# Patient Record
Sex: MALE | Race: WHITE | NOT HISPANIC OR LATINO | Employment: OTHER | URBAN - METROPOLITAN AREA
[De-identification: names, ages, dates, MRNs, and addresses within clinical notes are randomized per-mention and may not be internally consistent; named-entity substitution may affect disease eponyms.]

---

## 2017-01-10 ENCOUNTER — OFFICE VISIT (OUTPATIENT)
Dept: INTERNAL MEDICINE | Facility: CLINIC | Age: 62
End: 2017-01-10
Payer: MEDICARE

## 2017-01-10 VITALS
TEMPERATURE: 97 F | BODY MASS INDEX: 18.43 KG/M2 | HEART RATE: 77 BPM | HEIGHT: 71 IN | DIASTOLIC BLOOD PRESSURE: 58 MMHG | OXYGEN SATURATION: 98 % | WEIGHT: 131.63 LBS | SYSTOLIC BLOOD PRESSURE: 146 MMHG

## 2017-01-10 DIAGNOSIS — Z00.00 WELL ADULT EXAM: Primary | ICD-10-CM

## 2017-01-10 DIAGNOSIS — Z12.11 COLON CANCER SCREENING: ICD-10-CM

## 2017-01-10 DIAGNOSIS — Z72.0 NOT CONSIDERING QUITTING USE OF TOBACCO: ICD-10-CM

## 2017-01-10 DIAGNOSIS — I70.0 ATHEROSCLEROSIS OF AORTA: ICD-10-CM

## 2017-01-10 DIAGNOSIS — I73.9 PVD (PERIPHERAL VASCULAR DISEASE): ICD-10-CM

## 2017-01-10 DIAGNOSIS — I11.0 HYPERTENSIVE HEART DISEASE WITH HEART FAILURE: ICD-10-CM

## 2017-01-10 DIAGNOSIS — M05.741 RHEUMATOID ARTHRITIS INVOLVING BOTH HANDS WITH POSITIVE RHEUMATOID FACTOR: ICD-10-CM

## 2017-01-10 DIAGNOSIS — J34.3 NASAL TURBINATE HYPERTROPHY: ICD-10-CM

## 2017-01-10 DIAGNOSIS — R59.0 LOCALIZED ENLARGED LYMPH NODES: ICD-10-CM

## 2017-01-10 DIAGNOSIS — J44.9 CHRONIC OBSTRUCTIVE PULMONARY DISEASE, UNSPECIFIED COPD TYPE: ICD-10-CM

## 2017-01-10 DIAGNOSIS — M05.742 RHEUMATOID ARTHRITIS INVOLVING BOTH HANDS WITH POSITIVE RHEUMATOID FACTOR: ICD-10-CM

## 2017-01-10 PROCEDURE — 3077F SYST BP >= 140 MM HG: CPT | Mod: S$GLB,,, | Performed by: FAMILY MEDICINE

## 2017-01-10 PROCEDURE — 99499 UNLISTED E&M SERVICE: CPT | Mod: S$GLB,,, | Performed by: FAMILY MEDICINE

## 2017-01-10 PROCEDURE — 3078F DIAST BP <80 MM HG: CPT | Mod: S$GLB,,, | Performed by: FAMILY MEDICINE

## 2017-01-10 PROCEDURE — 99396 PREV VISIT EST AGE 40-64: CPT | Mod: S$GLB,,, | Performed by: FAMILY MEDICINE

## 2017-01-10 PROCEDURE — 99999 PR PBB SHADOW E&M-EST. PATIENT-LVL III: CPT | Mod: PBBFAC,,, | Performed by: FAMILY MEDICINE

## 2017-01-10 RX ORDER — AMLODIPINE BESYLATE 10 MG/1
10 TABLET ORAL DAILY
Qty: 30 TABLET | Refills: 11 | Status: SHIPPED | OUTPATIENT
Start: 2017-01-10 | End: 2017-03-15 | Stop reason: SDUPTHER

## 2017-01-10 RX ORDER — LUBIPROSTONE 24 UG/1
24 CAPSULE ORAL 2 TIMES DAILY WITH MEALS
Qty: 60 CAPSULE | Refills: 6 | Status: SHIPPED | OUTPATIENT
Start: 2017-01-10 | End: 2017-02-08

## 2017-01-10 RX ORDER — OMEPRAZOLE 20 MG/1
20 CAPSULE, DELAYED RELEASE ORAL 2 TIMES DAILY
Qty: 60 CAPSULE | Refills: 11 | Status: SHIPPED | OUTPATIENT
Start: 2017-01-10 | End: 2017-03-15

## 2017-01-10 RX ORDER — DOXAZOSIN 4 MG/1
4 TABLET ORAL NIGHTLY
Qty: 30 TABLET | Refills: 11 | Status: SHIPPED | OUTPATIENT
Start: 2017-01-10 | End: 2017-03-15 | Stop reason: SDUPTHER

## 2017-01-10 RX ORDER — DILTIAZEM HYDROCHLORIDE 240 MG/1
240 CAPSULE, COATED, EXTENDED RELEASE ORAL DAILY
Qty: 30 CAPSULE | Refills: 11 | Status: SHIPPED | OUTPATIENT
Start: 2017-01-10 | End: 2017-03-15 | Stop reason: SDUPTHER

## 2017-01-10 RX ORDER — GABAPENTIN 300 MG/1
300 CAPSULE ORAL 2 TIMES DAILY
Qty: 60 CAPSULE | Refills: 11 | Status: SHIPPED | OUTPATIENT
Start: 2017-01-10 | End: 2017-02-08

## 2017-01-10 RX ORDER — FLUTICASONE PROPIONATE 50 MCG
2 SPRAY, SUSPENSION (ML) NASAL DAILY
Qty: 1 BOTTLE | Refills: 11 | Status: SHIPPED | OUTPATIENT
Start: 2017-01-10

## 2017-01-10 RX ORDER — FUROSEMIDE 20 MG/1
TABLET ORAL
Qty: 30 TABLET | Refills: 11 | Status: SHIPPED | OUTPATIENT
Start: 2017-01-10 | End: 2017-02-08

## 2017-01-10 RX ORDER — TORSEMIDE 10 MG/1
10 TABLET ORAL DAILY
Qty: 30 TABLET | Refills: 11 | Status: SHIPPED | OUTPATIENT
Start: 2017-01-10 | End: 2017-02-08

## 2017-01-10 RX ORDER — FOLIC ACID 1 MG/1
1 TABLET ORAL DAILY
Qty: 30 TABLET | Refills: 11 | Status: SHIPPED | OUTPATIENT
Start: 2017-01-10 | End: 2017-02-08

## 2017-01-10 RX ORDER — PRAVASTATIN SODIUM 20 MG/1
TABLET ORAL
Qty: 30 TABLET | Refills: 11 | Status: SHIPPED | OUTPATIENT
Start: 2017-01-10 | End: 2017-03-15 | Stop reason: SDUPTHER

## 2017-01-10 RX ORDER — ALBUTEROL SULFATE 90 UG/1
AEROSOL, METERED RESPIRATORY (INHALATION)
Qty: 18 G | Refills: 11 | Status: SHIPPED | OUTPATIENT
Start: 2017-01-10 | End: 2018-01-18 | Stop reason: SDUPTHER

## 2017-01-10 RX ORDER — CLOPIDOGREL BISULFATE 75 MG/1
75 TABLET ORAL DAILY
Qty: 30 TABLET | Refills: 11 | Status: SHIPPED | OUTPATIENT
Start: 2017-01-10 | End: 2017-02-08

## 2017-01-10 RX ORDER — DILTIAZEM HYDROCHLORIDE 240 MG/1
CAPSULE, COATED, EXTENDED RELEASE ORAL
Qty: 30 CAPSULE | Refills: 6 | Status: SHIPPED | OUTPATIENT
Start: 2017-01-10 | End: 2017-03-15 | Stop reason: SDUPTHER

## 2017-01-10 RX ORDER — HYDRALAZINE HYDROCHLORIDE 100 MG/1
100 TABLET, FILM COATED ORAL 3 TIMES DAILY
Qty: 90 TABLET | Refills: 11 | Status: SHIPPED | OUTPATIENT
Start: 2017-01-10 | End: 2017-03-15 | Stop reason: SDUPTHER

## 2017-01-10 RX ORDER — LACTULOSE 10 G/15ML
SOLUTION ORAL; RECTAL
Qty: 300 ML | Refills: 6 | Status: SHIPPED | OUTPATIENT
Start: 2017-01-10 | End: 2017-12-07 | Stop reason: SDUPTHER

## 2017-01-10 NOTE — MR AVS SNAPSHOT
[facility]  [address] [place] [address]  Phone: [phone]                  Wallace Vigil   1/10/2017 8:20 AM   Office Visit    Description:  Male : 1955   Provider:  Mike Recinos MD   Department:  [facility]           Reason for Visit     Annual Exam     Medication Refill           Diagnoses this Visit        Comments    Localized enlarged lymph nodes    -  Primary     Hypertensive heart disease with heart failure         Rheumatoid arthritis involving both hands with positive rheumatoid factor         Chronic obstructive pulmonary disease, unspecified COPD type         Not considering quitting use of tobacco         Atherosclerosis of aorta         PVD (peripheral vascular disease)         Colon cancer screening                To Do List           Future Appointments        Provider Department Dept Phone    2017 8:40 AM LABORATORY, [facility] (5 Years of Data)     None      Follow-Up and Disposition     Return in about 6 months (around 7/10/2017).      [facility]     [facility] Nurse Care [facility] - /7 Assistance  Registered nurses in the [facility] provide clinical advisement, health education, appointment booking, and other advisory services.  Call for this free service at [phone].             Medications           Message regarding Medications     Verify the changes and/or additions to your medication regime listed below are the same as discussed with your clinician today.  If any of these changes or additions are incorrect, please notify your healthcare provider.        STOP taking these medications     benzonatate (TESSALON) 100 MG capsule Take 1 capsule (100 mg total) by mouth 3 (three) times daily as needed for Cough.           Verify that the below list of medications is an accurate representation of the medications you are currently taking.  If none reported, the  list may be blank. If incorrect, please contact your healthcare provider. Carry this list with you in case of emergency.           Current Medications     alprazolam (XANAX) 0.5 MG tablet Take 1 tablet (0.5 mg total) by mouth nightly as needed.    amlodipine (NORVASC) 10 MG tablet TAKE 1 TABLET BY MOUTH ONCE DAILY    ammonium lactate (AMLACTIN) 12 % lotion Use daily.  Apply to damp skin after bathing.    aspirin 81 mg Tab Take 1 tablet by mouth Daily. 1 Tablet Oral Every day    cloNIDine (CATAPRES) 0.1 MG tablet Take 1 tablet (0.1 mg total) by mouth 2 (two) times daily.    diltiazem (CARDIZEM CD) 240 MG 24 hr capsule Take 1 capsule (240 mg total) by mouth once daily.    doxazosin (CARDURA) 4 MG tablet TAKE 1 TABLET BY MOUTH EVERY EVENING    epinephrine (EPIPEN) 0.3 mg/0.3 mL (1:1,000) AtIn 1 Pen Injector Intramuscular once    ergocalciferol (ERGOCALCIFEROL) 50,000 unit Cap TAKE 1 CAPSULE BY MOUTH EVERY WEEK    fluticasone (FLONASE) 50 mcg/actuation nasal spray 2 sprays by Each Nare route once daily.    folic acid (FOLVITE) 1 MG tablet Take 1 tablet (1 mg total) by mouth once daily.    furosemide (LASIX) 20 MG tablet 1 Tablet Oral Every day    gabapentin (NEURONTIN) 300 MG capsule Take 1 capsule by mouth 2 (two) times daily.    hydrALAZINE (APRESOLINE) 100 MG tablet TAKE 1 TABLET BY MOUTH 3 TIMES DAILY    hydrocodone-acetaminophen 10-325mg (NORCO)  mg Tab     lactulose (CHRONULAC) 10 gram/15 mL solution Take 30 ml every 12 hours PRN to achieve a good bowel movement    lubiprostone (AMITIZA) 24 MCG Cap Take 1 capsule (24 mcg total) by mouth 2 (two) times daily with meals.    methotrexate 2.5 MG Tab Take 4 tablets (10 mg total) by mouth every 7 days.    omeprazole (PRILOSEC) 20 MG capsule Take 1 capsule (20 mg total) by mouth 2 (two) times daily.    pravastatin (PRAVACHOL) 20 MG tablet TAKE 1 TABLET BY MOUTH AT BEDTIME TO LOWER CHOLESTEROL    predniSONE (DELTASONE) 5 MG tablet Take 1 tablet (5 mg total) by mouth  "once daily.    torsemide (DEMADEX) 10 MG Tab Take 1 tablet (10 mg total) by mouth once daily.    VENTOLIN HFA 90 mcg/actuation inhaler INHALE 2 PUFFS BY MOUTH EVERY 4 HOURS IF NEEDED FOR WHEEZING    clopidogrel (PLAVIX) 75 mg tablet Take 1 tablet by mouth once daily.           Clinical Reference Information           Vital Signs - Last Recorded  Most recent update: 1/10/2017  8:14 AM by Clark Chow MA    BP Pulse Temp Ht Wt SpO2    (!) 146/58 77 97.1 °F (36.2 °C) (Tympanic) 5' 11" (1.803 m) 59.7 kg (131 lb 9.8 oz) 98%    BMI                18.36 kg/m2          Blood Pressure          Most Recent Value    BP  (!)  146/58      Allergies as of 1/10/2017     Diovan  [Valsartan]    Levofloxacin    Lisinopril      Immunizations Administered on Date of Encounter - 1/10/2017     None      Orders Placed During Today's Visit      Normal Orders This Visit    Ambulatory Referral to Hematology / Oncology     Case request GI: COLONOSCOPY     Future Labs/Procedures Expected by Expires    CBC auto differential  1/10/2017 (Approximate) 2/9/2017    Comprehensive metabolic panel  1/10/2017 (Approximate) 3/11/2018    Hemoglobin A1c  1/10/2017 (Approximate) 3/11/2018    Lipid panel  1/10/2017 (Approximate) 3/11/2017    PSA, Screening  1/10/2017 4/10/2017      [facility] Sign-Up     Activating your [facility] account is as easy as 1-2-3!     1) Visit [email or web address], select Sign Up Now, enter this activation code and your date of birth, then select Next.  [email or web address]  Expires: 2/24/2017  9:03 AM      2) Create a username and password to use when you visit [facility] in the future and select a security question in case you lose your password and select Next.    3) Enter your e-mail address and click Sign Up!    Additional Information  If you have questions, please e-mail [email or web address] or call [phone] to talk to our [facility] staff. Remember, [facility] is NOT to be used for urgent needs. For medical emergencies, dial 911. "         Smoking Cessation     If you would like to quit smoking:   You may be eligible for free services if you are a [place] resident and started smoking cigarettes before September 1, 1988.  Call the [employer]oking [employer]ess[employer] ([employer]) toll free at (971) 358-7215 or (777) 882-1617.   Call 0-800-QUIT-NOW if you do not meet the above criteria.

## 2017-01-10 NOTE — PROGRESS NOTES
Chief Complaint:    Chief Complaint   Patient presents with    Annual Exam    Medication Refill       History of Present Illness:  This is a for follow-up of chronic medical problems,  He has risen hypertension which appears to be well controlled today on multiple medication.  He has rheumatoid arthritis under the care of rheumatology is not been taking methotrexate and he does not want to take it only takes prednisone.    His carotid stenosis he has had carotid endarterectomy on one side and is being followed by vascular surgeon plans on getting it on the other side as well.  He also has significant peripheral vascular disease but is asymptomatic he continues to smoke and has absolutely no desire to quit.  Also has COPD.    On a routine ED visit he had a CAT scan of abdomen that showed an enlarged inguinal lymph node although patient does not complain of any swelling in the inguinal region this has not been followed up on.      ROS:  Review of Systems   Constitutional: Negative for activity change, chills, fatigue, fever and unexpected weight change.   HENT: Negative for congestion, ear discharge, ear pain, hearing loss, postnasal drip and rhinorrhea.    Eyes: Negative for pain and visual disturbance.   Respiratory: Negative for cough, chest tightness and shortness of breath.    Cardiovascular: Negative for chest pain and palpitations.   Gastrointestinal: Negative for abdominal pain, diarrhea and vomiting.   Endocrine: Negative for heat intolerance.   Genitourinary: Negative for dysuria, flank pain, frequency and hematuria.   Musculoskeletal: Negative for back pain, gait problem and neck pain.   Skin: Negative for color change and rash.   Neurological: Negative for dizziness, tremors, seizures, numbness and headaches.   Psychiatric/Behavioral: Negative for agitation, hallucinations, self-injury, sleep disturbance and suicidal ideas. The patient is not nervous/anxious.        Past Medical History   Diagnosis  "Date    Anxiety     Arthritis     Asthma     Atherosclerosis of native arteries of the extremities with intermittent claudication     Benign hypertensive heart disease without heart failure     Carotid artery occlusion     Chronic kidney disease     Coronary artery disease     GERD (gastroesophageal reflux disease)     History of aorto-femoral bypass 8/16/2013    Hyperlipidemia     Hypertension     PVD (peripheral vascular disease)     PVD (peripheral vascular disease) 8/16/2013    Renal artery stenosis 8/16/2013    Renovascular hypertension 8/16/2013    Shingles sept 2015    Stenosis of left subclavian artery 8/16/2013    Tobacco abuse        Social History:  Social History     Social History    Marital status:      Spouse name: N/A    Number of children: 2    Years of education: N/A     Social History Main Topics    Smoking status: Current Every Day Smoker     Packs/day: 1.00     Years: 40.00     Types: Cigarettes    Smokeless tobacco: Never Used    Alcohol use No    Drug use: No    Sexual activity: No     Other Topics Concern    None     Social History Narrative       Family History:   family history includes Heart disease in his mother; Heart failure in his mother; Hyperlipidemia in his father and mother; Hypertension in his mother.    Health Maintenance   Topic Date Due    Colonoscopy  05/07/2014    PROSTATE-SPECIFIC ANTIGEN  03/26/2016    Influenza Vaccine  08/01/2016    Lipid Panel  10/06/2016    TETANUS VACCINE  06/01/2019    Pneumococcal PPSV23 (Medium Risk) (2) 02/16/2021    Hepatitis C Screening  Completed    Zoster Vaccine  Completed       Physical Exam:    Vital Signs  Temp: 97.1 °F (36.2 °C)  Temp src: Tympanic  Pulse: 77  SpO2: 98 %  BP: (!) 146/58  Pain Score: 0-No pain  Height and Weight  Height: 5' 11" (180.3 cm)  Weight: 59.7 kg (131 lb 9.8 oz)  BSA (Calculated - sq m): 1.73 sq meters  BMI (Calculated): 18.4  Weight in (lb) to have BMI = 25: " 178.9]    Body mass index is 18.36 kg/(m^2).    Physical Exam   Constitutional: He is oriented to person, place, and time. He appears well-developed.   HENT:   Mouth/Throat: Oropharynx is clear and moist.   Eyes: Conjunctivae are normal. Pupils are equal, round, and reactive to light.   Neck: Normal range of motion. Neck supple.   Cardiovascular: Normal rate, regular rhythm and normal heart sounds.    No murmur heard.  Pulmonary/Chest: Effort normal and breath sounds normal. No respiratory distress. He has no wheezes. He has no rales. He exhibits no tenderness.   Abdominal: Soft. He exhibits no distension and no mass. There is no tenderness. There is no guarding.   Genitourinary:   Genitourinary Comments: Refused  exam.   Musculoskeletal: He exhibits no edema or tenderness.   Lymphadenopathy:     He has no cervical adenopathy.   Neurological: He is alert and oriented to person, place, and time. He has normal reflexes.   Skin: Skin is warm and dry.   Psychiatric: He has a normal mood and affect. His behavior is normal. Judgment and thought content normal.       Lab Results   Component Value Date    CHOL 128 10/06/2015    CHOL 139 03/26/2015    CHOL 127 08/08/2014    TRIG 78 10/06/2015    TRIG 127 03/26/2015    TRIG 79 08/08/2014    HDL 40 10/06/2015    HDL 37 (L) 03/26/2015    HDL 47 08/08/2014    TOTALCHOLEST 3.2 10/06/2015    TOTALCHOLEST 3.8 03/26/2015    TOTALCHOLEST 2.7 08/08/2014    NONHDLCHOL 88 10/06/2015    NONHDLCHOL 102 03/26/2015    NONHDLCHOL 80 08/08/2014       Lab Results   Component Value Date    HGBA1C 4.7 10/06/2015       Assessment:      ICD-10-CM ICD-9-CM   1. Well adult exam Z00.00 V70.0   2. Localized enlarged lymph nodes R59.0 785.6   3. Hypertensive heart disease with heart failure I11.0 402.91     428.9   4. Rheumatoid arthritis involving both hands with positive rheumatoid factor M05.741 714.0    M05.742    5. Chronic obstructive pulmonary disease, unspecified COPD type J44.9 496   6. Not  considering quitting use of tobacco Z72.0 305.1   7. Atherosclerosis of aorta I70.0 440.0   8. PVD (peripheral vascular disease) I73.9 443.9   9. Colon cancer screening Z12.11 V76.51   10. Nasal turbinate hypertrophy J34.3 478.0         Plan:  1.  Localized inguinal lymph node based on a CAT scan, patient refused  examination today.  Refer to hematology/oncology for further workup.  2.  Hypertension stable continue current meds  3.  Rheumatoid arthritis not on methotrexate anymore refusing to take it please continue follow with rheumatology.  4.  COPD stable  5.  Tobaccoism not interested in smoking cessation understands the hazards involved.  6.  Atherosclerosis of the aorta, peripheral vascular disease please work on modifying risk factors for blood pressure control in smoking cessation are important.  7.  He is due for colon cancer screening we will send in a request.  Follow-up 6 months.  Orders Placed This Encounter   Procedures    CBC auto differential    Comprehensive metabolic panel    Lipid panel    PSA, Screening    Hemoglobin A1c    Ambulatory Referral to Hematology / Oncology       Current Outpatient Prescriptions   Medication Sig Dispense Refill    alprazolam (XANAX) 0.5 MG tablet Take 1 tablet (0.5 mg total) by mouth nightly as needed. 30 tablet 1    amlodipine (NORVASC) 10 MG tablet TAKE 1 TABLET BY MOUTH ONCE DAILY 30 tablet 6    ammonium lactate (AMLACTIN) 12 % lotion Use daily.  Apply to damp skin after bathing. 225 g 11    aspirin 81 mg Tab Take 1 tablet by mouth Daily. 1 Tablet Oral Every day      cloNIDine (CATAPRES) 0.1 MG tablet Take 1 tablet (0.1 mg total) by mouth 2 (two) times daily. 60 tablet 0    diltiazem (CARDIZEM CD) 240 MG 24 hr capsule Take 1 capsule (240 mg total) by mouth once daily. 30 capsule 6    doxazosin (CARDURA) 4 MG tablet TAKE 1 TABLET BY MOUTH EVERY EVENING 30 tablet 5    epinephrine (EPIPEN) 0.3 mg/0.3 mL (1:1,000) AtIn 1 Pen Injector Intramuscular once 1  Device 5    ergocalciferol (ERGOCALCIFEROL) 50,000 unit Cap TAKE 1 CAPSULE BY MOUTH EVERY WEEK 4 capsule 6    fluticasone (FLONASE) 50 mcg/actuation nasal spray 2 sprays by Each Nare route once daily. 1 Bottle 12    folic acid (FOLVITE) 1 MG tablet Take 1 tablet (1 mg total) by mouth once daily. 30 tablet 11    furosemide (LASIX) 20 MG tablet 1 Tablet Oral Every day 30 tablet 6    gabapentin (NEURONTIN) 300 MG capsule Take 1 capsule by mouth 2 (two) times daily.      hydrALAZINE (APRESOLINE) 100 MG tablet TAKE 1 TABLET BY MOUTH 3 TIMES DAILY 90 tablet 12    hydrocodone-acetaminophen 10-325mg (NORCO)  mg Tab       lactulose (CHRONULAC) 10 gram/15 mL solution Take 30 ml every 12 hours PRN to achieve a good bowel movement 300 mL 6    lubiprostone (AMITIZA) 24 MCG Cap Take 1 capsule (24 mcg total) by mouth 2 (two) times daily with meals. 60 capsule 6    methotrexate 2.5 MG Tab Take 4 tablets (10 mg total) by mouth every 7 days. 20 tablet 0    omeprazole (PRILOSEC) 20 MG capsule Take 1 capsule (20 mg total) by mouth 2 (two) times daily. 60 capsule 6    pravastatin (PRAVACHOL) 20 MG tablet TAKE 1 TABLET BY MOUTH AT BEDTIME TO LOWER CHOLESTEROL 30 tablet 11    predniSONE (DELTASONE) 5 MG tablet Take 1 tablet (5 mg total) by mouth once daily. 60 tablet 2    torsemide (DEMADEX) 10 MG Tab Take 1 tablet (10 mg total) by mouth once daily. 30 tablet 11    VENTOLIN HFA 90 mcg/actuation inhaler INHALE 2 PUFFS BY MOUTH EVERY 4 HOURS IF NEEDED FOR WHEEZING 18 g 11    clopidogrel (PLAVIX) 75 mg tablet Take 1 tablet by mouth once daily.       No current facility-administered medications for this visit.        Medications Discontinued During This Encounter   Medication Reason    benzonatate (TESSALON) 100 MG capsule Patient no longer taking       Return in about 6 months (around 7/10/2017).      Dr Mike Recinos MD    Disclaimer: This note is prepared using voice recognition system and as such is likely to have  errors and is not proof read.

## 2017-01-16 ENCOUNTER — TELEPHONE (OUTPATIENT)
Dept: HEMATOLOGY/ONCOLOGY | Facility: CLINIC | Age: 62
End: 2017-01-16

## 2017-01-17 ENCOUNTER — LAB VISIT (OUTPATIENT)
Dept: LAB | Facility: HOSPITAL | Age: 62
End: 2017-01-17
Attending: FAMILY MEDICINE
Payer: MEDICARE

## 2017-01-17 DIAGNOSIS — M05.742 RHEUMATOID ARTHRITIS INVOLVING BOTH HANDS WITH POSITIVE RHEUMATOID FACTOR: ICD-10-CM

## 2017-01-17 DIAGNOSIS — I11.0 HYPERTENSIVE HEART DISEASE WITH HEART FAILURE: ICD-10-CM

## 2017-01-17 DIAGNOSIS — M05.741 RHEUMATOID ARTHRITIS INVOLVING BOTH HANDS WITH POSITIVE RHEUMATOID FACTOR: ICD-10-CM

## 2017-01-17 LAB
ALBUMIN SERPL BCP-MCNC: 3.1 G/DL
ALP SERPL-CCNC: 76 U/L
ALT SERPL W/O P-5'-P-CCNC: 12 U/L
ANION GAP SERPL CALC-SCNC: 9 MMOL/L
AST SERPL-CCNC: 15 U/L
BASOPHILS # BLD AUTO: 0.02 K/UL
BASOPHILS NFR BLD: 0.4 %
BILIRUB SERPL-MCNC: 0.3 MG/DL
BUN SERPL-MCNC: 27 MG/DL
CALCIUM SERPL-MCNC: 8.7 MG/DL
CHLORIDE SERPL-SCNC: 106 MMOL/L
CHOLEST/HDLC SERPL: 3 {RATIO}
CO2 SERPL-SCNC: 21 MMOL/L
COMPLEXED PSA SERPL-MCNC: 1 NG/ML
CREAT SERPL-MCNC: 1.7 MG/DL
DIFFERENTIAL METHOD: ABNORMAL
EOSINOPHIL # BLD AUTO: 0.2 K/UL
EOSINOPHIL NFR BLD: 2.8 %
ERYTHROCYTE [DISTWIDTH] IN BLOOD BY AUTOMATED COUNT: 14.1 %
EST. GFR  (AFRICAN AMERICAN): 49.6 ML/MIN/1.73 M^2
EST. GFR  (NON AFRICAN AMERICAN): 42.9 ML/MIN/1.73 M^2
GLUCOSE SERPL-MCNC: 99 MG/DL
HCT VFR BLD AUTO: 32.2 %
HDL/CHOLESTEROL RATIO: 33.6 %
HDLC SERPL-MCNC: 107 MG/DL
HDLC SERPL-MCNC: 36 MG/DL
HGB BLD-MCNC: 11 G/DL
LDLC SERPL CALC-MCNC: 56.4 MG/DL
LYMPHOCYTES # BLD AUTO: 0.7 K/UL
LYMPHOCYTES NFR BLD: 12 %
MCH RBC QN AUTO: 28.2 PG
MCHC RBC AUTO-ENTMCNC: 34.2 %
MCV RBC AUTO: 83 FL
MONOCYTES # BLD AUTO: 0.5 K/UL
MONOCYTES NFR BLD: 8.5 %
NEUTROPHILS # BLD AUTO: 4.3 K/UL
NEUTROPHILS NFR BLD: 76.1 %
NONHDLC SERPL-MCNC: 71 MG/DL
PLATELET # BLD AUTO: 160 K/UL
PMV BLD AUTO: 9.7 FL
POTASSIUM SERPL-SCNC: 4.1 MMOL/L
PROT SERPL-MCNC: 7.2 G/DL
RBC # BLD AUTO: 3.9 M/UL
SODIUM SERPL-SCNC: 136 MMOL/L
TRIGL SERPL-MCNC: 73 MG/DL
WBC # BLD AUTO: 5.66 K/UL

## 2017-01-17 PROCEDURE — 83036 HEMOGLOBIN GLYCOSYLATED A1C: CPT

## 2017-01-17 PROCEDURE — 80053 COMPREHEN METABOLIC PANEL: CPT

## 2017-01-17 PROCEDURE — 36415 COLL VENOUS BLD VENIPUNCTURE: CPT | Mod: PO

## 2017-01-17 PROCEDURE — 84153 ASSAY OF PSA TOTAL: CPT

## 2017-01-17 PROCEDURE — 80061 LIPID PANEL: CPT

## 2017-01-17 PROCEDURE — 85025 COMPLETE CBC W/AUTO DIFF WBC: CPT

## 2017-01-18 DIAGNOSIS — M05.742 RHEUMATOID ARTHRITIS INVOLVING BOTH HANDS WITH POSITIVE RHEUMATOID FACTOR: ICD-10-CM

## 2017-01-18 DIAGNOSIS — M05.741 RHEUMATOID ARTHRITIS INVOLVING BOTH HANDS WITH POSITIVE RHEUMATOID FACTOR: ICD-10-CM

## 2017-01-18 LAB
ESTIMATED AVG GLUCOSE: 91 MG/DL
HBA1C MFR BLD HPLC: 4.8 %

## 2017-01-18 RX ORDER — PREDNISONE 5 MG/1
TABLET ORAL
Qty: 60 TABLET | Refills: 2 | Status: SHIPPED | OUTPATIENT
Start: 2017-01-18 | End: 2017-02-08

## 2017-01-18 RX ORDER — CLONIDINE HYDROCHLORIDE 0.1 MG/1
TABLET ORAL
Qty: 60 TABLET | Refills: 2 | Status: SHIPPED | OUTPATIENT
Start: 2017-01-18 | End: 2017-03-15 | Stop reason: SDUPTHER

## 2017-01-20 ENCOUNTER — TELEPHONE (OUTPATIENT)
Dept: INTERNAL MEDICINE | Facility: CLINIC | Age: 62
End: 2017-01-20

## 2017-01-20 NOTE — TELEPHONE ENCOUNTER
----- Message from Lasha Pagan sent at 1/20/2017  2:20 PM CST -----  Contact: Pt  Pt states he is returning nurse call, please contact pt at [phone]

## 2017-01-23 ENCOUNTER — TELEPHONE (OUTPATIENT)
Dept: INTERNAL MEDICINE | Facility: CLINIC | Age: 62
End: 2017-01-23

## 2017-01-23 NOTE — TELEPHONE ENCOUNTER
----- Message from Yoselin Chavez sent at 1/23/2017 11:44 AM CST -----  Contact: pt   States he is rtn nurses call rg his labs and can be reached at [phone]//thanks/dbw

## 2017-01-23 NOTE — TELEPHONE ENCOUNTER
----- Message from Mike Recinos MD sent at 1/18/2017 10:39 PM CST -----  She is still anemic, probably related to the chronic kidney disease.  Kidney function is impaired but stable.  Blood sugars are okay.

## 2017-01-30 ENCOUNTER — LAB VISIT (OUTPATIENT)
Dept: LAB | Facility: HOSPITAL | Age: 62
End: 2017-01-30
Attending: INTERNAL MEDICINE
Payer: OTHER GOVERNMENT

## 2017-01-30 ENCOUNTER — INITIAL CONSULT (OUTPATIENT)
Dept: HEMATOLOGY/ONCOLOGY | Facility: CLINIC | Age: 62
End: 2017-01-30
Payer: MEDICARE

## 2017-01-30 VITALS
HEIGHT: 71 IN | DIASTOLIC BLOOD PRESSURE: 70 MMHG | BODY MASS INDEX: 18.52 KG/M2 | TEMPERATURE: 98 F | HEART RATE: 61 BPM | WEIGHT: 132.25 LBS | SYSTOLIC BLOOD PRESSURE: 120 MMHG | OXYGEN SATURATION: 98 % | RESPIRATION RATE: 18 BRPM

## 2017-01-30 DIAGNOSIS — R63.4 WEIGHT LOSS: ICD-10-CM

## 2017-01-30 DIAGNOSIS — Z11.4 ENCOUNTER FOR SCREENING FOR HIV: ICD-10-CM

## 2017-01-30 DIAGNOSIS — D68.9 COAGULATION DEFECT: ICD-10-CM

## 2017-01-30 DIAGNOSIS — R59.9 ENLARGED LYMPH NODE: Primary | ICD-10-CM

## 2017-01-30 DIAGNOSIS — D64.9 NORMOCYTIC ANEMIA, NOT DUE TO BLOOD LOSS: ICD-10-CM

## 2017-01-30 DIAGNOSIS — R91.1 SOLITARY PULMONARY NODULE: ICD-10-CM

## 2017-01-30 DIAGNOSIS — R23.3 EASY BRUISING: ICD-10-CM

## 2017-01-30 DIAGNOSIS — R59.9 ENLARGED LYMPH NODE: ICD-10-CM

## 2017-01-30 DIAGNOSIS — K59.09 OTHER CONSTIPATION: ICD-10-CM

## 2017-01-30 LAB
APTT BLDCRRT: 39.1 SEC
FERRITIN SERPL-MCNC: 76 NG/ML
HAPTOGLOB SERPL-MCNC: 146 MG/DL
INR PPP: 1
IRON SERPL-MCNC: 39 UG/DL
LDH SERPL L TO P-CCNC: 180 U/L
PROTHROMBIN TIME: 10.9 SEC
SATURATED IRON: 13 %
TOTAL IRON BINDING CAPACITY: 306 UG/DL
TRANSFERRIN SERPL-MCNC: 207 MG/DL
URATE SERPL-MCNC: 7.3 MG/DL

## 2017-01-30 PROCEDURE — 3078F DIAST BP <80 MM HG: CPT | Mod: S$GLB,,, | Performed by: INTERNAL MEDICINE

## 2017-01-30 PROCEDURE — 85730 THROMBOPLASTIN TIME PARTIAL: CPT

## 2017-01-30 PROCEDURE — 99999 PR PBB SHADOW E&M-EST. PATIENT-LVL V: CPT | Mod: PBBFAC,,, | Performed by: INTERNAL MEDICINE

## 2017-01-30 PROCEDURE — 86703 HIV-1/HIV-2 1 RESULT ANTBDY: CPT

## 2017-01-30 PROCEDURE — 84550 ASSAY OF BLOOD/URIC ACID: CPT

## 2017-01-30 PROCEDURE — 83615 LACTATE (LD) (LDH) ENZYME: CPT

## 2017-01-30 PROCEDURE — 85610 PROTHROMBIN TIME: CPT

## 2017-01-30 PROCEDURE — 83010 ASSAY OF HAPTOGLOBIN QUANT: CPT

## 2017-01-30 PROCEDURE — 82728 ASSAY OF FERRITIN: CPT

## 2017-01-30 PROCEDURE — 36415 COLL VENOUS BLD VENIPUNCTURE: CPT

## 2017-01-30 PROCEDURE — 1159F MED LIST DOCD IN RCRD: CPT | Mod: S$GLB,,, | Performed by: INTERNAL MEDICINE

## 2017-01-30 PROCEDURE — 3074F SYST BP LT 130 MM HG: CPT | Mod: S$GLB,,, | Performed by: INTERNAL MEDICINE

## 2017-01-30 PROCEDURE — 83540 ASSAY OF IRON: CPT

## 2017-01-30 PROCEDURE — 99499 UNLISTED E&M SERVICE: CPT | Mod: S$GLB,,, | Performed by: INTERNAL MEDICINE

## 2017-01-30 PROCEDURE — 99205 OFFICE O/P NEW HI 60 MIN: CPT | Mod: S$GLB,,, | Performed by: INTERNAL MEDICINE

## 2017-01-30 RX ORDER — AMOXICILLIN 250 MG
2 CAPSULE ORAL 2 TIMES DAILY
Qty: 60 TABLET | Refills: 9 | Status: SHIPPED | OUTPATIENT
Start: 2017-01-30 | End: 2017-02-08

## 2017-01-30 NOTE — PROGRESS NOTES
Hematology/Oncology Consult Note    Reason for Consult: Enlarged LNs    Consult requested by: Dr. Young, Primary care    History of Present Illness:  Mr. Vigil is a 60 y/o male with Rheumatoid arthritis (RA), CKD, PVD referred for evaluation of enlarged R inguinal LN. He reports weight loss from 230 - 110 lbs over past year or more. No fevers, chills, sweats. He states he has a good appetite and eats all day long. He has intermittent abdominal pain if he overeats. He has chronic problems with intermittent constipation, but he denies pencil thin stools. No melena/hematochezia. He did undergo abd/pelvis CT scan in 9/2016 for workup of weight loss, with no specific abnormalities found aside from 12mm R inguinal LN and mild splenomegaly. For his RA, he reports past treatment with Gold, which he believes messed up his skin and possibly his kidneys. He refuses any DMARDs for RA but was started on MTX by Dr. Ya in June 2016. He also reports easy bruising and easy skin bleeding in his hands and distal arms. He was taken off of Plavix for this reason, but he and his wife do not feel it has improved at all.    ROS:  General:  No fever/chills, fatigue, night sweats +significant weight loss over the past 1-2 yrs  Eyes: No vision problems, pain or inflammation.   Ears/Nose: No difficultly hearing, ear pain, rhinorrhea, or epistaxis  Oropharynx: No ulcers, dysphagia, or odynophagia  Cardiovascular: No chest pains, sob, PND or dyspnea on exertion  Pulmonary: No cough, sob, hemoptysis  Gastrointestional: No n/v/d, melena, hematochezia, or change in bowel habits +chronic constipation  : No dysuria, hematuria, pelvic pain or flank pain  Musculoskeletal: No myalgias, weakness, or arthralgias  Neurological: No headaches, focal deficits, or seizure activity  Endocrine: No heat or cold intolerance   Skin: No rashes pruritus, or lesions +Easy bruising and skin bleeding  Psychiatric: No symptoms of mood disorders  Heme/Lymph:  No lymph node enlargment    Past Medical History   Diagnosis Date    Anxiety     Arthritis     Asthma     Atherosclerosis of native arteries of the extremities with intermittent claudication     Benign hypertensive heart disease without heart failure     Carotid artery occlusion     Chronic kidney disease     Coronary artery disease     GERD (gastroesophageal reflux disease)     History of aorto-femoral bypass 8/16/2013    Hyperlipidemia     Hypertension     PVD (peripheral vascular disease)     PVD (peripheral vascular disease) 8/16/2013    Renal artery stenosis 8/16/2013    Renovascular hypertension 8/16/2013    Shingles sept 2015    Stenosis of left subclavian artery 8/16/2013    Tobacco abuse      Past Surgical History:  1. Renal artery bypass in 2013 at [facility]    Social History: . 2 children. Smokes 1ppd for past 45 yrs. No EtOH. No illicit drugs. Does have tattoo placed in [place].    Family History: family history includes Heart disease in his mother; Heart failure in his mother; Hyperlipidemia in his father and mother; Hypertension in his mother.    Physical Exam:  Vitals:    01/30/17 1110   BP: 120/70   Pulse: 61   Resp: 18   Temp: 98.1 °F (36.7 °C)     Body mass index is 18.45 kg/(m^2).  General:  AAOx4, no acute distress, thin  HEENT: EOMI. Normocephalic and atraumatic. No maxillary sinus tenderness. External auditory canals clear and TMs intact without lesions. Nasal and oral mucosal membranes moist. Normal dentition and gums.   Neck: no LAD, thyromegaly, normal ROM  Pulmonary: Bilaterally clear to auscultation, Normal effort with no accessory muscle use, no wheezes/rales/rhonchi  CV: Normal rate, regular rhythm, no murmurs/rubs/gallops, no edema  ABD:  Soft, nontender, nondistended, no mass, and without hepatosplenomegaly   Ext: No clubbing, cyanosis, or edema, normal ROM Soft lipoma/cyst on left elbow  Skin: No rashes, lesions. Thin skin. Multiple rheumatoid  nodules on wrists.   Neurological: No focal deficits, CN II to XII grossly intact, normal coordination    Psychiatric:  Normal mood, affect and judgement  Hem/Lymph:  No submandibular, cervical, supraclavicular, axillary LAD. Bilateral small shotty inguinal LAD noted, R > L.    Labs:    Lab Results   Component Value Date    WBC 5.66 01/17/2017    HGB 11.0 (L) 01/17/2017    HCT 32.2 (L) 01/17/2017    MCV 83 01/17/2017     01/17/2017     Lab Results   Component Value Date     01/17/2017    K 4.1 01/17/2017     01/17/2017    CO2 21 (L) 01/17/2017    BUN 27 (H) 01/17/2017    CREATININE 1.7 (H) 01/17/2017    CALCIUM 8.7 01/17/2017    ANIONGAP 9 01/17/2017    ESTGFRAFRICA 49.6 (A) 01/17/2017    EGFRNONAA 42.9 (A) 01/17/2017     Lab Results   Component Value Date    ALT 12 01/17/2017    AST 15 01/17/2017    ALKPHOS 76 01/17/2017    BILITOT 0.3 01/17/2017       Lab Results   Component Value Date    IRON <10 (L) 04/09/2013    FERRITIN 181 04/09/2013     Lab Results   Component Value Date    DFBFOVBC51 319 04/09/2013     Lab Results   Component Value Date    FOLATE 3.7 (L) 04/09/2013     Lab Results   Component Value Date    TSH 2.685 03/07/2013       Imaging:     CT 9/26/16:  1.  There is hyperdense material in the dependent portion of the gallbladder.  This is characteristic of cholelithiasis.  2.  The spleen is enlarged.  It measures 15.1 cm in length.  3.  The right kidney is atrophic.  There is a 2 mm nonobstructive stone in the inferior pole of the left kidney.  There are exophytic hypodense and hyperdense masses associated with the kidneys.  This may represent hemorrhagic and nonhemorrhagic cysts.  One of the largest one measures 7.3 cm in size and has a Hounsfield measurement of 4.  This is characteristic of a cyst.  If additional imaging evaluation is clinically indicated, high recommend consideration of a nonemergent ultrasound examination of the kidneys.  4.  There is a moderate amount of fecal  "material within the ascending, transverse, and descending portions of the colon.  5.  There is a small amount of free fluid within the pelvis.  6.  There is a moderate amount of atherosclerosis.  There is an aortic biiliac graft in place.  There is an aortic biiliac stent in the native arteries.  7.  There is an enlarged lymph node in the right inguinal region.  It has a short axis measurement of 12 mm. This is characteristic of an infectious or neoplastic process.    Colonoscopy 2013:  - Preparation of the colon was poor.                        - Internal hemorrhoids.                        - One 3 mm polyp in the transverse colon. Resected                         and retrieved.                        - Melanosis in the colon.    Assessment / Plan:  Wallace Vigil is a 61 y.o. male who comes in with     1. Enlarged inguinal LNs bilateral: Given bilateral inguinal LNs palpable on exam, this may represent progression of the process seen on CT in 9/2016. I recommend evaluating further with a PET scan at this time. If hypermetabolic on PET, I recommend proceeding with biopsy. On the other hand, given lack of treatment for Rheumatoid arthritis, these could also be Rheumatoid nodules.   -- PET and follow up afterwards  -- LDH, uric acid    2. Normocytic anemia: Decrease in Hgb over the past few months.  -- Check iron profile with ferritin, LDH, haptoglobin, SPEP    3. Splenomegaly: Mild with unclear etiology. This could also be related to Rheumatoid arthritis.     4. Rheumatoid arthritis: Treated with "gold treatments" in the past. Currently seeing Dr. Ya and refuses DMARDs. He is currently on MTX 2.5mg once a week.    5. Constipation: Takes Lactulose as needed for constipation.  -- Instructed pt to take Docusate 100mg bid  -- Can take Miralax or Lactulose as needed for constipation.  -- Drink 1.5 liters of water per day    6. Weight loss: Over 100-lbs lost over the past 2 yrs. No obvious malignancy seen on CT " imaging. Working up inguinal LAD as noted above. It is also possible that this weight loss is due to Rheumatoid arthritis.    7. Easy bruising: Likely due to thinning skin. However, given past elevated PTT, will recheck coags at this time.    8. Pulmonary nodule: 4mm on chest CT. PET can further evaluate this lesion as well.    Sharron Guido M.D.  Hematology Oncology

## 2017-01-30 NOTE — LETTER
January 30, 2017      Mike Recinos MD  [address] [address] [address]           [facility] - Hematology Oncology  [id number] [place] [id number]  Phone: [phone]  Fax: [phone]          Patient: Wallace Vigil   MR Number: [id number]   YOB: 1955   Date of Visit: 1/30/2017       Dear Dr. Mike Recinos:    Thank you for referring Wallace Vigil to me for evaluation. Attached you will find relevant portions of my assessment and plan of care.    If you have questions, please do not hesitate to call me. I look forward to following Wallace Vigil along with you.    Sincerely,    Sharron Guido MD    Enclosure  CC:  No Recipients    If you would like to receive this communication electronically, please contact [email or web address] or [phone] to request more information on [facility] access.    For providers and/or their staff who would like to refer a patient to [facility], please contact us through our one-stop-shop provider referral line, [facility] , at [phone].    If you feel you have received this communication in error or would no longer like to receive these types of communications, please e-mail [email or web address]

## 2017-01-31 LAB — HIV 1+2 AB+HIV1 P24 AG SERPL QL IA: NEGATIVE

## 2017-02-06 ENCOUNTER — OFFICE VISIT (OUTPATIENT)
Dept: HEMATOLOGY/ONCOLOGY | Facility: CLINIC | Age: 62
End: 2017-02-06
Payer: MEDICARE

## 2017-02-06 ENCOUNTER — HOSPITAL ENCOUNTER (OUTPATIENT)
Dept: RADIOLOGY | Facility: HOSPITAL | Age: 62
Discharge: HOME | End: 2017-02-06
Attending: INTERNAL MEDICINE
Payer: MEDICARE

## 2017-02-06 VITALS
TEMPERATURE: 98 F | DIASTOLIC BLOOD PRESSURE: 70 MMHG | HEART RATE: 63 BPM | HEIGHT: 71 IN | SYSTOLIC BLOOD PRESSURE: 114 MMHG | BODY MASS INDEX: 18.52 KG/M2 | WEIGHT: 132.25 LBS | OXYGEN SATURATION: 96 % | RESPIRATION RATE: 18 BRPM

## 2017-02-06 DIAGNOSIS — R91.1 SOLITARY PULMONARY NODULE: ICD-10-CM

## 2017-02-06 DIAGNOSIS — R16.1 SPLENOMEGALY: ICD-10-CM

## 2017-02-06 DIAGNOSIS — E07.9 THYROID MASS: Primary | ICD-10-CM

## 2017-02-06 DIAGNOSIS — R63.4 WEIGHT LOSS: ICD-10-CM

## 2017-02-06 DIAGNOSIS — R59.9 ENLARGED LYMPH NODE: ICD-10-CM

## 2017-02-06 DIAGNOSIS — D50.0 IRON DEFICIENCY ANEMIA DUE TO CHRONIC BLOOD LOSS: ICD-10-CM

## 2017-02-06 PROCEDURE — 78815 PET IMAGE W/CT SKULL-THIGH: CPT | Mod: 26,PI,, | Performed by: RADIOLOGY

## 2017-02-06 PROCEDURE — 99999 PR PBB SHADOW E&M-EST. PATIENT-LVL V: CPT | Mod: PBBFAC,,, | Performed by: INTERNAL MEDICINE

## 2017-02-06 PROCEDURE — 99214 OFFICE O/P EST MOD 30 MIN: CPT | Mod: S$GLB,,, | Performed by: INTERNAL MEDICINE

## 2017-02-06 PROCEDURE — 3074F SYST BP LT 130 MM HG: CPT | Mod: S$GLB,,, | Performed by: INTERNAL MEDICINE

## 2017-02-06 PROCEDURE — 3078F DIAST BP <80 MM HG: CPT | Mod: S$GLB,,, | Performed by: INTERNAL MEDICINE

## 2017-02-06 PROCEDURE — 99499 UNLISTED E&M SERVICE: CPT | Mod: S$GLB,,, | Performed by: INTERNAL MEDICINE

## 2017-02-06 RX ORDER — FERROUS SULFATE 325(65) MG
325 TABLET, DELAYED RELEASE (ENTERIC COATED) ORAL 2 TIMES DAILY WITH MEALS
Qty: 60 TABLET | Refills: 3 | Status: SHIPPED | OUTPATIENT
Start: 2017-02-06 | End: 2017-03-15

## 2017-02-06 NOTE — PROGRESS NOTES
Hematology/Oncology Established Visit    Reason for Consult: Enlarged LNs    Consult requested by: Dr. Young, Primary care    History of Present Illness:  Mr. Vigil is a 60 y/o male with Rheumatoid arthritis (RA), CKD, PVD referred for evaluation of enlarged R inguinal LN. He reports weight loss from 230 - 110 lbs over past year or more. No fevers, chills, sweats. He states he has a good appetite and eats all day long. He has intermittent abdominal pain if he overeats. He has chronic problems with intermittent constipation, but he denies pencil thin stools. No melena/hematochezia. He did undergo abd/pelvis CT scan in 9/2016 for workup of weight loss, with no specific abnormalities found aside from 12mm R inguinal LN and mild splenomegaly. For his RA, he reports past treatment with Gold, which he believes messed up his skin and possibly his kidneys. He refuses any DMARDs for RA but was started on MTX by Dr. Ya in June 2016. He also reports easy bruising and easy skin bleeding in his hands and distal arms. He was taken off of Plavix for this reason, but he and his wife do not feel it has improved at all.    Patient comes in to review results of lab tests and PET scan, which was notable for a 4cm left thyroid mass, SUV 26.    ROS:  General:  No fever/chills, fatigue, night sweats +significant weight loss over the past 1-2 yrs  Eyes: No vision problems, pain or inflammation.   Ears/Nose: No difficultly hearing, ear pain, rhinorrhea, or epistaxis  Oropharynx: No ulcers, dysphagia, or odynophagia  Cardiovascular: No chest pains, sob, PND or dyspnea on exertion  Pulmonary: No cough, sob, hemoptysis  Gastrointestional: No n/v/d, melena, hematochezia, or change in bowel habits +chronic constipation  : No dysuria, hematuria, pelvic pain or flank pain  Musculoskeletal: No myalgias, weakness, or arthralgias  Neurological: No headaches, focal deficits, or seizure activity  Endocrine: No heat or cold intolerance    Skin: No rashes pruritus, or lesions +Easy bruising and skin bleeding  Psychiatric: No symptoms of mood disorders  Heme/Lymph: No lymph node enlargment    Past Medical History   Diagnosis Date    Anxiety     Arthritis     Asthma     Atherosclerosis of native arteries of the extremities with intermittent claudication     Benign hypertensive heart disease without heart failure     Carotid artery occlusion     Chronic kidney disease     Coronary artery disease     GERD (gastroesophageal reflux disease)     History of aorto-femoral bypass 8/16/2013    Hyperlipidemia     Hypertension     PVD (peripheral vascular disease)     PVD (peripheral vascular disease) 8/16/2013    Renal artery stenosis 8/16/2013    Renovascular hypertension 8/16/2013    Shingles sept 2015    Stenosis of left subclavian artery 8/16/2013    Tobacco abuse      Past Surgical History:  1. Renal artery bypass in 2013 at [facility]    Social History: . 2 children. Smokes 1ppd for past 45 yrs. No EtOH. No illicit drugs. Does have tattoo placed in [place].    Family History: family history includes Heart disease in his mother; Heart failure in his mother; Hyperlipidemia in his father and mother; Hypertension in his mother.    Physical Exam:  Vitals:    02/06/17 1043   BP: 114/70   Pulse: 63   Resp: 18   Temp: 97.9 °F (36.6 °C)     Body mass index is 18.45 kg/(m^2).  General:  AAOx4, no acute distress, thin  HEENT: EOMI. Normocephalic and atraumatic. No maxillary sinus tenderness. External auditory canals clear and TMs intact without lesions. Nasal and oral mucosal membranes moist. Normal dentition and gums.   Neck: no LAD, thyromegaly, normal ROM  Pulmonary: Bilaterally clear to auscultation, Normal effort with no accessory muscle use, no wheezes/rales/rhonchi  CV: Normal rate, regular rhythm, no murmurs/rubs/gallops, no edema  ABD:  Soft, nontender, nondistended, no mass, and without hepatosplenomegaly   Ext: No  clubbing, cyanosis, or edema, normal ROM Soft lipoma/cyst on left elbow  Skin: No rashes, lesions. Thin skin. Multiple rheumatoid nodules on wrists.   Neurological: No focal deficits, CN II to XII grossly intact, normal coordination    Psychiatric:  Normal mood, affect and judgement  Hem/Lymph:  No submandibular, cervical, supraclavicular, axillary LAD. Bilateral small shotty inguinal LAD noted, R > L.    Labs:    Lab Results   Component Value Date    WBC 5.66 01/17/2017    HGB 11.0 (L) 01/17/2017    HCT 32.2 (L) 01/17/2017    MCV 83 01/17/2017     01/17/2017     Lab Results   Component Value Date     01/17/2017    K 4.1 01/17/2017     01/17/2017    CO2 21 (L) 01/17/2017    BUN 27 (H) 01/17/2017    CREATININE 1.7 (H) 01/17/2017    CALCIUM 8.7 01/17/2017    ANIONGAP 9 01/17/2017    ESTGFRAFRICA 49.6 (A) 01/17/2017    EGFRNONAA 42.9 (A) 01/17/2017     Lab Results   Component Value Date    ALT 12 01/17/2017    AST 15 01/17/2017    ALKPHOS 76 01/17/2017    BILITOT 0.3 01/17/2017       Lab Results   Component Value Date    IRON 39 (L) 01/30/2017    TIBC 306 01/30/2017    FERRITIN 76 01/30/2017     Lab Results   Component Value Date    XCOGRGBB43 319 04/09/2013     Lab Results   Component Value Date    FOLATE 3.7 (L) 04/09/2013     Lab Results   Component Value Date    TSH 2.685 03/07/2013       Imaging:     CT 9/26/16:  1.  There is hyperdense material in the dependent portion of the gallbladder.  This is characteristic of cholelithiasis.  2.  The spleen is enlarged.  It measures 15.1 cm in length.  3.  The right kidney is atrophic.  There is a 2 mm nonobstructive stone in the inferior pole of the left kidney.  There are exophytic hypodense and hyperdense masses associated with the kidneys.  This may represent hemorrhagic and nonhemorrhagic cysts.  One of the largest one measures 7.3 cm in size and has a Hounsfield measurement of 4.  This is characteristic of a cyst.  If additional imaging evaluation  is clinically indicated, high recommend consideration of a nonemergent ultrasound examination of the kidneys.  4.  There is a moderate amount of fecal material within the ascending, transverse, and descending portions of the colon.  5.  There is a small amount of free fluid within the pelvis.  6.  There is a moderate amount of atherosclerosis.  There is an aortic biiliac graft in place.  There is an aortic biiliac stent in the native arteries.  7.  There is an enlarged lymph node in the right inguinal region.  It has a short axis measurement of 12 mm. This is characteristic of an infectious or neoplastic process.    Colonoscopy 2013:  - Preparation of the colon was poor.                        - Internal hemorrhoids.                        - One 3 mm polyp in the transverse colon. Resected                         and retrieved.                        - Melanosis in the colon.    PET 2/6/17:  1.  Large hypodense mass involving the left lobe of the thyroid gland which essentially replaces the entire left lobe and demonstrates significant FDG uptake.  FNA of this mass is recommended.   2.  8mm noncalcified pulmonary nodule noted within the lingula that demonstrates uptake greater than background lung parenchyma.  Although this may be related to an infectious or inflammatory process, malignancy cannot be excluded.  At the very least, this lung nodule should be followed by CT. Per Fleischner Society guidelines for nodule >8mm; in a low or high risk patient, recommend 3, 9, and 24 month CT chest follow-up to exclude neoplasia.  PET scan and/or biopsy may also be considered.  3.  Mild pleural thickening versus tiny layering right sided effusion new when compared to the CT the abdomen pelvis from 09/26/2016 that demonstrates low level uptake with an SV max of up to 2.0.  4.  Remaining findings as described above.    Assessment / Plan:  Wallace Vigil is a 61 y.o. male who comes in with     1. Enlarged inguinal LNs  "bilateral: Given bilateral inguinal LNs palpable on exam, this may represent progression of the process seen on CT in 9/2016. I recommend evaluating further with a PET scan at this time. If hypermetabolic on PET, I recommend proceeding with biopsy. On the other hand, given lack of treatment for Rheumatoid arthritis, these could also be Rheumatoid nodules.     2. Thyroid mass: Hypermetabolic on PET (SUV 26) and needs to be biopsied.  -- Check TSH, free T4, total T3 today  -- U/s guided biopsy of thyroid mass    2. Iron deficiency anemia: Decrease in Hgb over the past few months with borderline low iron levels.  -- Start ferrous sulfate 325mg bid with meals    3. Splenomegaly: No hypermetabolic lymphadenopathy. This is likely related to Rheumatoid arthritis.     4. Rheumatoid arthritis: Treated with "gold treatments" in the past. Currently seeing Dr. Ya and refuses DMARDs. He is currently on MTX 2.5mg once a week.    5. Constipation: Takes Lactulose as needed for constipation.  -- Instructed pt to take Docusate 100mg bid  -- Can take Miralax or Lactulose as needed for constipation.  -- Drink 1.5 liters of water per day    6. Weight loss: Over 100-lbs lost over the past 2 yrs. No obvious malignancy seen on CT imaging. Working up inguinal LAD as noted above. It is also possible that this weight loss is due to Rheumatoid arthritis.    7. Easy bruising: Likely due to thinning skin. However, given past elevated PTT, will recheck coags at this time.    8. Pulmonary nodule: 4mm on chest CT in 9/2016 and now 8mm with higher uptake than background.   -- Consider biopsy if possible    Sharron Guido M.D.  Hematology Oncology  "

## 2017-02-08 ENCOUNTER — HOSPITAL ENCOUNTER (EMERGENCY)
Facility: HOSPITAL | Age: 62
Discharge: HOME | End: 2017-02-08
Attending: EMERGENCY MEDICINE
Payer: MEDICARE

## 2017-02-08 VITALS
BODY MASS INDEX: 18.48 KG/M2 | RESPIRATION RATE: 18 BRPM | HEART RATE: 77 BPM | HEIGHT: 71 IN | DIASTOLIC BLOOD PRESSURE: 73 MMHG | OXYGEN SATURATION: 96 % | WEIGHT: 132 LBS | SYSTOLIC BLOOD PRESSURE: 153 MMHG | TEMPERATURE: 98 F

## 2017-02-08 DIAGNOSIS — R00.2 PALPITATIONS: ICD-10-CM

## 2017-02-08 LAB
ALBUMIN SERPL BCP-MCNC: 3.5 G/DL
ALP SERPL-CCNC: 75 U/L
ALT SERPL W/O P-5'-P-CCNC: 11 U/L
ANION GAP SERPL CALC-SCNC: 12 MMOL/L
AST SERPL-CCNC: 15 U/L
BASOPHILS # BLD AUTO: 0.02 K/UL
BASOPHILS NFR BLD: 0.3 %
BILIRUB SERPL-MCNC: 0.5 MG/DL
BUN SERPL-MCNC: 39 MG/DL
CALCIUM SERPL-MCNC: 9.2 MG/DL
CHLORIDE SERPL-SCNC: 103 MMOL/L
CK MB SERPL-MCNC: 2 NG/ML
CK MB SERPL-RTO: 5.3 %
CK SERPL-CCNC: 38 U/L
CK SERPL-CCNC: 38 U/L
CO2 SERPL-SCNC: 19 MMOL/L
CREAT SERPL-MCNC: 2.1 MG/DL
DIFFERENTIAL METHOD: ABNORMAL
EOSINOPHIL # BLD AUTO: 0.1 K/UL
EOSINOPHIL NFR BLD: 1 %
ERYTHROCYTE [DISTWIDTH] IN BLOOD BY AUTOMATED COUNT: 14.7 %
EST. GFR  (AFRICAN AMERICAN): 38 ML/MIN/1.73 M^2
EST. GFR  (NON AFRICAN AMERICAN): 33 ML/MIN/1.73 M^2
GLUCOSE SERPL-MCNC: 86 MG/DL
HCT VFR BLD AUTO: 30.8 %
HGB BLD-MCNC: 10.7 G/DL
INR PPP: 1.1
LYMPHOCYTES # BLD AUTO: 0.6 K/UL
LYMPHOCYTES NFR BLD: 8.8 %
MCH RBC QN AUTO: 29.2 PG
MCHC RBC AUTO-ENTMCNC: 34.7 %
MCV RBC AUTO: 84 FL
MONOCYTES # BLD AUTO: 0.6 K/UL
MONOCYTES NFR BLD: 9.1 %
NEUTROPHILS # BLD AUTO: 5 K/UL
NEUTROPHILS NFR BLD: 81 %
PLATELET # BLD AUTO: 117 K/UL
PMV BLD AUTO: 8.6 FL
POTASSIUM SERPL-SCNC: 4.2 MMOL/L
PROT SERPL-MCNC: 7.8 G/DL
PROTHROMBIN TIME: 11.9 SEC
RBC # BLD AUTO: 3.66 M/UL
SODIUM SERPL-SCNC: 134 MMOL/L
TROPONIN I SERPL DL<=0.01 NG/ML-MCNC: 0.08 NG/ML
TSH SERPL DL<=0.005 MIU/L-ACNC: 1.31 UIU/ML
WBC # BLD AUTO: 6.24 K/UL

## 2017-02-08 PROCEDURE — 84443 ASSAY THYROID STIM HORMONE: CPT

## 2017-02-08 PROCEDURE — 84484 ASSAY OF TROPONIN QUANT: CPT

## 2017-02-08 PROCEDURE — 85025 COMPLETE CBC W/AUTO DIFF WBC: CPT

## 2017-02-08 PROCEDURE — 93010 ELECTROCARDIOGRAM REPORT: CPT | Mod: ,,, | Performed by: INTERNAL MEDICINE

## 2017-02-08 PROCEDURE — 80053 COMPREHEN METABOLIC PANEL: CPT

## 2017-02-08 PROCEDURE — 99284 EMERGENCY DEPT VISIT MOD MDM: CPT | Mod: 25

## 2017-02-08 PROCEDURE — 93005 ELECTROCARDIOGRAM TRACING: CPT

## 2017-02-08 PROCEDURE — 82553 CREATINE MB FRACTION: CPT

## 2017-02-08 PROCEDURE — 85610 PROTHROMBIN TIME: CPT

## 2017-02-08 NOTE — ED PROVIDER NOTES
SCRIBE #1 NOTE: I, Mac Gregory, am scribing for, and in the presence of, Ashwin Kearney NP. I have scribed the HPI, ROS, PEx, and EKG.     SCRIBE #2 NOTE: I, Milena López, am scribing for, and in the presence of,  Christian Lopez MD. I have scribed the remaining portions of the note not scribed by Scribe #1.     History      Chief Complaint   Patient presents with    Palpitations     pt reports having feels like heart is racing & pain between shoulder blades that has been intermittent for the past week       Review of patient's allergies indicates:   Allergen Reactions    Diovan  [valsartan] Swelling    Levofloxacin Other (See Comments)     Stomach pains    Lisinopril Swelling        HPI   HPI    2/8/2017, 4:49 PM   History obtained from the patient      History of Present Illness: Wallace Vigil is a 61 y.o. male patient who presents to the Emergency Department for palpitations which onset gradually 1 week ago. Symptoms are intermittent and moderate in severity. Pt states he has been having palpitations for the a few months but has been worse the past week. Pt states he has been having pain in between his shoulder blades in his back. No mitigating or exacerbating factors reported. No other associated sx reported. Patient denies any syncope, fever, chills, CP, SOB, leg swelling, HA, lightheadedness, dizziness, n/v/d, and all other sxs at this time. No further complaints or concerns at this time.         Arrival mode: Personal vehicle    PCP: Mike Recinos MD       Past Medical History:  Past Medical History   Diagnosis Date    Anxiety     Arthritis     Asthma     Atherosclerosis of native arteries of the extremities with intermittent claudication     Benign hypertensive heart disease without heart failure     Carotid artery occlusion     Chronic kidney disease     Coronary artery disease     GERD (gastroesophageal reflux disease)     History of aorto-femoral bypass 8/16/2013    Hyperlipidemia      Hypertension     PVD (peripheral vascular disease)     PVD (peripheral vascular disease) 8/16/2013    Renal artery stenosis 8/16/2013    Renovascular hypertension 8/16/2013    Shingles sept 2015    Stenosis of left subclavian artery 8/16/2013    Tobacco abuse        Past Surgical History:  Past Surgical History   Procedure Laterality Date    Renal artery bypass       2012    Cardiac catheterization           Family History:  Family History   Problem Relation Age of Onset    Hypertension Mother     Hyperlipidemia Mother     Heart failure Mother     Heart disease Mother     Hyperlipidemia Father        Social History:  Social History     Social History Main Topics    Smoking status: Current Every Day Smoker     Packs/day: 1.00     Years: 40.00     Types: Cigarettes    Smokeless tobacco: Never Used    Alcohol use No    Drug use: No    Sexual activity: No       ROS   Review of Systems   Constitutional: Negative for chills and fever.   HENT: Negative for sore throat.    Respiratory: Negative for shortness of breath.    Cardiovascular: Positive for palpitations. Negative for chest pain and leg swelling.   Gastrointestinal: Negative for diarrhea, nausea and vomiting.   Genitourinary: Negative for dysuria.   Musculoskeletal: Positive for back pain (between shoulder blades).   Skin: Negative for rash.   Neurological: Negative for dizziness, syncope, weakness, light-headedness and headaches.   Hematological: Does not bruise/bleed easily.       Physical Exam    Initial Vitals   BP Pulse Resp Temp SpO2   02/08/17 1630 02/08/17 1630 02/08/17 1630 02/08/17 1630 02/08/17 1630   149/79 80 18 98.3 °F (36.8 °C) 96 %      Physical Exam  Nursing Notes and Vital Signs Reviewed.  Constitutional: Patient is in no acute distress. Awake and alert. Well-developed and well-nourished.  Head: Atraumatic. Normocephalic.  Eyes: PERRL. EOM intact. Conjunctivae are not pale. No scleral icterus.  ENT: Mucous membranes are moist.  "Oropharynx is clear and symmetric.    Neck: Supple. Full ROM. No lymphadenopathy.  Cardiovascular: Regular rate. Regular rhythm. No murmurs, rubs, or gallops. Distal pulses are 2+ and symmetric.  Pulmonary/Chest: No respiratory distress. Clear to auscultation bilaterally. No wheezing, rales, or rhonchi.  Abdominal: Soft and non-distended.  There is no tenderness.  No rebound, guarding, or rigidity.  Good bowel sounds.    Musculoskeletal: Moves all extremities. No obvious deformities. No edema. No calf tenderness.  Skin: Warm and dry.  Neurological:  Alert, awake, and appropriate.  Normal speech.  No acute focal neurological deficits are appreciated.  Psychiatric: Normal affect. Good eye contact. Appropriate in content.    ED Course    Procedures  ED Vital Signs:  Vitals:    02/08/17 1630   BP: (!) 149/79   Pulse: 80   Resp: 18   Temp: 98.3 °F (36.8 °C)   TempSrc: Oral   SpO2: 96%   Weight: 59.9 kg (132 lb)   Height: 5' 11" (1.803 m)       Abnormal Lab Results:  Labs Reviewed   CBC W/ AUTO DIFFERENTIAL - Abnormal; Notable for the following:        Result Value    RBC 3.66 (*)     Hemoglobin 10.7 (*)     Hematocrit 30.8 (*)     RDW 14.7 (*)     Platelets 117 (*)     MPV 8.6 (*)     Lymph # 0.6 (*)     Gran% 81.0 (*)     Lymph% 8.8 (*)     All other components within normal limits   COMPREHENSIVE METABOLIC PANEL - Abnormal; Notable for the following:     Sodium 134 (*)     CO2 19 (*)     BUN, Bld 39 (*)     Creatinine 2.1 (*)     eGFR if  38 (*)     eGFR if non  33 (*)     All other components within normal limits   CK-MB - Abnormal; Notable for the following:     MB% 5.3 (*)     All other components within normal limits   TROPONIN I - Abnormal; Notable for the following:     Troponin I 0.081 (*)     All other components within normal limits   CK   TSH   PROTIME-INR        All Lab Results:  Results for orders placed or performed during the hospital encounter of 02/08/17   CBC auto " differential   Result Value Ref Range    WBC 6.24 3.90 - 12.70 K/uL    RBC 3.66 (L) 4.60 - 6.20 M/uL    Hemoglobin 10.7 (L) 14.0 - 18.0 g/dL    Hematocrit 30.8 (L) 40.0 - 54.0 %    MCV 84 82 - 98 fL    MCH 29.2 27.0 - 31.0 pg    MCHC 34.7 32.0 - 36.0 %    RDW 14.7 (H) 11.5 - 14.5 %    Platelets 117 (L) 150 - 350 K/uL    MPV 8.6 (L) 9.2 - 12.9 fL    Gran # 5.0 1.8 - 7.7 K/uL    Lymph # 0.6 (L) 1.0 - 4.8 K/uL    Mono # 0.6 0.3 - 1.0 K/uL    Eos # 0.1 0.0 - 0.5 K/uL    Baso # 0.02 0.00 - 0.20 K/uL    Gran% 81.0 (H) 38.0 - 73.0 %    Lymph% 8.8 (L) 18.0 - 48.0 %    Mono% 9.1 4.0 - 15.0 %    Eosinophil% 1.0 0.0 - 8.0 %    Basophil% 0.3 0.0 - 1.9 %    Differential Method Automated    Comprehensive metabolic panel   Result Value Ref Range    Sodium 134 (L) 136 - 145 mmol/L    Potassium 4.2 3.5 - 5.1 mmol/L    Chloride 103 95 - 110 mmol/L    CO2 19 (L) 23 - 29 mmol/L    Glucose 86 70 - 110 mg/dL    BUN, Bld 39 (H) 6 - 20 mg/dL    Creatinine 2.1 (H) 0.5 - 1.4 mg/dL    Calcium 9.2 8.7 - 10.5 mg/dL    Total Protein 7.8 6.0 - 8.4 g/dL    Albumin 3.5 3.5 - 5.2 g/dL    Total Bilirubin 0.5 0.1 - 1.0 mg/dL    Alkaline Phosphatase 75 55 - 135 U/L    AST 15 10 - 40 U/L    ALT 11 10 - 44 U/L    Anion Gap 12 8 - 16 mmol/L    eGFR if African American 38 (A) >60 mL/min/1.73 m^2    eGFR if non African American 33 (A) >60 mL/min/1.73 m^2   CPK   Result Value Ref Range    CPK 38 20 - 200 U/L   CK-MB   Result Value Ref Range    CPK 38 20 - 200 U/L    CPK MB 2.0 0.1 - 6.5 ng/mL    MB% 5.3 (H) 0.0 - 5.0 %   Troponin I   Result Value Ref Range    Troponin I 0.081 (H) 0.000 - 0.026 ng/mL   TSH   Result Value Ref Range    TSH 1.308 0.400 - 4.000 uIU/mL   Protime-INR   Result Value Ref Range    Prothrombin Time 11.9 9.0 - 12.5 sec    INR 1.1 0.8 - 1.2         Imaging Results:  Imaging Results         X-Ray Chest PA And Lateral (In process)          The EKG was ordered, reviewed, and independently interpreted by the ED provider.  Interpretation  time: 16:41  Rate: 75 BPM  Rhythm: normal sinus rhythm  Interpretation: Possible left atrial enlargement. No STEMI.           The Emergency Provider reviewed the vital signs and test results, which are outlined above.    ED Discussion     4:53 PM: Ashwin Kearney NP transfers care of pt to Dr. Lopez, pending lab results.    6:25 PM: Reassessed pt at this time.  Discussed with pt all pertinent ED information and results. Discussed pt dx and plan of tx. Gave pt all f/u and return to the ED instructions. Instructed pt to f/u with cardiology for a holter monitor and further evaluation of palpitations. All questions and concerns were addressed at this time. Pt expresses understanding of information and instructions, and is comfortable with plan to discharge. Pt is stable for discharge.      Follow-up Information     Follow up with Cardiology. Call in 1 day.    Contact information:    [phone]            Medical Decision Making    Medical Decision Making:   Clinical Tests:   Lab Tests: Ordered and Reviewed  Radiological Study: Ordered and Reviewed  Medical Tests: Ordered and Reviewed           Scribe Attestation:   Scribe #1: I performed the above scribed service and the documentation accurately describes the services I performed. I attest to the accuracy of the note.    Attending:   Physician Attestation Statement for Scribe #1: I, Ashwin Kearney NP, personally performed the services described in this documentation, as scribed by Mac Gregory, in my presence, and it is both accurate and complete.       Scribe Attestation:   Scribe #2: I performed the above scribed service and the documentation accurately describes the services I performed. I attest to the accuracy of the note.    Attending Attestation:           Physician Attestation for Scribe:    Physician Attestation Statement for Scribe #2: I, Christian Lopez MD, reviewed documentation, as scribed by Milena López in my presence, and it is both accurate and complete. I  also acknowledge and confirm the content of the note done by Priyankaibe #1.          Clinical Impression       ICD-10-CM ICD-9-CM   1. Palpitations R00.2 785.1       Disposition:   Disposition: Discharged  Condition: Stable           Christian Lopez MD  02/08/17 [id number]

## 2017-02-08 NOTE — ED AVS SNAPSHOT
[facility] -   [address] [facility] [address]               Wallace Vigil   2017  4:33 PM   ED    Description:  Male : 1955   Department:  [facility] -            Your Care was Coordinated By:     Provider Role From To    Christian Lopez MD Attending Provider 17 1651 --    Ashwin Kearney NP Nurse Practitioner 17 1645 17 1651      Reason for Visit     Palpitations           Diagnoses this Visit        Comments    Palpitations           ED Disposition     ED Disposition Condition Comment    Discharge             To Do List           Follow-up Information     Follow up with Cardiology. Call in 1 day.    Contact information:    [phone]      [facility]     [facility] -  Assistance  Registered nurses in the [facility] provide clinical advisement, health education, appointment booking, and other advisory services.  Call for this free service at [phone].             Medications           Message regarding Medications     Verify the changes and/or additions to your medication regime listed below are the same as discussed with your clinician today.  If any of these changes or additions are incorrect, please notify your healthcare provider.        STOP taking these medications     clopidogrel (PLAVIX) 75 mg tablet Take 1 tablet (75 mg total) by mouth once daily.    torsemide (DEMADEX) 10 MG Tab Take 1 tablet (10 mg total) by mouth once daily.    senna-docusate 8.6-50 mg (PERICOLACE) 8.6-50 mg per tablet Take 2 tablets by mouth 2 (two) times daily.    predniSONE (DELTASONE) 5 MG tablet TAKE 1 TABLET BY MOUTH ONCE DAILY WITH FOOD    methotrexate 2.5 MG Tab Take 4 tablets (10 mg total) by mouth every 7 days.    lubiprostone (AMITIZA) 24 MCG Cap Take 1 capsule (24 mcg total) by mouth 2 (two) times daily with meals.    gabapentin (NEURONTIN) 300 MG capsule Take 1 capsule (300 mg total)  by mouth 2 (two) times daily.    furosemide (LASIX) 20 MG tablet 1 Tablet Oral Every day    folic acid (FOLVITE) 1 MG tablet Take 1 tablet (1 mg total) by mouth once daily.    ergocalciferol (ERGOCALCIFEROL) 50,000 unit Cap TAKE 1 CAPSULE BY MOUTH EVERY WEEK    ammonium lactate (AMLACTIN) 12 % lotion Use daily.  Apply to damp skin after bathing.           Verify that the below list of medications is an accurate representation of the medications you are currently taking.  If none reported, the list may be blank. If incorrect, please contact your healthcare provider. Carry this list with you in case of emergency.           Current Medications     albuterol (VENTOLIN HFA) 90 mcg/actuation inhaler INHALE 2 PUFFS BY MOUTH EVERY 4 HOURS IF NEEDED FOR WHEEZING    alprazolam (XANAX) 0.5 MG tablet Take 1 tablet (0.5 mg total) by mouth nightly as needed.    amlodipine (NORVASC) 10 MG tablet Take 1 tablet (10 mg total) by mouth once daily.    aspirin 81 mg Tab Take 1 tablet by mouth Daily. 1 Tablet Oral Every day    cloNIDine (CATAPRES) 0.1 MG tablet TAKE 1 TABLET (0.1 MG TOTAL) BY MOUTH 2 (TWO) TIMES DAILY.    diltiaZEM (CARDIZEM CD) 240 MG 24 hr capsule TAKE 1 CAPSULE BY MOUTH ONCE DAILY    diltiaZEM (CARDIZEM CD) 240 MG 24 hr capsule Take 1 capsule (240 mg total) by mouth once daily.    doxazosin (CARDURA) 4 MG tablet Take 1 tablet (4 mg total) by mouth every evening.    ferrous sulfate 325 (65 FE) MG EC tablet Take 1 tablet (325 mg total) by mouth 2 (two) times daily with meals.    fluticasone (FLONASE) 50 mcg/actuation nasal spray 2 sprays by Each Nare route once daily.    hydrALAZINE (APRESOLINE) 100 MG tablet Take 1 tablet (100 mg total) by mouth 3 (three) times daily.    hydrocodone-acetaminophen 10-325mg (NORCO)  mg Tab     lactulose (CHRONULAC) 10 gram/15 mL solution Take 30 ml every 12 hours PRN to achieve a good bowel movement    omeprazole (PRILOSEC) 20 MG capsule Take 1 capsule (20 mg total) by mouth 2  "(two) times daily.    pravastatin (PRAVACHOL) 20 MG tablet TAKE 1 TABLET BY MOUTH AT BEDTIME TO LOWER CHOLESTEROL    epinephrine (EPIPEN) 0.3 mg/0.3 mL (1:1,000) AtIn 1 Pen Injector Intramuscular once           Clinical Reference Information           Your Vitals Were     BP Pulse Temp Resp Height Weight    149/79 (BP Location: Right arm, Patient Position: Sitting) 80 98.3 °F (36.8 °C) (Oral) 18 5' 11" (1.803 m) 59.9 kg (132 lb)    SpO2 BMI             96% 18.41 kg/m2         Allergies as of 2/8/2017        Reactions    Diovan  [Valsartan] Swelling    Levofloxacin Other (See Comments)    Stomach pains    Lisinopril Swelling      Immunizations Administered on Date of Encounter - 2/8/2017     None      ED Micro, Lab, POCT     Start Ordered       Status Ordering Provider    02/08/17 1649 02/08/17 1648  Protime-INR  STAT      Final result     02/08/17 1648 02/08/17 1648  CBC auto differential  STAT      Preliminary result     02/08/17 1648 02/08/17 1648  Comprehensive metabolic panel  STAT      Final result     02/08/17 1648 02/08/17 1648  CPK  STAT      Final result     02/08/17 1648 02/08/17 1648  CK-MB  STAT      Final result     02/08/17 1648 02/08/17 1648  Troponin I  STAT      Final result     02/08/17 1648 02/08/17 1648  TSH  STAT      Final result       ED Imaging Orders     Start Ordered       Status Ordering Provider    02/08/17 1648 02/08/17 1648  X-Ray Chest PA And Lateral  1 time imaging      In process         Discharge Instructions         Understanding Heart Palpitations    Heart palpitations are a symptom. Its the feeling you have when your heartbeat seems to be racing, pounding, skipping, or fluttering. Heart palpitations are most often felt in the chest. Sometimes, they may also be felt in the neck.  What causes heart palpitations?  In most cases, heart palpitations are caused by:  · Stress or anxiety  · Exercise  · Pregnancy  · Some medicines  · Caffeine  · Nicotine  · Alcohol  · Illegal drugs, such " as cocaine  · Health problems, such as anemia or overactive thyroid  In some cases, heart palpitations may be caused by a problem with the heart. Abnormal heart rhythms (arrhythmias) are the main concern. They may need to be managed by you and your healthcare provider or treated right away.  How are heart palpitations treated?  Treatments for heart palpitations depend on the cause. Options may include:  · Managing the things that trigger your heart palpitations. This could mean:  ¨ Learning ways to reduce stress and anxiety  ¨ Avoiding caffeine, nicotine, alcohol, or illegal drugs  ¨ Stopping the use of certain medicines, under your doctors guidance  · Medicines, procedures, or surgery to treat an arrhythmia or other health problem that is causing your symptoms  What are the complications of heart palpitations?  Complications of heart palpitations are rare unless they are caused by a problem such as an arrhythmia. In such cases, complications can include:  · Fainting  · Heart failure. This problem occurs when the heart is so weak it no longer pumps blood well.  · Blood clots and stroke  · Sudden cardiac arrest. This problem occurs when the heart suddenly stops beating.  When should I call my healthcare provider?  Call your healthcare provider right away if you have any of these:  · Fever of 100.4°F (38°C) or higher, or as directed  · Symptoms that dont get better with treatment, or symptoms that get worse  · New symptoms, such as chest pain, shortness of breath, dizziness, or fainting   Date Last Reviewed: 5/1/2016  © 7325-1536 [employer]. [address] [place] [address]. All rights reserved. This information is not intended as a substitute for professional medical care. Always follow your healthcare professional's instructions.          Your Scheduled Appointments     Feb 17, 2017 10:00 AM CST   Us Biopsy with [facility] US1   [facility] ([facility])    [address]  [facility] [address]   [phone]            Feb 22, 2017 10:00 AM CST   Established Patient Visit with Sharron Guido MD   [facility]    [address] [facility] [address]   [phone]              [facility] Sign-Up     Activating your [facility] account is as easy as 1-2-3!     1) Visit [email or web address], select Sign Up Now, enter this activation code and your date of birth, then select Next.  [email or web address]  Expires: 2/24/2017  9:03 AM      2) Create a username and password to use when you visit [facility] in the future and select a security question in case you lose your password and select Next.    3) Enter your e-mail address and click Sign Up!    Additional Information  If you have questions, please e-mail [email or web address] or call [phone] to talk to our [facility] staff. Remember, [facility] is NOT to be used for urgent needs. For medical emergencies, dial 911.         Smoking Cessation     If you would like to quit smoking:   You may be eligible for free services if you are a [place] resident and started smoking cigarettes before September 1, 1988.  Call the Smoking Cessation Trust (SCT) toll free at (736) 070-6851 or (985) 041-2858.   Call 1-800-QUIT-NOW if you do not meet the above criteria.             [facility] complies with applicable Federal civil rights laws and does not discriminate on the basis of race, color, national origin, age, disability, or sex.        Language Assistance Services     ATTENTION: Language assistance services are available, free of charge. Please call [phone].      ATENCIÓN: Si habla shirley, tiene a sethi disposición servicios gratuitos de asistencia lingüística. Llame al [phone].     CHÚ Ý: N?u b?n nói Ti?ng Vi?t, có các d?ch v? h? tr? ngôn ng? mi?n phí dành cho b?n. G?i s? [phone].

## 2017-02-09 NOTE — DISCHARGE INSTRUCTIONS
Understanding Heart Palpitations    Heart palpitations are a symptom. Its the feeling you have when your heartbeat seems to be racing, pounding, skipping, or fluttering. Heart palpitations are most often felt in the chest. Sometimes, they may also be felt in the neck.  What causes heart palpitations?  In most cases, heart palpitations are caused by:  · Stress or anxiety  · Exercise  · Pregnancy  · Some medicines  · Caffeine  · Nicotine  · Alcohol  · Illegal drugs, such as cocaine  · Health problems, such as anemia or overactive thyroid  In some cases, heart palpitations may be caused by a problem with the heart. Abnormal heart rhythms (arrhythmias) are the main concern. They may need to be managed by you and your healthcare provider or treated right away.  How are heart palpitations treated?  Treatments for heart palpitations depend on the cause. Options may include:  · Managing the things that trigger your heart palpitations. This could mean:  ¨ Learning ways to reduce stress and anxiety  ¨ Avoiding caffeine, nicotine, alcohol, or illegal drugs  ¨ Stopping the use of certain medicines, under your doctors guidance  · Medicines, procedures, or surgery to treat an arrhythmia or other health problem that is causing your symptoms  What are the complications of heart palpitations?  Complications of heart palpitations are rare unless they are caused by a problem such as an arrhythmia. In such cases, complications can include:  · Fainting  · Heart failure. This problem occurs when the heart is so weak it no longer pumps blood well.  · Blood clots and stroke  · Sudden cardiac arrest. This problem occurs when the heart suddenly stops beating.  When should I call my healthcare provider?  Call your healthcare provider right away if you have any of these:  · Fever of 100.4°F (38°C) or higher, or as directed  · Symptoms that dont get better with treatment, or symptoms that get worse  · New symptoms, such as chest pain,  shortness of breath, dizziness, or fainting   Date Last Reviewed: 5/1/2016  © 2261-6881 [employer]. [address] [place] [address], [place] [address]. All rights reserved. This information is not intended as a substitute for professional medical care. Always follow your healthcare professional's instructions.

## 2017-02-17 ENCOUNTER — HOSPITAL ENCOUNTER (OUTPATIENT)
Dept: RADIOLOGY | Facility: HOSPITAL | Age: 62
Discharge: HOME | End: 2017-02-17
Attending: INTERNAL MEDICINE
Payer: MEDICARE

## 2017-02-17 DIAGNOSIS — E07.9 THYROID MASS: ICD-10-CM

## 2017-02-17 PROCEDURE — 88173 CYTOPATH EVAL FNA REPORT: CPT | Mod: 26,,, | Performed by: PATHOLOGY

## 2017-02-17 PROCEDURE — 76942 ECHO GUIDE FOR BIOPSY: CPT | Mod: TC

## 2017-02-17 PROCEDURE — 88173 CYTOPATH EVAL FNA REPORT: CPT | Performed by: PATHOLOGY

## 2017-02-22 ENCOUNTER — TELEPHONE (OUTPATIENT)
Dept: HEMATOLOGY/ONCOLOGY | Facility: CLINIC | Age: 62
End: 2017-02-22

## 2017-02-22 NOTE — TELEPHONE ENCOUNTER
----- Message from Vanessa Reid sent at 2/22/2017 12:19 PM CST -----  Contact: Ying wife  Calling concerning test results on patient. Please call wife ASAP @ [phone]. Thanks, olga

## 2017-02-23 ENCOUNTER — OFFICE VISIT (OUTPATIENT)
Dept: HEMATOLOGY/ONCOLOGY | Facility: CLINIC | Age: 62
End: 2017-02-23
Payer: MEDICARE

## 2017-02-23 VITALS
SYSTOLIC BLOOD PRESSURE: 116 MMHG | DIASTOLIC BLOOD PRESSURE: 60 MMHG | HEIGHT: 70 IN | RESPIRATION RATE: 18 BRPM | BODY MASS INDEX: 18.67 KG/M2 | OXYGEN SATURATION: 98 % | WEIGHT: 130.38 LBS | TEMPERATURE: 97 F | HEART RATE: 71 BPM

## 2017-02-23 DIAGNOSIS — R63.4 WEIGHT LOSS: ICD-10-CM

## 2017-02-23 DIAGNOSIS — E07.9 THYROID MASS: Primary | ICD-10-CM

## 2017-02-23 DIAGNOSIS — M06.9 RHEUMATOID ARTHRITIS, INVOLVING UNSPECIFIED SITE, UNSPECIFIED RHEUMATOID FACTOR PRESENCE: ICD-10-CM

## 2017-02-23 DIAGNOSIS — N18.3 CKD (CHRONIC KIDNEY DISEASE), STAGE 3 (MODERATE): ICD-10-CM

## 2017-02-23 DIAGNOSIS — R91.1 PULMONARY NODULE: ICD-10-CM

## 2017-02-23 PROCEDURE — 99214 OFFICE O/P EST MOD 30 MIN: CPT | Mod: S$GLB,,, | Performed by: INTERNAL MEDICINE

## 2017-02-23 PROCEDURE — 3078F DIAST BP <80 MM HG: CPT | Mod: S$GLB,,, | Performed by: INTERNAL MEDICINE

## 2017-02-23 PROCEDURE — 1160F RVW MEDS BY RX/DR IN RCRD: CPT | Mod: S$GLB,,, | Performed by: INTERNAL MEDICINE

## 2017-02-23 PROCEDURE — 99999 PR PBB SHADOW E&M-EST. PATIENT-LVL IV: CPT | Mod: PBBFAC,,, | Performed by: INTERNAL MEDICINE

## 2017-02-23 PROCEDURE — 3074F SYST BP LT 130 MM HG: CPT | Mod: S$GLB,,, | Performed by: INTERNAL MEDICINE

## 2017-02-23 PROCEDURE — 99499 UNLISTED E&M SERVICE: CPT | Mod: S$GLB,,, | Performed by: INTERNAL MEDICINE

## 2017-02-23 NOTE — PROGRESS NOTES
Hematology/Oncology Established Visit    Reason for Consult: Enlarged LNs    Consult requested by: Dr. Young, Primary care    History of Present Illness:  Mr. Vigil is a 62 y/o male with Rheumatoid arthritis (RA), CKD, PVD referred for evaluation of enlarged R inguinal LN. He reports weight loss from 230 - 110 lbs over past year or more. No fevers, chills, sweats. He states he has a good appetite and eats all day long. He has intermittent abdominal pain if he overeats. He has chronic problems with intermittent constipation, but he denies pencil thin stools. No melena/hematochezia. He did undergo abd/pelvis CT scan in 9/2016 for workup of weight loss, with no specific abnormalities found aside from 12mm R inguinal LN and mild splenomegaly. For his RA, he reports past treatment with Gold, which he believes messed up his skin and possibly his kidneys. He refuses any DMARDs for RA but was started on MTX by Dr. Ya in June 2016. He also reports easy bruising and easy skin bleeding in his hands and distal arms. He was taken off of Plavix for this reason, but he and his wife do not feel it has improved at all.    Patient comes in to review results of thyroid FNA given PET scan findings, notable for a 4cm left thyroid mass, SUV 26. Unfortunately, the FNA sample had insufficient cells.    ROS:  General:  No fever/chills, fatigue, night sweats +significant weight loss over the past 1-2 yrs  Eyes: No vision problems, pain or inflammation.   Ears/Nose: No difficultly hearing, ear pain, rhinorrhea, or epistaxis  Oropharynx: No ulcers, dysphagia, or odynophagia  Cardiovascular: No chest pains, sob, PND or dyspnea on exertion  Pulmonary: No cough, sob, hemoptysis  Gastrointestional: No n/v/d, melena, hematochezia, or change in bowel habits +chronic constipation  : No dysuria, hematuria, pelvic pain or flank pain  Musculoskeletal: No myalgias, weakness, or arthralgias  Neurological: No headaches, focal deficits, or  seizure activity  Endocrine: No heat or cold intolerance   Skin: No rashes pruritus, or lesions +Easy bruising and skin bleeding  Psychiatric: No symptoms of mood disorders  Heme/Lymph: No lymph node enlargment    Past Medical History   Diagnosis Date    Anxiety     Arthritis     Asthma     Atherosclerosis of native arteries of the extremities with intermittent claudication     Benign hypertensive heart disease without heart failure     Carotid artery occlusion     Chronic kidney disease     Coronary artery disease     GERD (gastroesophageal reflux disease)     History of aorto-femoral bypass 8/16/2013    Hyperlipidemia     Hypertension     PVD (peripheral vascular disease)     PVD (peripheral vascular disease) 8/16/2013    Renal artery stenosis 8/16/2013    Renovascular hypertension 8/16/2013    Shingles sept 2015    Stenosis of left subclavian artery 8/16/2013    Tobacco abuse      Past Surgical History:  1. Renal artery bypass in 2013 at [facility]    Social History: . 2 children. Smokes 1ppd for past 45 yrs. No EtOH. No illicit drugs. Does have tattoo placed in [place].    Family History: family history includes Heart disease in his mother; Heart failure in his mother; Hyperlipidemia in his father and mother; Hypertension in his mother.    Physical Exam:  Vitals:    02/23/17 0903   BP: 116/60   Pulse: 71   Resp: 18   Temp: 97.2 °F (36.2 °C)     Body mass index is 18.71 kg/(m^2).  General:  AAOx4, no acute distress, thin, smells like cigarette smoke  HEENT: EOMI. Normocephalic and atraumatic. No maxillary sinus tenderness. External auditory canals clear and TMs intact without lesions. Nasal and oral mucosal membranes moist. Normal dentition and gums.   Neck: no LAD, thyromegaly, normal ROM  Pulmonary: Bilaterally clear to auscultation, Normal effort with no accessory muscle use, no wheezes/rales/rhonchi  CV: Normal rate, regular rhythm, no murmurs/rubs/gallops, no  edema  ABD:  Soft, nontender, nondistended, no mass, and without hepatosplenomegaly   Ext: No clubbing, cyanosis, or edema, normal ROM Soft lipoma/cyst on left elbow  Skin: No rashes, lesions. Thin skin. Multiple rheumatoid nodules on wrists.   Neurological: No focal deficits, CN II to XII grossly intact, normal coordination    Psychiatric:  Normal mood, affect and judgement  Hem/Lymph:  No submandibular, cervical, supraclavicular, axillary LAD. Bilateral small shotty inguinal LAD noted, R > L.    Labs:    Lab Results   Component Value Date    WBC 6.24 02/08/2017    HGB 10.7 (L) 02/08/2017    HCT 30.8 (L) 02/08/2017    MCV 84 02/08/2017     (L) 02/08/2017     Lab Results   Component Value Date     (L) 02/08/2017    K 4.2 02/08/2017     02/08/2017    CO2 19 (L) 02/08/2017    BUN 39 (H) 02/08/2017    CREATININE 2.1 (H) 02/08/2017    CALCIUM 9.2 02/08/2017    ANIONGAP 12 02/08/2017    ESTGFRAFRICA 38 (A) 02/08/2017    EGFRNONAA 33 (A) 02/08/2017     Lab Results   Component Value Date    ALT 11 02/08/2017    AST 15 02/08/2017    ALKPHOS 75 02/08/2017    BILITOT 0.5 02/08/2017       Lab Results   Component Value Date    IRON 39 (L) 01/30/2017    TIBC 306 01/30/2017    FERRITIN 76 01/30/2017     Lab Results   Component Value Date    KJZRABQF47 319 04/09/2013     Lab Results   Component Value Date    FOLATE 3.7 (L) 04/09/2013     Lab Results   Component Value Date    TSH 1.308 02/08/2017       Imaging:     CT 9/26/16:  1.  There is hyperdense material in the dependent portion of the gallbladder.  This is characteristic of cholelithiasis.  2.  The spleen is enlarged.  It measures 15.1 cm in length.  3.  The right kidney is atrophic.  There is a 2 mm nonobstructive stone in the inferior pole of the left kidney.  There are exophytic hypodense and hyperdense masses associated with the kidneys.  This may represent hemorrhagic and nonhemorrhagic cysts.  One of the largest one measures 7.3 cm in size and has a  Hounsfield measurement of 4.  This is characteristic of a cyst.  If additional imaging evaluation is clinically indicated, high recommend consideration of a nonemergent ultrasound examination of the kidneys.  4.  There is a moderate amount of fecal material within the ascending, transverse, and descending portions of the colon.  5.  There is a small amount of free fluid within the pelvis.  6.  There is a moderate amount of atherosclerosis.  There is an aortic biiliac graft in place.  There is an aortic biiliac stent in the native arteries.  7.  There is an enlarged lymph node in the right inguinal region.  It has a short axis measurement of 12 mm. This is characteristic of an infectious or neoplastic process.    Colonoscopy 2013:  - Preparation of the colon was poor.                        - Internal hemorrhoids.                        - One 3 mm polyp in the transverse colon. Resected                         and retrieved.                        - Melanosis in the colon.    PET 2/6/17:  1.  Large hypodense mass involving the left lobe of the thyroid gland which essentially replaces the entire left lobe and demonstrates significant FDG uptake.  FNA of this mass is recommended.   2.  8mm noncalcified pulmonary nodule noted within the lingula that demonstrates uptake greater than background lung parenchyma.  Although this may be related to an infectious or inflammatory process, malignancy cannot be excluded.  At the very least, this lung nodule should be followed by CT. Per Fleischner Society guidelines for nodule >8mm; in a low or high risk patient, recommend 3, 9, and 24 month CT chest follow-up to exclude neoplasia.  PET scan and/or biopsy may also be considered.  3.  Mild pleural thickening versus tiny layering right sided effusion new when compared to the CT the abdomen pelvis from 09/26/2016 that demonstrates low level uptake with an SV max of up to 2.0.  4.  Remaining findings as described above.    Assessment  "/ Plan:  Wallace Vigil is a 62 y.o. male who comes in with     1. Enlarged inguinal LNs bilateral:  These nodules on not hypermetabolic on PET scan and may be related to underlying Rheumatoid arthritis / Rheumatoid nodules.     2. Thyroid mass: Hypermetabolic on PET (SUV 26) and needs to be biopsied. FNA had insufficient cells. TSH, free T4 normal.  -- Refer to Dr. Ambriz for left thyroid biopsy.   -- As pt is also undergoing left carotid artery surgery next week, I did call Dr. Bray's [[phone] office, [phone] cell] to see whether he would be willing to coordinate a thyroid biopsy at the same time.    3. Iron deficiency anemia: Decrease in Hgb over the past few months with borderline low iron levels.  -- Continue ferrous sulfate 325mg bid with meals    4. Splenomegaly: No hypermetabolic lymphadenopathy. This is likely related to Rheumatoid arthritis.     5. Rheumatoid arthritis: Treated with "gold treatments" in the past. Currently seeing Dr. Ya and refuses DMARDs. He is currently on MTX 2.5mg once a week.    6. Constipation: Takes Lactulose as needed for constipation.  -- Instructed pt to take Docusate 100mg bid  -- Can take Miralax or Lactulose as needed for constipation.  -- Drink 1.5 liters of water per day    7. Weight loss: Over 100-lbs lost over the past 2 yrs. As the thyroid function is normal, I doubt the weight loss is related to thyroid mass - but cannot rule out metastatic thyroid cancer given 8mm pulmonary nodule. It is more likely due to systemic manifestations of Rheumatoid arthritis.    8. Easy bruising: Likely due to thinning skin. PTT elevated, but normal PT/INR. This is mostly likely due to lupus anticoagulant.     9. Pulmonary nodule: 4mm on chest CT in 9/2016 and now 8mm with higher uptake than background.     Sharron Guido M.D.  Hematology Oncology  "

## 2017-03-02 ENCOUNTER — TELEPHONE (OUTPATIENT)
Dept: OTOLARYNGOLOGY | Facility: CLINIC | Age: 62
End: 2017-03-02

## 2017-03-02 DIAGNOSIS — E07.9 THYROID MASS: Primary | ICD-10-CM

## 2017-03-02 NOTE — TELEPHONE ENCOUNTER
----- Message from Deven Ambriz MD sent at 3/2/2017  7:07 AM CST -----  Please order thyroid US and schedule f/u with me.        ----- Message -----     From: Sharron Guido MD     Sent: 2/22/2017  12:26 PM       To: Deven Ambriz MD    This is a patient with severe rheumatoid arthritis who saw me for extreme weight loss, which may be related to his RA. But PET scan for workup showed a very hypermetabolic thyroid. Unfortunately FNA was inconclusive, and I am wondering if you can see him for a surgical evaluation. Let me know, and I can place a consult.    Thank you,  Sharron

## 2017-03-02 NOTE — TELEPHONE ENCOUNTER
Contacted patient and spoke with wife with her to explain that the patient had surgery yesterday where the performing MD removed the Total Thyroid. Wife canceled appointment and explained that she appreciated everything and she will contact us back if a desired appointment is still needed after visit with Dr. Guido.

## 2017-03-07 ENCOUNTER — TELEPHONE (OUTPATIENT)
Dept: INTERNAL MEDICINE | Facility: CLINIC | Age: 62
End: 2017-03-07

## 2017-03-07 NOTE — TELEPHONE ENCOUNTER
----- Message from Ying Jurado sent at 3/7/2017  1:35 PM CST -----  Contact: Patient   Patient request a call back at [phone], Regards to medication.    Thanks  Td

## 2017-03-11 ENCOUNTER — HOSPITAL ENCOUNTER (EMERGENCY)
Facility: HOSPITAL | Age: 62
Discharge: HOME | End: 2017-03-11
Attending: INTERNAL MEDICINE
Payer: MEDICARE

## 2017-03-11 VITALS
OXYGEN SATURATION: 97 % | WEIGHT: 130 LBS | DIASTOLIC BLOOD PRESSURE: 71 MMHG | HEART RATE: 69 BPM | RESPIRATION RATE: 19 BRPM | TEMPERATURE: 98 F | HEIGHT: 71 IN | BODY MASS INDEX: 18.2 KG/M2 | SYSTOLIC BLOOD PRESSURE: 152 MMHG

## 2017-03-11 DIAGNOSIS — K59.04 CHRONIC IDIOPATHIC CONSTIPATION: ICD-10-CM

## 2017-03-11 DIAGNOSIS — K55.9 MESENTERIC ISCHEMIA: Primary | ICD-10-CM

## 2017-03-11 PROCEDURE — 99282 EMERGENCY DEPT VISIT SF MDM: CPT

## 2017-03-11 NOTE — ED AVS SNAPSHOT
[facility]  [address] [facility] [address]               Wallace Vigil   3/11/2017  8:55 PM   ED    Description:  Male : 1955   Department:  [facility]            Your Care was Coordinated By:     Provider Role From To    Naz Burrows MD Attending Provider [phone] --      Reason for Visit     Constipation     Palpitations           Diagnoses this Visit        Comments    Mesenteric ischemia    -  Primary     Chronic idiopathic constipation           ED Disposition     ED Disposition Condition Comment    Discharge             To Do List           Follow-up Information     Follow up with Juancarlos Cottrell MD. Schedule an appointment as soon as possible for a visit in 2 days.    Specialty:  Vascular Surgery    Contact information:    [address]  [address]  VASCULAR CLINIC  [facility] [address]  [phone]        [facility]     [facility] -  Assistance  Registered nurses in the [facility] provide clinical advisement, health education, appointment booking, and other advisory services.  Call for this free service at [phone].             Medications           Message regarding Medications     Verify the changes and/or additions to your medication regime listed below are the same as discussed with your clinician today.  If any of these changes or additions are incorrect, please notify your healthcare provider.             Verify that the below list of medications is an accurate representation of the medications you are currently taking.  If none reported, the list may be blank. If incorrect, please contact your healthcare provider. Carry this list with you in case of emergency.           Current Medications     albuterol (VENTOLIN HFA) 90 mcg/actuation inhaler INHALE 2 PUFFS BY MOUTH EVERY 4 HOURS IF NEEDED FOR WHEEZING    alprazolam (XANAX) 0.5 MG tablet Take 1 tablet (0.5 mg  "total) by mouth nightly as needed.    amlodipine (NORVASC) 10 MG tablet Take 1 tablet (10 mg total) by mouth once daily.    aspirin 81 mg Tab Take 1 tablet by mouth Daily. 1 Tablet Oral Every day    cloNIDine (CATAPRES) 0.1 MG tablet TAKE 1 TABLET (0.1 MG TOTAL) BY MOUTH 2 (TWO) TIMES DAILY.    diltiaZEM (CARDIZEM CD) 240 MG 24 hr capsule TAKE 1 CAPSULE BY MOUTH ONCE DAILY    diltiaZEM (CARDIZEM CD) 240 MG 24 hr capsule Take 1 capsule (240 mg total) by mouth once daily.    doxazosin (CARDURA) 4 MG tablet Take 1 tablet (4 mg total) by mouth every evening.    epinephrine (EPIPEN) 0.3 mg/0.3 mL (1:1,000) AtIn 1 Pen Injector Intramuscular once    ferrous sulfate 325 (65 FE) MG EC tablet Take 1 tablet (325 mg total) by mouth 2 (two) times daily with meals.    fluticasone (FLONASE) 50 mcg/actuation nasal spray 2 sprays by Each Nare route once daily.    hydrALAZINE (APRESOLINE) 100 MG tablet Take 1 tablet (100 mg total) by mouth 3 (three) times daily.    hydrocodone-acetaminophen 10-325mg (NORCO)  mg Tab     lactulose (CHRONULAC) 10 gram/15 mL solution Take 30 ml every 12 hours PRN to achieve a good bowel movement    omeprazole (PRILOSEC) 20 MG capsule Take 1 capsule (20 mg total) by mouth 2 (two) times daily.    pravastatin (PRAVACHOL) 20 MG tablet TAKE 1 TABLET BY MOUTH AT BEDTIME TO LOWER CHOLESTEROL           Clinical Reference Information           Your Vitals Were     BP Pulse Temp Resp Height Weight    152/71 (BP Location: Right arm, Patient Position: Sitting) 69 97.7 °F (36.5 °C) (Oral) 19 5' 11" (1.803 m) 59 kg (130 lb)    SpO2 BMI             97% 18.13 kg/m2         Allergies as of 3/11/2017        Reactions    Diovan  [Valsartan] Swelling    Levofloxacin Other (See Comments)    Stomach pains    Lisinopril Swelling      Immunizations Administered on Date of Encounter - 3/11/2017     None      ED Micro, Lab, POCT     None      ED Imaging Orders     None        Discharge Instructions         Ischemic Colitis: " Mesenteric ischemia resulting from poor circulation in the bowels resulting in weight loss and severe abdominal pain especially after eating or drinking fluids.  Follow-up with Dr. Carter for further management.  Continue lactulose for constipation and can add mineral oil for constipation.     Ischemic colitis happens if blood flow to a part of the colon is reduced.   Ischemic colitis happens when blood flow to the colon is reduced or blocked. Bloody diarrhea and severe belly pain are the most common symptoms. Other symptoms include vomiting, fever, and fainting. Diarrhea can lead to severe dehydration. This is the rapid loss of the fluids your body needs to function. Because of the severe pain and the risk for dehydration, ischemic colitis should be treated right away.  Causes of ischemic colitis  The cause of the reduction or blockage of blood flow to the colon is not well understood. In some cases, a sudden drop in blood pressure, or dehydration, leads to an episode. Ischemic colitis is more likely in people with blood clotting problems or heart and blood vessel disease.  Diagnosing ischemic colitis  The presence of severe belly pain and bloody diarrhea is often enough to diagnose ischemic colitis. After these symptoms are treated, a test called a colonoscopy will likely be done. This helps rule out other colon problems. The test uses a thin, flexible scope with a light and camera on the end. The scope is inserted through the rectum into the colon. The scope sends pictures from inside the colon to a video screen. A small sample of tissue (biopsy) from the colon may be taken for further testing in a lab.  Treating ischemic colitis  Episodes are treated in the hospital. You may remain in the hospital for several days or longer:  · An IV line is put into a vein in your hand or arm. You will be given fluids through the IV to treat dehydration. You are also given IV pain medicines if you need them.  · You may be  given IV antibiotics (medicines that treat infection).  · To rest the bowel, you will not eat or drink for a few days. In rare cases, when symptoms are very severe, you will be given nutrition through the IV.  · If you lost a lot of blood during the episode, you may receive a blood transfusion.  · In rare cases, an episode causes severe damage to the colon. In this case, surgery may need to be done to remove the damaged section. Your healthcare provider can tell you more if this is needed.  Follow-up  While you are being treated, your healthcare provider will work to find the cause of your ischemic colitis. After you recover, you may need to make lifestyle changes, such as quitting smoking, or take medicines to decrease your risk of another episode. Call 911 or emergency services or go to the emergency room right away if your symptoms return.  Date Last Reviewed: 7/1/2016  © 5814-7699 [employer]. [address] [place] [address]. All rights reserved. This information is not intended as a substitute for professional medical care. Always follow your healthcare professional's instructions.          Your Scheduled Appointments     Mar 17, 2017  9:20 AM CDT   Established Patient Visit with Sharron Guido MD   [facility] ([facility]    [id number] [place] [id number] [phone]              [facility] Sign-Up     Activating your [facility] account is as easy as 1-2-3!     1) Visit [email or web address], select Sign Up Now, enter this activation code and your date of birth, then select Next.  [email or web address]  Expires: 4/25/2017 11:20 PM      2) Create a username and password to use when you visit [facility] in the future and select a security question in case you lose your password and select Next.    3) Enter your e-mail address and click Sign Up!    Additional Information  If you have questions, please e-mail [email or web address] or call [phone] to talk to  our [facility] staff. Remember, [facility] is NOT to be used for urgent needs. For medical emergencies, dial 911.          [facility] complies with applicable Federal civil rights laws and does not discriminate on the basis of race, color, national origin, age, disability, or sex.        Language Assistance Services     ATTENTION: Language assistance services are available, free of charge. Please call [phone].      ATENCIÓN: Si habla español, tiene a sethi disposición servicios gratuitos de asistencia lingüística. Llame al [phone].     CHÚ Ý: N?u b?n nói Ti?ng Vi?t, có các d?ch v? h? tr? ngôn ng? mi?n phí dành cho b?n. G?i s? [phone].

## 2017-03-12 NOTE — ED PROVIDER NOTES
Encounter Date: 3/11/2017  1. Bilateral carotid stenosis by ultrasound.  2. PAD status post aortobifemoral bypass.  3. Renal vascular disease status post remote bilateral renal artery  bypass and then more recent redo left renal artery bypass.  4. History of chronic kidney disease with Bebeto atrophic right  kidney but functioning left kidney       History     Chief Complaint   Patient presents with    Constipation     x 3 weeks. reports surgery on march 1st.     Palpitations     since this am.      Review of patient's allergies indicates:   Allergen Reactions    Diovan  [valsartan] Swelling    Levofloxacin Other (See Comments)     Stomach pains    Lisinopril Swelling     HPI  Past Medical History:   Diagnosis Date    Anxiety     Arthritis     Asthma     Atherosclerosis of native arteries of the extremities with intermittent claudication     Benign hypertensive heart disease without heart failure     Carotid artery occlusion     Chronic kidney disease     Coronary artery disease     GERD (gastroesophageal reflux disease)     History of aorto-femoral bypass 8/16/2013    Hyperlipidemia     Hypertension     PVD (peripheral vascular disease)     PVD (peripheral vascular disease) 8/16/2013    Renal artery stenosis 8/16/2013    Renovascular hypertension 8/16/2013    Shingles sept 2015    Stenosis of left subclavian artery 8/16/2013    Tobacco abuse      Past Surgical History:   Procedure Laterality Date    CARDIAC CATHETERIZATION      RENAL ARTERY BYPASS      2012     Family History   Problem Relation Age of Onset    Hypertension Mother     Hyperlipidemia Mother     Heart failure Mother     Heart disease Mother     Hyperlipidemia Father      Social History   Substance Use Topics    Smoking status: Current Every Day Smoker     Packs/day: 1.00     Years: 40.00     Types: Cigarettes    Smokeless tobacco: Never Used    Alcohol use No     Review of Systems    Physical Exam   Initial Vitals    BP Pulse Resp Temp SpO2   03/11/17 2054 03/11/17 2054 03/11/17 2054 03/11/17 2054 03/11/17 2054   152/71 69 19 97.7 °F (36.5 °C) 97 %     Physical Exam    ED Course   Procedures  Labs Reviewed - No data to display                            ED Course     Clinical Impression:   {Add your Clinical Impression here. If you haven't documented one yet, please pend the note, finalize a Clinical Impression, and refresh your note before signing.:[id number]}

## 2017-03-12 NOTE — ED PROVIDER NOTES
SCRIBE #1 NOTE: I, Zoë Pinto, am scribing for, and in the presence of, Naz Burrows MD. I have scribed the entire note.     Encounter Date: 3/11/2017  1. Bilateral carotid stenosis by ultrasound.  2. PAD status post aortobifemoral bypass.  3. Renal vascular disease status post remote bilateral renal artery  bypass and then more recent redo left renal artery bypass.  4. History of chronic kidney disease with Bebeto atrophic right  kidney but functioning left kidney        History      Chief Complaint   Patient presents with    Constipation     x 3 weeks. reports surgery on march 1st.     Palpitations     since this am.        Review of patient's allergies indicates:   Allergen Reactions    Diovan  [valsartan] Swelling    Levofloxacin Other (See Comments)     Stomach pains    Lisinopril Swelling        HPI   HPI    3/11/2017, 10:03 PM   History obtained from the patient      History of Present Illness: Wallace Vigil is a 62 y.o. male patient who presents to the Emergency Department for constipation which onset 3 weeks ago. Symptoms are constant and moderate in severity. Pt reports having surgery on 3/1/17 at [facility]. No mitigating or exacerbating factors reported. Associated sxs include hurting when he eats and palpitations. Patient denies any fever, chills, N/V/D, SOB, CP, and all other sxs at this time. No prior Tx. No further complaints or concerns at this time.     Encounter Date: 3/11/2017  1. Bilateral carotid stenosis by ultrasound.  2. PAD status post aortobifemoral bypass.  3. Renal vascular disease status post remote bilateral renal artery  bypass and then more recent redo left renal artery bypass.  4. History of chronic kidney disease with Bebeto atrophic right  kidney but functioning left kidney       Arrival mode: Personal vehicle    PCP: Mike Recinos MD       Past Medical History:  Past Medical History:   Diagnosis Date    Anxiety     Arthritis     Asthma     Atherosclerosis of  native arteries of the extremities with intermittent claudication     Benign hypertensive heart disease without heart failure     Cancer     Carotid artery occlusion     Chronic kidney disease     COPD (chronic obstructive pulmonary disease)     Coronary artery disease     Encounter for blood transfusion     GERD (gastroesophageal reflux disease)     History of aorto-femoral bypass 8/16/2013    Hyperlipidemia     Hypertension     PVD (peripheral vascular disease)     PVD (peripheral vascular disease) 8/16/2013    Renal artery stenosis 8/16/2013    Renovascular hypertension 8/16/2013    Shingles sept 2015    Stenosis of left subclavian artery 8/16/2013    Thyroid disease     Thyroid cancer 3/1/2017    Tobacco abuse        Past Surgical History:  Past Surgical History:   Procedure Laterality Date    CARDIAC CATHETERIZATION      RENAL ARTERY BYPASS      2012    THYROIDECTOMY      VASCULAR SURGERY      Carotid cleaned out          Family History:  Family History   Problem Relation Age of Onset    Hypertension Mother     Hyperlipidemia Mother     Heart failure Mother     Heart disease Mother     Hyperlipidemia Father        Social History:  Social History     Social History Main Topics    Smoking status: Current Every Day Smoker     Packs/day: 1.00     Years: 40.00     Types: Cigarettes    Smokeless tobacco: Never Used    Alcohol use No    Drug use: No    Sexual activity: No       ROS   Review of Systems   Constitutional: Negative for chills and fever.   HENT: Negative for sore throat.    Respiratory: Negative for shortness of breath.    Cardiovascular: Positive for palpitations. Negative for chest pain.   Gastrointestinal: Positive for constipation. Negative for diarrhea, nausea and vomiting.        (+) abd pain when he eats   Genitourinary: Negative for dysuria.   Musculoskeletal: Negative for back pain.   Skin: Negative for rash.   Neurological: Negative for weakness.   Hematological:  "Does not bruise/bleed easily.   All other systems reviewed and are negative.      Physical Exam    Initial Vitals   BP Pulse Resp Temp SpO2   03/11/17 2054 03/11/17 2054 03/11/17 2054 03/11/17 2054 03/11/17 2054   152/71 69 19 97.7 °F (36.5 °C) 97 %      Physical Exam  Nursing Notes and Vital Signs Reviewed.  Constitutional: Patient is in no acute distress. Awake and alert. Well-developed and well-nourished.  Head: Atraumatic. Normocephalic.  Eyes: PERRL. EOM intact. Conjunctivae are not pale. No scleral icterus.  ENT: Mucous membranes are moist. Oropharynx is clear and symmetric. Bilateral carotid bruit.   Neck: Supple. Full ROM. No lymphadenopathy.   Cardiovascular: Regular rate. Regular rhythm. No murmurs, rubs, or gallops. Distal pulses are 2+ and symmetric.  Pulmonary/Chest: No respiratory distress. Clear to auscultation bilaterally. No wheezing, rales, or rhonchi.  Abdominal: Soft and non-distended.  There is no tenderness.  No rebound, guarding, or rigidity. Good bowel sounds. Poor circulation in abdomen.   Genitourinary: No CVA tenderness  Musculoskeletal: Moves all extremities. No obvious deformities. No edema. No calf tenderness.  Skin: Warm and dry.  Neurological:  Alert, awake, and appropriate.  Normal speech.  No acute focal neurological deficits are appreciated.  Psychiatric: Normal affect. Good eye contact. Appropriate in content.    ED Course    Procedures  ED Vital Signs:  Vitals:    03/11/17 2054   BP: (!) 152/71   Pulse: 69   Resp: 19   Temp: 97.7 °F (36.5 °C)   TempSrc: Oral   SpO2: 97%   Weight: 59 kg (130 lb)   Height: 5' 11" (1.803 m)        The EKG was ordered, reviewed, and independently interpreted by the ED provider.  Interpretation time: 21:02  Rate: 65 BPM  Rhythm: normal sinus rhythm  Interpretation: Lateral leads and new elevated T wave inversions. No STEMI.        The Emergency Provider reviewed the vital signs and test results, which are outlined above.    ED Discussion     11:40 PM: " Reassessed pt at this time. Discussed with pt all pertinent ED information and results. Discussed pt dx and plan of tx. Gave pt all f/u and return to the ED instructions. All questions and concerns were addressed at this time. Pt expresses understanding of information and instructions, and is comfortable with plan to discharge. Pt is stable for discharge.    Pre-hypertension/Hypertension: The pt has been informed that they may have pre-hypertension or hypertension based on a blood pressure reading in the ED. I recommend that the pt call the PCP listed on their discharge instructions or a physician of their choice this week to arrange f/u for further evaluation of possible pre-hypertension or hypertension.         ED Medication(s):  Medications - No data to display    New Prescriptions    No medications on file       Follow-up Information     Follow up with Juancarlos Cottrell MD. Schedule an appointment as soon as possible for a visit in 2 days.    Specialty:  Vascular Surgery    Contact information:    [address]  [address]  VASCULAR CLINIC  [facility] [address]  [phone]              Medical Decision Making              Scribe Attestation:   Scribe #1: I performed the above scribed service and the documentation accurately describes the services I performed. I attest to the accuracy of the note.    Attending:   Physician Attestation Statement for Scribe #1: I, Naz Burrows MD, personally performed the services described in this documentation, as scribed by Zoë Pinto, in my presence, and it is both accurate and complete.          Clinical Impression       ICD-10-CM ICD-9-CM   1. Mesenteric ischemia K55.9 557.9   2. Chronic idiopathic constipation K59.04 564.00       1. Bilateral carotid stenosis by ultrasound.  2. PAD status post aortobifemoral bypass.  3. Renal vascular disease status post remote bilateral renal artery  bypass and then more recent redo left renal artery bypass.  4. History of  chronic kidney disease with Bebeto atrophic right  kidney but functioning left kidney    Disposition:   Disposition: Discharged  Condition: Stable         Naz Burrows MD  03/12/17 0135

## 2017-03-12 NOTE — DISCHARGE INSTRUCTIONS
Ischemic Colitis: Mesenteric ischemia resulting from poor circulation in the bowels resulting in weight loss and severe abdominal pain especially after eating or drinking fluids.  Follow-up with Dr. Carter for further management.  Continue lactulose for constipation and can add mineral oil for constipation.     Ischemic colitis happens if blood flow to a part of the colon is reduced.   Ischemic colitis happens when blood flow to the colon is reduced or blocked. Bloody diarrhea and severe belly pain are the most common symptoms. Other symptoms include vomiting, fever, and fainting. Diarrhea can lead to severe dehydration. This is the rapid loss of the fluids your body needs to function. Because of the severe pain and the risk for dehydration, ischemic colitis should be treated right away.  Causes of ischemic colitis  The cause of the reduction or blockage of blood flow to the colon is not well understood. In some cases, a sudden drop in blood pressure, or dehydration, leads to an episode. Ischemic colitis is more likely in people with blood clotting problems or heart and blood vessel disease.  Diagnosing ischemic colitis  The presence of severe belly pain and bloody diarrhea is often enough to diagnose ischemic colitis. After these symptoms are treated, a test called a colonoscopy will likely be done. This helps rule out other colon problems. The test uses a thin, flexible scope with a light and camera on the end. The scope is inserted through the rectum into the colon. The scope sends pictures from inside the colon to a video screen. A small sample of tissue (biopsy) from the colon may be taken for further testing in a lab.  Treating ischemic colitis  Episodes are treated in the hospital. You may remain in the hospital for several days or longer:  · An IV line is put into a vein in your hand or arm. You will be given fluids through the IV to treat dehydration. You are also given IV pain medicines if you need  them.  · You may be given IV antibiotics (medicines that treat infection).  · To rest the bowel, you will not eat or drink for a few days. In rare cases, when symptoms are very severe, you will be given nutrition through the IV.  · If you lost a lot of blood during the episode, you may receive a blood transfusion.  · In rare cases, an episode causes severe damage to the colon. In this case, surgery may need to be done to remove the damaged section. Your healthcare provider can tell you more if this is needed.  Follow-up  While you are being treated, your healthcare provider will work to find the cause of your ischemic colitis. After you recover, you may need to make lifestyle changes, such as quitting smoking, or take medicines to decrease your risk of another episode. Call 911 or emergency services or go to the emergency room right away if your symptoms return.  Date Last Reviewed: 7/1/2016  © 5941-1901 [employer]. [address] [place] [address], [place] [address]. All rights reserved. This information is not intended as a substitute for professional medical care. Always follow your healthcare professional's instructions.

## 2017-03-12 NOTE — ED NOTES
In bed 8 with family at bedside. Lying on stretcher, AAO x 4. HARDING to command. Call light in reach

## 2017-03-15 ENCOUNTER — OFFICE VISIT (OUTPATIENT)
Dept: INTERNAL MEDICINE | Facility: CLINIC | Age: 62
End: 2017-03-15
Payer: MEDICARE

## 2017-03-15 VITALS
OXYGEN SATURATION: 97 % | WEIGHT: 131.19 LBS | DIASTOLIC BLOOD PRESSURE: 70 MMHG | BODY MASS INDEX: 18.37 KG/M2 | TEMPERATURE: 99 F | HEIGHT: 71 IN | RESPIRATION RATE: 18 BRPM | SYSTOLIC BLOOD PRESSURE: 120 MMHG | HEART RATE: 74 BPM

## 2017-03-15 DIAGNOSIS — Z72.0 NOT CONSIDERING QUITTING USE OF TOBACCO: ICD-10-CM

## 2017-03-15 DIAGNOSIS — T81.40XA POST OP INFECTION: Primary | ICD-10-CM

## 2017-03-15 DIAGNOSIS — Z12.11 COLON CANCER SCREENING: ICD-10-CM

## 2017-03-15 DIAGNOSIS — I10 ESSENTIAL HYPERTENSION: ICD-10-CM

## 2017-03-15 DIAGNOSIS — J44.9 CHRONIC OBSTRUCTIVE PULMONARY DISEASE, UNSPECIFIED COPD TYPE: ICD-10-CM

## 2017-03-15 PROCEDURE — 1160F RVW MEDS BY RX/DR IN RCRD: CPT | Mod: S$GLB,,, | Performed by: FAMILY MEDICINE

## 2017-03-15 PROCEDURE — 99499 UNLISTED E&M SERVICE: CPT | Mod: S$GLB,,, | Performed by: FAMILY MEDICINE

## 2017-03-15 PROCEDURE — 99999 PR PBB SHADOW E&M-EST. PATIENT-LVL III: CPT | Mod: PBBFAC,,, | Performed by: FAMILY MEDICINE

## 2017-03-15 PROCEDURE — 87070 CULTURE OTHR SPECIMN AEROBIC: CPT

## 2017-03-15 PROCEDURE — 87186 SC STD MICRODIL/AGAR DIL: CPT

## 2017-03-15 PROCEDURE — 99214 OFFICE O/P EST MOD 30 MIN: CPT | Mod: S$GLB,,, | Performed by: FAMILY MEDICINE

## 2017-03-15 PROCEDURE — 3078F DIAST BP <80 MM HG: CPT | Mod: S$GLB,,, | Performed by: FAMILY MEDICINE

## 2017-03-15 PROCEDURE — 3074F SYST BP LT 130 MM HG: CPT | Mod: S$GLB,,, | Performed by: FAMILY MEDICINE

## 2017-03-15 PROCEDURE — 87077 CULTURE AEROBIC IDENTIFY: CPT

## 2017-03-15 RX ORDER — DOXAZOSIN 4 MG/1
4 TABLET ORAL NIGHTLY
Qty: 90 TABLET | Refills: 3 | Status: SHIPPED | OUTPATIENT
Start: 2017-03-15 | End: 2017-07-15 | Stop reason: DRUGHIGH

## 2017-03-15 RX ORDER — DILTIAZEM HYDROCHLORIDE 240 MG/1
240 CAPSULE, COATED, EXTENDED RELEASE ORAL DAILY
Qty: 90 CAPSULE | Refills: 3 | Status: SHIPPED | OUTPATIENT
Start: 2017-03-15 | End: 2017-12-13 | Stop reason: SDUPTHER

## 2017-03-15 RX ORDER — CLONIDINE HYDROCHLORIDE 0.1 MG/1
TABLET ORAL
Qty: 180 TABLET | Refills: 3 | Status: SHIPPED | OUTPATIENT
Start: 2017-03-15 | End: 2017-04-15 | Stop reason: SDUPTHER

## 2017-03-15 RX ORDER — PREDNISONE 5 MG/1
5 TABLET ORAL DAILY
COMMUNITY
End: 2017-04-03

## 2017-03-15 RX ORDER — PRAVASTATIN SODIUM 20 MG/1
TABLET ORAL
Qty: 90 TABLET | Refills: 3 | Status: SHIPPED | OUTPATIENT
Start: 2017-03-15 | End: 2017-12-13 | Stop reason: SDUPTHER

## 2017-03-15 RX ORDER — AMLODIPINE BESYLATE 10 MG/1
10 TABLET ORAL DAILY
Qty: 90 TABLET | Refills: 3 | Status: SHIPPED | OUTPATIENT
Start: 2017-03-15 | End: 2017-07-15

## 2017-03-15 RX ORDER — HYDRALAZINE HYDROCHLORIDE 100 MG/1
100 TABLET, FILM COATED ORAL 3 TIMES DAILY
Qty: 270 TABLET | Refills: 3 | Status: SHIPPED | OUTPATIENT
Start: 2017-03-15 | End: 2018-02-19 | Stop reason: SDUPTHER

## 2017-03-15 RX ORDER — CEPHALEXIN 500 MG/1
500 CAPSULE ORAL EVERY 8 HOURS
Qty: 21 CAPSULE | Refills: 0 | Status: SHIPPED | OUTPATIENT
Start: 2017-03-15 | End: 2017-03-22

## 2017-03-15 RX ORDER — ALPRAZOLAM 0.5 MG/1
0.5 TABLET ORAL NIGHTLY PRN
Qty: 30 TABLET | Refills: 1 | Status: SHIPPED | OUTPATIENT
Start: 2017-03-15 | End: 2017-07-13 | Stop reason: SDUPTHER

## 2017-03-15 NOTE — PROGRESS NOTES
Chief Complaint:    Chief Complaint   Patient presents with    Follow-up       History of Present Illness:  Patient is here status post left carotid endarterectomy and thyroidectomy.  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  He had 2 lymph nodes excised which were negative for cancer.  He's complaining of some swelling on the left side no fever.  He has hypertension, which is stable at home.  He has COPD and is a smoker and does not want to quit.      ROS:  Review of Systems   Constitutional: Negative for activity change, chills, fatigue, fever and unexpected weight change.   HENT: Negative for congestion, ear discharge, ear pain, hearing loss, postnasal drip and rhinorrhea.    Eyes: Negative for pain and visual disturbance.   Respiratory: Negative for cough, chest tightness and shortness of breath.    Cardiovascular: Negative for chest pain and palpitations.   Gastrointestinal: Negative for abdominal pain, diarrhea and vomiting.   Endocrine: Negative for heat intolerance.   Genitourinary: Negative for dysuria, flank pain, frequency and hematuria.   Musculoskeletal: Negative for back pain, gait problem and neck pain.   Skin: Negative for color change and rash.   Neurological: Negative for dizziness, tremors, seizures, numbness and headaches.   Psychiatric/Behavioral: Negative for agitation, hallucinations, self-injury, sleep disturbance and suicidal ideas. The patient is not nervous/anxious.        Past Medical History:   Diagnosis Date    Anxiety     Arthritis     Asthma     Atherosclerosis of native arteries of the extremities with intermittent claudication     Benign hypertensive heart disease without heart failure     Cancer     Carotid artery occlusion     Chronic kidney disease     COPD (chronic obstructive pulmonary disease)     Coronary artery disease     Encounter for blood transfusion     GERD (gastroesophageal reflux disease)     History of  "aorto-femoral bypass 8/16/2013    Hyperlipidemia     Hypertension     PVD (peripheral vascular disease)     PVD (peripheral vascular disease) 8/16/2013    Renal artery stenosis 8/16/2013    Renovascular hypertension 8/16/2013    Shingles sept 2015    Stenosis of left subclavian artery 8/16/2013    Thyroid disease     Thyroid cancer 3/1/2017    Tobacco abuse        Social History:  Social History     Social History    Marital status:      Spouse name: N/A    Number of children: 2    Years of education: N/A     Social History Main Topics    Smoking status: Current Every Day Smoker     Packs/day: 1.00     Years: 40.00     Types: Cigarettes    Smokeless tobacco: Never Used    Alcohol use No    Drug use: No    Sexual activity: No     Other Topics Concern    None     Social History Narrative    None       Family History:   family history includes Heart disease in his mother; Heart failure in his mother; Hyperlipidemia in his father and mother; Hypertension in his mother.    Health Maintenance   Topic Date Due    Colonoscopy  05/07/2014    Influenza Vaccine  08/01/2016    PROSTATE-SPECIFIC ANTIGEN  01/17/2018    Lipid Panel  01/17/2018    Pneumococcal PPSV23 (Medium Risk) (2) 02/16/2021    TETANUS VACCINE  06/17/2023    Hepatitis C Screening  Completed    Zoster Vaccine  Completed       Physical Exam:    Vital Signs  Temp: 98.5 °F (36.9 °C)  Temp src: Tympanic  Pulse: 74  Resp: 18  SpO2: 97 %  BP: 120/70  BP Location: Right arm  BP Method: Manual  Patient Position: Sitting  Pain Score:   7  Height and Weight  Height: 5' 11" (180.3 cm)  Weight: 59.5 kg (131 lb 2.8 oz)  BSA (Calculated - sq m): 1.73 sq meters  BMI (Calculated): 18.3  Weight in (lb) to have BMI = 25: 178.9]    Body mass index is 18.3 kg/(m^2).    Physical Exam   Constitutional: He is oriented to person, place, and time. He appears well-developed.   HENT:   Mouth/Throat: Oropharynx is clear and moist.   Eyes: Conjunctivae are " normal. Pupils are equal, round, and reactive to light.   Neck: Normal range of motion. Neck supple.       Cardiovascular: Normal rate, regular rhythm and normal heart sounds.    No murmur heard.  Pulmonary/Chest: Effort normal and breath sounds normal. No respiratory distress. He has no wheezes. He has no rales. He exhibits no tenderness.   Abdominal: Soft. He exhibits no distension and no mass. There is no tenderness. There is no guarding.   Musculoskeletal: He exhibits no edema or tenderness.   Lymphadenopathy:     He has no cervical adenopathy.   Neurological: He is alert and oriented to person, place, and time. He has normal reflexes.   Skin: Skin is warm and dry.   Psychiatric: He has a normal mood and affect. His behavior is normal. Judgment and thought content normal.       Lab Results   Component Value Date    CHOL 107 (L) 01/17/2017    CHOL 128 10/06/2015    CHOL 139 03/26/2015    TRIG 73 01/17/2017    TRIG 78 10/06/2015    TRIG 127 03/26/2015    HDL 36 (L) 01/17/2017    HDL 40 10/06/2015    HDL 37 (L) 03/26/2015    TOTALCHOLEST 3.0 01/17/2017    TOTALCHOLEST 3.2 10/06/2015    TOTALCHOLEST 3.8 03/26/2015    NONHDLCHOL 71 01/17/2017    NONHDLCHOL 88 10/06/2015    NONHDLCHOL 102 03/26/2015       Lab Results   Component Value Date    HGBA1C 4.8 01/17/2017       Assessment:      ICD-10-CM ICD-9-CM   1. Post op infection T81.4XXA 998.59   2. Essential hypertension I10 401.9   3. Chronic obstructive pulmonary disease, unspecified COPD type J44.9 496   4. Not considering quitting use of tobacco Z72.0 305.1   5. Colon cancer screening Z12.11 V76.51         Plan:  We collected a culture and started him on Keflex, discussed with Dr. Sinclair who is the partner for Dr. sosa perform the surgery on March 1.  He kindly accepted to see him today patient will be sent over to his office today.  Other medical problems are stable continue current meds  His meds will be refilled today.  He does not want to do colonoscopy  recommend a stool test.    Orders Placed This Encounter   Procedures    CULTURE, AEROBIC  (SPECIFY SOURCE)       Current Outpatient Prescriptions   Medication Sig Dispense Refill    albuterol (VENTOLIN HFA) 90 mcg/actuation inhaler INHALE 2 PUFFS BY MOUTH EVERY 4 HOURS IF NEEDED FOR WHEEZING 18 g 11    alprazolam (XANAX) 0.5 MG tablet Take 1 tablet (0.5 mg total) by mouth nightly as needed. 30 tablet 1    amlodipine (NORVASC) 10 MG tablet Take 1 tablet (10 mg total) by mouth once daily. 90 tablet 3    aspirin 81 mg Tab Take 1 tablet by mouth Daily. 1 Tablet Oral Every day      cloNIDine (CATAPRES) 0.1 MG tablet TAKE 1 TABLET (0.1 MG TOTAL) BY MOUTH 2 (TWO) TIMES DAILY. 180 tablet 3    diltiaZEM (CARDIZEM CD) 240 MG 24 hr capsule Take 1 capsule (240 mg total) by mouth once daily. 90 capsule 3    doxazosin (CARDURA) 4 MG tablet Take 1 tablet (4 mg total) by mouth every evening. 90 tablet 3    fluticasone (FLONASE) 50 mcg/actuation nasal spray 2 sprays by Each Nare route once daily. 1 Bottle 11    hydrALAZINE (APRESOLINE) 100 MG tablet Take 1 tablet (100 mg total) by mouth 3 (three) times daily. 270 tablet 3    hydrocodone-acetaminophen 10-325mg (NORCO)  mg Tab       lactulose (CHRONULAC) 10 gram/15 mL solution Take 30 ml every 12 hours PRN to achieve a good bowel movement 300 mL 6    pravastatin (PRAVACHOL) 20 MG tablet TAKE 1 TABLET BY MOUTH AT BEDTIME TO LOWER CHOLESTEROL 90 tablet 3    predniSONE (DELTASONE) 5 MG tablet Take 5 mg by mouth once daily.      ranitidine (ZANTAC) 75 MG tablet Take 75 mg by mouth 2 (two) times daily.      cephALEXin (KEFLEX) 500 MG capsule Take 1 capsule (500 mg total) by mouth every 8 (eight) hours. 21 capsule 0    epinephrine (EPIPEN) 0.3 mg/0.3 mL (1:1,000) AtIn 1 Pen Injector Intramuscular once 1 Device 5     No current facility-administered medications for this visit.        Medications Discontinued During This Encounter   Medication Reason    diltiaZEM  (CARDIZEM CD) 240 MG 24 hr capsule Duplicate Order    ferrous sulfate 325 (65 FE) MG EC tablet Patient no longer taking    omeprazole (PRILOSEC) 20 MG capsule Patient no longer taking    alprazolam (XANAX) 0.5 MG tablet Reorder    amlodipine (NORVASC) 10 MG tablet Reorder    diltiaZEM (CARDIZEM CD) 240 MG 24 hr capsule Reorder    doxazosin (CARDURA) 4 MG tablet Reorder    hydrALAZINE (APRESOLINE) 100 MG tablet Reorder    pravastatin (PRAVACHOL) 20 MG tablet Reorder    cloNIDine (CATAPRES) 0.1 MG tablet Reorder       No Follow-up on file.      Dr Mike Recinos MD    Disclaimer: This note is prepared using voice recognition system and as such is likely to have errors and is not proof read.

## 2017-03-18 LAB — BACTERIA SPEC AEROBE CULT: NORMAL

## 2017-03-20 ENCOUNTER — TELEPHONE (OUTPATIENT)
Dept: INTERNAL MEDICINE | Facility: CLINIC | Age: 62
End: 2017-03-20

## 2017-03-20 DIAGNOSIS — D64.9 ANEMIA, UNSPECIFIED TYPE: Primary | ICD-10-CM

## 2017-03-20 NOTE — TELEPHONE ENCOUNTER
Spoke with pt and he is requesting culture results   He sees Dr Sinclair again Wednesday  He is also requesting CBC because you told him he was anemic and he lost a lot of blood and wants to make sure that he is stable

## 2017-03-20 NOTE — TELEPHONE ENCOUNTER
----- Message from Yoselin Chavez sent at 3/20/2017  4:36 PM CDT -----  Contact: pt   States he is calling to follow up on a culture done on his neck and states to pls call [phone]//thanks/dbw     States the number on file doesn't ring and to pls call the number in msg

## 2017-03-20 NOTE — TELEPHONE ENCOUNTER
----- Message from Joan Manzano sent at 3/20/2017  1:30 PM CDT -----  Would like to speak to nurse. Please call back at [phone]. Thanks//cdb

## 2017-03-20 NOTE — TELEPHONE ENCOUNTER
His bacteria is ENTEROBACTER CLOACAE which is sensitive to all abx.  He must take his antibiotic given.    We need to do a cbc to see if his anemia is worse.

## 2017-04-03 ENCOUNTER — OFFICE VISIT (OUTPATIENT)
Dept: URGENT CARE | Facility: CLINIC | Age: 62
End: 2017-04-03
Payer: MEDICARE

## 2017-04-03 ENCOUNTER — HOSPITAL ENCOUNTER (OUTPATIENT)
Dept: RADIOLOGY | Facility: HOSPITAL | Age: 62
Discharge: HOME | End: 2017-04-03
Attending: NURSE PRACTITIONER
Payer: MEDICARE

## 2017-04-03 VITALS
WEIGHT: 133.5 LBS | RESPIRATION RATE: 18 BRPM | HEART RATE: 68 BPM | TEMPERATURE: 98 F | HEIGHT: 70 IN | OXYGEN SATURATION: 97 % | SYSTOLIC BLOOD PRESSURE: 136 MMHG | DIASTOLIC BLOOD PRESSURE: 68 MMHG | BODY MASS INDEX: 19.11 KG/M2

## 2017-04-03 DIAGNOSIS — J20.9 ACUTE BRONCHITIS, UNSPECIFIED ORGANISM: Primary | ICD-10-CM

## 2017-04-03 DIAGNOSIS — J20.9 ACUTE BRONCHITIS, UNSPECIFIED ORGANISM: ICD-10-CM

## 2017-04-03 DIAGNOSIS — J01.01 ACUTE RECURRENT MAXILLARY SINUSITIS: ICD-10-CM

## 2017-04-03 PROCEDURE — 1160F RVW MEDS BY RX/DR IN RCRD: CPT | Mod: S$GLB,,, | Performed by: NURSE PRACTITIONER

## 2017-04-03 PROCEDURE — 71020 XR CHEST PA AND LATERAL: CPT | Mod: 26,,, | Performed by: RADIOLOGY

## 2017-04-03 PROCEDURE — 99213 OFFICE O/P EST LOW 20 MIN: CPT | Mod: 25,S$GLB,, | Performed by: NURSE PRACTITIONER

## 2017-04-03 PROCEDURE — 71020 XR CHEST PA AND LATERAL: CPT | Mod: TC,PO

## 2017-04-03 PROCEDURE — 3075F SYST BP GE 130 - 139MM HG: CPT | Mod: S$GLB,,, | Performed by: NURSE PRACTITIONER

## 2017-04-03 PROCEDURE — 3078F DIAST BP <80 MM HG: CPT | Mod: S$GLB,,, | Performed by: NURSE PRACTITIONER

## 2017-04-03 PROCEDURE — 99999 PR PBB SHADOW E&M-EST. PATIENT-LVL V: CPT | Mod: PBBFAC,,, | Performed by: NURSE PRACTITIONER

## 2017-04-03 PROCEDURE — 99499 UNLISTED E&M SERVICE: CPT | Mod: S$GLB,,, | Performed by: NURSE PRACTITIONER

## 2017-04-03 RX ORDER — LEVOTHYROXINE SODIUM 88 UG/1
100 TABLET ORAL
COMMUNITY
Start: 2017-03-29 | End: 2018-01-01

## 2017-04-03 RX ORDER — PREDNISONE 10 MG/1
10 TABLET ORAL DAILY
Qty: 3 TABLET | Refills: 0 | Status: SHIPPED | OUTPATIENT
Start: 2017-04-03 | End: 2017-04-06

## 2017-04-03 RX ORDER — AMOXICILLIN AND CLAVULANATE POTASSIUM 875; 125 MG/1; MG/1
1 TABLET, FILM COATED ORAL EVERY 12 HOURS
Qty: 14 TABLET | Refills: 0 | Status: SHIPPED | OUTPATIENT
Start: 2017-04-03 | End: 2017-04-10

## 2017-04-03 RX ORDER — POLYETHYLENE GLYCOL 3350 17 G/17G
17 POWDER, FOR SOLUTION ORAL
COMMUNITY
End: 2017-06-28

## 2017-04-03 RX ORDER — LEVALBUTEROL INHALATION SOLUTION 0.63 MG/3ML
0.63 SOLUTION RESPIRATORY (INHALATION)
Status: COMPLETED | OUTPATIENT
Start: 2017-04-03 | End: 2017-04-03

## 2017-04-03 RX ADMIN — LEVALBUTEROL INHALATION SOLUTION 0.63 MG: 0.63 SOLUTION RESPIRATORY (INHALATION) at 04:04

## 2017-04-03 NOTE — PATIENT INSTRUCTIONS
PLAN: CXR,   Advised increased p.o. fluids  Nebulizer with Xopenex 0.63 for 15 and clinic now x 1  Drink plenty of clear fluids--water/juice & rest  Simply saline nasal wash & Flonase to irrigate sinuses and for congestion/runny nose.  Cool mist humidifier/vaporizer.  Meds: Augmentin, prednisone  Advise use of albuterol inhaler   Discussed smoking cessation  Advise return to prednisone 5 mg after completion of Prednisone 10 mg x 3 days  Mucinex for cough and chest congestion.  Tylenol  for fever, headache and body aches.  Warm salt water gargles for throat comfort.  Chloraseptic spray or lozenges for throat comfort.  See PCP or go to ER if symptoms worsen or fail to improve with treatment.

## 2017-04-03 NOTE — MR AVS SNAPSHOT
[facility]  [address]  [facility] [address]  Phone: [phone]  Fax: [phone]                  Wallace Vigil   4/3/2017 3:10 PM   Office Visit    Description:  Male : 1955   Provider:  Radha Watts NP   Department:  [facility]           Reason for Visit     Sinus Problem     Cough           Diagnoses this Visit        Comments    Acute bronchitis, unspecified organism    -  Primary     Acute recurrent maxillary sinusitis                To Do List           Goals (5 Years of Data)     None       These Medications        Disp Refills Start End    amoxicillin-clavulanate 875-125mg (AUGMENTIN) 875-125 mg per tablet 14 tablet 0 4/3/2017 4/10/2017    Take 1 tablet by mouth every 12 (twelve) hours. - Oral    Pharmacy: [facility] in [place], [place] - [address] [facility] [address] Ph #: [phone]       predniSONE (DELTASONE) 10 MG tablet 3 tablet 0 4/3/2017 2017    Take 1 tablet (10 mg total) by mouth once daily. - Oral    Pharmacy: [facility] in [place] - [address] [address] Ph #: [phone]         [facility]     [facility] Care [facility] -  Assistance  Unless otherwise directed by your provider, please contact [facility], our nurse care line that is available for / assistance.     Registered nurses in the [facility] provide: appointment scheduling, clinical advisement, health education, and other advisory services.  Call: [phone] (toll free)               Medications           Message regarding Medications     Verify the changes and/or additions to your medication regime listed below are the same as discussed with your clinician today.  If any of these changes or additions are incorrect, please notify your healthcare provider.        START taking these NEW medications        Refills    amoxicillin-clavulanate 875-125mg (AUGMENTIN) 875-125 mg per  tablet 0    Sig: Take 1 tablet by mouth every 12 (twelve) hours.    Class: Normal    Route: Oral    predniSONE (DELTASONE) 10 MG tablet 0    Sig: Take 1 tablet (10 mg total) by mouth once daily.    Class: Normal    Route: Oral      These medications were administered today        Dose Freq    levalbuterol nebulizer solution 0.63 mg 0.63 mg [facility] 1 time    Sig: Take 3 mLs (0.63 mg total) by nebulization one time.    Class: Normal    Route: Nebulization           Verify that the below list of medications is an accurate representation of the medications you are currently taking.  If none reported, the list may be blank. If incorrect, please contact your healthcare provider. Carry this list with you in case of emergency.           Current Medications     albuterol (VENTOLIN HFA) 90 mcg/actuation inhaler INHALE 2 PUFFS BY MOUTH EVERY 4 HOURS IF NEEDED FOR WHEEZING    alprazolam (XANAX) 0.5 MG tablet Take 1 tablet (0.5 mg total) by mouth nightly as needed.    amlodipine (NORVASC) 10 MG tablet Take 1 tablet (10 mg total) by mouth once daily.    amoxicillin-clavulanate 875-125mg (AUGMENTIN) 875-125 mg per tablet Take 1 tablet by mouth every 12 (twelve) hours.    aspirin 81 mg Tab Take 1 tablet by mouth Daily. 1 Tablet Oral Every day    cloNIDine (CATAPRES) 0.1 MG tablet TAKE 1 TABLET (0.1 MG TOTAL) BY MOUTH 2 (TWO) TIMES DAILY.    diltiaZEM (CARDIZEM CD) 240 MG 24 hr capsule Take 1 capsule (240 mg total) by mouth once daily.    doxazosin (CARDURA) 4 MG tablet Take 1 tablet (4 mg total) by mouth every evening.    epinephrine (EPIPEN) 0.3 mg/0.3 mL (1:1,000) AtIn 1 Pen Injector Intramuscular once    fluticasone (FLONASE) 50 mcg/actuation nasal spray 2 sprays by Each Nare route once daily.    hydrALAZINE (APRESOLINE) 100 MG tablet Take 1 tablet (100 mg total) by mouth 3 (three) times daily.    hydrocodone-acetaminophen 10-325mg (NORCO)  mg Tab     lactulose (CHRONULAC) 10 gram/15 mL solution Take 30 ml every 12  "hours PRN to achieve a good bowel movement    levothyroxine (SYNTHROID) 88 MCG tablet Take 88 mcg by mouth.    polyethylene glycol (GLYCOLAX) 17 gram/dose powder Take 17 g by mouth.    pravastatin (PRAVACHOL) 20 MG tablet TAKE 1 TABLET BY MOUTH AT BEDTIME TO LOWER CHOLESTEROL    predniSONE (DELTASONE) 10 MG tablet Take 1 tablet (10 mg total) by mouth once daily.    ranitidine (ZANTAC) 75 MG tablet Take 75 mg by mouth 2 (two) times daily.           Clinical Reference Information           Your Vitals Were     BP Pulse Temp Resp    136/68 (BP Location: Right arm, Patient Position: Sitting, BP Method: Manual) 68 98.4 °F (36.9 °C) (Tympanic) 18    Height Weight SpO2 BMI    5' 10" (1.778 m) 60.6 kg (133 lb 7.8 oz) 97% 19.15 kg/m2      Blood Pressure          Most Recent Value    BP  136/68      Allergies as of 4/3/2017     Diovan  [Valsartan]    Levofloxacin    Lisinopril      Immunizations Administered on Date of Encounter - 4/3/2017     None      Orders Placed During Today's Visit     Future Labs/Procedures Expected by Expires    X-Ray Chest PA And Lateral  4/3/2017 4/3/2018      Administrations This Visit     levalbuterol nebulizer solution 0.63 mg     Admin Date Action Dose Route Administered By             04/03/2017 Given 0.63 mg Nebulization SRINIVAS Qureshislee Sign-Up     Activating your [facility] account is as easy as 1-2-3!     1) Visit [email or web address], select Sign Up Now, enter this activation code and your date of birth, then select Next.  [email or web address]  Expires: 4/26/2017 12:20 AM      2) Create a username and password to use when you visit [facility] in the future and select a security question in case you lose your password and select Next.    3) Enter your e-mail address and click Sign Up!    Additional Information  If you have questions, please e-mail [email or web address] or call [phone] to talk to our [facility] staff. Remember, [facility] is NOT to be used for urgent " needs. For medical emergencies, dial 911.         Instructions    PLAN: CXR,   Advised increased p.o. fluids  Nebulizer with Xopenex 0.63 for 15 and clinic now x 1  Drink plenty of clear fluids--water/juice & rest  Simply saline nasal wash & flonase to irrigate sinuses and for congestion/runny nose.  Cool mist humidifier/vaporizer.  Meds: Augmentin, prednisone,   Advise use of albuterol inhaler   Discussed smoking cessation  Advise return to prednisone 5 mg after completion of Prednisone 10 mg   Mucinex for cough and chest congestion.  Tylenol  for fever, headache and body aches.  Warm salt water gargles for throat comfort.  Chloraseptic spray or lozenges for throat comfort.  See PCP or go to ER if symptoms worsen or fail to improve with treatment.         Smoking Cessation     If you would like to quit smoking:   You may be eligible for free services if you are a [place] resident and started smoking cigarettes before September 1, 1988.  Call the Smoking Cessation Trust ([facility]) toll free at ([phone] 716-8249 or (728) 625-6947.   Call 1-800-QUIT-NOW if you do not meet the above criteria.   Contact us via email: [email or web address]   View our website for more information: [email or web address]        Language Assistance Services     ATTENTION: Language assistance services are available, free of charge. Please call [phone].      ATENCIÓN: Si habla español, tiene a sethi disposición servicios gratuitos de asistencia lingüística. Llame al [phone].     CHÚ Ý: N?u b?n nói Ti?ng Vi?t, có các d?ch v? h? tr? ngôn ng? mi?n phí dành cho b?n. G?i s? [phone].         [facility] complies with applicable Federal civil rights laws and does not discriminate on the basis of race, color, national origin, age, disability, or sex.

## 2017-04-03 NOTE — PROGRESS NOTES
Chief complaint/reason for visit: Nasal congestion, postnasal drip, cough and fever    HISTORY OF PRESENT ILLNESS:  63 y/o male complains of nasal congestion, postnasal drip, headache, ears popping, slight shortness of breath, productive cough with wheezing and chills onset 1 week ago.  Patient complains cough with wheezing worse at night.  Patient admits had thyroid gland removed due to cancer on March 1st. Admits incision had become infected, given antibiotics.  Patient admits feels like a sinus infection & usually given Amoxil for infection every year. Admits did not try inhaler & or Flonase for congestion. Patient admitting to still smoking!      Past Medical History:   Diagnosis Date    Anxiety     Arthritis     Asthma     Atherosclerosis of native arteries of the extremities with intermittent claudication     Benign hypertensive heart disease without heart failure     Cancer     Carotid artery occlusion     Chronic kidney disease     COPD (chronic obstructive pulmonary disease)     Coronary artery disease     Encounter for blood transfusion     GERD (gastroesophageal reflux disease)     History of aorto-femoral bypass 8/16/2013    Hyperlipidemia     Hypertension     PVD (peripheral vascular disease)     PVD (peripheral vascular disease) 8/16/2013    Renal artery stenosis 8/16/2013    Renovascular hypertension 8/16/2013    Shingles sept 2015    Stenosis of left subclavian artery 8/16/2013    Thyroid disease     Thyroid cancer 3/1/2017    Tobacco abuse        Past Surgical History:   Procedure Laterality Date    CARDIAC CATHETERIZATION      RENAL ARTERY BYPASS      2012    THYROIDECTOMY      VASCULAR SURGERY      Carotid cleaned out        Family History   Problem Relation Age of Onset    Hypertension Mother     Hyperlipidemia Mother     Heart failure Mother     Heart disease Mother     Hyperlipidemia Father        Social History     Social History    Marital status:       Spouse name: N/A    Number of children: 2    Years of education: N/A     Occupational History    Not on file.     Social History Main Topics    Smoking status: Current Every Day Smoker     Packs/day: 1.00     Years: 40.00     Types: Cigarettes    Smokeless tobacco: Never Used    Alcohol use No    Drug use: No    Sexual activity: No     Other Topics Concern    Not on file     Social History Narrative       ROS:  GENERAL: Reports fatigue.   SKIN: No rashes, itching or changes in color or texture of skin.  HEENT: Reports nasal congestion, postnasal drip, headache, hoarseness.   NODES: No masses or lesions. Denies swollen glands.  CHEST: Reports productive cough. & wheezing  CARDIOVASCULAR: slight shortness of breath, Denies chest pain  ABDOMEN: Appetite fair, No weight loss.  MUSCULOSKELETAL: reports no back pain.  NEUROLOGIC: No history of seizures, paralysis, alteration of gait or coordination.  PSYCHIATRIC: Dereje mood swings, depression.    PE:   APPEARANCE: Well nourished, well developed, in moderate distress, [place]  SKIN: Normal skin turgor, no lesions.  HEENT: Turbinates red, Minimal red pharynx, TM's with minimal effusions & poor light reflex bilateral, frontal/ facial tenderness, bilateral sclera clear.  CHEST: minimal expiratory wheezing with rhonchi on auscultation.  CARDIOVASCULAR: Regular rate and rhythm.   ABDOMEN:  Soft. No tenderness or masses. No CVA tenderness.  MUSCULOSKELETAL: HARDING without difficulty  NEUROLOGIC: No sensory deficits. Gait & Posture: normal, No cerebellar signs.  MENTAL STATUS: Patient alert, oriented x 3 & conversant.    PLAN: CXR,   Advised increased p.o. fluids  Nebulizer with Xopenex 0.63 for 15 and clinic now x 1  Drink plenty of clear fluids--water/juice & rest  Simply saline nasal wash & Flonase to irrigate sinuses and for congestion/runny nose.  Cool mist humidifier/vaporizer.  Meds: Augmentin, prednisone  Advise use of albuterol inhaler   Discussed smoking  cessation  Advise return to prednisone 5 mg after completion of Prednisone 10 mg x 3 days  Mucinex for cough and chest congestion.  Tylenol  for fever, headache and body aches.  Warm salt water gargles for throat comfort.  Chloraseptic spray or lozenges for throat comfort.  See PCP or go to ER if symptoms worsen or fail to improve with treatment.      DIAGNOSIS:  Sinobronchitis   Hypertension   Chronic kidney disease  Tobacco use disorder   Chronic obstructive pulmonary disease

## 2017-04-04 ENCOUNTER — TELEPHONE (OUTPATIENT)
Dept: HEMATOLOGY/ONCOLOGY | Facility: CLINIC | Age: 62
End: 2017-04-04

## 2017-04-04 NOTE — TELEPHONE ENCOUNTER
----- Message from Sharron Guido MD sent at 4/4/2017  8:08 AM CDT -----  This is the patient that came to see me with weight loss and had his thyroid lighting up abnormally on PET... But also was about to get carotid surgery... So you helped me arrange him getting a thyroidectomy at the same time as his carotid surgery. Well, I just received a letter stating it was cancer and that path should have been sent to us.    Do you mind working on getting the pathology report and contacting the patient to see if anyone has referred him to an endocrinologist?    Thank you,  RT

## 2017-04-15 RX ORDER — CLONIDINE HYDROCHLORIDE 0.1 MG/1
TABLET ORAL
Qty: 60 TABLET | Refills: 2 | Status: SHIPPED | OUTPATIENT
Start: 2017-04-15 | End: 2017-12-13 | Stop reason: SDUPTHER

## 2017-04-16 RX ORDER — DOXAZOSIN 4 MG/1
TABLET ORAL
Qty: 30 TABLET | Refills: 5 | Status: SHIPPED | OUTPATIENT
Start: 2017-04-16 | End: 2017-06-28 | Stop reason: SDUPTHER

## 2017-05-10 ENCOUNTER — TELEPHONE (OUTPATIENT)
Dept: HEMATOLOGY/ONCOLOGY | Facility: CLINIC | Age: 62
End: 2017-05-10

## 2017-05-10 NOTE — TELEPHONE ENCOUNTER
"Spoke with Dr Lundberg's office. They stated they referred patient to radiation and it's "pending".  Requested note to be faxed over.   "

## 2017-05-10 NOTE — TELEPHONE ENCOUNTER
----- Message from Melvin Alexander sent at 5/10/2017  1:12 PM CDT -----  Contact: pt wife Ying  Pt is calling nurse staff regarding pt health condition. Pt call back [phone] thanks

## 2017-05-17 ENCOUNTER — OFFICE VISIT (OUTPATIENT)
Dept: INTERNAL MEDICINE | Facility: CLINIC | Age: 62
End: 2017-05-17
Payer: MEDICARE

## 2017-05-17 ENCOUNTER — LAB VISIT (OUTPATIENT)
Dept: LAB | Facility: HOSPITAL | Age: 62
End: 2017-05-17
Attending: FAMILY MEDICINE
Payer: OTHER GOVERNMENT

## 2017-05-17 VITALS
WEIGHT: 132.25 LBS | HEIGHT: 71 IN | BODY MASS INDEX: 18.52 KG/M2 | TEMPERATURE: 97 F | DIASTOLIC BLOOD PRESSURE: 70 MMHG | HEART RATE: 77 BPM | OXYGEN SATURATION: 97 % | SYSTOLIC BLOOD PRESSURE: 130 MMHG

## 2017-05-17 DIAGNOSIS — R31.9 HEMATURIA: ICD-10-CM

## 2017-05-17 DIAGNOSIS — R12 HEART BURN: ICD-10-CM

## 2017-05-17 DIAGNOSIS — R60.0 PEDAL EDEMA: ICD-10-CM

## 2017-05-17 DIAGNOSIS — D64.9 ANEMIA, UNSPECIFIED TYPE: ICD-10-CM

## 2017-05-17 DIAGNOSIS — R60.0 PEDAL EDEMA: Primary | ICD-10-CM

## 2017-05-17 LAB
ALBUMIN SERPL BCP-MCNC: 3.4 G/DL
ALP SERPL-CCNC: 69 U/L
ALT SERPL W/O P-5'-P-CCNC: 8 U/L
ANION GAP SERPL CALC-SCNC: 9 MMOL/L
AST SERPL-CCNC: 16 U/L
BACTERIA #/AREA URNS AUTO: ABNORMAL /HPF
BASOPHILS # BLD AUTO: 0.02 K/UL
BASOPHILS NFR BLD: 0.4 %
BILIRUB SERPL-MCNC: 0.4 MG/DL
BILIRUB UR QL STRIP: NEGATIVE
BNP SERPL-MCNC: 86 PG/ML
BUN SERPL-MCNC: 21 MG/DL
CALCIUM SERPL-MCNC: 9.1 MG/DL
CHLORIDE SERPL-SCNC: 104 MMOL/L
CLARITY UR REFRACT.AUTO: CLEAR
CO2 SERPL-SCNC: 24 MMOL/L
COLOR UR AUTO: YELLOW
CREAT SERPL-MCNC: 1.6 MG/DL
D DIMER PPP IA.FEU-MCNC: 1.96 MG/L FEU
DIFFERENTIAL METHOD: ABNORMAL
EOSINOPHIL # BLD AUTO: 0.1 K/UL
EOSINOPHIL NFR BLD: 1.1 %
ERYTHROCYTE [DISTWIDTH] IN BLOOD BY AUTOMATED COUNT: 16.9 %
EST. GFR  (AFRICAN AMERICAN): 53 ML/MIN/1.73 M^2
EST. GFR  (NON AFRICAN AMERICAN): 45.8 ML/MIN/1.73 M^2
GLUCOSE SERPL-MCNC: 100 MG/DL
GLUCOSE UR QL STRIP: NEGATIVE
HCT VFR BLD AUTO: 32.7 %
HGB BLD-MCNC: 10.8 G/DL
HGB UR QL STRIP: ABNORMAL
HYALINE CASTS UR QL AUTO: 1 /LPF
KETONES UR QL STRIP: NEGATIVE
LEUKOCYTE ESTERASE UR QL STRIP: NEGATIVE
LYMPHOCYTES # BLD AUTO: 0.6 K/UL
LYMPHOCYTES NFR BLD: 12 %
MCH RBC QN AUTO: 30.1 PG
MCHC RBC AUTO-ENTMCNC: 33 %
MCV RBC AUTO: 91 FL
MICROSCOPIC COMMENT: ABNORMAL
MONOCYTES # BLD AUTO: 0.4 K/UL
MONOCYTES NFR BLD: 7.9 %
NEUTROPHILS # BLD AUTO: 4.2 K/UL
NEUTROPHILS NFR BLD: 78.4 %
NITRITE UR QL STRIP: NEGATIVE
PH UR STRIP: 5 [PH] (ref 5–8)
PLATELET # BLD AUTO: 175 K/UL
PMV BLD AUTO: 9.8 FL
POTASSIUM SERPL-SCNC: 3.9 MMOL/L
PROT SERPL-MCNC: 7.7 G/DL
PROT UR QL STRIP: ABNORMAL
RBC # BLD AUTO: 3.59 M/UL
RBC #/AREA URNS AUTO: 18 /HPF (ref 0–4)
SODIUM SERPL-SCNC: 137 MMOL/L
SP GR UR STRIP: 1.02 (ref 1–1.03)
T4 FREE SERPL-MCNC: 0.81 NG/DL
TSH SERPL DL<=0.005 MIU/L-ACNC: 24.43 UIU/ML
URN SPEC COLLECT METH UR: ABNORMAL
UROBILINOGEN UR STRIP-ACNC: ABNORMAL EU/DL
WBC # BLD AUTO: 5.35 K/UL
WBC #/AREA URNS AUTO: 1 /HPF (ref 0–5)

## 2017-05-17 PROCEDURE — 85025 COMPLETE CBC W/AUTO DIFF WBC: CPT

## 2017-05-17 PROCEDURE — 80053 COMPREHEN METABOLIC PANEL: CPT

## 2017-05-17 PROCEDURE — 85379 FIBRIN DEGRADATION QUANT: CPT

## 2017-05-17 PROCEDURE — 3075F SYST BP GE 130 - 139MM HG: CPT | Mod: S$GLB,,, | Performed by: FAMILY MEDICINE

## 2017-05-17 PROCEDURE — 84439 ASSAY OF FREE THYROXINE: CPT

## 2017-05-17 PROCEDURE — 3078F DIAST BP <80 MM HG: CPT | Mod: S$GLB,,, | Performed by: FAMILY MEDICINE

## 2017-05-17 PROCEDURE — 84443 ASSAY THYROID STIM HORMONE: CPT

## 2017-05-17 PROCEDURE — 1160F RVW MEDS BY RX/DR IN RCRD: CPT | Mod: S$GLB,,, | Performed by: FAMILY MEDICINE

## 2017-05-17 PROCEDURE — 99999 PR PBB SHADOW E&M-EST. PATIENT-LVL IV: CPT | Mod: PBBFAC,,, | Performed by: FAMILY MEDICINE

## 2017-05-17 PROCEDURE — 83880 ASSAY OF NATRIURETIC PEPTIDE: CPT

## 2017-05-17 PROCEDURE — 99214 OFFICE O/P EST MOD 30 MIN: CPT | Mod: S$GLB,,, | Performed by: FAMILY MEDICINE

## 2017-05-17 PROCEDURE — 36415 COLL VENOUS BLD VENIPUNCTURE: CPT | Mod: PO

## 2017-05-17 NOTE — MR AVS SNAPSHOT
[facility]  [address] [place] [id number]  Phone: [phone]                  Wallace Vigil   2017 11:20 AM   Office Visit    Description:  Male : 1955   Provider:  Mike Recinos MD   Department:  [facility]           Reason for Visit     Edema           Diagnoses this Visit        Comments    Pedal edema    -  Primary     Heart burn         Anemia, unspecified type                To Do List           Future Appointments        Provider Department Dept Phone    2017 1:10 PM LABORATORY, [facility] [phone]      Goals (5 Years of Data)     None      [facility]     [facility] On Call Nurse Care Line -  Assistance  Unless otherwise directed by your provider, please contact [facility], our nurse care line that is available for  assistance.     Registered nurses in the [facility] On Call Center provide: appointment scheduling, clinical advisement, health education, and other advisory services.  Call: [phone] (toll free)               Medications           Message regarding Medications     Verify the changes and/or additions to your medication regime listed below are the same as discussed with your clinician today.  If any of these changes or additions are incorrect, please notify your healthcare provider.             Verify that the below list of medications is an accurate representation of the medications you are currently taking.  If none reported, the list may be blank. If incorrect, please contact your healthcare provider. Carry this list with you in case of emergency.           Current Medications     albuterol (VENTOLIN HFA) 90 mcg/actuation inhaler INHALE 2 PUFFS BY MOUTH EVERY 4 HOURS IF NEEDED FOR WHEEZING    alprazolam (XANAX) 0.5 MG tablet Take 1 tablet (0.5 mg total) by mouth nightly as needed.    amlodipine (NORVASC) 10 MG tablet Take 1 tablet (10 mg total) by mouth once daily.    aspirin 81 mg  "Tab Take 1 tablet by mouth Daily. 1 Tablet Oral Every day    cloNIDine (CATAPRES) 0.1 MG tablet TAKE 1 TABLET BY MOUTH TWICE DAILY    diltiaZEM (CARDIZEM CD) 240 MG 24 hr capsule Take 1 capsule (240 mg total) by mouth once daily.    doxazosin (CARDURA) 4 MG tablet Take 1 tablet (4 mg total) by mouth every evening.    doxazosin (CARDURA) 4 MG tablet TAKE 1 TABLET BY MOUTH EVERY EVENING    epinephrine (EPIPEN) 0.3 mg/0.3 mL (1:1,000) AtIn 1 Pen Injector Intramuscular once    fluticasone (FLONASE) 50 mcg/actuation nasal spray 2 sprays by Each Nare route once daily.    hydrALAZINE (APRESOLINE) 100 MG tablet Take 1 tablet (100 mg total) by mouth 3 (three) times daily.    hydrocodone-acetaminophen 10-325mg (NORCO)  mg Tab     lactulose (CHRONULAC) 10 gram/15 mL solution Take 30 ml every 12 hours PRN to achieve a good bowel movement    levothyroxine (SYNTHROID) 88 MCG tablet Take 88 mcg by mouth.    polyethylene glycol (GLYCOLAX) 17 gram/dose powder Take 17 g by mouth.    pravastatin (PRAVACHOL) 20 MG tablet TAKE 1 TABLET BY MOUTH AT BEDTIME TO LOWER CHOLESTEROL    ranitidine (ZANTAC) 75 MG tablet Take 75 mg by mouth 2 (two) times daily.           Clinical Reference Information           Your Vitals Were     BP Pulse Temp Height Weight SpO2    130/70 (BP Location: Left arm, Patient Position: Sitting, BP Method: Manual) 77 97 °F (36.1 °C) (Tympanic) 5' 11" (1.803 m) 60 kg (132 lb 4.4 oz) 97%    BMI                18.45 kg/m2          Blood Pressure          Most Recent Value    BP  130/70      Allergies as of 5/17/2017     Diovan  [Valsartan]    Levofloxacin    Lisinopril      Immunizations Administered on Date of Encounter - 5/17/2017     None      Orders Placed During Today's Visit      Normal Orders This Visit    URINALYSIS     Future Labs/Procedures Expected by Expires    Brain natriuretic peptide  5/17/2017 7/16/2018    CBC auto differential  5/17/2017 (Approximate) 6/16/2017    Comprehensive metabolic panel  " 5/17/2017 (Approximate) 7/16/2018    D dimer, quantitative  5/17/2017 7/16/2018    TSH  5/17/2017 (Approximate) 7/16/2018      [facility] Sign-Up     Activating your [facility] account is as easy as 1-2-3!     1) Visit [email or web address], select Sign Up Now, enter this activation code and your date of birth, then select Next.  [email or web address]  Expires: 7/1/2017 11:50 AM      2) Create a username and password to use when you visit [facility] in the future and select a security question in case you lose your password and select Next.    3) Enter your e-mail address and click Sign Up!    Additional Information  If you have questions, please e-mail [email or web address] or call [phone] to talk to our [facility] staff. Remember, [facility] is NOT to be used for urgent needs. For medical emergencies, dial 911.         Smoking Cessation     If you would like to quit smoking:   You may be eligible for free services if you are a [place] resident and started smoking cigarettes before September 1, 1988.  Call the Smoking Cessation Trust ([facility]) toll free at (113) 522-7500 or (639) 463-7459.   Call 7-385-QUIT-NOW if you do not meet the above criteria.   Contact us via email: [email or web address]   View our website for more information: [email or web address]        Language Assistance Services     ATTENTION: Language assistance services are available, free of charge. Please call [phone].      ATENCIÓN: Si habla español, tiene a sethi disposición servicios gratuitos de asistencia lingüística. Llame al [phone].     CHÚ Ý: N?u b?n nói Ti?ng Vi?t, có các d?ch v? h? tr? ngôn ng? mi?n phí dành cho b?n. G?i s? [phone].         [facility] complies with applicable Federal civil rights laws and does not discriminate on the basis of race, color, national origin, age, disability, or sex.

## 2017-05-17 NOTE — PROGRESS NOTES
Chief Complaint:    Chief Complaint   Patient presents with    Edema       History of Present Illness:  Patient presents today he's complaining of pedal edema bilaterally going on for many months.  Denies any new shortness of breath or chest pain.  He is extremely hard of hearing.  He has anemia.  Also thyroid cancer he has not heard from the radiation oncologist that he was referred to.  He has chronic kidney disease and he has a single kidney.  He also complains of heartburn and takes Zantac does not help very much.  Patient's blood pressure today is normal his home blood pressure readings are stable.      ROS:  Review of Systems   Constitutional: Negative for activity change, chills, fatigue, fever and unexpected weight change.   HENT: Negative for congestion, ear discharge, ear pain, hearing loss, postnasal drip and rhinorrhea.    Eyes: Negative for pain and visual disturbance.   Respiratory: Negative for cough, chest tightness and shortness of breath.    Cardiovascular: Negative for chest pain and palpitations.   Gastrointestinal: Negative for abdominal pain, diarrhea and vomiting.   Endocrine: Negative for heat intolerance.   Genitourinary: Negative for dysuria, flank pain, frequency and hematuria.   Musculoskeletal: Negative for back pain, gait problem and neck pain.   Skin: Negative for color change and rash.   Neurological: Negative for dizziness, tremors, seizures, numbness and headaches.   Psychiatric/Behavioral: Negative for agitation, hallucinations, self-injury, sleep disturbance and suicidal ideas. The patient is not nervous/anxious.        Past Medical History:   Diagnosis Date    Anxiety     Arthritis     Asthma     Atherosclerosis of native arteries of the extremities with intermittent claudication     Benign hypertensive heart disease without heart failure     Cancer     Carotid artery occlusion     Chronic kidney disease     COPD (chronic obstructive pulmonary disease)     Coronary  "artery disease     Encounter for blood transfusion     GERD (gastroesophageal reflux disease)     History of aorto-femoral bypass 8/16/2013    Hyperlipidemia     Hypertension     PVD (peripheral vascular disease)     PVD (peripheral vascular disease) 8/16/2013    Renal artery stenosis 8/16/2013    Renovascular hypertension 8/16/2013    Shingles sept 2015    Stenosis of left subclavian artery 8/16/2013    Thyroid disease     Thyroid cancer 3/1/2017    Tobacco abuse        Social History:  Social History     Social History    Marital status:      Spouse name: N/A    Number of children: 2    Years of education: N/A     Social History Main Topics    Smoking status: Current Every Day Smoker     Packs/day: 1.00     Years: 40.00     Types: Cigarettes    Smokeless tobacco: Never Used    Alcohol use No    Drug use: No    Sexual activity: No     Other Topics Concern    None     Social History Narrative       Family History:   family history includes Heart disease in his mother; Heart failure in his mother; Hyperlipidemia in his father and mother; Hypertension in his mother.    Health Maintenance   Topic Date Due    Colonoscopy  05/07/2014    Influenza Vaccine  08/01/2017    PROSTATE-SPECIFIC ANTIGEN  01/17/2018    Lipid Panel  01/17/2018    Pneumococcal PPSV23 (Medium Risk) (2) 02/16/2021    TETANUS VACCINE  06/17/2023    Hepatitis C Screening  Completed    Zoster Vaccine  Completed       Physical Exam:    Vital Signs  Temp: 97 °F (36.1 °C)  Temp src: Tympanic  Pulse: 77  SpO2: 97 %  BP: 130/70  BP Location: Left arm  BP Method: Manual  Patient Position: Sitting  Pain Score:   6 (home readings)  Height and Weight  Height: 5' 11" (180.3 cm)  Weight: 60 kg (132 lb 4.4 oz)  BSA (Calculated - sq m): 1.73 sq meters  BMI (Calculated): 18.5  Weight in (lb) to have BMI = 25: 178.9]    Body mass index is 18.45 kg/(m^2).    Physical Exam   Constitutional: He is oriented to person, place, and time. " He appears well-developed.   HENT:   Mouth/Throat: Oropharynx is clear and moist.   Eyes: Conjunctivae are normal. Pupils are equal, round, and reactive to light.   Neck: Normal range of motion. Neck supple.   Cardiovascular: Normal rate, regular rhythm and normal heart sounds.    No murmur heard.  Pulmonary/Chest: Effort normal and breath sounds normal. No respiratory distress. He has no wheezes. He has no rales. He exhibits no tenderness.   Abdominal: Soft. He exhibits no distension and no mass. There is no tenderness. There is no guarding.   Musculoskeletal: He exhibits edema (B legs). He exhibits no tenderness.   Lymphadenopathy:     He has no cervical adenopathy.   Neurological: He is alert and oriented to person, place, and time. He has normal reflexes.   Skin: Skin is warm and dry.   Psychiatric: He has a normal mood and affect. His behavior is normal. Judgment and thought content normal.       Lab Results   Component Value Date    CHOL 107 (L) 01/17/2017    CHOL 128 10/06/2015    CHOL 139 03/26/2015    TRIG 73 01/17/2017    TRIG 78 10/06/2015    TRIG 127 03/26/2015    HDL 36 (L) 01/17/2017    HDL 40 10/06/2015    HDL 37 (L) 03/26/2015    TOTALCHOLEST 3.0 01/17/2017    TOTALCHOLEST 3.2 10/06/2015    TOTALCHOLEST 3.8 03/26/2015    NONHDLCHOL 71 01/17/2017    NONHDLCHOL 88 10/06/2015    NONHDLCHOL 102 03/26/2015       Lab Results   Component Value Date    HGBA1C 4.8 01/17/2017       Assessment:      ICD-10-CM ICD-9-CM   1. Pedal edema R60.0 782.3   2. Heart burn R12 787.1   3. Anemia, unspecified type D64.9 285.9         Plan:  1.  Pedal edema below for other causes check his kidney function a BNP, a urine analysis.  2.  He would not be a good candidate for PPI because of this impaired kidney function and a solitary kidney based take Zantac twice a day.  3.  Anemia check CBC today.  4.  Orders with him that he needs to contact his oncologist for the follow-up of his thyroid cancer.  Orders Placed This Encounter    Procedures    CBC auto differential    TSH    Comprehensive metabolic panel    Brain natriuretic peptide    URINALYSIS    D dimer, quantitative       Current Outpatient Prescriptions   Medication Sig Dispense Refill    albuterol (VENTOLIN HFA) 90 mcg/actuation inhaler INHALE 2 PUFFS BY MOUTH EVERY 4 HOURS IF NEEDED FOR WHEEZING 18 g 11    alprazolam (XANAX) 0.5 MG tablet Take 1 tablet (0.5 mg total) by mouth nightly as needed. 30 tablet 1    amlodipine (NORVASC) 10 MG tablet Take 1 tablet (10 mg total) by mouth once daily. 90 tablet 3    aspirin 81 mg Tab Take 1 tablet by mouth Daily. 1 Tablet Oral Every day      cloNIDine (CATAPRES) 0.1 MG tablet TAKE 1 TABLET BY MOUTH TWICE DAILY 60 tablet 2    diltiaZEM (CARDIZEM CD) 240 MG 24 hr capsule Take 1 capsule (240 mg total) by mouth once daily. 90 capsule 3    doxazosin (CARDURA) 4 MG tablet Take 1 tablet (4 mg total) by mouth every evening. 90 tablet 3    doxazosin (CARDURA) 4 MG tablet TAKE 1 TABLET BY MOUTH EVERY EVENING 30 tablet 5    epinephrine (EPIPEN) 0.3 mg/0.3 mL (1:1,000) AtIn 1 Pen Injector Intramuscular once 1 Device 5    fluticasone (FLONASE) 50 mcg/actuation nasal spray 2 sprays by Each Nare route once daily. 1 Bottle 11    hydrALAZINE (APRESOLINE) 100 MG tablet Take 1 tablet (100 mg total) by mouth 3 (three) times daily. 270 tablet 3    hydrocodone-acetaminophen 10-325mg (NORCO)  mg Tab       lactulose (CHRONULAC) 10 gram/15 mL solution Take 30 ml every 12 hours PRN to achieve a good bowel movement 300 mL 6    levothyroxine (SYNTHROID) 88 MCG tablet Take 88 mcg by mouth.      polyethylene glycol (GLYCOLAX) 17 gram/dose powder Take 17 g by mouth.      pravastatin (PRAVACHOL) 20 MG tablet TAKE 1 TABLET BY MOUTH AT BEDTIME TO LOWER CHOLESTEROL 90 tablet 3    ranitidine (ZANTAC) 75 MG tablet Take 75 mg by mouth 2 (two) times daily.       No current facility-administered medications for this visit.        There are no  discontinued medications.    No Follow-up on file.      Dr Mike Recnios MD    Disclaimer: This note is prepared using voice recognition system and as such is likely to have errors and is not proof read.

## 2017-05-18 ENCOUNTER — TELEPHONE (OUTPATIENT)
Dept: INTERNAL MEDICINE | Facility: CLINIC | Age: 62
End: 2017-05-18

## 2017-05-18 ENCOUNTER — TELEPHONE (OUTPATIENT)
Dept: HEMATOLOGY/ONCOLOGY | Facility: CLINIC | Age: 62
End: 2017-05-18

## 2017-05-18 ENCOUNTER — OFFICE VISIT (OUTPATIENT)
Dept: HEMATOLOGY/ONCOLOGY | Facility: CLINIC | Age: 62
End: 2017-05-18
Payer: MEDICARE

## 2017-05-18 VITALS
DIASTOLIC BLOOD PRESSURE: 60 MMHG | RESPIRATION RATE: 18 BRPM | HEART RATE: 64 BPM | BODY MASS INDEX: 18.73 KG/M2 | WEIGHT: 133.81 LBS | HEIGHT: 71 IN | TEMPERATURE: 98 F | SYSTOLIC BLOOD PRESSURE: 110 MMHG | OXYGEN SATURATION: 98 %

## 2017-05-18 DIAGNOSIS — M05.79 RHEUMATOID ARTHRITIS INVOLVING MULTIPLE SITES WITH POSITIVE RHEUMATOID FACTOR: ICD-10-CM

## 2017-05-18 DIAGNOSIS — R59.0 INGUINAL LYMPHADENOPATHY: ICD-10-CM

## 2017-05-18 DIAGNOSIS — C73 FOLLICULAR THYROID CANCER: ICD-10-CM

## 2017-05-18 PROCEDURE — 99214 OFFICE O/P EST MOD 30 MIN: CPT | Mod: S$GLB,,, | Performed by: INTERNAL MEDICINE

## 2017-05-18 PROCEDURE — 3074F SYST BP LT 130 MM HG: CPT | Mod: S$GLB,,, | Performed by: INTERNAL MEDICINE

## 2017-05-18 PROCEDURE — 3078F DIAST BP <80 MM HG: CPT | Mod: S$GLB,,, | Performed by: INTERNAL MEDICINE

## 2017-05-18 PROCEDURE — 99999 PR PBB SHADOW E&M-EST. PATIENT-LVL IV: CPT | Mod: PBBFAC,,, | Performed by: INTERNAL MEDICINE

## 2017-05-18 PROCEDURE — 1160F RVW MEDS BY RX/DR IN RCRD: CPT | Mod: S$GLB,,, | Performed by: INTERNAL MEDICINE

## 2017-05-18 PROCEDURE — 99499 UNLISTED E&M SERVICE: CPT | Mod: S$GLB,,, | Performed by: INTERNAL MEDICINE

## 2017-05-18 NOTE — TELEPHONE ENCOUNTER
Spoke with Gaby at [facility]. She states patient can call her to scheduled the radio active iodine ablation. This number is [phone].

## 2017-05-18 NOTE — PROGRESS NOTES
Hematology/Oncology Established Visit    Hematology Oncology Diagnosis: Thyroid cancer (both follicular carcinoma and papillary carcinoma) and inguinal lymphadenopathy    History of Present Illness:  Mr. Vigil is a 63 y/o male with Rheumatoid arthritis (RA), CKD, PVD and thyroid cancer who was initially referred for evaluation of enlarged R inguinal LN. He reported weight loss from 230 - 110 lbs over the past year or more. No fevers, chills, sweats. He states he has a good appetite and eats all day long. He has intermittent abdominal pain if he overeats. He has chronic problems with intermittent constipation, but he denies pencil thin stools. No melena/hematochezia. He did undergo abd/pelvis CT scan in 9/2016 for workup of weight loss, with no specific abnormalities found aside from 12mm R inguinal LN and mild splenomegaly. For his RA, he reports past treatment with Gold, which he believes messed up his skin and possibly his kidneys. He refuses any DMARDs for RA but was started on MTX by Dr. Ya in June 2016. He also reports easy bruising and easy skin bleeding in his hands and distal arms. He was taken off of Plavix for this reason, but he and his wife do not feel it has improved at all. The patient underwent PET scan which was notable for a 4cm left thyroid mass, SUV 26. The patient underwent FNA which insufficient cells. He was then referred to ENT at [facility] for thyroidectomy since he was planning to also undergo left carotid endarterectomy. The patient underwent both surgeries on 2/28/17. Pathology was notable for thyroid cancer. The patient was referred to Dr. William Lundberg in Endocrinology at [facility] who evaluated patient, recommend thyroglobulin suppression with levothyoxine as well as BRANDT.     The patient comes in today due to frustration and concern over lack of communication with Dr. Lundberg's office. He states he was told he needs further treatment and has not been contacted by any office regarding the  further treatment. After multiple calls from our office. Dr. Lundberg's nurse faxed us the consultation note from Dr. Lundberg. He recommends radioactive iodine ablation and states that nuclear medicine needs to contact the patient. The patient is frustrated and scared regarding lack of progress with getting an appointment for treatment.    ROS:  General:  No fever/chills, fatigue, night sweats +significant weight loss over the past 1-2 yrs  Eyes: No vision problems, pain or inflammation.   Ears/Nose: No difficultly hearing, ear pain, rhinorrhea, or epistaxis  Oropharynx: No ulcers, dysphagia, or odynophagia  Cardiovascular: No chest pains, sob, PND or dyspnea on exertion  Pulmonary: No cough, sob, hemoptysis  Gastrointestional: No n/v/d, melena, hematochezia, or change in bowel habits +chronic constipation  : No dysuria, hematuria, pelvic pain or flank pain  Musculoskeletal: No myalgias, weakness, or arthralgias  Neurological: No headaches, focal deficits, or seizure activity  Endocrine: No heat or cold intolerance   Skin: No rashes pruritus, or lesions +Easy bruising and skin bleeding  Psychiatric: No symptoms of mood disorders  Heme/Lymph: No lymph node enlargment    Past Medical History:   Diagnosis Date    Anxiety     Arthritis     Asthma     Atherosclerosis of native arteries of the extremities with intermittent claudication     Benign hypertensive heart disease without heart failure     Cancer     Carotid artery occlusion     Chronic kidney disease     COPD (chronic obstructive pulmonary disease)     Coronary artery disease     Encounter for blood transfusion     GERD (gastroesophageal reflux disease)     History of aorto-femoral bypass 8/16/2013    Hyperlipidemia     Hypertension     PVD (peripheral vascular disease)     PVD (peripheral vascular disease) 8/16/2013    Renal artery stenosis 8/16/2013    Renovascular hypertension 8/16/2013    Shingles sept 2015    Stenosis of left subclavian  artery 8/16/2013    Thyroid disease     Thyroid cancer 3/1/2017    Tobacco abuse      Past Surgical History:  1. Renal artery bypass in 2013 at [facility]    Social History: . 2 children. Smokes 1ppd for past 45 yrs. No EtOH. No illicit drugs. Does have tattoo placed in [place].    Family History: family history includes Heart disease in his mother; Heart failure in his mother; Hyperlipidemia in his father and mother; Hypertension in his mother.    Physical Exam:  Vitals:    05/18/17 1341   BP: 110/60   Pulse: 64   Resp: 18   Temp: 98.3 °F (36.8 °C)     Body mass index is 18.66 kg/(m^2).  General:  AAOx4, no acute distress, thin, smells like cigarette smoke  HEENT: EOMI. Normocephalic and atraumatic. No maxillary sinus tenderness. External auditory canals clear and TMs intact without lesions. Nasal and oral mucosal membranes moist. Normal dentition and gums.   Neck: no LAD, thyromegaly, normal ROM  Pulmonary: Bilaterally clear to auscultation, Normal effort with no accessory muscle use, no wheezes/rales/rhonchi  CV: Normal rate, regular rhythm, no murmurs/rubs/gallops, no edema  ABD:  Soft, nontender, nondistended, no mass, and without hepatosplenomegaly   Ext: No clubbing, cyanosis, or edema, normal ROM Soft lipoma/cyst on left elbow  Skin: No rashes, lesions. Thin skin. Multiple rheumatoid nodules on wrists.   Neurological: No focal deficits, CN II to XII grossly intact, normal coordination    Psychiatric:  Normal mood, affect and judgement  Hem/Lymph:  No submandibular, cervical, supraclavicular, axillary LAD. Small shotty inguinal on left, larger but soft inguinal LAD on R.     Labs:    Lab Results   Component Value Date    WBC 5.35 05/17/2017    HGB 10.8 (L) 05/17/2017    HCT 32.7 (L) 05/17/2017    MCV 91 05/17/2017     05/17/2017     Lab Results   Component Value Date     05/17/2017    K 3.9 05/17/2017     05/17/2017    CO2 24 05/17/2017    BUN 21 05/17/2017     CREATININE 1.6 (H) 05/17/2017    CALCIUM 9.1 05/17/2017    ANIONGAP 9 05/17/2017    ESTGFRAFRICA 53.0 (A) 05/17/2017    EGFRNONAA 45.8 (A) 05/17/2017     Lab Results   Component Value Date    ALT 8 (L) 05/17/2017    AST 16 05/17/2017    ALKPHOS 69 05/17/2017    BILITOT 0.4 05/17/2017       Lab Results   Component Value Date    IRON 39 (L) 01/30/2017    TIBC 306 01/30/2017    FERRITIN 76 01/30/2017     Lab Results   Component Value Date    OXGTROJE26 319 04/09/2013     Lab Results   Component Value Date    FOLATE 3.7 (L) 04/09/2013     Lab Results   Component Value Date    TSH 24.430 (H) 05/17/2017       Imaging:     CT 9/26/16:  1.  There is hyperdense material in the dependent portion of the gallbladder.  This is characteristic of cholelithiasis.  2.  The spleen is enlarged.  It measures 15.1 cm in length.  3.  The right kidney is atrophic.  There is a 2 mm nonobstructive stone in the inferior pole of the left kidney.  There are exophytic hypodense and hyperdense masses associated with the kidneys.  This may represent hemorrhagic and nonhemorrhagic cysts.  One of the largest one measures 7.3 cm in size and has a Hounsfield measurement of 4.  This is characteristic of a cyst.  If additional imaging evaluation is clinically indicated, high recommend consideration of a nonemergent ultrasound examination of the kidneys.  4.  There is a moderate amount of fecal material within the ascending, transverse, and descending portions of the colon.  5.  There is a small amount of free fluid within the pelvis.  6.  There is a moderate amount of atherosclerosis.  There is an aortic biiliac graft in place.  There is an aortic biiliac stent in the native arteries.  7.  There is an enlarged lymph node in the right inguinal region.  It has a short axis measurement of 12 mm. This is characteristic of an infectious or neoplastic process.    Colonoscopy 2013:  - Preparation of the colon was poor.                        - Internal  hemorrhoids.                        - One 3 mm polyp in the transverse colon. Resected                         and retrieved.                        - Melanosis in the colon.    PET 2/6/17:  1.  Large hypodense mass involving the left lobe of the thyroid gland which essentially replaces the entire left lobe and demonstrates significant FDG uptake.  FNA of this mass is recommended.   2.  8mm noncalcified pulmonary nodule noted within the lingula that demonstrates uptake greater than background lung parenchyma.  Although this may be related to an infectious or inflammatory process, malignancy cannot be excluded.  At the very least, this lung nodule should be followed by CT. Per Fleischner Society guidelines for nodule >8mm; in a low or high risk patient, recommend 3, 9, and 24 month CT chest follow-up to exclude neoplasia.  PET scan and/or biopsy may also be considered.  3.  Mild pleural thickening versus tiny layering right sided effusion new when compared to the CT the abdomen pelvis from 09/26/2016 that demonstrates low level uptake with an SUV max of up to 2.0.  4.  Remaining findings as described above.    Assessment / Plan:  Wallace Vigil is a 62 y.o. male who comes in for follow up and questions regarding thyroid cancer.     1. Enlarged inguinal LNs bilateral:  These nodules on not hypermetabolic on PET scan and may be related to underlying Rheumatoid arthritis / Rheumatoid nodules.     2. Thyroid cancer: Both follicular and papillary and s/p thyroidectomy on 2/28/17. Was hypermetabolic on PET (SUV 26) and needs to be biopsied. Pt was evaluated by Endocrinology, Dr. William Lundberg who started Levothyroxine for suppression of thyroglobulin and recommended radioactive iodine ablation. Our office called Nuclear Medicine at [facility] and patient was contacted with an appointment.   -- Radioactive iodine ablation with [facility] at [facility].  -- If pt wishes to transfer care to Endocrinology in [place] in the future, we can  "assist with that.     3. Iron deficiency anemia: Decrease in Hgb over the past few months with borderline low iron levels.  -- Continue ferrous sulfate 325mg bid with meals    4. Splenomegaly: No hypermetabolic lymphadenopathy. This is likely related to Rheumatoid arthritis.     5. Rheumatoid arthritis: Treated with "gold treatments" in the past. Currently seeing Dr. Ya and refuses DMARDs. He is currently on MTX 2.5mg once a week.  -- f/u with Rheumatology    6. Constipation: Takes Lactulose as needed for constipation.  -- Instructed pt to take Docusate 100mg bid  -- Can take Miralax or Lactulose as needed for constipation.  -- Drink 1.5 liters of water per day    7. Weight loss: Over 100-lbs lost over the past 2 yrs. As the thyroid function is normal, I doubt the weight loss is related to thyroid mass - but cannot rule out metastatic thyroid cancer given 8mm pulmonary nodule. It is more likely due to systemic manifestations of Rheumatoid arthritis.    8. Easy bruising: Likely due to thinning skin. PTT elevated, but normal PT/INR. This is mostly likely due to lupus anticoagulant.     9. Pulmonary nodule: 4mm on chest CT in 9/2016 and now 8mm with higher uptake than background.     Sharron Guiod M.D.  Hematology Oncology  "

## 2017-05-19 ENCOUNTER — TELEPHONE (OUTPATIENT)
Dept: INTERNAL MEDICINE | Facility: CLINIC | Age: 62
End: 2017-05-19

## 2017-05-19 ENCOUNTER — HOSPITAL ENCOUNTER (OUTPATIENT)
Dept: RADIOLOGY | Facility: HOSPITAL | Age: 62
Discharge: HOME | End: 2017-05-19
Attending: FAMILY MEDICINE
Payer: MEDICARE

## 2017-05-19 DIAGNOSIS — R60.0 PEDAL EDEMA: ICD-10-CM

## 2017-05-19 PROCEDURE — 93970 EXTREMITY STUDY: CPT | Mod: TC

## 2017-05-19 NOTE — TELEPHONE ENCOUNTER
----- Message from Yasmeen Parker sent at 5/19/2017  3:20 PM CDT -----  Contact: pt  Pt would like to speak to the nurse regarding results ultrasound that was done today....[phone]

## 2017-05-22 ENCOUNTER — TELEPHONE (OUTPATIENT)
Dept: INTERNAL MEDICINE | Facility: CLINIC | Age: 62
End: 2017-05-22

## 2017-06-27 ENCOUNTER — HOSPITAL ENCOUNTER (EMERGENCY)
Facility: HOSPITAL | Age: 62
Discharge: HOME | End: 2017-06-28
Attending: EMERGENCY MEDICINE
Payer: MEDICARE

## 2017-06-27 ENCOUNTER — NURSE TRIAGE (OUTPATIENT)
Dept: ADMINISTRATIVE | Facility: CLINIC | Age: 62
End: 2017-06-27

## 2017-06-27 DIAGNOSIS — E03.2 IATROGENIC HYPOTHYROIDISM: Primary | ICD-10-CM

## 2017-06-27 DIAGNOSIS — R53.1 WEAK: ICD-10-CM

## 2017-06-27 DIAGNOSIS — E86.0 DEHYDRATION: ICD-10-CM

## 2017-06-27 PROCEDURE — 96360 HYDRATION IV INFUSION INIT: CPT

## 2017-06-27 PROCEDURE — 99284 EMERGENCY DEPT VISIT MOD MDM: CPT | Mod: 25

## 2017-06-28 VITALS
BODY MASS INDEX: 18.62 KG/M2 | RESPIRATION RATE: 20 BRPM | HEART RATE: 87 BPM | HEIGHT: 71 IN | TEMPERATURE: 99 F | OXYGEN SATURATION: 94 % | WEIGHT: 133 LBS | DIASTOLIC BLOOD PRESSURE: 91 MMHG | SYSTOLIC BLOOD PRESSURE: 182 MMHG

## 2017-06-28 LAB
ALBUMIN SERPL BCP-MCNC: 3.1 G/DL
ALP SERPL-CCNC: 54 U/L
ALT SERPL W/O P-5'-P-CCNC: 7 U/L
ANION GAP SERPL CALC-SCNC: 10 MMOL/L
AST SERPL-CCNC: 13 U/L
BACTERIA #/AREA URNS HPF: ABNORMAL /HPF
BASOPHILS # BLD AUTO: 0.02 K/UL
BASOPHILS NFR BLD: 0.4 %
BILIRUB SERPL-MCNC: 0.5 MG/DL
BILIRUB UR QL STRIP: NEGATIVE
BUN SERPL-MCNC: 28 MG/DL
CALCIUM SERPL-MCNC: 8.8 MG/DL
CHLORIDE SERPL-SCNC: 101 MMOL/L
CLARITY UR: CLEAR
CO2 SERPL-SCNC: 22 MMOL/L
COLOR UR: YELLOW
CREAT SERPL-MCNC: 2 MG/DL
DEPRECATED S PYO AG THROAT QL EIA: NEGATIVE
DIFFERENTIAL METHOD: ABNORMAL
EOSINOPHIL # BLD AUTO: 0.1 K/UL
EOSINOPHIL NFR BLD: 1.1 %
ERYTHROCYTE [DISTWIDTH] IN BLOOD BY AUTOMATED COUNT: 13.9 %
EST. GFR  (AFRICAN AMERICAN): 40 ML/MIN/1.73 M^2
EST. GFR  (NON AFRICAN AMERICAN): 35 ML/MIN/1.73 M^2
GLUCOSE SERPL-MCNC: 79 MG/DL
GLUCOSE UR QL STRIP: NEGATIVE
HCT VFR BLD AUTO: 29.3 %
HGB BLD-MCNC: 10.3 G/DL
HGB UR QL STRIP: NEGATIVE
HYALINE CASTS #/AREA URNS LPF: 0 /LPF
KETONES UR QL STRIP: NEGATIVE
LEUKOCYTE ESTERASE UR QL STRIP: NEGATIVE
LYMPHOCYTES # BLD AUTO: 0.6 K/UL
LYMPHOCYTES NFR BLD: 11 %
MCH RBC QN AUTO: 31.5 PG
MCHC RBC AUTO-ENTMCNC: 35.2 %
MCV RBC AUTO: 90 FL
MICROSCOPIC COMMENT: ABNORMAL
MONOCYTES # BLD AUTO: 0.6 K/UL
MONOCYTES NFR BLD: 10.1 %
NEUTROPHILS # BLD AUTO: 4.2 K/UL
NEUTROPHILS NFR BLD: 77.6 %
NITRITE UR QL STRIP: POSITIVE
PH UR STRIP: 7 [PH] (ref 5–8)
PLATELET # BLD AUTO: 113 K/UL
PMV BLD AUTO: 8.8 FL
POTASSIUM SERPL-SCNC: 3.7 MMOL/L
PROT SERPL-MCNC: 7.3 G/DL
PROT UR QL STRIP: ABNORMAL
RBC # BLD AUTO: 3.27 M/UL
RBC #/AREA URNS HPF: 5 /HPF (ref 0–4)
SODIUM SERPL-SCNC: 133 MMOL/L
SP GR UR STRIP: 1.02 (ref 1–1.03)
T4 FREE SERPL-MCNC: <0.4 NG/DL
TSH SERPL DL<=0.005 MIU/L-ACNC: 39.46 UIU/ML
URN SPEC COLLECT METH UR: ABNORMAL
UROBILINOGEN UR STRIP-ACNC: NEGATIVE EU/DL
WBC # BLD AUTO: 5.46 K/UL
WBC #/AREA URNS HPF: 0 /HPF (ref 0–5)

## 2017-06-28 PROCEDURE — 87081 CULTURE SCREEN ONLY: CPT

## 2017-06-28 PROCEDURE — 25000003 PHARM REV CODE 250: Performed by: EMERGENCY MEDICINE

## 2017-06-28 PROCEDURE — 80053 COMPREHEN METABOLIC PANEL: CPT

## 2017-06-28 PROCEDURE — 81000 URINALYSIS NONAUTO W/SCOPE: CPT

## 2017-06-28 PROCEDURE — 87880 STREP A ASSAY W/OPTIC: CPT

## 2017-06-28 PROCEDURE — 85025 COMPLETE CBC W/AUTO DIFF WBC: CPT

## 2017-06-28 PROCEDURE — 84443 ASSAY THYROID STIM HORMONE: CPT

## 2017-06-28 PROCEDURE — 84439 ASSAY OF FREE THYROXINE: CPT

## 2017-06-28 RX ORDER — SODIUM CHLORIDE 9 MG/ML
1000 INJECTION, SOLUTION INTRAVENOUS
Status: COMPLETED | OUTPATIENT
Start: 2017-06-28 | End: 2017-06-28

## 2017-06-28 RX ADMIN — SODIUM CHLORIDE 1000 ML: 0.9 INJECTION, SOLUTION INTRAVENOUS at 01:06

## 2017-06-28 NOTE — TELEPHONE ENCOUNTER
Reason for Disposition   [1] Fever > 101 F (38.3 C) AND [2] age > 60    Protocols used: ST FEVER-A-AH    Pt wife reports fever of 102.8, states pt has h/o thyroid cancer. Care advice given.

## 2017-06-28 NOTE — ED PROVIDER NOTES
SCRIBE #1 NOTE: I, Corinne Mack, am scribing for, and in the presence of, Akil Velasquez Jr., MD. I have scribed the entire note.      History      Chief Complaint   Patient presents with    Fever     Family reports cough, sore throat, and fever.       Review of patient's allergies indicates:   Allergen Reactions    Diovan  [valsartan] Swelling    Levofloxacin Other (See Comments)     Stomach pains    Lisinopril Swelling        HPI   HPI    6/28/2017, 12:14 AM   History obtained from the patient      History of Present Illness: Wallace Vigil is a 62 y.o. male patient who presents to the Emergency Department for fever which onset gradually today. Symptoms are constant and moderate in severity. Pt has his throid removed due to thyroid cancer. The pt will begin Tx for any remaining cancer at [facility] tomorrow. No mitigating or exacerbating factors reported. Associated sxs include cough, fatigue, chills, and sore throat. Patient denies any CP, SOB, N/V/D, back pain, HA, dizziness, and all other sxs at this time. No prior Tx reported. Pt has Hx of COPD and CKD. No further complaints or concerns at this time.       Arrival mode: Personal vehicle      PCP: Mike Recinos MD       Past Medical History:  Past Medical History:   Diagnosis Date    Anxiety     Arthritis     Asthma     Atherosclerosis of native arteries of the extremities with intermittent claudication     Benign hypertensive heart disease without heart failure     Cancer     Carotid artery occlusion     Chronic kidney disease     COPD (chronic obstructive pulmonary disease)     Coronary artery disease     Encounter for blood transfusion     GERD (gastroesophageal reflux disease)     History of aorto-femoral bypass 8/16/2013    Hyperlipidemia     Hypertension     PVD (peripheral vascular disease)     PVD (peripheral vascular disease) 8/16/2013    Renal artery stenosis 8/16/2013    Renovascular hypertension 8/16/2013    Shingles sept 2015     Stenosis of left subclavian artery 8/16/2013    Thyroid disease     Thyroid cancer 3/1/2017    Tobacco abuse        Past Surgical History:  Past Surgical History:   Procedure Laterality Date    CARDIAC CATHETERIZATION      RENAL ARTERY BYPASS      2012    THYROIDECTOMY      VASCULAR SURGERY      Carotid cleaned out          Family History:  Family History   Problem Relation Age of Onset    Hypertension Mother     Hyperlipidemia Mother     Heart failure Mother     Heart disease Mother     Hyperlipidemia Father        Social History:  Social History     Social History Main Topics    Smoking status: Current Every Day Smoker     Packs/day: 1.00     Years: 40.00     Types: Cigarettes    Smokeless tobacco: Never Used    Alcohol use No    Drug use: No    Sexual activity: No       ROS   Review of Systems   Constitutional: Positive for chills, fatigue and fever.   HENT: Positive for sore throat. Negative for congestion, rhinorrhea and trouble swallowing.    Respiratory: Positive for cough. Negative for shortness of breath.    Cardiovascular: Negative for chest pain and leg swelling.   Gastrointestinal: Negative for abdominal pain, diarrhea, nausea and vomiting.   Genitourinary: Negative for dysuria, flank pain and hematuria.   Musculoskeletal: Negative for neck pain and neck stiffness.   Skin: Negative for rash and wound.   Neurological: Negative for dizziness, light-headedness, numbness and headaches.     Physical Exam      Initial Vitals [06/27/17 2356]   BP Pulse Resp Temp SpO2   127/66 77 20 99.2 °F (37.3 °C) (!) 90 %      MAP       86.33          Physical Exam  Nursing Notes and Vital Signs Reviewed.  Constitutional: Patient is in no apparent distress. Well-developed and well-nourished.  Head: Atraumatic. Normocephalic.  Eyes: PERRL. EOM intact. Conjunctivae are not pale. No scleral icterus.  ENT: Mucous membranes are moist. Oropharynx is clear and symmetric.    Neck: Supple. Full ROM. No  "lymphadenopathy.  Cardiovascular: Regular rate. Regular rhythm. No murmurs, rubs, or gallops. Distal pulses are 2+ and symmetric.  Pulmonary/Chest: No respiratory distress. Clear to auscultation bilaterally. No wheezing, rales, or rhonchi.  Abdominal: Soft and non-distended.  There is no tenderness.  No rebound, guarding, or rigidity.  Musculoskeletal: Moves all extremities. No obvious deformities. No edema. No calf tenderness.  Skin: Warm and dry.  Neurological:  Alert, awake, and appropriate.  Normal speech.  No acute focal neurological deficits are appreciated.  Psychiatric: Normal affect. Good eye contact. Appropriate in content.    ED Course    Procedures  ED Vital Signs:  Vitals:    06/27/17 2356 06/28/17 0045   BP: 127/66 (!) 154/69   Pulse: 77 78   Resp: 20    Temp: 99.2 °F (37.3 °C)    TempSrc: Oral    SpO2: (!) 90% (!) 93%   Weight: 60.3 kg (133 lb)    Height: 5' 11" (1.803 m)        Abnormal Lab Results:  Labs Reviewed   CBC W/ AUTO DIFFERENTIAL - Abnormal; Notable for the following:        Result Value    RBC 3.27 (*)     Hemoglobin 10.3 (*)     Hematocrit 29.3 (*)     MCH 31.5 (*)     Platelets 113 (*)     MPV 8.8 (*)     Lymph # 0.6 (*)     Gran% 77.6 (*)     Lymph% 11.0 (*)     All other components within normal limits   COMPREHENSIVE METABOLIC PANEL - Abnormal; Notable for the following:     Sodium 133 (*)     CO2 22 (*)     BUN, Bld 28 (*)     Creatinine 2.0 (*)     Albumin 3.1 (*)     Alkaline Phosphatase 54 (*)     ALT 7 (*)     eGFR if  40 (*)     eGFR if non  35 (*)     All other components within normal limits   TSH - Abnormal; Notable for the following:     TSH 39.462 (*)     All other components within normal limits   T4, FREE - Abnormal; Notable for the following:     Free T4 <0.40 (*)     All other components within normal limits   URINALYSIS - Abnormal; Notable for the following:     Protein, UA 1+ (*)     Nitrite, UA Positive (*)     All other components " within normal limits   URINALYSIS MICROSCOPIC - Abnormal; Notable for the following:     RBC, UA 5 (*)     All other components within normal limits   THROAT SCREEN, RAPID        All Lab Results:  Results for orders placed or performed during the hospital encounter of 06/27/17   CBC auto differential   Result Value Ref Range    WBC 5.46 3.90 - 12.70 K/uL    RBC 3.27 (L) 4.60 - 6.20 M/uL    Hemoglobin 10.3 (L) 14.0 - 18.0 g/dL    Hematocrit 29.3 (L) 40.0 - 54.0 %    MCV 90 82 - 98 fL    MCH 31.5 (H) 27.0 - 31.0 pg    MCHC 35.2 32.0 - 36.0 %    RDW 13.9 11.5 - 14.5 %    Platelets 113 (L) 150 - 350 K/uL    MPV 8.8 (L) 9.2 - 12.9 fL    Gran # 4.2 1.8 - 7.7 K/uL    Lymph # 0.6 (L) 1.0 - 4.8 K/uL    Mono # 0.6 0.3 - 1.0 K/uL    Eos # 0.1 0.0 - 0.5 K/uL    Baso # 0.02 0.00 - 0.20 K/uL    Gran% 77.6 (H) 38.0 - 73.0 %    Lymph% 11.0 (L) 18.0 - 48.0 %    Mono% 10.1 4.0 - 15.0 %    Eosinophil% 1.1 0.0 - 8.0 %    Basophil% 0.4 0.0 - 1.9 %    Differential Method Automated    Comprehensive metabolic panel   Result Value Ref Range    Sodium 133 (L) 136 - 145 mmol/L    Potassium 3.7 3.5 - 5.1 mmol/L    Chloride 101 95 - 110 mmol/L    CO2 22 (L) 23 - 29 mmol/L    Glucose 79 70 - 110 mg/dL    BUN, Bld 28 (H) 8 - 23 mg/dL    Creatinine 2.0 (H) 0.5 - 1.4 mg/dL    Calcium 8.8 8.7 - 10.5 mg/dL    Total Protein 7.3 6.0 - 8.4 g/dL    Albumin 3.1 (L) 3.5 - 5.2 g/dL    Total Bilirubin 0.5 0.1 - 1.0 mg/dL    Alkaline Phosphatase 54 (L) 55 - 135 U/L    AST 13 10 - 40 U/L    ALT 7 (L) 10 - 44 U/L    Anion Gap 10 8 - 16 mmol/L    eGFR if African American 40 (A) >60 mL/min/1.73 m^2    eGFR if non African American 35 (A) >60 mL/min/1.73 m^2   TSH   Result Value Ref Range    TSH 39.462 (H) 0.400 - 4.000 uIU/mL   T4, free   Result Value Ref Range    Free T4 <0.40 (L) 0.71 - 1.51 ng/dL   Urinalysis   Result Value Ref Range    Specimen UA Urine, Clean Catch     Color, UA Yellow Yellow, Straw, Nora    Appearance, UA Clear Clear    pH, UA 7.0 5.0 - 8.0     Specific Gravity, UA 1.020 1.005 - 1.030    Protein, UA 1+ (A) Negative    Glucose, UA Negative Negative    Ketones, UA Negative Negative    Bilirubin (UA) Negative Negative    Occult Blood UA Negative Negative    Nitrite, UA Positive (A) Negative    Urobilinogen, UA Negative <2.0 EU/dL    Leukocytes, UA Negative Negative   Urinalysis Microscopic   Result Value Ref Range    RBC, UA 5 (H) 0 - 4 /hpf    WBC, UA 0 0 - 5 /hpf    Bacteria, UA None None-Occ /hpf    Hyaline Casts, UA 0 0-1/lpf /lpf    Microscopic Comment SEE COMMENT        Imaging Results:  Imaging Results          X-Ray Chest PA And Lateral (In process)                         The Emergency Provider reviewed the vital signs and test results, which are outlined above.    ED Discussion     1:37 AM: Reassessed pt at this time. Pt is awake, alert, and in no distress. Discussed with pt all pertinent ED information and results. Discussed pt dx and plan of tx. Gave pt all f/u and return to the ED instructions. All questions and concerns were addressed at this time. Pt expresses understanding of information and instructions, and is comfortable with plan to discharge. Pt is stable for discharge.    I discussed with patient and/or family/caretaker that evaluation in the ED does not suggest any emergent or life threatening medical conditions requiring immediate intervention beyond what was provided in the ED, and I believe patient is safe for discharge.  Regardless, an unremarkable evaluation in the ED does not preclude the development or presence of a serious of life threatening condition. As such, patient was instructed to return immediately for any worsening or change in current symptoms.      ED Medication(s):  Medications   0.9%  NaCl infusion (not administered)       New Prescriptions    No medications on file       Follow-up Information     Mike Recinos MD. Call in 1 day.    Specialty:  Family Medicine  Why:  As needed for recheck- otherwise make appt  with your thyroid cancer MD mendoza for recheck  Contact information:  [address] [address] [address] [phone]                     Medical Decision Making    Medical Decision Making:   Clinical Tests:   Lab Tests: Reviewed and Ordered  Radiological Study: Reviewed and Ordered           Scribe Attestation:   Scribe #1: I performed the above scribed service and the documentation accurately describes the services I performed. I attest to the accuracy of the note.    Attending:   Physician Attestation Statement for Scribe #1: I, Akil Velasquez Jr., MD, personally performed the services described in this documentation, as scribed by Corinne Mack, in my presence, and it is both accurate and complete.          Clinical Impression       ICD-10-CM ICD-9-CM   1. Iatrogenic hypothyroidism E03.2 244.3   2. Weak R53.1 780.79   3. Dehydration E86.0 276.51       Disposition:   Disposition: Discharged  Condition: Stable         Akil Velasquez Jr., MD  06/28/17 0155

## 2017-06-30 LAB — BACTERIA THROAT CULT: NORMAL

## 2017-07-13 ENCOUNTER — OFFICE VISIT (OUTPATIENT)
Dept: FAMILY MEDICINE | Facility: CLINIC | Age: 62
End: 2017-07-13
Payer: MEDICARE

## 2017-07-13 VITALS
SYSTOLIC BLOOD PRESSURE: 120 MMHG | HEART RATE: 66 BPM | WEIGHT: 128.75 LBS | DIASTOLIC BLOOD PRESSURE: 50 MMHG | TEMPERATURE: 98 F | BODY MASS INDEX: 19.07 KG/M2 | RESPIRATION RATE: 18 BRPM | HEIGHT: 69 IN | OXYGEN SATURATION: 95 %

## 2017-07-13 DIAGNOSIS — F41.9 ANXIETY: ICD-10-CM

## 2017-07-13 DIAGNOSIS — S51.812A SKIN TEAR OF LEFT FOREARM WITHOUT COMPLICATION, INITIAL ENCOUNTER: ICD-10-CM

## 2017-07-13 DIAGNOSIS — S41.112A SKIN TEAR OF LEFT UPPER ARM WITHOUT COMPLICATION, INITIAL ENCOUNTER: Primary | ICD-10-CM

## 2017-07-13 DIAGNOSIS — M05.79 RHEUMATOID ARTHRITIS INVOLVING MULTIPLE SITES WITH POSITIVE RHEUMATOID FACTOR: ICD-10-CM

## 2017-07-13 PROCEDURE — 99999 PR PBB SHADOW E&M-EST. PATIENT-LVL IV: CPT | Mod: PBBFAC,,,

## 2017-07-13 PROCEDURE — 99214 OFFICE O/P EST MOD 30 MIN: CPT | Mod: S$GLB,,,

## 2017-07-13 PROCEDURE — 99499 UNLISTED E&M SERVICE: CPT | Mod: S$GLB,,,

## 2017-07-13 RX ORDER — SULFAMETHOXAZOLE AND TRIMETHOPRIM 800; 160 MG/1; MG/1
TABLET ORAL
COMMUNITY
Start: 2017-07-10 | End: 2017-09-07

## 2017-07-13 RX ORDER — ALPRAZOLAM 0.5 MG/1
0.5 TABLET ORAL NIGHTLY PRN
Qty: 90 TABLET | Refills: 0 | Status: SHIPPED | OUTPATIENT
Start: 2017-07-13 | End: 2017-09-23 | Stop reason: SDUPTHER

## 2017-07-13 RX ORDER — PREDNISONE 5 MG/1
5 TABLET ORAL DAILY
Qty: 90 TABLET | Refills: 1 | Status: SHIPPED | OUTPATIENT
Start: 2017-07-13 | End: 2017-11-27 | Stop reason: SDUPTHER

## 2017-07-13 RX ORDER — LEVOTHYROXINE SODIUM 100 UG/1
TABLET ORAL
COMMUNITY
Start: 2017-05-16 | End: 2017-07-13 | Stop reason: SDUPTHER

## 2017-07-13 RX ORDER — PREDNISONE 5 MG/1
TABLET ORAL
COMMUNITY
Start: 2017-05-22 | End: 2017-07-13 | Stop reason: SDUPTHER

## 2017-07-14 NOTE — PROGRESS NOTES
Subjective:       Patient ID: Wallace Vigil is a 62 y.o. male.    Chief Complaint: Fall and Arm Injury (went to [facility])    TIKI Montez today with a complaint of skin tears to his left upper and forearm.  He says the tears occurred when he tried to catch something that was falling off a boat he fell and struck the peir.  The patient went to the emergency Department where x-rays were performed and no fractures were noted.  The only wounds were skin tears.  He voices some bleeding of the skin tears and some mild associated swelling of the forearm.  He says the wounds are moderate in intensity.  He says some of the wounds have healed while the upper arm wounds are little more slow to heal.  The patient was given antibiotics along with pain medications.  He is once the wounds checked to make sure there is no signs of infection.    So voices a chronic history of rheumatoid arthritis.  He states he is unable to take biological agents as he has only one kidney.  He currently takes prednisone 5 mg by mouth daily and has done so for quite some time now.  The patient says the prednisone keeps his arthritis pains stable and well controlled.  He denies any side effects from the medication.    Voices a chronic history of anxiety for which she currently takes Xanax on a daily basis.  The patient only needs medication to help him rest at night.  He says without it is anxiety is constant and moderate in intensity.  He says the Xanax works to keep his anxiety stable well controlled.  He denies any hyper somnolence or parasomnias while taking the Xanax.  He denies any other side effects from the medication.     Review of Systems   Constitutional: Negative for activity change, appetite change, fatigue and unexpected weight change.   HENT: Negative.    Eyes: Negative.    Respiratory: Negative for cough, chest tightness, shortness of breath and wheezing.    Cardiovascular: Negative for chest pain, palpitations and leg  swelling.   Gastrointestinal: Negative for constipation, diarrhea, nausea and vomiting.   Endocrine: Negative.    Genitourinary: Negative.    Musculoskeletal: Negative.    Skin: Negative for color change.        Skin tears of the left upper and forearm   Allergic/Immunologic: Negative.    Neurological: Negative for dizziness, weakness and light-headedness.   Hematological: Negative.    Psychiatric/Behavioral: Negative for sleep disturbance. The patient is nervous/anxious.          Objective:      Physical Exam   Constitutional: He is oriented to person, place, and time. Vital signs are normal. He appears well-developed and well-nourished. He is active and cooperative. No distress.   HENT:   Head: Normocephalic and atraumatic.   Eyes: Conjunctivae are normal. Pupils are equal, round, and reactive to light. No scleral icterus.   Neck: Normal range of motion. Neck supple.   Cardiovascular: Normal rate, regular rhythm, normal heart sounds and intact distal pulses.  Exam reveals no gallop and no friction rub.    No murmur heard.  Pulmonary/Chest: Effort normal and breath sounds normal. No respiratory distress. He has no wheezes. He has no rales. He exhibits no tenderness.   Musculoskeletal: Normal range of motion. He exhibits no edema or tenderness.   Neurological: He is alert and oriented to person, place, and time. He exhibits normal muscle tone. Coordination normal.   Skin: Skin is warm and dry. No rash noted. He is not diaphoretic. No erythema. No pallor.        Psychiatric: He has a normal mood and affect. His speech is normal and behavior is normal. Judgment and thought content normal.       Assessment:       1. Skin tear of left upper arm without complication, initial encounter    2. Skin tear of left forearm without complication, initial encounter    3. Rheumatoid arthritis involving multiple sites with positive rheumatoid factor    4. Anxiety          Plan:   Skin tear of left upper arm without complication,  initial encounter        -  .   I advised the patient to keep the wounds open to air as much as possible, if they must be dressed use either Vaseline gauze or a nonstick dressing.        -     Continue the antibiotics that were previously written in the emergency department        -     Keep arm clean and dry, it is okay to lightly scrub the arm with a washcloth to remove dead epidermal tissue        -     We'll recheck in 1 week    Skin tear of left forearm without complication, initial encounter     -  .   I advised the patient to keep the wounds open to air as much as possible, if they must be dressed use either Vaseline gauze or a nonstick dressing.        -     Continue the antibiotics that were previously written in the emergency department        -     Keep arm clean and dry, it is okay to lightly scrub the arm with a washcloth to remove dead epidermal tissue        -     We'll recheck in 1 week    Rheumatoid arthritis involving multiple sites with positive rheumatoid factor  -     predniSONE (DELTASONE) 5 MG tablet; Take 1 tablet (5 mg total) by mouth once daily.  Dispense: 90 tablet; Refill: 1    Anxiety  -     alprazolam (XANAX) 0.5 MG tablet; Take 1 tablet (0.5 mg total) by mouth nightly as needed.  Dispense: 90 tablet; Refill: 0            Disclaimer: This note is prepared using voice recognition software.  As such there may be errors in the dictation.  It has not been proofread.

## 2017-07-15 ENCOUNTER — HOSPITAL ENCOUNTER (EMERGENCY)
Facility: HOSPITAL | Age: 62
Discharge: HOME | End: 2017-07-15
Attending: INTERNAL MEDICINE
Payer: MEDICARE

## 2017-07-15 VITALS
BODY MASS INDEX: 19.4 KG/M2 | RESPIRATION RATE: 19 BRPM | OXYGEN SATURATION: 98 % | DIASTOLIC BLOOD PRESSURE: 79 MMHG | HEART RATE: 89 BPM | SYSTOLIC BLOOD PRESSURE: 121 MMHG | HEIGHT: 68 IN | WEIGHT: 128 LBS | TEMPERATURE: 98 F

## 2017-07-15 DIAGNOSIS — N17.9 AKI (ACUTE KIDNEY INJURY): ICD-10-CM

## 2017-07-15 DIAGNOSIS — S51.812D LACERATION OF SKIN OF LEFT FOREARM, SUBSEQUENT ENCOUNTER: ICD-10-CM
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
## 2017-07-15 DIAGNOSIS — D63.1 ANEMIA ASSOCIATED WITH CHRONIC RENAL FAILURE: Primary | ICD-10-CM

## 2017-07-15 DIAGNOSIS — N18.9 ANEMIA ASSOCIATED WITH CHRONIC RENAL FAILURE: Primary | ICD-10-CM

## 2017-07-15 DIAGNOSIS — R60.0 BILATERAL EDEMA OF LOWER EXTREMITY: ICD-10-CM

## 2017-07-15 LAB
ALBUMIN SERPL BCP-MCNC: 2.9 G/DL
ALP SERPL-CCNC: 63 U/L
ALT SERPL W/O P-5'-P-CCNC: 12 U/L
ANION GAP SERPL CALC-SCNC: 11 MMOL/L
AST SERPL-CCNC: 15 U/L
BASOPHILS # BLD AUTO: 0.01 K/UL
BASOPHILS NFR BLD: 0.2 %
BILIRUB SERPL-MCNC: 0.3 MG/DL
BILIRUB UR QL STRIP: NEGATIVE
BUN SERPL-MCNC: 27 MG/DL
CALCIUM SERPL-MCNC: 8.7 MG/DL
CHLORIDE SERPL-SCNC: 102 MMOL/L
CLARITY UR: CLEAR
CO2 SERPL-SCNC: 21 MMOL/L
COLOR UR: YELLOW
CREAT SERPL-MCNC: 2.4 MG/DL
DIFFERENTIAL METHOD: ABNORMAL
EOSINOPHIL # BLD AUTO: 0.1 K/UL
EOSINOPHIL NFR BLD: 1.5 %
ERYTHROCYTE [DISTWIDTH] IN BLOOD BY AUTOMATED COUNT: 13.7 %
EST. GFR  (AFRICAN AMERICAN): 32 ML/MIN/1.73 M^2
EST. GFR  (NON AFRICAN AMERICAN): 28 ML/MIN/1.73 M^2
GLUCOSE SERPL-MCNC: 106 MG/DL
GLUCOSE UR QL STRIP: NEGATIVE
HCT VFR BLD AUTO: 24.5 %
HGB BLD-MCNC: 8.4 G/DL
HGB UR QL STRIP: NEGATIVE
KETONES UR QL STRIP: NEGATIVE
LEUKOCYTE ESTERASE UR QL STRIP: NEGATIVE
LYMPHOCYTES # BLD AUTO: 0.4 K/UL
LYMPHOCYTES NFR BLD: 8.8 %
MCH RBC QN AUTO: 30.3 PG
MCHC RBC AUTO-ENTMCNC: 34.3 %
MCV RBC AUTO: 88 FL
MONOCYTES # BLD AUTO: 0.4 K/UL
MONOCYTES NFR BLD: 8.4 %
NEUTROPHILS # BLD AUTO: 3.7 K/UL
NEUTROPHILS NFR BLD: 81.3 %
NITRITE UR QL STRIP: NEGATIVE
PH UR STRIP: 6 [PH] (ref 5–8)
PLATELET # BLD AUTO: 136 K/UL
PMV BLD AUTO: 8.2 FL
POTASSIUM SERPL-SCNC: 4.2 MMOL/L
PROT SERPL-MCNC: 7.3 G/DL
PROT UR QL STRIP: NEGATIVE
RBC # BLD AUTO: 2.77 M/UL
SODIUM SERPL-SCNC: 134 MMOL/L
SP GR UR STRIP: 1.01 (ref 1–1.03)
T4 FREE SERPL-MCNC: 0.66 NG/DL
TSH SERPL DL<=0.005 MIU/L-ACNC: 35.82 UIU/ML
URN SPEC COLLECT METH UR: NORMAL
UROBILINOGEN UR STRIP-ACNC: NEGATIVE EU/DL
WBC # BLD AUTO: 4.53 K/UL

## 2017-07-15 PROCEDURE — 99284 EMERGENCY DEPT VISIT MOD MDM: CPT

## 2017-07-15 PROCEDURE — 80053 COMPREHEN METABOLIC PANEL: CPT

## 2017-07-15 PROCEDURE — 84439 ASSAY OF FREE THYROXINE: CPT

## 2017-07-15 PROCEDURE — 84443 ASSAY THYROID STIM HORMONE: CPT

## 2017-07-15 PROCEDURE — 85025 COMPLETE CBC W/AUTO DIFF WBC: CPT

## 2017-07-15 PROCEDURE — 81003 URINALYSIS AUTO W/O SCOPE: CPT

## 2017-07-15 RX ORDER — AMLODIPINE BESYLATE 5 MG/1
5 TABLET ORAL DAILY
Qty: 90 TABLET | Refills: 2 | Status: SHIPPED | OUTPATIENT
Start: 2017-07-15 | End: 2018-01-01

## 2017-07-16 NOTE — ED PROVIDER NOTES
SCRIBE #1 NOTE: I, Monik Gupta, am scribing for, and in the presence of, Naz Burrows MD. I have scribed the entire note.      History      Chief Complaint   Patient presents with    Fatigue     hx of anemia. worsening the past week. hx thyroid cancer    Arm Pain     fell 1 week ago. currently wrapped with wrap       Review of patient's allergies indicates:   Allergen Reactions    Diovan  [valsartan] Swelling    Levofloxacin Other (See Comments)     Stomach pains    Lisinopril Swelling        HPI   HPI    7/15/2017, 9:07 PM   History obtained from the patient      History of Present Illness: Wallace Vigil is a 62 y.o. male patient with PMHx anemia who presents to the Emergency Department for fatigue and confusion onset over the past few days. Pt additionally reports decreased appetite, recent weight loss (50 lbs), chills, and mild chronic abdominal pain. Pt also c/o injury/pain to L arm after [place] from boat onto pier one week ago. Pt was treated by LASHAWN Douglas at [facility] on July 13, 2017 after his fall and was given prednisone. No x-ray was done.  Pt denies any nausea, vomiting, fever, chills, diarrhea, dysuria, hematuria, CP, SOB, and all other sxs at this time.  Symptoms are constant and moderate in severity.  No mitigating or exacerbating factors reported.  No further complaints or concerns at this time. Pt was dx with thyroid cancer in March 2017. Pt had thyroidectomy and nuclear radiation July 2017. Pt also has PMHx COPD, CAD and aroto-femoral bypass. Pt was seen by Dr. Velasquez in ED on June 28th 2017 for fever and chills. Pt followed up with Dr. Bray who fixed pt's poor circulation. No further complaints or concerns at this time.        Arrival mode: Personal vehicle     PCP: Mike Recinos MD       Past Medical History:  Past Medical History:   Diagnosis Date    Anxiety     Arthritis     Asthma     Atherosclerosis of native arteries of the extremities with  intermittent claudication     Benign hypertensive heart disease without heart failure     Cancer     Carotid artery occlusion     Chronic kidney disease     COPD (chronic obstructive pulmonary disease)     Coronary artery disease     Encounter for blood transfusion     GERD (gastroesophageal reflux disease)     History of aorto-femoral bypass 8/16/2013    Hyperlipidemia     Hypertension     PVD (peripheral vascular disease)     PVD (peripheral vascular disease) 8/16/2013    Renal artery stenosis 8/16/2013    Renovascular hypertension 8/16/2013    Shingles sept 2015    Stenosis of left subclavian artery 8/16/2013    Thyroid disease     Thyroid cancer 3/1/2017    Tobacco abuse        Past Surgical History:  Past Surgical History:   Procedure Laterality Date    CARDIAC CATHETERIZATION      RENAL ARTERY BYPASS      2012    THYROIDECTOMY      VASCULAR SURGERY      Carotid cleaned out          Family History:  Family History   Problem Relation Age of Onset    Hypertension Mother     Hyperlipidemia Mother     Heart failure Mother     Heart disease Mother     Hyperlipidemia Father        Social History:  Social History     Social History Main Topics    Smoking status: Current Every Day Smoker     Packs/day: 1.00     Years: 40.00     Types: Cigarettes    Smokeless tobacco: Never Used    Alcohol use No    Drug use: No    Sexual activity: No       ROS   Review of Systems   Constitutional: Positive for appetite change (decreased), chills, fatigue and unexpected weight change (-50lbs). Negative for fever.   HENT: Negative for congestion and sore throat.    Respiratory: Negative for shortness of breath.    Cardiovascular: Negative for chest pain.   Gastrointestinal: Positive for abdominal pain. Negative for nausea and vomiting.   Genitourinary: Negative for dysuria.   Musculoskeletal: Negative for back pain.        (+) L arm injury/pain   Skin: Negative for rash.   Neurological: Negative for  "weakness.   Hematological: Does not bruise/bleed easily.   Psychiatric/Behavioral: Positive for confusion.   All other systems reviewed and are negative.      Physical Exam      Initial Vitals [07/15/17 2051]   BP Pulse Resp Temp SpO2   129/66 64 18 98.1 °F (36.7 °C) 100 %      MAP       87          Physical Exam  Nursing Notes and Vital Signs Reviewed.  Constitutional: Patient is in no apparent distress. Well-developed and well-nourished.  Head: Atraumatic. Normocephalic.  Eyes: PERRL. EOM intact. Conjunctivae are not pale. No scleral icterus.  ENT: Mucous membranes are moist. Oropharynx is clear and symmetric.    Neck: Supple. Full ROM. No lymphadenopathy.  Cardiovascular: Regular rate. Regular rhythm. No murmurs, rubs, or gallops. Distal pulses are 2+ and symmetric.  Pulmonary/Chest: No respiratory distress. Clear to auscultation bilaterally. No wheezing, rales, or rhonchi.   Abdominal: Soft and non-distended.  There is no tenderness.  No rebound, guarding, or rigidity. Bruit detected indicating poor circulation.  Genitourinary: No CVA tenderness  Musculoskeletal: Moves all extremities. No obvious deformities. 1+ edema in bilateral lower extremities. No calf tenderness.   Skin: Warm and dry. Pt's skin on L arm in bad condition with bruising and multiple lacerations approximately 3-4 cm in length. See pictures below.   Neurological:  Alert, awake, and appropriate.  Normal speech.  No acute focal neurological deficits are appreciated.  Psychiatric: Normal affect. Good eye contact. Appropriate in content.                ED Course    Procedures  ED Vital Signs:  Vitals:    07/15/17 2051   BP: 129/66   Pulse: 64   Resp: 18   Temp: 98.1 °F (36.7 °C)   TempSrc: Oral   SpO2: 100%   Weight: 58.1 kg (128 lb)   Height: 5' 8" (1.727 m)       Abnormal Lab Results:  Labs Reviewed   CBC W/ AUTO DIFFERENTIAL - Abnormal; Notable for the following:        Result Value    RBC 2.77 (*)     Hemoglobin 8.4 (*)     Hematocrit 24.5 (*)  "    Platelets 136 (*)     MPV 8.2 (*)     Lymph # 0.4 (*)     Gran% 81.3 (*)     Lymph% 8.8 (*)     All other components within normal limits   COMPREHENSIVE METABOLIC PANEL - Abnormal; Notable for the following:     Sodium 134 (*)     CO2 21 (*)     BUN, Bld 27 (*)     Creatinine 2.4 (*)     Albumin 2.9 (*)     eGFR if  32 (*)     eGFR if non  28 (*)     All other components within normal limits   URINALYSIS   TSH        All Lab Results:  Results for orders placed or performed during the hospital encounter of 07/15/17   CBC auto differential   Result Value Ref Range    WBC 4.53 3.90 - 12.70 K/uL    RBC 2.77 (L) 4.60 - 6.20 M/uL    Hemoglobin 8.4 (L) 14.0 - 18.0 g/dL    Hematocrit 24.5 (L) 40.0 - 54.0 %    MCV 88 82 - 98 fL    MCH 30.3 27.0 - 31.0 pg    MCHC 34.3 32.0 - 36.0 %    RDW 13.7 11.5 - 14.5 %    Platelets 136 (L) 150 - 350 K/uL    MPV 8.2 (L) 9.2 - 12.9 fL    Gran # 3.7 1.8 - 7.7 K/uL    Lymph # 0.4 (L) 1.0 - 4.8 K/uL    Mono # 0.4 0.3 - 1.0 K/uL    Eos # 0.1 0.0 - 0.5 K/uL    Baso # 0.01 0.00 - 0.20 K/uL    Gran% 81.3 (H) 38.0 - 73.0 %    Lymph% 8.8 (L) 18.0 - 48.0 %    Mono% 8.4 4.0 - 15.0 %    Eosinophil% 1.5 0.0 - 8.0 %    Basophil% 0.2 0.0 - 1.9 %    Differential Method Automated    Comprehensive metabolic panel   Result Value Ref Range    Sodium 134 (L) 136 - 145 mmol/L    Potassium 4.2 3.5 - 5.1 mmol/L    Chloride 102 95 - 110 mmol/L    CO2 21 (L) 23 - 29 mmol/L    Glucose 106 70 - 110 mg/dL    BUN, Bld 27 (H) 8 - 23 mg/dL    Creatinine 2.4 (H) 0.5 - 1.4 mg/dL    Calcium 8.7 8.7 - 10.5 mg/dL    Total Protein 7.3 6.0 - 8.4 g/dL    Albumin 2.9 (L) 3.5 - 5.2 g/dL    Total Bilirubin 0.3 0.1 - 1.0 mg/dL    Alkaline Phosphatase 63 55 - 135 U/L    AST 15 10 - 40 U/L    ALT 12 10 - 44 U/L    Anion Gap 11 8 - 16 mmol/L    eGFR if African American 32 (A) >60 mL/min/1.73 m^2    eGFR if non African American 28 (A) >60 mL/min/1.73 m^2   Urinalysis   Result Value Ref Range     Specimen UA Urine, Clean Catch     Color, UA Yellow Yellow, Straw, Nora    Appearance, UA Clear Clear    pH, UA 6.0 5.0 - 8.0    Specific Gravity, UA 1.015 1.005 - 1.030    Protein, UA Negative Negative    Glucose, UA Negative Negative    Ketones, UA Negative Negative    Bilirubin (UA) Negative Negative    Occult Blood UA Negative Negative    Nitrite, UA Negative Negative    Urobilinogen, UA Negative <2.0 EU/dL    Leukocytes, UA Negative Negative         Imaging Results:  Imaging Results          CT Head Without Contrast (Final result)  Result time 07/15/17 21:41:30    Final result by Swati Duncan MD (07/15/17 21:41:30)                 Impression:             No CT evidence of acute intracranial abnormality.        All CT scans at this facility use dose modulation, iterative reconstruction and/or weight based dosing when appropriate to reduce radiation dose to as low as reasonably achievable.       Electronically signed by: SWATI DUNCAN MD  Date:     07/15/17  Time:    21:41              Narrative:    Exam: CT HEAD WITHOUT CONTRAST    Clinical History:   Acute head pain.  Initial encounter.Status post fall       Technique: Axial CT imaging was performed through the head without intravenous contrast.     Findings:    No hydrocephalus, midline shift, mass effect, or acute intracranial hemorrhage. Cortical sulcal pattern and gray-white matter differentiation is normal. Basilar cisterns and posterior fossa are normal. The visualized paranasal sinuses and mastoid air cells are clear. The skull is intact.                             X-Ray Humerus 2 View Left (Final result)  Result time 07/15/17 21:39:26    Final result by Swati Duncan MD (07/15/17 21:39:26)                 Impression:         No acute findings.        Electronically signed by: SWATI DUNCAN MD  Date:     07/15/17  Time:    21:39              Narrative:    Exam: XR HUMERUS 2 VIEW LEFT    Clinical History:    Acute left arm pain. Initial  encounter.    Findings:      Osteopenia.  Moderate acromioclavicular and glenohumeral DJD.  No acute fracture.  Alignment is satisfactory.                             X-Ray Forearm Left (Final result)  Result time 07/15/17 21:38:54    Final result by Swati Duncan MD (07/15/17 21:38:54)                 Impression:         No acute findings.  Degenerative changes.  Consider dedicated wrist radiograph to evaluate carpal bones.        Electronically signed by: SWAIT DUNCAN MD  Date:     07/15/17  Time:    21:38              Narrative:    Exam: XR FOREARM LEFT    Clinical History:    Acute left arm pain. Initial encounter.    Findings:      Severe first CMC and radiocarpal DJD.  No acute fracture identified.  Alignment is satisfactory.                                      The Emergency Provider reviewed the vital signs and test results, which are outlined above.    ED Discussion     10:19 PM: Reassessed pt at this time. Pt is in no apparent distress. Discussed with pt all pertinent ED information and results. Discussed pt dx and plan of tx. Gave pt all f/u and return to the ED instructions. All questions and concerns were addressed at this time. Pt expresses understanding of information and instructions, and is comfortable with plan to discharge. Pt is stable for discharge.      I discussed with patient and family that evaluation in the ED does not suggest any emergent or life threatening medical conditions requiring immediate intervention beyond what was provided in the ED, and I believe patient is safe for discharge.  Regardless, an unremarkable evaluation in the ED does not preclude the development or presence of a serious of life threatening condition. As such, patient was instructed to return immediately for any worsening or change in current symptoms.      ED Medication(s):  Medications - No data to display    Current Discharge Medication List          Follow-up Information     Go to  [facility]     Specialty:  Emergency Medicine  Why:  If symptoms worsen  Contact information:  [address]  [facility] [address] [phone]           Martínez Mcleod MD. Schedule an appointment as soon as possible for a visit in 1 week.    Specialty:  Hematology and Oncology  Why:  for anemia  Contact information:  [address]  [place] [address] [phone]             Naz Burrows MD. Schedule an appointment as soon as possible for a visit in 1 week.    Specialty:  Nephrology  Why:  chronic kidney disease  Contact information:  [address]  [facility] [address] [phone]                     Medical Decision Making    Medical Decision Making:   Clinical Tests:   Lab Tests: Ordered and Reviewed  Radiological Study: Ordered and Reviewed           Scribe Attestation:   Scribe #1: I performed the above scribed service and the documentation accurately describes the services I performed. I attest to the accuracy of the note.    Attending:   Physician Attestation Statement for Scribe #1: I, Naz Burrows MD, personally performed the services described in this documentation, as scribed by Monik Gupta, in my presence, and it is both accurate and complete.          Clinical Impression       ICD-10-CM ICD-9-CM   1. Anemia associated with chronic renal failure D63.1 285.21   2. Fall, accidental, initial encounter W19.XXXA E888.9   3. DORON (acute kidney injury) N17.9 584.9   4. Bilateral edema of lower extremity R60.0 782.3   5. Laceration of skin of left forearm, subsequent encounter S51.812D V58.89     881.00   1.  Anemia: Need to see hematologist as an outpatient for anemia along with kidney disease for IV iron and erythropoietin injections    2.  Acute kidney injury: You have rising creatinine on Bactrim which is benign.  Continue with Bactrim.  Follow-up with your kidney specialist to have repeat labs    3.  Lower extremity swelling likely from doxazosin and amlodipine.   Recommend to stop doxazosin and lower the amlodipine down to 5 mg    4.  thyroid test is pending at this time: Please follow-up with the endocrinologist for follow-up on the thyroid medication    Disposition:   Disposition: Discharged  Condition: Stable         Naz Burrows MD  07/15/17 3048

## 2017-07-16 NOTE — ED NOTES
Family and pt reports recent fall. Unknown loc. + left upper arm discomfort and abrasion noted. Clean dressings applied. Pt reports generalized weakness. Thin and elderly in appearance.

## 2017-07-16 NOTE — DISCHARGE INSTRUCTIONS
1.  Anemia: Need to see hematologist as an outpatient for anemia along with kidney disease for IV iron and erythropoietin injections    2.  Acute kidney injury: You have rising creatinine on Bactrim which is benign.  Continue with Bactrim.  Follow-up with your kidney specialist to have repeat labs    3.  Lower extremity swelling likely from doxazosin and amlodipine.  Recommend to stop doxazosin and lower the amlodipine down to 5 mg    4.  thyroid test is pending at this time: Please follow-up with the endocrinologist for follow-up on the thyroid medication

## 2017-07-20 ENCOUNTER — TELEPHONE (OUTPATIENT)
Dept: NEPHROLOGY | Facility: CLINIC | Age: 62
End: 2017-07-20

## 2017-07-20 NOTE — TELEPHONE ENCOUNTER
----- Message from Joan Manzano sent at 7/20/2017  3:28 PM CDT -----  Contact: dion/jacinda ash  Would like to speak to nurse about rx for pt. Please call back at [phone]. thanks

## 2017-07-20 NOTE — TELEPHONE ENCOUNTER
Returned call to [facility]. Confirmed with 's note that the Amlodipine has been reduced to 5 mg daily. She expressed understanding.

## 2017-08-24 ENCOUNTER — PATIENT OUTREACH (OUTPATIENT)
Dept: ADMINISTRATIVE | Facility: HOSPITAL | Age: 62
End: 2017-08-24

## 2017-08-24 NOTE — PROGRESS NOTES
Documentation of patient receiving DMARD routed to [employer] as attestation documentation for Disease-Modifying Anti- Rheumatic Drug Therapy for Rheumatoid Arthritis measure (MTX 2/2017 documented).  2017 measure has been satisfied.

## 2017-09-07 ENCOUNTER — OFFICE VISIT (OUTPATIENT)
Dept: FAMILY MEDICINE | Facility: CLINIC | Age: 62
End: 2017-09-07
Payer: MEDICARE

## 2017-09-07 VITALS
OXYGEN SATURATION: 100 % | WEIGHT: 129.88 LBS | DIASTOLIC BLOOD PRESSURE: 73 MMHG | TEMPERATURE: 96 F | RESPIRATION RATE: 16 BRPM | HEART RATE: 77 BPM | BODY MASS INDEX: 19.68 KG/M2 | HEIGHT: 68 IN | SYSTOLIC BLOOD PRESSURE: 131 MMHG

## 2017-09-07 DIAGNOSIS — I11.0 HYPERTENSIVE HEART DISEASE WITH HEART FAILURE: ICD-10-CM

## 2017-09-07 DIAGNOSIS — Z72.0 TOBACCO ABUSE: ICD-10-CM

## 2017-09-07 DIAGNOSIS — Z23 NEED FOR STREPTOCOCCUS PNEUMONIAE VACCINATION: ICD-10-CM

## 2017-09-07 DIAGNOSIS — J44.9 CHRONIC OBSTRUCTIVE PULMONARY DISEASE, UNSPECIFIED COPD TYPE: ICD-10-CM

## 2017-09-07 DIAGNOSIS — C73 FOLLICULAR THYROID CANCER: ICD-10-CM

## 2017-09-07 DIAGNOSIS — N18.4 CHRONIC KIDNEY DISEASE, STAGE IV (SEVERE): Primary | ICD-10-CM

## 2017-09-07 DIAGNOSIS — Z12.11 COLON CANCER SCREENING: ICD-10-CM

## 2017-09-07 DIAGNOSIS — I10 ESSENTIAL HYPERTENSION: ICD-10-CM

## 2017-09-07 DIAGNOSIS — E55.9 VITAMIN D DEFICIENCY DISEASE: ICD-10-CM

## 2017-09-07 DIAGNOSIS — Z95.828 HISTORY OF AORTO-FEMORAL BYPASS: ICD-10-CM

## 2017-09-07 DIAGNOSIS — M05.79 RHEUMATOID ARTHRITIS INVOLVING MULTIPLE SITES WITH POSITIVE RHEUMATOID FACTOR: ICD-10-CM

## 2017-09-07 DIAGNOSIS — Z23 NEED FOR IMMUNIZATION AGAINST INFLUENZA: ICD-10-CM

## 2017-09-07 DIAGNOSIS — E78.00 PURE HYPERCHOLESTEROLEMIA: ICD-10-CM

## 2017-09-07 DIAGNOSIS — I25.10 CORONARY ARTERY DISEASE INVOLVING NATIVE CORONARY ARTERY OF NATIVE HEART WITHOUT ANGINA PECTORIS: ICD-10-CM

## 2017-09-07 DIAGNOSIS — H35.039 HYPERTENSIVE RETINOPATHY, UNSPECIFIED LATERALITY: ICD-10-CM

## 2017-09-07 DIAGNOSIS — I70.0 ATHEROSCLEROSIS OF AORTA: ICD-10-CM

## 2017-09-07 DIAGNOSIS — I70.1 RENAL ARTERY STENOSIS: ICD-10-CM

## 2017-09-07 DIAGNOSIS — N18.4 STAGE 4 CHRONIC KIDNEY DISEASE: ICD-10-CM

## 2017-09-07 DIAGNOSIS — I73.9 PVD (PERIPHERAL VASCULAR DISEASE): ICD-10-CM

## 2017-09-07 PROCEDURE — 99499 UNLISTED E&M SERVICE: CPT | Mod: S$GLB,,, | Performed by: NURSE PRACTITIONER

## 2017-09-07 PROCEDURE — 99999 PR PBB SHADOW E&M-EST. PATIENT-LVL IV: CPT | Mod: PBBFAC,,, | Performed by: NURSE PRACTITIONER

## 2017-09-07 PROCEDURE — 96160 PT-FOCUSED HLTH RISK ASSMT: CPT | Mod: S$GLB,,, | Performed by: NURSE PRACTITIONER

## 2017-09-07 RX ORDER — DOXAZOSIN 4 MG/1
4 TABLET ORAL NIGHTLY
COMMUNITY
End: 2017-12-13 | Stop reason: SDUPTHER

## 2017-09-08 NOTE — PROGRESS NOTES
"Wallace Vigil presented for a  Medicare AWV and comprehensive Health Risk Assessment today. The following components were reviewed and updated:    · Medical history  · Family History  · Social history  · Allergies and Current Medications  · Health Risk Assessment  · Health Maintenance  · Care Team     ** See Completed Assessments for Annual Wellness Visit within the encounter summary.**       The following assessments were completed:  · Living Situation  · CAGE  · Depression Screening  · Timed Get Up and Go  · Whisper Test  · Cognitive Function Screening  · Nutrition Screening  · ADL Screening  · PAQ Screening    Vitals:    09/07/17 1029   BP: 131/73   Pulse: 77   Resp: 16   Temp: 96.3 °F (35.7 °C)   TempSrc: Tympanic   SpO2: 100%   Weight: 58.9 kg (129 lb 13.6 oz)   Height: 5' 8" (1.727 m)     Body mass index is 19.74 kg/m².  Physical Exam      Diagnoses and health risks identified today and associated recommendations/orders:    1. Chronic kidney disease, stage IV (severe)  7/15/17  eGFR if non African American >60 mL/min/1.73 m^2 28       Uncontrolled. Follow up with PCP     2. Hypertensive heart disease with heart failure  Stable. Continue current treatment plan    3. Atherosclerosis of aorta  Stable continue current treatment plan    4. Chronic obstructive pulmonary disease, unspecified COPD type  Stable. Continue current treatment plan        6. Tobacco abuse  Smoking cessation. Refused referral to program    7. PVD (peripheral vascular disease)  Stable. Continue current treatment plan with statin therapy and bp control    8. Renal artery stenosis  Stable. Continue current treatment plan    9. Coronary artery disease involving native coronary artery of native heart without angina pectoris  Stable. Continue current treatment plan    10. Pure hypercholesterolemia  Improved from previous. Continue current treatment plan with low fat diet    11. Vitamin D deficiency disease  Stable continue current treatment " plan    12. Rheumatoid arthritis involving multiple sites with positive rheumatoid factor  stable    13. Follicular thyroid cancer  Resolved after thyroidectomy    14. Hypertensive retinopathy, unspecified laterality  stable    15. Essential hypertension  stable    16. History of aorto-femoral bypass      17. Colon cancer screening  Follow up with PCP    18. Need for Streptococcus pneumoniae vaccination  Follow up with PCP    19. Need for immunization against influenza  Follow up with PCP      Provided Wallace with a 5-10 year written screening schedule and personal prevention plan. Recommendations were developed using the USPSTF age appropriate recommendations. Education, counseling, and referrals were provided as needed. After Visit Summary printed and given to patient which includes a list of additional screenings\tests needed.    No Follow-up on file.    Magalie Quinones NP

## 2017-09-23 DIAGNOSIS — F41.9 ANXIETY: ICD-10-CM

## 2017-09-26 RX ORDER — ALPRAZOLAM 0.5 MG/1
TABLET ORAL
Qty: 90 TABLET | Refills: 0 | Status: SHIPPED | OUTPATIENT
Start: 2017-09-26 | End: 2017-12-13 | Stop reason: SDUPTHER

## 2017-11-27 DIAGNOSIS — M05.79 RHEUMATOID ARTHRITIS INVOLVING MULTIPLE SITES WITH POSITIVE RHEUMATOID FACTOR: ICD-10-CM

## 2017-11-28 RX ORDER — PREDNISONE 5 MG/1
TABLET ORAL
Qty: 90 TABLET | Refills: 1 | Status: SHIPPED | OUTPATIENT
Start: 2017-11-28 | End: 2018-01-01 | Stop reason: SDUPTHER

## 2017-12-06 ENCOUNTER — PATIENT OUTREACH (OUTPATIENT)
Dept: ADMINISTRATIVE | Facility: HOSPITAL | Age: 62
End: 2017-12-06

## 2017-12-07 RX ORDER — LACTULOSE 10 G/15ML
SOLUTION ORAL; RECTAL
Qty: 300 ML | Refills: 6 | Status: SHIPPED | OUTPATIENT
Start: 2017-12-07 | End: 2019-01-01 | Stop reason: SDUPTHER

## 2017-12-13 DIAGNOSIS — F41.9 ANXIETY: ICD-10-CM

## 2017-12-14 RX ORDER — PRAVASTATIN SODIUM 20 MG/1
TABLET ORAL
Qty: 90 TABLET | Refills: 3 | Status: SHIPPED | OUTPATIENT
Start: 2017-12-14 | End: 2018-01-01 | Stop reason: SDUPTHER

## 2017-12-14 RX ORDER — DOXAZOSIN 4 MG/1
TABLET ORAL
Qty: 90 TABLET | Refills: 3 | Status: SHIPPED | OUTPATIENT
Start: 2017-12-14 | End: 2018-01-01 | Stop reason: SDUPTHER

## 2017-12-14 RX ORDER — DILTIAZEM HYDROCHLORIDE 240 MG/1
CAPSULE, EXTENDED RELEASE ORAL
Qty: 90 CAPSULE | Refills: 3 | Status: SHIPPED | OUTPATIENT
Start: 2017-12-14 | End: 2018-01-01 | Stop reason: SDUPTHER

## 2017-12-14 RX ORDER — AMLODIPINE BESYLATE 10 MG/1
TABLET ORAL
Qty: 90 TABLET | Refills: 3 | Status: SHIPPED | OUTPATIENT
Start: 2017-12-14 | End: 2018-01-01 | Stop reason: SDUPTHER

## 2017-12-14 RX ORDER — CLONIDINE HYDROCHLORIDE 0.1 MG/1
TABLET ORAL
Qty: 180 TABLET | Refills: 3 | Status: SHIPPED | OUTPATIENT
Start: 2017-12-14 | End: 2018-01-01 | Stop reason: SDUPTHER

## 2017-12-15 RX ORDER — ALPRAZOLAM 0.5 MG/1
TABLET ORAL
Qty: 30 TABLET | Refills: 0 | Status: SHIPPED | OUTPATIENT
Start: 2017-12-15 | End: 2018-01-01 | Stop reason: SDUPTHER

## 2018-01-01 ENCOUNTER — DOCUMENTATION ONLY (OUTPATIENT)
Dept: HEMATOLOGY/ONCOLOGY | Facility: CLINIC | Age: 63
End: 2018-01-01

## 2018-01-01 ENCOUNTER — TELEPHONE (OUTPATIENT)
Dept: HEMATOLOGY/ONCOLOGY | Facility: CLINIC | Age: 63
End: 2018-01-01

## 2018-01-01 ENCOUNTER — OFFICE VISIT (OUTPATIENT)
Dept: RADIATION ONCOLOGY | Facility: CLINIC | Age: 63
End: 2018-01-01
Payer: MEDICARE

## 2018-01-01 ENCOUNTER — TELEPHONE (OUTPATIENT)
Dept: PULMONOLOGY | Facility: CLINIC | Age: 63
End: 2018-01-01

## 2018-01-01 ENCOUNTER — TELEPHONE (OUTPATIENT)
Dept: PHARMACY | Facility: CLINIC | Age: 63
End: 2018-01-01

## 2018-01-01 ENCOUNTER — OFFICE VISIT (OUTPATIENT)
Dept: HEMATOLOGY/ONCOLOGY | Facility: CLINIC | Age: 63
End: 2018-01-01
Payer: MEDICARE

## 2018-01-01 ENCOUNTER — PES CALL (OUTPATIENT)
Dept: ADMINISTRATIVE | Facility: CLINIC | Age: 63
End: 2018-01-01

## 2018-01-01 ENCOUNTER — HOSPITAL ENCOUNTER (OUTPATIENT)
Dept: RADIOLOGY | Facility: HOSPITAL | Age: 63
Discharge: HOME | End: 2018-10-18
Attending: RADIOLOGY
Payer: MEDICARE

## 2018-01-01 ENCOUNTER — HOSPITAL ENCOUNTER (OUTPATIENT)
Dept: RADIOLOGY | Facility: HOSPITAL | Age: 63
Discharge: HOME | End: 2018-06-06
Attending: INTERNAL MEDICINE
Payer: MEDICARE

## 2018-01-01 ENCOUNTER — OFFICE VISIT (OUTPATIENT)
Dept: FAMILY MEDICINE | Facility: CLINIC | Age: 63
End: 2018-01-01
Payer: MEDICARE

## 2018-01-01 ENCOUNTER — PATIENT OUTREACH (OUTPATIENT)
Dept: FAMILY MEDICINE | Facility: CLINIC | Age: 63
End: 2018-01-01

## 2018-01-01 ENCOUNTER — DOCUMENTATION ONLY (OUTPATIENT)
Dept: ENDOSCOPY | Facility: HOSPITAL | Age: 63
End: 2018-01-01

## 2018-01-01 ENCOUNTER — LAB VISIT (OUTPATIENT)
Dept: LAB | Facility: HOSPITAL | Age: 63
End: 2018-01-01
Attending: FAMILY MEDICINE
Payer: MEDICARE

## 2018-01-01 ENCOUNTER — OUTPATIENT CASE MANAGEMENT (OUTPATIENT)
Dept: ADMINISTRATIVE | Facility: OTHER | Age: 63
End: 2018-01-01

## 2018-01-01 ENCOUNTER — PATIENT OUTREACH (OUTPATIENT)
Dept: ADMINISTRATIVE | Facility: HOSPITAL | Age: 63
End: 2018-01-01

## 2018-01-01 ENCOUNTER — TELEPHONE (OUTPATIENT)
Dept: INFUSION THERAPY | Facility: HOSPITAL | Age: 63
End: 2018-01-01

## 2018-01-01 ENCOUNTER — OFFICE VISIT (OUTPATIENT)
Dept: ENDOCRINOLOGY | Facility: CLINIC | Age: 63
End: 2018-01-01
Payer: MEDICARE

## 2018-01-01 ENCOUNTER — TELEPHONE (OUTPATIENT)
Dept: RADIOLOGY | Facility: HOSPITAL | Age: 63
End: 2018-01-01

## 2018-01-01 ENCOUNTER — DOCUMENTATION ONLY (OUTPATIENT)
Dept: RADIATION ONCOLOGY | Facility: CLINIC | Age: 63
End: 2018-01-01

## 2018-01-01 ENCOUNTER — PROCEDURE VISIT (OUTPATIENT)
Dept: PULMONOLOGY | Facility: CLINIC | Age: 63
End: 2018-01-01
Payer: MEDICARE

## 2018-01-01 ENCOUNTER — LAB VISIT (OUTPATIENT)
Dept: LAB | Facility: HOSPITAL | Age: 63
End: 2018-01-01
Attending: INTERNAL MEDICINE
Payer: MEDICARE

## 2018-01-01 ENCOUNTER — CLINICAL SUPPORT (OUTPATIENT)
Dept: PULMONOLOGY | Facility: CLINIC | Age: 63
End: 2018-01-01
Payer: MEDICARE

## 2018-01-01 ENCOUNTER — INFUSION (OUTPATIENT)
Dept: INFUSION THERAPY | Facility: HOSPITAL | Age: 63
End: 2018-01-01
Attending: INTERNAL MEDICINE
Payer: MEDICARE

## 2018-01-01 ENCOUNTER — DOCUMENTATION ONLY (OUTPATIENT)
Dept: SURGERY | Facility: CLINIC | Age: 63
End: 2018-01-01

## 2018-01-01 ENCOUNTER — HOSPITAL ENCOUNTER (OUTPATIENT)
Dept: RADIATION THERAPY | Facility: HOSPITAL | Age: 63
Discharge: HOME | End: 2018-06-29
Attending: RADIOLOGY
Payer: MEDICARE

## 2018-01-01 ENCOUNTER — INITIAL CONSULT (OUTPATIENT)
Dept: RADIATION ONCOLOGY | Facility: CLINIC | Age: 63
End: 2018-01-01
Payer: MEDICARE

## 2018-01-01 ENCOUNTER — HOSPITAL ENCOUNTER (EMERGENCY)
Facility: HOSPITAL | Age: 63
Discharge: HOME | End: 2018-12-31
Attending: FAMILY MEDICINE
Payer: MEDICARE

## 2018-01-01 ENCOUNTER — HOSPITAL ENCOUNTER (OUTPATIENT)
Dept: RADIOLOGY | Facility: HOSPITAL | Age: 63
Discharge: HOME | End: 2018-12-17
Attending: INTERNAL MEDICINE
Payer: MEDICARE

## 2018-01-01 ENCOUNTER — PATIENT OUTREACH (OUTPATIENT)
Dept: PULMONOLOGY | Facility: CLINIC | Age: 63
End: 2018-01-01

## 2018-01-01 ENCOUNTER — TELEPHONE (OUTPATIENT)
Dept: FAMILY MEDICINE | Facility: CLINIC | Age: 63
End: 2018-01-01

## 2018-01-01 ENCOUNTER — TELEPHONE (OUTPATIENT)
Dept: RADIATION ONCOLOGY | Facility: CLINIC | Age: 63
End: 2018-01-01

## 2018-01-01 ENCOUNTER — HOSPITAL ENCOUNTER (OUTPATIENT)
Dept: RADIOLOGY | Facility: HOSPITAL | Age: 63
Discharge: HOME | End: 2018-04-17
Attending: FAMILY MEDICINE
Payer: MEDICARE

## 2018-01-01 ENCOUNTER — HOSPITAL ENCOUNTER (OUTPATIENT)
Dept: RADIOLOGY | Facility: HOSPITAL | Age: 63
Discharge: HOME | End: 2018-12-13
Attending: INTERNAL MEDICINE
Payer: MEDICARE

## 2018-01-01 ENCOUNTER — TELEPHONE (OUTPATIENT)
Dept: ENDOCRINOLOGY | Facility: CLINIC | Age: 63
End: 2018-01-01

## 2018-01-01 ENCOUNTER — SOCIAL WORK (OUTPATIENT)
Dept: HEMATOLOGY/ONCOLOGY | Facility: CLINIC | Age: 63
End: 2018-01-01

## 2018-01-01 ENCOUNTER — HOSPITAL ENCOUNTER (OUTPATIENT)
Dept: RADIATION THERAPY | Facility: HOSPITAL | Age: 63
Discharge: HOME | End: 2018-07-02
Attending: RADIOLOGY
Payer: MEDICARE

## 2018-01-01 ENCOUNTER — HOSPITAL ENCOUNTER (OUTPATIENT)
Dept: RADIOLOGY | Facility: HOSPITAL | Age: 63
Discharge: HOME | End: 2018-10-29
Attending: RADIOLOGY
Payer: MEDICARE

## 2018-01-01 VITALS
HEART RATE: 77 BPM | HEIGHT: 69 IN | BODY MASS INDEX: 19.37 KG/M2 | TEMPERATURE: 98 F | SYSTOLIC BLOOD PRESSURE: 126 MMHG | DIASTOLIC BLOOD PRESSURE: 61 MMHG | WEIGHT: 130.81 LBS | OXYGEN SATURATION: 97 %

## 2018-01-01 VITALS
DIASTOLIC BLOOD PRESSURE: 56 MMHG | OXYGEN SATURATION: 93 % | RESPIRATION RATE: 18 BRPM | SYSTOLIC BLOOD PRESSURE: 127 MMHG | HEART RATE: 65 BPM | TEMPERATURE: 98 F

## 2018-01-01 VITALS
WEIGHT: 131.19 LBS | SYSTOLIC BLOOD PRESSURE: 141 MMHG | HEIGHT: 70 IN | TEMPERATURE: 98 F | TEMPERATURE: 98 F | DIASTOLIC BLOOD PRESSURE: 67 MMHG | WEIGHT: 132.69 LBS | HEART RATE: 68 BPM | HEART RATE: 65 BPM | HEART RATE: 63 BPM | RESPIRATION RATE: 17 BRPM | BODY MASS INDEX: 18.23 KG/M2 | HEIGHT: 70 IN | TEMPERATURE: 99 F | BODY MASS INDEX: 18.78 KG/M2 | WEIGHT: 130.19 LBS | HEIGHT: 71 IN | SYSTOLIC BLOOD PRESSURE: 140 MMHG | DIASTOLIC BLOOD PRESSURE: 74 MMHG | DIASTOLIC BLOOD PRESSURE: 82 MMHG | OXYGEN SATURATION: 98 % | BODY MASS INDEX: 19 KG/M2 | OXYGEN SATURATION: 95 % | SYSTOLIC BLOOD PRESSURE: 144 MMHG | RESPIRATION RATE: 21 BRPM

## 2018-01-01 VITALS
HEIGHT: 71 IN | RESPIRATION RATE: 18 BRPM | WEIGHT: 126.56 LBS | DIASTOLIC BLOOD PRESSURE: 78 MMHG | SYSTOLIC BLOOD PRESSURE: 165 MMHG | BODY MASS INDEX: 17.72 KG/M2 | TEMPERATURE: 97 F | HEART RATE: 67 BPM | OXYGEN SATURATION: 98 %

## 2018-01-01 VITALS
BODY MASS INDEX: 18.65 KG/M2 | SYSTOLIC BLOOD PRESSURE: 141 MMHG | OXYGEN SATURATION: 96 % | TEMPERATURE: 97 F | DIASTOLIC BLOOD PRESSURE: 61 MMHG | RESPIRATION RATE: 18 BRPM | WEIGHT: 126.31 LBS | HEART RATE: 62 BPM

## 2018-01-01 VITALS
OXYGEN SATURATION: 98 % | HEIGHT: 71 IN | BODY MASS INDEX: 17.72 KG/M2 | WEIGHT: 126.56 LBS | DIASTOLIC BLOOD PRESSURE: 70 MMHG | TEMPERATURE: 97 F | HEART RATE: 68 BPM | RESPIRATION RATE: 18 BRPM | SYSTOLIC BLOOD PRESSURE: 138 MMHG

## 2018-01-01 VITALS
TEMPERATURE: 98 F | OXYGEN SATURATION: 99 % | BODY MASS INDEX: 18.52 KG/M2 | DIASTOLIC BLOOD PRESSURE: 60 MMHG | WEIGHT: 125.44 LBS | SYSTOLIC BLOOD PRESSURE: 121 MMHG | HEART RATE: 64 BPM

## 2018-01-01 VITALS
OXYGEN SATURATION: 97 % | HEART RATE: 86 BPM | WEIGHT: 129.75 LBS | BODY MASS INDEX: 18.57 KG/M2 | HEIGHT: 70 IN | RESPIRATION RATE: 16 BRPM

## 2018-01-01 VITALS
DIASTOLIC BLOOD PRESSURE: 61 MMHG | TEMPERATURE: 98 F | WEIGHT: 125 LBS | OXYGEN SATURATION: 96 % | BODY MASS INDEX: 18.46 KG/M2 | SYSTOLIC BLOOD PRESSURE: 121 MMHG | HEART RATE: 75 BPM

## 2018-01-01 VITALS
BODY MASS INDEX: 18.7 KG/M2 | TEMPERATURE: 97 F | HEART RATE: 68 BPM | DIASTOLIC BLOOD PRESSURE: 64 MMHG | SYSTOLIC BLOOD PRESSURE: 128 MMHG | OXYGEN SATURATION: 97 % | WEIGHT: 126.63 LBS

## 2018-01-01 VITALS
SYSTOLIC BLOOD PRESSURE: 124 MMHG | DIASTOLIC BLOOD PRESSURE: 61 MMHG | BODY MASS INDEX: 18.71 KG/M2 | RESPIRATION RATE: 18 BRPM | HEIGHT: 69 IN | WEIGHT: 126.31 LBS | HEART RATE: 61 BPM | TEMPERATURE: 98 F

## 2018-01-01 VITALS
BODY MASS INDEX: 18.77 KG/M2 | RESPIRATION RATE: 18 BRPM | HEART RATE: 65 BPM | HEIGHT: 69 IN | DIASTOLIC BLOOD PRESSURE: 57 MMHG | TEMPERATURE: 98 F | OXYGEN SATURATION: 98 % | WEIGHT: 126.75 LBS | SYSTOLIC BLOOD PRESSURE: 111 MMHG

## 2018-01-01 VITALS
HEART RATE: 61 BPM | WEIGHT: 123.69 LBS | DIASTOLIC BLOOD PRESSURE: 80 MMHG | OXYGEN SATURATION: 98 % | SYSTOLIC BLOOD PRESSURE: 161 MMHG | BODY MASS INDEX: 17.31 KG/M2 | HEIGHT: 71 IN | TEMPERATURE: 98 F

## 2018-01-01 DIAGNOSIS — C73 FOLLICULAR THYROID CANCER: ICD-10-CM

## 2018-01-01 DIAGNOSIS — R16.1 SPLENOMEGALY: ICD-10-CM

## 2018-01-01 DIAGNOSIS — R91.1 LEFT UPPER LOBE PULMONARY NODULE: ICD-10-CM

## 2018-01-01 DIAGNOSIS — M05.79 RHEUMATOID ARTHRITIS INVOLVING MULTIPLE SITES WITH POSITIVE RHEUMATOID FACTOR: ICD-10-CM

## 2018-01-01 DIAGNOSIS — I10 ESSENTIAL HYPERTENSION: ICD-10-CM

## 2018-01-01 DIAGNOSIS — Z72.0 TOBACCO ABUSE: ICD-10-CM

## 2018-01-01 DIAGNOSIS — E78.00 PURE HYPERCHOLESTEROLEMIA: ICD-10-CM

## 2018-01-01 DIAGNOSIS — D49.1 NEOPLASM OF LUNG: ICD-10-CM

## 2018-01-01 DIAGNOSIS — I70.1 RENAL ARTERY STENOSIS: ICD-10-CM

## 2018-01-01 DIAGNOSIS — D50.0 IRON DEFICIENCY ANEMIA DUE TO CHRONIC BLOOD LOSS: Primary | ICD-10-CM

## 2018-01-01 DIAGNOSIS — C73 MALIGNANT NEOPLASM OF THYROID GLAND: ICD-10-CM

## 2018-01-01 DIAGNOSIS — I11.0 HYPERTENSIVE HEART DISEASE WITH HEART FAILURE: ICD-10-CM

## 2018-01-01 DIAGNOSIS — F41.9 ANXIETY: ICD-10-CM

## 2018-01-01 DIAGNOSIS — J44.9 COPD (CHRONIC OBSTRUCTIVE PULMONARY DISEASE): Primary | ICD-10-CM

## 2018-01-01 DIAGNOSIS — C34.32 PRIMARY CANCER OF LEFT LOWER LOBE OF LUNG: ICD-10-CM

## 2018-01-01 DIAGNOSIS — B35.4 DERMATOPHYTOSIS OF BODY: ICD-10-CM

## 2018-01-01 DIAGNOSIS — D50.0 IRON DEFICIENCY ANEMIA DUE TO CHRONIC BLOOD LOSS: ICD-10-CM

## 2018-01-01 DIAGNOSIS — J44.9 CHRONIC OBSTRUCTIVE PULMONARY DISEASE, UNSPECIFIED COPD TYPE: ICD-10-CM

## 2018-01-01 DIAGNOSIS — E53.8 B12 DEFICIENCY: Primary | ICD-10-CM

## 2018-01-01 DIAGNOSIS — M06.9 RHEUMATOID ARTHRITIS, INVOLVING UNSPECIFIED SITE, UNSPECIFIED RHEUMATOID FACTOR PRESENCE: ICD-10-CM

## 2018-01-01 DIAGNOSIS — N18.4 STAGE 4 CHRONIC KIDNEY DISEASE: ICD-10-CM

## 2018-01-01 DIAGNOSIS — J18.9 PNEUMONIA OF RIGHT LOWER LOBE DUE TO INFECTIOUS ORGANISM: Primary | ICD-10-CM

## 2018-01-01 DIAGNOSIS — J44.9 COPD (CHRONIC OBSTRUCTIVE PULMONARY DISEASE): ICD-10-CM

## 2018-01-01 DIAGNOSIS — R06.02 SHORTNESS OF BREATH: ICD-10-CM

## 2018-01-01 DIAGNOSIS — E53.8 B12 DEFICIENCY: ICD-10-CM

## 2018-01-01 DIAGNOSIS — C34.90 MALIGNANT NEOPLASM OF LUNG, UNSPECIFIED LATERALITY, UNSPECIFIED PART OF LUNG: ICD-10-CM

## 2018-01-01 DIAGNOSIS — C73 THYROID CANCER: ICD-10-CM

## 2018-01-01 DIAGNOSIS — C73 FOLLICULAR THYROID CANCER: Primary | ICD-10-CM

## 2018-01-01 DIAGNOSIS — N18.9 CHRONIC KIDNEY DISEASE, UNSPECIFIED CKD STAGE: ICD-10-CM

## 2018-01-01 DIAGNOSIS — C34.32 PRIMARY CANCER OF LEFT LOWER LOBE OF LUNG: Primary | ICD-10-CM

## 2018-01-01 DIAGNOSIS — R64 CACHEXIA: ICD-10-CM

## 2018-01-01 DIAGNOSIS — C34.90 MALIGNANT NEOPLASM OF LUNG, UNSPECIFIED LATERALITY, UNSPECIFIED PART OF LUNG: Primary | ICD-10-CM

## 2018-01-01 DIAGNOSIS — R05.9 COUGH: ICD-10-CM

## 2018-01-01 DIAGNOSIS — I73.9 PVD (PERIPHERAL VASCULAR DISEASE): ICD-10-CM

## 2018-01-01 DIAGNOSIS — E07.9 THYROID DISEASE: ICD-10-CM

## 2018-01-01 DIAGNOSIS — C73 THYROID CANCER: Primary | ICD-10-CM

## 2018-01-01 DIAGNOSIS — R05.9 COUGH: Primary | ICD-10-CM

## 2018-01-01 DIAGNOSIS — D64.9 ANEMIA, UNSPECIFIED TYPE: Primary | ICD-10-CM

## 2018-01-01 DIAGNOSIS — I70.0 ATHEROSCLEROSIS OF AORTA: ICD-10-CM

## 2018-01-01 LAB
ALBUMIN SERPL BCP-MCNC: 2.9 G/DL
ALBUMIN SERPL BCP-MCNC: 3.5 G/DL
ALBUMIN SERPL BCP-MCNC: 3.5 G/DL
ALP SERPL-CCNC: 61 U/L
ALP SERPL-CCNC: 68 U/L
ALP SERPL-CCNC: 73 U/L
ALT SERPL W/O P-5'-P-CCNC: 7 U/L
ALT SERPL W/O P-5'-P-CCNC: 7 U/L
ALT SERPL W/O P-5'-P-CCNC: <5 U/L
ANION GAP SERPL CALC-SCNC: 12 MMOL/L
ANION GAP SERPL CALC-SCNC: 9 MMOL/L
ANISOCYTOSIS BLD QL SMEAR: SLIGHT
AST SERPL-CCNC: 10 U/L
AST SERPL-CCNC: 11 U/L
AST SERPL-CCNC: 12 U/L
BASOPHILS # BLD AUTO: 0.02 K/UL
BASOPHILS # BLD AUTO: 0.03 K/UL
BASOPHILS # BLD AUTO: 0.03 K/UL
BASOPHILS NFR BLD: 0.2 %
BASOPHILS NFR BLD: 0.4 %
BASOPHILS NFR BLD: 0.6 %
BASOPHILS NFR BLD: 0.6 %
BASOPHILS NFR BLD: 0.7 %
BILIRUB SERPL-MCNC: 0.4 MG/DL
BILIRUB SERPL-MCNC: 0.4 MG/DL
BILIRUB SERPL-MCNC: 0.5 MG/DL
BILIRUB UR QL STRIP: NEGATIVE
BNP SERPL-MCNC: 64 PG/ML
BUN SERPL-MCNC: 23 MG/DL
BUN SERPL-MCNC: 27 MG/DL
BUN SERPL-MCNC: 28 MG/DL
BUN SERPL-MCNC: 30 MG/DL
BUN SERPL-MCNC: 32 MG/DL
CALCIUM SERPL-MCNC: 9 MG/DL
CALCIUM SERPL-MCNC: 9.4 MG/DL
CALCIUM SERPL-MCNC: 9.4 MG/DL
CALCIUM SERPL-MCNC: 9.6 MG/DL
CALCIUM SERPL-MCNC: 9.8 MG/DL
CHLORIDE SERPL-SCNC: 101 MMOL/L
CHLORIDE SERPL-SCNC: 102 MMOL/L
CHLORIDE SERPL-SCNC: 103 MMOL/L
CHLORIDE SERPL-SCNC: 104 MMOL/L
CHLORIDE SERPL-SCNC: 104 MMOL/L
CHOLEST SERPL-MCNC: 98 MG/DL
CHOLEST/HDLC SERPL: 3 {RATIO}
CLARITY UR: CLEAR
CO2 SERPL-SCNC: 20 MMOL/L
CO2 SERPL-SCNC: 22 MMOL/L
CO2 SERPL-SCNC: 25 MMOL/L
CO2 SERPL-SCNC: 25 MMOL/L
CO2 SERPL-SCNC: 26 MMOL/L
COLOR UR: YELLOW
COMPLEXED PSA SERPL-MCNC: 1 NG/ML
CREAT SERPL-MCNC: 1.8 MG/DL
CREAT SERPL-MCNC: 1.8 MG/DL
CREAT SERPL-MCNC: 1.9 MG/DL
CREAT SERPL-MCNC: 1.9 MG/DL
CREAT SERPL-MCNC: 2.2 MG/DL
DIFFERENTIAL METHOD: ABNORMAL
EOSINOPHIL # BLD AUTO: 0 K/UL
EOSINOPHIL # BLD AUTO: 0.1 K/UL
EOSINOPHIL # BLD AUTO: 0.1 K/UL
EOSINOPHIL NFR BLD: 0.2 %
EOSINOPHIL NFR BLD: 0.4 %
EOSINOPHIL NFR BLD: 1 %
EOSINOPHIL NFR BLD: 1.4 %
EOSINOPHIL NFR BLD: 1.7 %
ERYTHROCYTE [DISTWIDTH] IN BLOOD BY AUTOMATED COUNT: 12.8 %
ERYTHROCYTE [DISTWIDTH] IN BLOOD BY AUTOMATED COUNT: 13.3 %
ERYTHROCYTE [DISTWIDTH] IN BLOOD BY AUTOMATED COUNT: 13.4 %
ERYTHROCYTE [DISTWIDTH] IN BLOOD BY AUTOMATED COUNT: 15.7 %
ERYTHROCYTE [DISTWIDTH] IN BLOOD BY AUTOMATED COUNT: 15.9 %
EST. GFR  (AFRICAN AMERICAN): 35.8 ML/MIN/1.73 M^2
EST. GFR  (AFRICAN AMERICAN): 42.7 ML/MIN/1.73 M^2
EST. GFR  (AFRICAN AMERICAN): 43 ML/MIN/1.73 M^2
EST. GFR  (AFRICAN AMERICAN): 45.6 ML/MIN/1.73 M^2
EST. GFR  (AFRICAN AMERICAN): 46 ML/MIN/1.73 M^2
EST. GFR  (NON AFRICAN AMERICAN): 31 ML/MIN/1.73 M^2
EST. GFR  (NON AFRICAN AMERICAN): 37 ML/MIN/1.73 M^2
EST. GFR  (NON AFRICAN AMERICAN): 37 ML/MIN/1.73 M^2
EST. GFR  (NON AFRICAN AMERICAN): 39 ML/MIN/1.73 M^2
EST. GFR  (NON AFRICAN AMERICAN): 39.5 ML/MIN/1.73 M^2
ESTIMATED AVG GLUCOSE: 77 MG/DL
FERRITIN SERPL-MCNC: 114 NG/ML
FERRITIN SERPL-MCNC: 166 NG/ML
FERRITIN SERPL-MCNC: 58 NG/ML
GLUCOSE SERPL-MCNC: 129 MG/DL
GLUCOSE SERPL-MCNC: 147 MG/DL
GLUCOSE SERPL-MCNC: 157 MG/DL
GLUCOSE SERPL-MCNC: 88 MG/DL
GLUCOSE SERPL-MCNC: 92 MG/DL
GLUCOSE UR QL STRIP: NEGATIVE
HAPTOGLOB SERPL-MCNC: 83 MG/DL
HBA1C MFR BLD HPLC: 4.3 %
HCT VFR BLD AUTO: 27.5 %
HCT VFR BLD AUTO: 32.5 %
HCT VFR BLD AUTO: 33.9 %
HCT VFR BLD AUTO: 34.3 %
HCT VFR BLD AUTO: 35 %
HDLC SERPL-MCNC: 33 MG/DL
HDLC SERPL: 33.7 %
HGB BLD-MCNC: 10.9 G/DL
HGB BLD-MCNC: 11 G/DL
HGB BLD-MCNC: 11.4 G/DL
HGB BLD-MCNC: 11.6 G/DL
HGB BLD-MCNC: 8.9 G/DL
HGB UR QL STRIP: NEGATIVE
IMM GRANULOCYTES # BLD AUTO: 0.02 K/UL
IMM GRANULOCYTES # BLD AUTO: 0.03 K/UL
IMM GRANULOCYTES NFR BLD AUTO: 0.4 %
IMM GRANULOCYTES NFR BLD AUTO: 0.6 %
INFLUENZA A, MOLECULAR: NEGATIVE
INFLUENZA B, MOLECULAR: NEGATIVE
IRON SERPL-MCNC: 28 UG/DL
IRON SERPL-MCNC: 55 UG/DL
IRON SERPL-MCNC: 61 UG/DL
KETONES UR QL STRIP: NEGATIVE
LACTATE SERPL-SCNC: 1 MMOL/L
LDH SERPL L TO P-CCNC: 158 U/L
LDLC SERPL CALC-MCNC: 47.4 MG/DL
LEUKOCYTE ESTERASE UR QL STRIP: NEGATIVE
LYMPHOCYTES # BLD AUTO: 0.3 K/UL
LYMPHOCYTES # BLD AUTO: 0.3 K/UL
LYMPHOCYTES # BLD AUTO: 0.5 K/UL
LYMPHOCYTES # BLD AUTO: 0.6 K/UL
LYMPHOCYTES # BLD AUTO: 0.8 K/UL
LYMPHOCYTES NFR BLD: 10.6 %
LYMPHOCYTES NFR BLD: 10.7 %
LYMPHOCYTES NFR BLD: 10.9 %
LYMPHOCYTES NFR BLD: 6.4 %
LYMPHOCYTES NFR BLD: 9.7 %
MCH RBC QN AUTO: 28.9 PG
MCH RBC QN AUTO: 30.3 PG
MCH RBC QN AUTO: 31.3 PG
MCH RBC QN AUTO: 31.4 PG
MCH RBC QN AUTO: 32 PG
MCHC RBC AUTO-ENTMCNC: 32.1 G/DL
MCHC RBC AUTO-ENTMCNC: 32.4 G/DL
MCHC RBC AUTO-ENTMCNC: 33.1 G/DL
MCHC RBC AUTO-ENTMCNC: 33.5 G/DL
MCHC RBC AUTO-ENTMCNC: 33.6 G/DL
MCV RBC AUTO: 89 FL
MCV RBC AUTO: 90 FL
MCV RBC AUTO: 93 FL
MCV RBC AUTO: 97 FL
MCV RBC AUTO: 97 FL
MONOCYTES # BLD AUTO: 0.3 K/UL
MONOCYTES # BLD AUTO: 0.5 K/UL
MONOCYTES # BLD AUTO: 0.6 K/UL
MONOCYTES NFR BLD: 11 %
MONOCYTES NFR BLD: 3.4 %
MONOCYTES NFR BLD: 5.4 %
MONOCYTES NFR BLD: 9.1 %
MONOCYTES NFR BLD: 9.8 %
NEUTROPHILS # BLD AUTO: 2.4 K/UL
NEUTROPHILS # BLD AUTO: 3.9 K/UL
NEUTROPHILS # BLD AUTO: 4 K/UL
NEUTROPHILS # BLD AUTO: 4.3 K/UL
NEUTROPHILS # BLD AUTO: 7.2 K/UL
NEUTROPHILS NFR BLD: 76 %
NEUTROPHILS NFR BLD: 76.6 %
NEUTROPHILS NFR BLD: 78.5 %
NEUTROPHILS NFR BLD: 86.4 %
NEUTROPHILS NFR BLD: 87 %
NITRITE UR QL STRIP: NEGATIVE
NONHDLC SERPL-MCNC: 65 MG/DL
NRBC BLD-RTO: 0 /100 WBC
PH UR STRIP: 6 [PH] (ref 5–8)
PLATELET # BLD AUTO: 100 K/UL
PLATELET # BLD AUTO: 103 K/UL
PLATELET # BLD AUTO: 123 K/UL
PLATELET # BLD AUTO: 135 K/UL
PLATELET # BLD AUTO: 71 K/UL
PLATELET BLD QL SMEAR: ABNORMAL
PMV BLD AUTO: 10.1 FL
PMV BLD AUTO: 8.6 FL
PMV BLD AUTO: 8.7 FL
POIKILOCYTOSIS BLD QL SMEAR: SLIGHT
POTASSIUM SERPL-SCNC: 3.9 MMOL/L
POTASSIUM SERPL-SCNC: 4.1 MMOL/L
POTASSIUM SERPL-SCNC: 4.2 MMOL/L
PROT SERPL-MCNC: 7.4 G/DL
PROT SERPL-MCNC: 7.5 G/DL
PROT SERPL-MCNC: 7.8 G/DL
PROT UR QL STRIP: NEGATIVE
RBC # BLD AUTO: 3.08 M/UL
RBC # BLD AUTO: 3.52 M/UL
RBC # BLD AUTO: 3.6 M/UL
RBC # BLD AUTO: 3.62 M/UL
RBC # BLD AUTO: 3.63 M/UL
RETICS/RBC NFR AUTO: 1.3 %
SATURATED IRON: 21 %
SATURATED IRON: 23 %
SATURATED IRON: 9 %
SODIUM SERPL-SCNC: 132 MMOL/L
SODIUM SERPL-SCNC: 135 MMOL/L
SODIUM SERPL-SCNC: 136 MMOL/L
SODIUM SERPL-SCNC: 138 MMOL/L
SODIUM SERPL-SCNC: 139 MMOL/L
SP GR UR STRIP: <=1.005 (ref 1–1.03)
SPECIMEN SOURCE: NORMAL
T4 FREE SERPL-MCNC: 1.16 NG/DL
TOTAL IRON BINDING CAPACITY: 260 UG/DL
TOTAL IRON BINDING CAPACITY: 262 UG/DL
TOTAL IRON BINDING CAPACITY: 309 UG/DL
TRANSFERRIN SERPL-MCNC: 176 MG/DL
TRANSFERRIN SERPL-MCNC: 177 MG/DL
TRANSFERRIN SERPL-MCNC: 209 MG/DL
TRIGL SERPL-MCNC: 88 MG/DL
TROPONIN I SERPL DL<=0.01 NG/ML-MCNC: 0.03 NG/ML
TSH SERPL DL<=0.005 MIU/L-ACNC: 9.07 UIU/ML
URN SPEC COLLECT METH UR: ABNORMAL
UROBILINOGEN UR STRIP-ACNC: NEGATIVE EU/DL
VIT B12 SERPL-MCNC: 205 PG/ML
WBC # BLD AUTO: 3.07 K/UL
WBC # BLD AUTO: 4.97 K/UL
WBC # BLD AUTO: 5.1 K/UL
WBC # BLD AUTO: 5.21 K/UL
WBC # BLD AUTO: 8.29 K/UL

## 2018-01-01 PROCEDURE — 96375 TX/PRO/DX INJ NEW DRUG ADDON: CPT

## 2018-01-01 PROCEDURE — 84153 ASSAY OF PSA TOTAL: CPT

## 2018-01-01 PROCEDURE — 94664 DEMO&/EVAL PT USE INHALER: CPT | Mod: 59,HCNC,S$GLB, | Performed by: INTERNAL MEDICINE

## 2018-01-01 PROCEDURE — 99499 UNLISTED E&M SERVICE: CPT | Mod: HCNC,S$GLB,, | Performed by: RADIOLOGY

## 2018-01-01 PROCEDURE — 99214 OFFICE O/P EST MOD 30 MIN: CPT | Mod: PBBFAC,HCNC | Performed by: RADIOLOGY

## 2018-01-01 PROCEDURE — 78815 PET IMAGE W/CT SKULL-THIGH: CPT | Mod: TC

## 2018-01-01 PROCEDURE — 77014 HC CT GUIDANCE RADIATION THERAPY FLDS PLACEMENT: CPT | Mod: TC | Performed by: RADIOLOGY

## 2018-01-01 PROCEDURE — 80048 BASIC METABOLIC PNL TOTAL CA: CPT

## 2018-01-01 PROCEDURE — 3078F DIAST BP <80 MM HG: CPT | Mod: CPTII,HCNC,S$GLB, | Performed by: NURSE PRACTITIONER

## 2018-01-01 PROCEDURE — 3074F SYST BP LT 130 MM HG: CPT | Mod: CPTII,S$GLB,, | Performed by: FAMILY MEDICINE

## 2018-01-01 PROCEDURE — 63600175 PHARM REV CODE 636 W HCPCS: Mod: HCNC | Performed by: FAMILY MEDICINE

## 2018-01-01 PROCEDURE — 3079F DIAST BP 80-89 MM HG: CPT | Mod: CPTII,,, | Performed by: INTERNAL MEDICINE

## 2018-01-01 PROCEDURE — 99499 UNLISTED E&M SERVICE: CPT | Mod: S$GLB,,, | Performed by: RADIOLOGY

## 2018-01-01 PROCEDURE — 99213 OFFICE O/P EST LOW 20 MIN: CPT | Mod: S$GLB,,, | Performed by: RADIOLOGY

## 2018-01-01 PROCEDURE — 96374 THER/PROPH/DIAG INJ IV PUSH: CPT

## 2018-01-01 PROCEDURE — 77373 STRTCTC BDY RAD THER TX DLVR: CPT | Performed by: RADIOLOGY

## 2018-01-01 PROCEDURE — 99205 OFFICE O/P NEW HI 60 MIN: CPT | Mod: S$GLB,,, | Performed by: RADIOLOGY

## 2018-01-01 PROCEDURE — 71046 X-RAY EXAM CHEST 2 VIEWS: CPT | Mod: TC,FY,PO

## 2018-01-01 PROCEDURE — 3078F DIAST BP <80 MM HG: CPT | Mod: CPTII,S$GLB,, | Performed by: INTERNAL MEDICINE

## 2018-01-01 PROCEDURE — A9552 F18 FDG: HCPCS

## 2018-01-01 PROCEDURE — 3008F BODY MASS INDEX DOCD: CPT | Mod: CPTII,HCNC,S$GLB, | Performed by: NURSE PRACTITIONER

## 2018-01-01 PROCEDURE — 82728 ASSAY OF FERRITIN: CPT

## 2018-01-01 PROCEDURE — 99499 UNLISTED E&M SERVICE: CPT | Mod: S$GLB,,, | Performed by: INTERNAL MEDICINE

## 2018-01-01 PROCEDURE — 3008F BODY MASS INDEX DOCD: CPT | Mod: CPTII,HCNC,S$GLB, | Performed by: RADIOLOGY

## 2018-01-01 PROCEDURE — 99499 UNLISTED E&M SERVICE: CPT | Mod: HCNC,S$GLB,, | Performed by: INTERNAL MEDICINE

## 2018-01-01 PROCEDURE — 3008F BODY MASS INDEX DOCD: CPT | Mod: CPTII,,, | Performed by: INTERNAL MEDICINE

## 2018-01-01 PROCEDURE — 99213 OFFICE O/P EST LOW 20 MIN: CPT | Mod: HCNC,S$GLB,, | Performed by: RADIOLOGY

## 2018-01-01 PROCEDURE — 99215 OFFICE O/P EST HI 40 MIN: CPT | Mod: S$GLB,,, | Performed by: INTERNAL MEDICINE

## 2018-01-01 PROCEDURE — 83540 ASSAY OF IRON: CPT

## 2018-01-01 PROCEDURE — 3075F SYST BP GE 130 - 139MM HG: CPT | Mod: CPTII,HCNC,S$GLB, | Performed by: RADIOLOGY

## 2018-01-01 PROCEDURE — 96367 TX/PROPH/DG ADDL SEQ IV INF: CPT | Mod: HCNC

## 2018-01-01 PROCEDURE — 3008F BODY MASS INDEX DOCD: CPT | Mod: CPTII,S$GLB,, | Performed by: RADIOLOGY

## 2018-01-01 PROCEDURE — 3079F DIAST BP 80-89 MM HG: CPT | Mod: CPTII,HCNC,S$GLB, | Performed by: INTERNAL MEDICINE

## 2018-01-01 PROCEDURE — 77014 HC CT GUIDANCE RADIATION THERAPY FLDS PLACEMENT: CPT | Mod: TC,59 | Performed by: RADIOLOGY

## 2018-01-01 PROCEDURE — 99999 PR PBB SHADOW E&M-EST. PATIENT-LVL IV: CPT | Mod: PBBFAC,,, | Performed by: RADIOLOGY

## 2018-01-01 PROCEDURE — 99999 PR PBB SHADOW E&M-EST. PATIENT-LVL III: CPT | Mod: PBBFAC,,, | Performed by: FAMILY MEDICINE

## 2018-01-01 PROCEDURE — 76536 US EXAM OF HEAD AND NECK: CPT | Mod: 26,HCNC,, | Performed by: RADIOLOGY

## 2018-01-01 PROCEDURE — 77290 THER RAD SIMULAJ FIELD CPLX: CPT | Mod: TC | Performed by: RADIOLOGY

## 2018-01-01 PROCEDURE — 85045 AUTOMATED RETICULOCYTE COUNT: CPT

## 2018-01-01 PROCEDURE — 84439 ASSAY OF FREE THYROXINE: CPT

## 2018-01-01 PROCEDURE — 87040 BLOOD CULTURE FOR BACTERIA: CPT | Mod: HCNC

## 2018-01-01 PROCEDURE — 3008F BODY MASS INDEX DOCD: CPT | Mod: CPTII,HCNC,S$GLB, | Performed by: INTERNAL MEDICINE

## 2018-01-01 PROCEDURE — 82728 ASSAY OF FERRITIN: CPT | Mod: HCNC

## 2018-01-01 PROCEDURE — 3008F BODY MASS INDEX DOCD: CPT | Mod: CPTII,S$GLB,, | Performed by: INTERNAL MEDICINE

## 2018-01-01 PROCEDURE — 85025 COMPLETE CBC W/AUTO DIFF WBC: CPT

## 2018-01-01 PROCEDURE — 99499 UNLISTED E&M SERVICE: CPT | Mod: HCNC,S$GLB,, | Performed by: FAMILY MEDICINE

## 2018-01-01 PROCEDURE — 82607 VITAMIN B-12: CPT

## 2018-01-01 PROCEDURE — 96366 THER/PROPH/DIAG IV INF ADDON: CPT | Mod: HCNC

## 2018-01-01 PROCEDURE — 83605 ASSAY OF LACTIC ACID: CPT | Mod: HCNC

## 2018-01-01 PROCEDURE — 99999 PR PBB SHADOW E&M-EST. PATIENT-LVL IV: CPT | Mod: PBBFAC,HCNC,, | Performed by: RADIOLOGY

## 2018-01-01 PROCEDURE — 99999 PR PBB SHADOW E&M-EST. PATIENT-LVL III: CPT | Mod: PBBFAC,HCNC,, | Performed by: NURSE PRACTITIONER

## 2018-01-01 PROCEDURE — 99214 OFFICE O/P EST MOD 30 MIN: CPT | Mod: S$PBB,,, | Performed by: RADIOLOGY

## 2018-01-01 PROCEDURE — 3078F DIAST BP <80 MM HG: CPT | Mod: CPTII,,, | Performed by: RADIOLOGY

## 2018-01-01 PROCEDURE — 84484 ASSAY OF TROPONIN QUANT: CPT | Mod: HCNC

## 2018-01-01 PROCEDURE — 83036 HEMOGLOBIN GLYCOSYLATED A1C: CPT

## 2018-01-01 PROCEDURE — 3074F SYST BP LT 130 MM HG: CPT | Mod: CPTII,S$GLB,, | Performed by: INTERNAL MEDICINE

## 2018-01-01 PROCEDURE — 36415 COLL VENOUS BLD VENIPUNCTURE: CPT | Mod: HCNC,PO

## 2018-01-01 PROCEDURE — 63600175 PHARM REV CODE 636 W HCPCS: Performed by: INTERNAL MEDICINE

## 2018-01-01 PROCEDURE — 99215 OFFICE O/P EST HI 40 MIN: CPT | Mod: PBBFAC | Performed by: RADIOLOGY

## 2018-01-01 PROCEDURE — 77290 THER RAD SIMULAJ FIELD CPLX: CPT | Mod: 26,,, | Performed by: RADIOLOGY

## 2018-01-01 PROCEDURE — 99204 OFFICE O/P NEW MOD 45 MIN: CPT | Mod: HCNC,S$GLB,, | Performed by: INTERNAL MEDICINE

## 2018-01-01 PROCEDURE — 99214 OFFICE O/P EST MOD 30 MIN: CPT | Mod: S$GLB,,, | Performed by: FAMILY MEDICINE

## 2018-01-01 PROCEDURE — 78815 PET IMAGE W/CT SKULL-THIGH: CPT | Mod: 26,PS,, | Performed by: RADIOLOGY

## 2018-01-01 PROCEDURE — 36415 COLL VENOUS BLD VENIPUNCTURE: CPT

## 2018-01-01 PROCEDURE — 99214 OFFICE O/P EST MOD 30 MIN: CPT | Mod: HCNC,S$GLB,, | Performed by: NURSE PRACTITIONER

## 2018-01-01 PROCEDURE — 85025 COMPLETE CBC W/AUTO DIFF WBC: CPT | Mod: HCNC

## 2018-01-01 PROCEDURE — 80048 BASIC METABOLIC PNL TOTAL CA: CPT | Mod: HCNC

## 2018-01-01 PROCEDURE — 25000003 PHARM REV CODE 250: Performed by: INTERNAL MEDICINE

## 2018-01-01 PROCEDURE — 99999 PR PBB SHADOW E&M-EST. PATIENT-LVL III: CPT | Mod: PBBFAC,,, | Performed by: INTERNAL MEDICINE

## 2018-01-01 PROCEDURE — 96365 THER/PROPH/DIAG IV INF INIT: CPT

## 2018-01-01 PROCEDURE — 3078F DIAST BP <80 MM HG: CPT | Mod: CPTII,S$GLB,, | Performed by: FAMILY MEDICINE

## 2018-01-01 PROCEDURE — 3008F BODY MASS INDEX DOCD: CPT | Mod: CPTII,S$GLB,, | Performed by: FAMILY MEDICINE

## 2018-01-01 PROCEDURE — 80061 LIPID PANEL: CPT

## 2018-01-01 PROCEDURE — 87502 INFLUENZA DNA AMP PROBE: CPT | Mod: HCNC

## 2018-01-01 PROCEDURE — 3078F DIAST BP <80 MM HG: CPT | Mod: CPTII,HCNC,S$GLB, | Performed by: RADIOLOGY

## 2018-01-01 PROCEDURE — 76536 US EXAM OF HEAD AND NECK: CPT | Mod: TC,HCNC,PO

## 2018-01-01 PROCEDURE — 83615 LACTATE (LD) (LDH) ENZYME: CPT

## 2018-01-01 PROCEDURE — 93010 EKG 12-LEAD: ICD-10-PCS | Mod: HCNC,,, | Performed by: INTERNAL MEDICINE

## 2018-01-01 PROCEDURE — 83010 ASSAY OF HAPTOGLOBIN QUANT: CPT

## 2018-01-01 PROCEDURE — 96365 THER/PROPH/DIAG IV INF INIT: CPT | Mod: HCNC

## 2018-01-01 PROCEDURE — 94060 EVALUATION OF WHEEZING: CPT | Mod: HCNC,S$GLB,, | Performed by: INTERNAL MEDICINE

## 2018-01-01 PROCEDURE — 36415 COLL VENOUS BLD VENIPUNCTURE: CPT | Mod: PO

## 2018-01-01 PROCEDURE — 3074F SYST BP LT 130 MM HG: CPT | Mod: CPTII,S$GLB,, | Performed by: RADIOLOGY

## 2018-01-01 PROCEDURE — 80053 COMPREHEN METABOLIC PANEL: CPT

## 2018-01-01 PROCEDURE — 77336 RADIATION PHYSICS CONSULT: CPT | Performed by: RADIOLOGY

## 2018-01-01 PROCEDURE — 77280 THER RAD SIMULAJ FIELD SMPL: CPT | Mod: TC

## 2018-01-01 PROCEDURE — 3078F DIAST BP <80 MM HG: CPT | Mod: CPTII,S$GLB,, | Performed by: RADIOLOGY

## 2018-01-01 PROCEDURE — 99999 PR PBB SHADOW E&M-EST. PATIENT-LVL V: CPT | Mod: PBBFAC,,, | Performed by: RADIOLOGY

## 2018-01-01 PROCEDURE — 71046 X-RAY EXAM CHEST 2 VIEWS: CPT | Mod: 26,,, | Performed by: RADIOLOGY

## 2018-01-01 PROCEDURE — 99214 OFFICE O/P EST MOD 30 MIN: CPT | Mod: S$PBB,,, | Performed by: INTERNAL MEDICINE

## 2018-01-01 PROCEDURE — 93010 ELECTROCARDIOGRAM REPORT: CPT | Mod: HCNC,,, | Performed by: INTERNAL MEDICINE

## 2018-01-01 PROCEDURE — 3008F BODY MASS INDEX DOCD: CPT | Mod: CPTII,,, | Performed by: RADIOLOGY

## 2018-01-01 PROCEDURE — 77334 RADIATION TREATMENT AID(S): CPT | Mod: TC | Performed by: RADIOLOGY

## 2018-01-01 PROCEDURE — 71250 CT THORAX DX C-: CPT | Mod: TC

## 2018-01-01 PROCEDURE — 83540 ASSAY OF IRON: CPT | Mod: HCNC

## 2018-01-01 PROCEDURE — 99213 OFFICE O/P EST LOW 20 MIN: CPT | Mod: PBBFAC | Performed by: INTERNAL MEDICINE

## 2018-01-01 PROCEDURE — 77280 THER RAD SIMULAJ FIELD SMPL: CPT | Mod: 26,,, | Performed by: RADIOLOGY

## 2018-01-01 PROCEDURE — 80053 COMPREHEN METABOLIC PANEL: CPT | Mod: HCNC

## 2018-01-01 PROCEDURE — 99999 PR PBB SHADOW E&M-EST. PATIENT-LVL III: CPT | Mod: PBBFAC,HCNC,, | Performed by: INTERNAL MEDICINE

## 2018-01-01 PROCEDURE — 3077F SYST BP >= 140 MM HG: CPT | Mod: CPTII,HCNC,S$GLB, | Performed by: NURSE PRACTITIONER

## 2018-01-01 PROCEDURE — 3077F SYST BP >= 140 MM HG: CPT | Mod: CPTII,,, | Performed by: RADIOLOGY

## 2018-01-01 PROCEDURE — 77334 RADIATION TREATMENT AID(S): CPT | Mod: 26,,, | Performed by: RADIOLOGY

## 2018-01-01 PROCEDURE — 83880 ASSAY OF NATRIURETIC PEPTIDE: CPT | Mod: HCNC

## 2018-01-01 PROCEDURE — 77263 THER RADIOLOGY TX PLNG CPLX: CPT | Mod: ,,, | Performed by: RADIOLOGY

## 2018-01-01 PROCEDURE — 3077F SYST BP >= 140 MM HG: CPT | Mod: CPTII,HCNC,S$GLB, | Performed by: INTERNAL MEDICINE

## 2018-01-01 PROCEDURE — 81003 URINALYSIS AUTO W/O SCOPE: CPT | Mod: HCNC

## 2018-01-01 PROCEDURE — 99499 UNLISTED E&M SERVICE: CPT | Mod: HCNC,S$GLB,, | Performed by: NURSE PRACTITIONER

## 2018-01-01 PROCEDURE — 99285 EMERGENCY DEPT VISIT HI MDM: CPT | Mod: 25,HCNC

## 2018-01-01 PROCEDURE — 3077F SYST BP >= 140 MM HG: CPT | Mod: CPTII,,, | Performed by: INTERNAL MEDICINE

## 2018-01-01 PROCEDURE — 84443 ASSAY THYROID STIM HORMONE: CPT

## 2018-01-01 RX ORDER — HYDRALAZINE HYDROCHLORIDE 100 MG/1
TABLET, FILM COATED ORAL
Qty: 270 TABLET | Refills: 3 | Status: SHIPPED | OUTPATIENT
Start: 2018-01-01

## 2018-01-01 RX ORDER — METHYLPREDNISOLONE SOD SUCC 125 MG
125 VIAL (EA) INJECTION
Status: COMPLETED | OUTPATIENT
Start: 2018-01-01 | End: 2018-01-01

## 2018-01-01 RX ORDER — AZITHROMYCIN 250 MG/1
250 TABLET, FILM COATED ORAL DAILY
Qty: 6 TABLET | Refills: 0 | Status: SHIPPED | OUTPATIENT
Start: 2018-01-01 | End: 2019-01-01 | Stop reason: ALTCHOICE

## 2018-01-01 RX ORDER — DOXYCYCLINE HYCLATE 100 MG
100 TABLET ORAL EVERY 12 HOURS
Qty: 20 TABLET | Refills: 0 | Status: SHIPPED | OUTPATIENT
Start: 2018-01-01 | End: 2018-01-01

## 2018-01-01 RX ORDER — METHYLPREDNISOLONE SOD SUCC 125 MG
125 VIAL (EA) INJECTION
Status: CANCELLED
Start: 2018-01-01

## 2018-01-01 RX ORDER — DOXYCYCLINE 100 MG/1
CAPSULE ORAL
COMMUNITY
Start: 2018-01-01 | End: 2018-01-01

## 2018-01-01 RX ORDER — CLONIDINE HYDROCHLORIDE 0.1 MG/1
0.1 TABLET ORAL 2 TIMES DAILY
Qty: 180 TABLET | Refills: 3 | Status: SHIPPED | OUTPATIENT
Start: 2018-01-01 | End: 2018-01-01 | Stop reason: SDUPTHER

## 2018-01-01 RX ORDER — DILTIAZEM HYDROCHLORIDE 240 MG/1
240 CAPSULE, COATED, EXTENDED RELEASE ORAL DAILY
Qty: 90 CAPSULE | Refills: 3 | Status: SHIPPED | OUTPATIENT
Start: 2018-01-01 | End: 2019-01-01 | Stop reason: SDUPTHER

## 2018-01-01 RX ORDER — AMLODIPINE BESYLATE 10 MG/1
10 TABLET ORAL DAILY
Qty: 90 TABLET | Refills: 3 | Status: SHIPPED | OUTPATIENT
Start: 2018-01-01 | End: 2019-01-01 | Stop reason: SDUPTHER

## 2018-01-01 RX ORDER — SODIUM CHLORIDE 0.9 % (FLUSH) 0.9 %
10 SYRINGE (ML) INJECTION
Status: CANCELLED | OUTPATIENT
Start: 2018-01-01

## 2018-01-01 RX ORDER — CLOTRIMAZOLE 1 %
CREAM (GRAM) TOPICAL 2 TIMES DAILY
Qty: 113 G | Refills: 1 | Status: SHIPPED | OUTPATIENT
Start: 2018-01-01 | End: 2019-01-01

## 2018-01-01 RX ORDER — ALPRAZOLAM 0.5 MG/1
TABLET ORAL
Qty: 21 TABLET | Refills: 0 | Status: SHIPPED | OUTPATIENT
Start: 2018-01-01

## 2018-01-01 RX ORDER — TRAMADOL HYDROCHLORIDE 50 MG/1
TABLET ORAL
Refills: 2 | COMMUNITY
Start: 2018-01-01 | End: 2019-01-01

## 2018-01-01 RX ORDER — DOXAZOSIN 4 MG/1
4 TABLET ORAL NIGHTLY
Qty: 90 TABLET | Refills: 3 | Status: SHIPPED | OUTPATIENT
Start: 2018-01-01 | End: 2018-01-01 | Stop reason: SDUPTHER

## 2018-01-01 RX ORDER — LEVOTHYROXINE SODIUM 125 UG/1
125 TABLET ORAL DAILY
Qty: 90 TABLET | Refills: 3 | Status: SHIPPED | OUTPATIENT
Start: 2018-01-01 | End: 2019-01-01

## 2018-01-01 RX ORDER — HEPARIN 100 UNIT/ML
500 SYRINGE INTRAVENOUS
Status: CANCELLED | OUTPATIENT
Start: 2018-01-01

## 2018-01-01 RX ORDER — DILTIAZEM HYDROCHLORIDE 240 MG/1
240 CAPSULE, COATED, EXTENDED RELEASE ORAL DAILY
Qty: 90 CAPSULE | Refills: 3 | Status: SHIPPED | OUTPATIENT
Start: 2018-01-01 | End: 2018-01-01 | Stop reason: SDUPTHER

## 2018-01-01 RX ORDER — PRAVASTATIN SODIUM 20 MG/1
TABLET ORAL
Qty: 90 TABLET | Refills: 3 | Status: SHIPPED | OUTPATIENT
Start: 2018-01-01 | End: 2019-01-01

## 2018-01-01 RX ORDER — AMLODIPINE BESYLATE 10 MG/1
10 TABLET ORAL DAILY
Qty: 90 TABLET | Refills: 3 | Status: SHIPPED | OUTPATIENT
Start: 2018-01-01 | End: 2018-01-01 | Stop reason: SDUPTHER

## 2018-01-01 RX ORDER — PREDNISONE 5 MG/1
TABLET ORAL
Qty: 90 TABLET | Refills: 1 | Status: SHIPPED | OUTPATIENT
Start: 2018-01-01 | End: 2019-01-01 | Stop reason: SDUPTHER

## 2018-01-01 RX ORDER — DOXAZOSIN 4 MG/1
4 TABLET ORAL NIGHTLY
Qty: 90 TABLET | Refills: 3 | Status: SHIPPED | OUTPATIENT
Start: 2018-01-01 | End: 2019-01-01 | Stop reason: SDUPTHER

## 2018-01-01 RX ORDER — LEVOTHYROXINE SODIUM 125 UG/1
125 TABLET ORAL DAILY
Qty: 90 TABLET | Refills: 3 | Status: SHIPPED | OUTPATIENT
Start: 2018-01-01 | End: 2018-01-01 | Stop reason: SDUPTHER

## 2018-01-01 RX ORDER — PRAVASTATIN SODIUM 20 MG/1
TABLET ORAL
Qty: 90 TABLET | Refills: 3 | Status: SHIPPED | OUTPATIENT
Start: 2018-01-01 | End: 2018-01-01 | Stop reason: SDUPTHER

## 2018-01-01 RX ORDER — LEVOTHYROXINE SODIUM 125 UG/1
125 TABLET ORAL DAILY
COMMUNITY
Start: 2018-04-12 | End: 2018-01-01 | Stop reason: SDUPTHER

## 2018-01-01 RX ORDER — CEFUROXIME AXETIL 500 MG/1
500 TABLET ORAL DAILY
Qty: 5 TABLET | Refills: 0 | Status: SHIPPED | OUTPATIENT
Start: 2018-01-01 | End: 2018-01-01

## 2018-01-01 RX ORDER — SODIUM CHLORIDE 0.9 % (FLUSH) 0.9 %
10 SYRINGE (ML) INJECTION
Status: DISCONTINUED | OUTPATIENT
Start: 2018-01-01 | End: 2018-01-01 | Stop reason: HOSPADM

## 2018-01-01 RX ORDER — PREDNISONE 5 MG/1
5 TABLET ORAL DAILY
Qty: 90 TABLET | Refills: 1 | Status: SHIPPED | OUTPATIENT
Start: 2018-01-01 | End: 2018-01-01 | Stop reason: SDUPTHER

## 2018-01-01 RX ORDER — ALBUTEROL SULFATE 90 UG/1
AEROSOL, METERED RESPIRATORY (INHALATION)
COMMUNITY
Start: 2017-01-10 | End: 2019-01-01 | Stop reason: SDUPTHER

## 2018-01-01 RX ORDER — POLYETHYLENE GLYCOL 3350, SODIUM SULFATE ANHYDROUS, SODIUM BICARBONATE, SODIUM CHLORIDE, POTASSIUM CHLORIDE 236; 22.74; 6.74; 5.86; 2.97 G/4L; G/4L; G/4L; G/4L; G/4L
4 POWDER, FOR SOLUTION ORAL ONCE
Qty: 4000 ML | Refills: 0 | Status: SHIPPED | OUTPATIENT
Start: 2018-01-01 | End: 2018-01-01

## 2018-01-01 RX ORDER — DOXYCYCLINE 100 MG/1
100 CAPSULE ORAL
COMMUNITY
End: 2018-01-01 | Stop reason: ALTCHOICE

## 2018-01-01 RX ORDER — CLONIDINE HYDROCHLORIDE 0.1 MG/1
0.1 TABLET ORAL 2 TIMES DAILY
Qty: 180 TABLET | Refills: 3 | Status: SHIPPED | OUTPATIENT
Start: 2018-01-01 | End: 2019-01-01 | Stop reason: SDUPTHER

## 2018-01-01 RX ADMIN — METHYLPREDNISOLONE SODIUM SUCCINATE 125 MG: 125 INJECTION, POWDER, FOR SOLUTION INTRAMUSCULAR; INTRAVENOUS at 02:07

## 2018-01-01 RX ADMIN — AZITHROMYCIN MONOHYDRATE 500 MG: 500 INJECTION, POWDER, LYOPHILIZED, FOR SOLUTION INTRAVENOUS at 12:12

## 2018-01-01 RX ADMIN — CEFTRIAXONE 2 G: 2 INJECTION, SOLUTION INTRAVENOUS at 12:12

## 2018-01-01 RX ADMIN — FERUMOXYTOL 510 MG: 510 INJECTION INTRAVENOUS at 02:07

## 2018-01-01 RX ADMIN — METHYLPREDNISOLONE SODIUM SUCCINATE 125 MG: 125 INJECTION, POWDER, FOR SOLUTION INTRAMUSCULAR; INTRAVENOUS at 01:07

## 2018-01-01 RX ADMIN — FERUMOXYTOL 510 MG: 510 INJECTION INTRAVENOUS at 03:07

## 2018-01-18 RX ORDER — ALBUTEROL SULFATE 90 UG/1
AEROSOL, METERED RESPIRATORY (INHALATION)
Qty: 18 G | Refills: 11 | Status: SHIPPED | OUTPATIENT
Start: 2018-01-18

## 2018-02-05 ENCOUNTER — PATIENT OUTREACH (OUTPATIENT)
Dept: ADMINISTRATIVE | Facility: HOSPITAL | Age: 63
End: 2018-02-05

## 2018-02-05 NOTE — PROGRESS NOTES
Documentation of patient prescribed DMARD routed to [employer] as attestation documentation for Disease-Modifying Anti- Rheumatic Drug Therapy for Rheumatoid Arthritis measure (MTX 2017 documented).  2017 measure has been satisfied.      Wallace Vigil  2016 2:08 PM   MRN:  [id number]   Description: Male : 1955 Provider: Lalo Ya MD Department: [facility] Rheumatology Dept Phone: [phone]   Medication   methotrexate 2.5 MG Tab [[id number]]   Medication Detail      Disp Refills Start End ALEXIS   methotrexate 2.5 MG Tab (Discontinued) 20 tablet 0 2016 --   Sig - Route: Take 4 tablets (10 mg total) by mouth every 7 days. - Oral   Class: Normal   Reason for Discontinue: Patient no longer taking   Order: [id number]   Date/Time Signed: 2016 14:34       E-Prescribing Status: Receipt confirmed by pharmacy (2016  2:35 PM CDT)   Pharmacy     [facility] [place] [address], [facility]    Associated Diagnoses     Rheumatoid arthritis involving both hands with positive rheumatoid factor  - Primary          Relevant Medication Information     None found.   Ordering Encounter Report     Associated Reports   View Encounter   Warnings History     No Interaction Warnings Shown    Order Audit Trail     Number of times this order has been changed since signin   Order Audit Trail   Order Provider Info         Office phone Pager E-mail   Ordering User Lalo Ya [phone] -- [email or web address]   Authorizing Provider Lalo Ya [phone] -- --   Discontinued By (on 2017 1703) Jesika Wiseman -- -- [email or web address]   This Order Has Been Discontinued     Order Status Reason By On   Discontinued Patient no longer taking Jesika De La Paz, MARYCRUZ 17 1703          methotrexate 2.5 MG Tab [[id number]]     Electronically signed by: Lalo Ya MD on 16 1438 Status: Discontinued   Ordering user: Lalo  MD Bhakti 06/14/16 1434 Authorized by: Lalo Ya MD   Ordering mode: Standard   Frequency: Q7 Days 06/14/16 - 365  Days Discontinued by: Jesika De La Paz RN 02/08/17 6519 [Patient no longer taking]   Diagnoses  Rheumatoid arthritis involving both hands with positive rheumatoid factor [M05.741, M05.742]   Order Tracking     methotrexate 2.5 MG Tab (Order #[id number]) on 6/14/16

## 2018-02-19 RX ORDER — HYDRALAZINE HYDROCHLORIDE 100 MG/1
TABLET, FILM COATED ORAL
Qty: 270 TABLET | Refills: 3 | Status: SHIPPED | OUTPATIENT
Start: 2018-02-19 | End: 2018-01-01 | Stop reason: SDUPTHER

## 2018-04-17 NOTE — PROGRESS NOTES
Chief Complaint:    Chief Complaint   Patient presents with    Annual Exam       History of Present Illness:  Patient presents today complaining of cough going on for the past 2 weeks.  He went to urgent care and was treated with amoxicillin but that has not helped any.  He has COPD and he continues to smoke.  He says he is not ready to quit and does not quit.    Patient is requesting Xanax refilled he is also on narcotic pain medication he has been explained that he should not take these medications in combination and he knows that he says he only takes the Xanax very occasionally.    Patient has a solitary kidney and chronic kidney disease [facility]    He also has thyroid cancer he was asked to follow-up with his oncologist but he's missed his appointment.      ROS:  Review of Systems   Constitutional: Negative for activity change, chills, fatigue, fever and unexpected weight change.   HENT: Positive for congestion. Negative for ear discharge, ear pain, hearing loss, postnasal drip and rhinorrhea.    Eyes: Negative for pain and visual disturbance.   Respiratory: Positive for cough. Negative for chest tightness and shortness of breath.    Cardiovascular: Negative for chest pain and palpitations.   Gastrointestinal: Negative for abdominal pain, diarrhea and vomiting.   Endocrine: Negative for heat intolerance.   Genitourinary: Negative for dysuria, flank pain, frequency and hematuria.   Musculoskeletal: Negative for back pain, gait problem and neck pain.   Skin: Negative for color change and rash.   Neurological: Negative for dizziness, tremors, seizures, numbness and headaches.   Psychiatric/Behavioral: Negative for agitation, hallucinations, self-injury, sleep disturbance and suicidal ideas. The patient is not nervous/anxious.        Past Medical History:   Diagnosis Date    Anxiety     Arthritis     Asthma     Atherosclerosis of native arteries of the extremities with  intermittent claudication     Back pain     Benign hypertensive heart disease without heart failure     Cancer     Carotid artery occlusion     Chronic kidney disease     COPD (chronic obstructive pulmonary disease)     Coronary artery disease     Emphysema of lung     Encounter for blood transfusion     GERD (gastroesophageal reflux disease)     Heart failure     History of aorto-femoral bypass 8/16/2013    Hyperlipidemia     Hypertension     Hypothyroidism     PVD (peripheral vascular disease)     PVD (peripheral vascular disease) 8/16/2013    Renal artery stenosis 8/16/2013    Renovascular hypertension 8/16/2013    Rheumatoid arthritis     Shingles sept 2015    Stenosis of left subclavian artery 8/16/2013    Thyroid cancer     Thyroid disease     Thyroid cancer 3/1/2017    Tobacco abuse     Tobacco dependence     Trouble in sleeping        Social History:  Social History     Social History    Marital status:      Spouse name: N/A    Number of children: 2    Years of education: N/A     Social History Main Topics    Smoking status: Current Every Day Smoker     Packs/day: 1.00     Years: 50.00     Types: Cigarettes    Smokeless tobacco: Never Used    Alcohol use No    Drug use: No    Sexual activity: Not Currently     Partners: Female     Birth control/ protection: None     Other Topics Concern    None     Social History Narrative    None       Family History:   family history includes COPD in his father; Diabetes in his daughter and daughter; Heart disease in his mother; Heart failure in his mother; Hyperlipidemia in his father and mother; Hypertension in his mother.    Health Maintenance   Topic Date Due    Colonoscopy  05/07/2014    Pneumococcal PCV13 (High Risk) (1 - PCV13 Required) 06/11/2014    Influenza Vaccine  08/01/2017    PROSTATE-SPECIFIC ANTIGEN  01/17/2018    Lipid Panel  01/17/2018    Pneumococcal PPSV23 (High Risk) (2) 06/11/2018    TETANUS VACCINE   "06/17/2023    Hepatitis C Screening  Completed    Zoster Vaccine  Completed       Physical Exam:    Vital Signs  Temp: 98.2 °F (36.8 °C)  Temp src: Tympanic  Pulse: 77  SpO2: 97 %  BP: 126/61  BP Location: Left arm  Patient Position: Sitting  Pain Score:   6  Pain Loc: Generalized  Height and Weight  Height: 5' 9" (175.3 cm)  Weight: 59.4 kg (130 lb 13.5 oz)  BSA (Calculated - sq m): 1.7 sq meters  BMI (Calculated): 19.4  Weight in (lb) to have BMI = 25: 168.9]    Body mass index is 19.32 kg/m².    Physical Exam   Constitutional: He is oriented to person, place, and time. He appears cachectic.   HENT:   Mouth/Throat: Oropharynx is clear and moist.   Eyes: Conjunctivae are normal. Pupils are equal, round, and reactive to light.   Neck: Normal range of motion. Neck supple.   Cardiovascular: Normal rate, regular rhythm and normal heart sounds.    No murmur heard.  Pulmonary/Chest: Effort normal and breath sounds normal. No respiratory distress. He has no wheezes. He has no rales. He exhibits no tenderness.   Abdominal: Soft. He exhibits no distension and no mass. There is no tenderness. There is no guarding.   Musculoskeletal: He exhibits no edema or tenderness.   Lymphadenopathy:     He has no cervical adenopathy.   Neurological: He is alert and oriented to person, place, and time. He has normal reflexes.   Skin: Skin is warm and dry.   Psychiatric: He has a normal mood and affect. His behavior is normal. Judgment and thought content normal.       Lab Results   Component Value Date    CHOL 107 (L) 01/17/2017    CHOL 128 10/06/2015    CHOL 139 03/26/2015    TRIG 73 01/17/2017    TRIG 78 10/06/2015    TRIG 127 03/26/2015    HDL 36 (L) 01/17/2017    HDL 40 10/06/2015    HDL 37 (L) 03/26/2015    TOTALCHOLEST 3.0 01/17/2017    TOTALCHOLEST 3.2 10/06/2015    TOTALCHOLEST 3.8 03/26/2015    NONHDLCHOL 71 01/17/2017    NONHDLCHOL 88 10/06/2015    NONHDLCHOL 102 03/26/2015       Lab Results   Component Value Date    HGBA1C " 4.8 01/17/2017       Assessment:      ICD-10-CM ICD-9-CM   1. Cough R05 786.2   2. Anxiety F41.9 300.00   3. Chronic obstructive pulmonary disease, unspecified COPD type J44.9 496   4. Follicular thyroid cancer C73 193   5. Pure hypercholesterolemia E78.00 272.0   6. Essential hypertension I10 401.9   7. Rheumatoid arthritis involving multiple sites with positive rheumatoid factor M05.79 714.0   8. Renal artery stenosis I70.1 440.1         Plan:  Start on doxycycline and a chest x-ray to rule out pneumonia  Patient not interested in smoking cessation  He says he's had a colonoscopy done at the [facility] will get records  Hypertension stable  Patient on prednisone 5 mg chronic.  Continue to follow with nephrology, oncology and rheumatology.  Orders Placed This Encounter   Procedures    X-Ray Chest PA And Lateral    CBC auto differential    Comprehensive metabolic panel    Hemoglobin A1c    Lipid panel    PSA, Screening       Current Outpatient Prescriptions   Medication Sig Dispense Refill    albuterol (VENTOLIN HFA) 90 mcg/actuation inhaler INHALE 2 PUFFS BY MOUTH EVERY 4 HOURS IF NEEDED FOR WHEEZING 18 g 11    ALPRAZolam (XANAX) 0.5 MG tablet TAKE 1 TABLET EVERY NIGHT AS NEEDED 21 tablet 0    amLODIPine (NORVASC) 10 MG tablet Take 1 tablet (10 mg total) by mouth once daily. 90 tablet 3    aspirin 81 mg Tab Take 1 tablet by mouth Daily. 1 Tablet Oral Every day      cloNIDine (CATAPRES) 0.1 MG tablet Take 1 tablet (0.1 mg total) by mouth 2 (two) times daily. 180 tablet 3    diltiaZEM (CARTIA XT) 240 MG 24 hr capsule Take 1 capsule (240 mg total) by mouth once daily. 90 capsule 3    doxazosin (CARDURA) 4 MG tablet Take 1 tablet (4 mg total) by mouth every evening. 90 tablet 3    epinephrine (EPIPEN) 0.3 mg/0.3 mL (1:1,000) AtIn 1 Pen Injector Intramuscular once 1 Device 5    fluticasone (FLONASE) 50 mcg/actuation nasal spray 2 sprays by Each Nare route once daily. 1 Bottle 11    hydrALAZINE  (APRESOLINE) 100 MG tablet TAKE 1 TABLET THREE TIMES DAILY 270 tablet 3    hydrocodone-acetaminophen 10-325mg (NORCO)  mg Tab Take 1 tablet by mouth every 6 (six) hours as needed.       lactulose (CHRONULAC) 10 gram/15 mL solution Take 30 ml every 12 hours PRN to achieve a good bowel movement 300 mL 6    levothyroxine (SYNTHROID) 125 MCG tablet Take 1 tablet (125 mcg total) by mouth once daily. 90 tablet 3    pravastatin (PRAVACHOL) 20 MG tablet TAKE 1 TABLET BY MOUTH AT BEDTIME TO LOWER CHOLESTEROL 90 tablet 3    predniSONE (DELTASONE) 5 MG tablet TAKE 1 TABLET EVERY DAY 90 tablet 1    ranitidine (ZANTAC) 75 MG tablet Take 75 mg by mouth 2 (two) times daily.      doxycycline (VIBRA-TABS) 100 MG tablet Take 1 tablet (100 mg total) by mouth every 12 (twelve) hours. 20 tablet 0     No current facility-administered medications for this visit.        Medications Discontinued During This Encounter   Medication Reason    amlodipine (NORVASC) 5 MG tablet Patient no longer taking    levothyroxine (SYNTHROID) 88 MCG tablet     amLODIPine (NORVASC) 10 MG tablet Reorder    CARTIA  mg 24 hr capsule Reorder    cloNIDine (CATAPRES) 0.1 MG tablet Reorder    doxazosin (CARDURA) 4 MG tablet Reorder    levothyroxine (SYNTHROID) 125 MCG tablet Reorder    pravastatin (PRAVACHOL) 20 MG tablet Reorder    ALPRAZolam (XANAX) 0.5 MG tablet Reorder       No Follow-up on file.      Mike Recinos MD

## 2018-04-18 NOTE — TELEPHONE ENCOUNTER
----- Message from Chaya Adrien sent at 4/18/2018  3:33 PM CDT -----  Contact: pt wife   1. What is the name of the medication you are requesting? predniSONE (DELTASONE)   2. What is the dose? 5 mg   3. How do you take the medication? Orally, topically, etc? Orally   4. How often do you take this medication? Daily   5. Do you need a 30 day or 90 day supply? 90  6. How many refills are you requesting? 1  7. What is your preferred pharmacy and location of the pharmacy?   [facility] - [place], [place] - [address]  [address] [address]  Phone: [phone] Fax: [phone]  8. Who can we contact with further questions? the patient

## 2018-04-20 NOTE — LETTER
April 20, 2018    [facility]               [facility]  [address]  [facility] [address]  Phone: [phone] April 20, 2018     Patient: Wallace Vigil    YOB: 1955   Date of Visit: 4/20/2018     To whom it may concern     We are seeing Wallace SANTO KEL VigilO.B is 1955, at [facility]. Mike Recinos MD is their primary care physician. To help with our arsh maintenance records could you please send the following:     Most recent copy of Diabetic Eye Exam that states Positive or Negative Retinopathy.    Please send fax to [phone], Attention Aurora BATES LPN Care Coordination.    Thank-you in advance for your assistance. If you have any questions or concerns, please don't hesitate to contact me at [phone].     Aurora TRAORE LPN  Care Coordination Department  [facility]

## 2018-05-15 NOTE — TELEPHONE ENCOUNTER
Patient was given results, he sees Dr Farr hematology/oncology in [place] , he just saw him last month

## 2018-05-15 NOTE — TELEPHONE ENCOUNTER
----- Message from Elana Johnson sent at 5/15/2018  8:47 AM CDT -----  Contact: pt  He's calling in regards to blood work , pls call pt back at [phone]

## 2018-05-31 PROBLEM — R64 CACHEXIA: Status: ACTIVE | Noted: 2018-01-01

## 2018-05-31 NOTE — PROGRESS NOTES
Patient, Wallace Vigil (MRN #[id number]), presented with a recent Platelet count less than 150 K/uL consistent with the definition of thrombocytopenia (ICD10 - D69.6).    Platelets   Date Value Ref Range Status   05/31/2018 71 (L) 150 - 350 K/uL Final     The patient's thrombocytopenia was monitored, evaluated, addressed and/or treated. This addendum to the medical record is made on 05/31/2018.

## 2018-05-31 NOTE — PROGRESS NOTES
Subjective:       Patient ID: Wallace Vigil is a 63 y.o. male.    Chief Complaint: Results and Cancer    HPI 63-year-old male history of follicular and papillary thyroid carcinoma treated 1 year ago with thyroidectomy reportedly with increasing weight loss fatigue has been seen by endocrinology at [facility]. With recent thyroglobulin undetectable reviewed notes through care everywhere ECOG status 2    Past Medical History:   Diagnosis Date    Anxiety     Arthritis     Asthma     Atherosclerosis of native arteries of the extremities with intermittent claudication     Back pain     Benign hypertensive heart disease without heart failure     Cancer     Carotid artery occlusion     Chronic kidney disease     COPD (chronic obstructive pulmonary disease)     Coronary artery disease     Emphysema of lung     Encounter for blood transfusion     GERD (gastroesophageal reflux disease)     Heart failure     History of aorto-femoral bypass 8/16/2013    Hyperlipidemia     Hypertension     Hypothyroidism     PVD (peripheral vascular disease)     PVD (peripheral vascular disease) 8/16/2013    Renal artery stenosis 8/16/2013    Renovascular hypertension 8/16/2013    Rheumatoid arthritis     Shingles sept 2015    Stenosis of left subclavian artery 8/16/2013    Thyroid cancer     Thyroid disease     Thyroid cancer 3/1/2017    Tobacco abuse     Tobacco dependence     Trouble in sleeping      Family History   Problem Relation Age of Onset    Hypertension Mother     Hyperlipidemia Mother     Heart failure Mother     Heart disease Mother     Hyperlipidemia Father     COPD Father     Diabetes Daughter     Diabetes Daughter      Social History     Social History    Marital status:      Spouse name: N/A    Number of children: 2    Years of education: N/A     Occupational History    Not on file.     Social History Main Topics    Smoking status: Current Every Day Smoker     Packs/day: 1.00      Years: 50.00     Types: Cigarettes    Smokeless tobacco: Never Used    Alcohol use No    Drug use: No    Sexual activity: Not Currently     Partners: Female     Birth control/ protection: None     Other Topics Concern    Not on file     Social History Narrative    No narrative on file     Past Surgical History:   Procedure Laterality Date    CARDIAC CATHETERIZATION      RENAL ARTERY BYPASS      2012    THYROIDECTOMY      VASCULAR SURGERY      Carotid cleaned out        Labs:  Lab Results   Component Value Date    WBC 3.07 (L) 05/31/2018    HGB 10.9 (L) 05/31/2018    HCT 32.5 (L) 05/31/2018    MCV 90 05/31/2018    PLT 71 (L) 05/31/2018     BMP  Lab Results   Component Value Date     (L) 04/18/2018    K 3.9 04/18/2018     04/18/2018    CO2 22 (L) 04/18/2018    BUN 32 (H) 04/18/2018    CREATININE 1.9 (H) 04/18/2018    CALCIUM 9.0 04/18/2018    ANIONGAP 9 04/18/2018    ESTGFRAFRICA 42.7 (A) 04/18/2018    EGFRNONAA 37.0 (A) 04/18/2018     Lab Results   Component Value Date    ALT 7 (L) 04/18/2018    AST 11 04/18/2018    ALKPHOS 61 04/18/2018    BILITOT 0.4 04/18/2018       Lab Results   Component Value Date    IRON 39 (L) 01/30/2017    TIBC 306 01/30/2017    FERRITIN 76 01/30/2017     Lab Results   Component Value Date    WOSGSJPR12 319 04/09/2013     Lab Results   Component Value Date    FOLATE 3.7 (L) 04/09/2013     Lab Results   Component Value Date    TSH 35.820 (H) 07/15/2017         Review of Systems   Constitutional: Positive for activity change, appetite change, fatigue and unexpected weight change. Negative for chills, diaphoresis and fever.   HENT: Negative for congestion, dental problem, drooling, ear discharge, ear pain, facial swelling, hearing loss, mouth sores, nosebleeds, postnasal drip, rhinorrhea, sinus pressure, sneezing, sore throat, tinnitus, trouble swallowing and voice change.    Eyes: Negative for photophobia, pain, discharge, redness, itching and visual disturbance.    Respiratory: Negative for apnea, cough, choking, chest tightness, shortness of breath, wheezing and stridor.    Cardiovascular: Negative for chest pain, palpitations and leg swelling.   Gastrointestinal: Negative for abdominal distention, abdominal pain, anal bleeding, blood in stool, constipation, diarrhea, nausea, rectal pain and vomiting.   Endocrine: Negative for cold intolerance, heat intolerance, polydipsia, polyphagia and polyuria.   Genitourinary: Negative for decreased urine volume, difficulty urinating, discharge, dysuria, enuresis, flank pain, frequency, genital sores, hematuria, penile pain, penile swelling, scrotal swelling, testicular pain and urgency.   Musculoskeletal: Positive for arthralgias and myalgias. Negative for back pain, gait problem, joint swelling, neck pain and neck stiffness.   Skin: Positive for color change. Negative for pallor, rash and wound.   Allergic/Immunologic: Negative for environmental allergies, food allergies and immunocompromised state.   Neurological: Positive for weakness. Negative for dizziness, tremors, seizures, syncope, facial asymmetry, speech difficulty, light-headedness, numbness and headaches.   Hematological: Negative for adenopathy. Does not bruise/bleed easily.   Psychiatric/Behavioral: Positive for dysphoric mood. Negative for agitation, behavioral problems, confusion, decreased concentration, hallucinations, self-injury, sleep disturbance and suicidal ideas. The patient is nervous/anxious. The patient is not hyperactive.        Objective:      Physical Exam   Constitutional: He is oriented to person, place, and time. He appears cachectic. He has a sickly appearance. He appears ill. He appears distressed.   HENT:   Head: Normocephalic.   Right Ear: External ear normal.   Left Ear: External ear normal.   Nose: Nose normal. Right sinus exhibits no maxillary sinus tenderness and no frontal sinus tenderness. Left sinus exhibits no maxillary sinus tenderness and  no frontal sinus tenderness.   Mouth/Throat: Oropharynx is clear and moist. No oropharyngeal exudate.   Eyes: EOM and lids are normal. Pupils are equal, round, and reactive to light. Right eye exhibits no discharge. Left eye exhibits no discharge. Right conjunctiva is not injected. Right conjunctiva has no hemorrhage. Left conjunctiva is not injected. Left conjunctiva has no hemorrhage. No scleral icterus. Right eye exhibits normal extraocular motion. Left eye exhibits normal extraocular motion.   Neck: Normal range of motion. Neck supple. No JVD present. No tracheal deviation present. No thyromegaly present.   Cardiovascular: Normal rate, regular rhythm and normal heart sounds.    Pulmonary/Chest: Effort normal and breath sounds normal. No stridor. No respiratory distress.   Abdominal: Soft. Bowel sounds are normal. He exhibits no mass. There is no hepatosplenomegaly, splenomegaly or hepatomegaly. There is no tenderness.   Musculoskeletal: Normal range of motion. He exhibits no edema or tenderness.   Lymphadenopathy:        Head (right side): No posterior auricular and no occipital adenopathy present.        Head (left side): No posterior auricular and no occipital adenopathy present.     He has no cervical adenopathy.        Right cervical: No superficial cervical, no deep cervical and no posterior cervical adenopathy present.       Left cervical: No superficial cervical, no deep cervical and no posterior cervical adenopathy present.     He has no axillary adenopathy.        Right: No supraclavicular adenopathy present.        Left: No supraclavicular adenopathy present.   Neurological: He is alert and oriented to person, place, and time. He has normal strength. No cranial nerve deficit. Coordination normal.   Skin: Skin is dry. No rash noted. He is not diaphoretic. No cyanosis or erythema. Nails show no clubbing.   Darkening of skin secondary to rheumatoid arthritis drugs according to patient   Psychiatric: His  behavior is normal. Judgment and thought content normal. His mood appears anxious. Cognition and memory are normal. He exhibits a depressed mood.   Vitals reviewed.          Assessment:      1. Follicular thyroid cancer    2. Iron deficiency anemia due to chronic blood loss    3. Splenomegaly    4. Rheumatoid arthritis, involving unspecified site, unspecified rheumatoid factor presence    5. Neoplasm of lung    6. Malignant neoplasm of thyroid gland     7. B12 deficiency           Plan:   Reported weight loss cachexia at this point follow up with me after PET scan and laboratory draw for evaluation of anemia discussed implications with him reviewed results of thyroglobulin and most recent ultrasound with no evidence of recurrence but I am concerned about the possibility of underlying malignancy in heavy smoker and history of rheumatoid arthritis

## 2018-05-31 NOTE — PROGRESS NOTES
Returning call, please call back at [phone].  Md Adenike     Attempted to contact the following pt. ap

## 2018-06-07 PROBLEM — R64 CACHEXIA: Status: ACTIVE | Noted: 2018-01-01

## 2018-06-07 PROBLEM — D50.0 IRON DEFICIENCY ANEMIA DUE TO CHRONIC BLOOD LOSS: Status: ACTIVE | Noted: 2018-01-01

## 2018-06-07 NOTE — PROGRESS NOTES
Subjective:       Patient ID: Wallace Vigil is a 63 y.o. male.    Chief Complaint: Results (Thyroid cancer)    HPI 63-year-old male history of follicular thyroid carcinoma treated with radioactive 123 for residual disease in the thyroid bed notes and Care everywhere hike on epic.  No evidence of metastatic disease in lung from imaging study in 2017    Past Medical History:   Diagnosis Date    Anxiety     Arthritis     Asthma     Atherosclerosis of native arteries of the extremities with intermittent claudication     Back pain     Benign hypertensive heart disease without heart failure     Cancer     Carotid artery occlusion     Chronic kidney disease     COPD (chronic obstructive pulmonary disease)     Coronary artery disease     Emphysema of lung     Encounter for blood transfusion     GERD (gastroesophageal reflux disease)     Heart failure     History of aorto-femoral bypass 8/16/2013    Hyperlipidemia     Hypertension     Hypothyroidism     Iron deficiency anemia due to chronic blood loss 6/7/2018    PVD (peripheral vascular disease)     PVD (peripheral vascular disease) 8/16/2013    Renal artery stenosis 8/16/2013    Renovascular hypertension 8/16/2013    Rheumatoid arthritis     Shingles sept 2015    Stenosis of left subclavian artery 8/16/2013    Thyroid cancer     Thyroid disease     Thyroid cancer 3/1/2017    Tobacco abuse     Tobacco dependence     Trouble in sleeping      Family History   Problem Relation Age of Onset    Hypertension Mother     Hyperlipidemia Mother     Heart failure Mother     Heart disease Mother     Hyperlipidemia Father     COPD Father     Diabetes Daughter     Diabetes Daughter      Social History     Social History    Marital status:      Spouse name: N/A    Number of children: 2    Years of education: N/A     Occupational History    Not on file.     Social History Main Topics    Smoking status: Current Every Day Smoker      Packs/day: 1.00     Years: 50.00     Types: Cigarettes    Smokeless tobacco: Never Used    Alcohol use No    Drug use: No    Sexual activity: Not Currently     Partners: Female     Birth control/ protection: None     Other Topics Concern    Not on file     Social History Narrative    No narrative on file     Past Surgical History:   Procedure Laterality Date    CARDIAC CATHETERIZATION      RENAL ARTERY BYPASS      2012    THYROIDECTOMY      VASCULAR SURGERY      Carotid cleaned out        Labs:  Lab Results   Component Value Date    WBC 3.07 (L) 05/31/2018    HGB 10.9 (L) 05/31/2018    HCT 32.5 (L) 05/31/2018    MCV 90 05/31/2018    PLT 71 (L) 05/31/2018     BMP  Lab Results   Component Value Date     05/31/2018    K 4.2 05/31/2018     05/31/2018    CO2 25 05/31/2018    BUN 23 05/31/2018    CREATININE 1.8 (H) 05/31/2018    CALCIUM 9.8 05/31/2018    ANIONGAP 9 05/31/2018    ESTGFRAFRICA 46 (A) 05/31/2018    EGFRNONAA 39 (A) 05/31/2018     Lab Results   Component Value Date    ALT 7 (L) 05/31/2018    AST 10 05/31/2018    ALKPHOS 68 05/31/2018    BILITOT 0.4 05/31/2018       Lab Results   Component Value Date    IRON 28 (L) 05/31/2018    TIBC 309 05/31/2018    FERRITIN 58 05/31/2018     Lab Results   Component Value Date    ZAXJDAFV13 205 (L) 05/31/2018     Lab Results   Component Value Date    FOLATE 3.7 (L) 04/09/2013     Lab Results   Component Value Date    TSH 9.073 (H) 05/31/2018         Review of Systems   Constitutional: Positive for activity change, appetite change, fatigue and unexpected weight change. Negative for chills, diaphoresis and fever.   HENT: Negative for congestion, dental problem, drooling, ear discharge, ear pain, facial swelling, hearing loss, mouth sores, nosebleeds, postnasal drip, rhinorrhea, sinus pressure, sneezing, sore throat, tinnitus, trouble swallowing and voice change.    Eyes: Negative for photophobia, pain, discharge, redness, itching and visual disturbance.    Respiratory: Positive for cough and shortness of breath. Negative for apnea, choking, chest tightness, wheezing and stridor.    Cardiovascular: Negative for chest pain, palpitations and leg swelling.   Gastrointestinal: Negative for abdominal distention, abdominal pain, anal bleeding, blood in stool, constipation, diarrhea, nausea, rectal pain and vomiting.   Endocrine: Negative for cold intolerance, heat intolerance, polydipsia, polyphagia and polyuria.   Genitourinary: Negative for decreased urine volume, difficulty urinating, discharge, dysuria, enuresis, flank pain, frequency, genital sores, hematuria, penile pain, penile swelling, scrotal swelling, testicular pain and urgency.   Musculoskeletal: Negative for arthralgias, back pain, gait problem, joint swelling, myalgias, neck pain and neck stiffness.   Skin: Negative for color change, pallor, rash and wound.   Allergic/Immunologic: Negative for environmental allergies, food allergies and immunocompromised state.   Neurological: Positive for weakness. Negative for dizziness, tremors, seizures, syncope, facial asymmetry, speech difficulty, light-headedness, numbness and headaches.   Hematological: Negative for adenopathy. Does not bruise/bleed easily.   Psychiatric/Behavioral: Positive for dysphoric mood. Negative for agitation, behavioral problems, confusion, decreased concentration, hallucinations, self-injury, sleep disturbance and suicidal ideas. The patient is nervous/anxious. The patient is not hyperactive.        Objective:      Physical Exam   Constitutional: He is oriented to person, place, and time. He appears well-developed and well-nourished. He appears cachectic. He has a sickly appearance. He appears ill. He appears distressed.   HENT:   Head: Normocephalic.   Right Ear: External ear normal.   Left Ear: External ear normal.   Nose: Nose normal. Right sinus exhibits no maxillary sinus tenderness and no frontal sinus tenderness. Left sinus exhibits  no maxillary sinus tenderness and no frontal sinus tenderness.   Mouth/Throat: Oropharynx is clear and moist. No oropharyngeal exudate.   Eyes: EOM and lids are normal. Pupils are equal, round, and reactive to light. Right eye exhibits no discharge. Left eye exhibits no discharge. Right conjunctiva is not injected. Right conjunctiva has no hemorrhage. Left conjunctiva is not injected. Left conjunctiva has no hemorrhage. No scleral icterus. Right eye exhibits normal extraocular motion. Left eye exhibits normal extraocular motion.   Neck: Normal range of motion. Neck supple. No JVD present. No tracheal deviation present. No thyromegaly present.   Cardiovascular: Normal rate, regular rhythm and normal heart sounds.    Pulmonary/Chest: Effort normal and breath sounds normal. No stridor. No respiratory distress.   Abdominal: Soft. Bowel sounds are normal. He exhibits no mass. There is no hepatosplenomegaly, splenomegaly or hepatomegaly. There is no tenderness.   Musculoskeletal: Normal range of motion. He exhibits no edema or tenderness.   Lymphadenopathy:        Head (right side): No posterior auricular and no occipital adenopathy present.        Head (left side): No posterior auricular and no occipital adenopathy present.     He has no cervical adenopathy.        Right cervical: No superficial cervical, no deep cervical and no posterior cervical adenopathy present.       Left cervical: No superficial cervical, no deep cervical and no posterior cervical adenopathy present.     He has no axillary adenopathy.        Right: No supraclavicular adenopathy present.        Left: No supraclavicular adenopathy present.   Neurological: He is alert and oriented to person, place, and time. He has normal strength. No cranial nerve deficit. Coordination normal.   Skin: Skin is dry. No rash noted. He is not diaphoretic. No cyanosis or erythema. Nails show no clubbing.   Psychiatric: His behavior is normal. Judgment and thought content  normal. His mood appears anxious. Cognition and memory are normal. He exhibits a depressed mood.   Vitals reviewed.          Assessment:      1. Iron deficiency anemia due to chronic blood loss    2. Follicular thyroid cancer    3. Neoplasm of lung           Plan:   Documented iron deficiency anemia and tolerated oral iron will treat with intravenous iron last colonoscopy in 2013 will proceed with upper lower endoscopies to iron repleted before any consideration of erythropoietin therapy for anemia.  At this point PET scan reviewed with Radiology PET avid finding increasing in size from PET scan in 2016 near left side of heart reviewed with Interventional Radiology not able to be biopsied patient is adamant about any surgical exploration.  Will refer to Radiation Oncology for empiric radiation for presumptive diagnosis of malignancy heavy history of smoking with growing pulmonary nodule and metastatic scan in 2017 for I 123-with finding noted from 2016 on PET when 8 mm.  Will return in 3 months with CBC iron status and BMP for response to therapy evaluation by Radiation Oncology in interim and open lower endoscopy to be completed 40 min face-to-face time coordination of care [id number] 40

## 2018-06-11 NOTE — PROGRESS NOTES
06/11/18 Per [employer], Person pressed touch tone key to speak with an endoscopy .  Pt has been scheduled for colonoscopy on 08/16/18. Instructions given and  Mailed. Nulytely ordered.

## 2018-06-20 NOTE — PROGRESS NOTES
[facility] [place]    RADIATION ONCOLOGY CONSULTATION    Name: Wallace Vigil  : 1955      Patient Referred To Radiation Oncology By:  Dr. Bradley Ramires MD  [address] [facility] [address]  [place] [address]    DIAGNOSIS:  Presumed lung cancer stage cIA2: cT1b cN0 cM0.    1. 18 PET scan 1.4 cm FDG avid 3.1 SUV lingula nodule.  Patient not candidate for biopsy and refusing surgery.    2. History of follicular thyroid cancer.    3. 3/1/17 total thyroidectomy.    4. 17 a whole-body iodine-123 scan without abnormal lung uptake.    5. 7/3/17 115.3 mCi of iodide-131 without abnormal lung uptake.    HISTORY OF PRESENT ILLNESS:  Wallace Vigil is a pleasant 63 y.o. male who presented in mid 2018 with cough for 2 weeks.  He was treated with amoxicillin at urgent care.  He also has COPD and a 50 pack-year history of cigarettes.  Chest x-ray 18 showed patchy opacities in the bilateral lower lung zones.  He was started on doxycycline.  I reviewed the images for this case personally.    He was treated for thyroid cancer 1 year ago and has been followed by Endocrinology at [facility].  On 17 he had a PET scan showing FDG avid thyroid nodule and an 8 mm noncalcified lingula nodule with 1.1-1.2 SUV which was slightly higher than the background lung parenchyma.  Malignancy could not be excluded.  On 3/1/17 he had a thyroidectomy.  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  On 17 a whole-body iodine 123 scan was performed.  There were 2 focal areas of uptake in the left postsurgical thyroid bed.  There was no mention of abnormal uptake in the lung.  7/3/17 he received 115.3 mCi of iodide 131.  Post therapy imaging did not mention abnormal uptake in the lung.  He is following Endocrinology and was last seen in 2018 with plans or continued surveillance with labs and imaging deemed at intermediate-high recurrence risk.    He was  evaluated by Dr. Ramires on 5/31/18 and was having cachexia.  6/6/18 he had a PET scan showing a 1.4 cm FDG avid pulmonary nodule in the lingula at 3.1 SUV.  This was 8 mm on a prior exam.  There were also dependent subpleural nodular densities in the right lung base possibly representing atelectasis without FDG activity. There was no FDG avid mediastinal or hilar adenopathy.  The spleen was enlarged but stable.  Dr. Ramires discussed this with interventional radiology who did not want to biopsy the lesion.  The patient has refused surgical exploration.    He also has iron deficiency anemia and workup with endoscopy is pending.    REVIEW OF SYSTEMS: (Positive findings bold, otherwise negative)   Constitutional: fever, fatigue, weight loss 100 lbs x 2 years  Eyes: blurred vision in the past 3 months, denies double vision   ENT: ear pain, new mouth lesions, jaw pain, difficulty swallowing, sore throat  Cardiovascular: chest pain on exertion, reflux, extremity swelling  Respiratory: shortness of breath, dyspnea, cough, hemoptysis.   GI: abdominal pain, diarrhea, constipation, blood in stool, painful bowel movements, reflux  : painful or burning urination, denies blood in urine  Musculoskeletal: new bone or joint pains  Neurologic: headache, seizure, focal numbness or tingling, balance changes, speech changes  Lymph: new or enlarged lymph nodes  Psychiatric: depression, anxiety    PRIOR RADIATION HISTORY: None    PAST MEDICAL HISTORY:  Past Medical History:   Diagnosis Date    Anxiety     Arthritis     Asthma     Atherosclerosis of native arteries of the extremities with intermittent claudication     Back pain     Benign hypertensive heart disease without heart failure     Cancer     Carotid artery occlusion     Chronic kidney disease     COPD (chronic obstructive pulmonary disease)     Coronary artery disease     Emphysema of lung     Encounter for blood transfusion     GERD (gastroesophageal reflux  disease)     Heart failure     History of aorto-femoral bypass 8/16/2013    Hyperlipidemia     Hypertension     Hypothyroidism     Iron deficiency anemia due to chronic blood loss 6/7/2018    PVD (peripheral vascular disease)     PVD (peripheral vascular disease) 8/16/2013    Renal artery stenosis 8/16/2013    Renovascular hypertension 8/16/2013    Rheumatoid arthritis     Shingles sept 2015    Stenosis of left subclavian artery 8/16/2013    Thyroid cancer     Thyroid disease     Thyroid cancer 3/1/2017    Tobacco abuse     Tobacco dependence     Trouble in sleeping        PAST SURGICAL HISTORY:  Past Surgical History:   Procedure Laterality Date    CARDIAC CATHETERIZATION      RENAL ARTERY BYPASS      2012    THYROIDECTOMY      VASCULAR SURGERY      Carotid cleaned out        ALLERGIES:   Review of patient's allergies indicates:   Allergen Reactions    Diovan  [valsartan] Swelling    Levofloxacin Other (See Comments)     Stomach pains    Lisinopril Swelling       MEDICATIONS:    Current Outpatient Prescriptions:     albuterol (VENTOLIN HFA) 90 mcg/actuation inhaler, INHALE 2 PUFFS BY MOUTH EVERY 4 HOURS IF NEEDED FOR WHEEZING, Disp: 18 g, Rfl: 11    ALPRAZolam (XANAX) 0.5 MG tablet, TAKE 1 TABLET EVERY NIGHT AS NEEDED, Disp: 21 tablet, Rfl: 0    amLODIPine (NORVASC) 10 MG tablet, Take 1 tablet (10 mg total) by mouth once daily., Disp: 90 tablet, Rfl: 3    aspirin 81 mg Tab, Take 1 tablet by mouth Daily. 1 Tablet Oral Every day, Disp: , Rfl:     cloNIDine (CATAPRES) 0.1 MG tablet, Take 1 tablet (0.1 mg total) by mouth 2 (two) times daily., Disp: 180 tablet, Rfl: 3    diltiaZEM (CARTIA XT) 240 MG 24 hr capsule, Take 1 capsule (240 mg total) by mouth once daily., Disp: 90 capsule, Rfl: 3    doxazosin (CARDURA) 4 MG tablet, Take 1 tablet (4 mg total) by mouth every evening., Disp: 90 tablet, Rfl: 3    fluticasone (FLONASE) 50 mcg/actuation nasal spray, 2 sprays by Each Nare route once  daily., Disp: 1 Bottle, Rfl: 11    hydrALAZINE (APRESOLINE) 100 MG tablet, TAKE 1 TABLET THREE TIMES DAILY, Disp: 270 tablet, Rfl: 3    hydrocodone-acetaminophen 10-325mg (NORCO)  mg Tab, Take 1 tablet by mouth every 6 (six) hours as needed. , Disp: , Rfl:     lactulose (CHRONULAC) 10 gram/15 mL solution, Take 30 ml every 12 hours PRN to achieve a good bowel movement, Disp: 300 mL, Rfl: 6    levothyroxine (SYNTHROID) 125 MCG tablet, Take 1 tablet (125 mcg total) by mouth once daily., Disp: 90 tablet, Rfl: 3    pravastatin (PRAVACHOL) 20 MG tablet, TAKE 1 TABLET BY MOUTH AT BEDTIME TO LOWER CHOLESTEROL, Disp: 90 tablet, Rfl: 3    predniSONE (DELTASONE) 5 MG tablet, Take 1 tablet (5 mg total) by mouth once daily., Disp: 90 tablet, Rfl: 1    ranitidine (ZANTAC) 75 MG tablet, Take 75 mg by mouth 2 (two) times daily., Disp: , Rfl:     cefUROXime (CEFTIN) 500 MG tablet, Take 1 tablet (500 mg total) by mouth once daily., Disp: 5 tablet, Rfl: 0    doxycycline (MONODOX) 100 MG capsule, Take 100 mg by mouth., Disp: , Rfl:     doxycycline (VIBRAMYCIN) 100 MG Cap, , Disp: , Rfl:     epinephrine (EPIPEN) 0.3 mg/0.3 mL (1:1,000) AtIn, 1 Pen Injector Intramuscular once, Disp: 1 Device, Rfl: 5    SOCIAL HISTORY:  Social History     Social History    Marital status:      Spouse name: N/A    Number of children: 2    Years of education: N/A     Occupational History    Not on file.     Social History Main Topics    Smoking status: Current Every Day Smoker     Packs/day: 1.00     Years: 50.00     Types: Cigarettes    Smokeless tobacco: Never Used    Alcohol use No    Drug use: No    Sexual activity: Not Currently     Partners: Female     Birth control/ protection: None     Other Topics Concern    Not on file     Social History Narrative    No narrative on file       FAMILY HISTORY:  Family History   Problem Relation Age of Onset    Hypertension Mother     Hyperlipidemia Mother     Heart failure  "Mother     Heart disease Mother     Hyperlipidemia Father     COPD Father     Diabetes Daughter     Diabetes Daughter            PHYSICAL EXAMINATION:  Constitutional: well appearing, no acute distress, ECOG 1 - Ambulates, capable of light work  Vitals:    /61   Pulse 61   Temp 97.9 °F (36.6 °C)   Resp 18   Ht 5' 9" (1.753 m)   Wt 57.3 kg (126 lb 5.2 oz)   PF 98 L/min   BMI 18.65 kg/m²   ENT: moist mucous membranes  Neck: trachea midline  Lymphatic: no cervical, supraclavicular adenopathy  Cardiovascular: regular rate, no murmurs, no edema of the upper or lower extremities, radial pulse 2+  Respiratory: unlabored effort, clear to auscultation, decreased breath sounds bilaterally  Abdomen: thin, soft, non-tender, no rigidity, no masses, no hepatomegaly  Skin:  darkened skin of the upper extremities  Spine: non-tender to percussion cervical, thoracic and lumbosacral spine    IMAGING AND LABORATORY FINDINGS: As per HPI; images reviewed personally.    ASSESSMENT:  Presumed early stage lung cancer, extensive smoking history.    PLAN:  I explained the presumptive diagnosis of lung cancer.  His iodine scanning has not shown avidity in the pulmonary nodule making a diagnosis of metastatic thyroid cancer unlikely.  Also, based on the Endocrinology notes his thyroglobulin is under control and there is no suspicion of recurrence or metastatic thyroid cancer.  Characteristics of the nodule such as growth, increased FDG uptake, spiculation and his extensive smoking history point toward the likely diagnosis of lung cancer.  Says he understands the possibility that this is benign lesion for that it is a cancer other than lung cancer.    I explained that the treatment of choice for lung cancer is surgical resection, however he declines surgical consultation.  I offered him stereotactic body radiation (SBRT) with a 90% control rate for lesions of this size.  There is about a 20% chance of distant metastases.    He " is concerned about dose to the heart.  We reviewed his imaging together.  I explained that we often treat tumors close to the left ventricle with stereotactic radiation.  I would favor the more protracted regimen of 50 Gy in 5 fractions rather than 1-3 fractions to keep the dose per fraction down and possibly minimize cardiac toxicity.  Cardiac toxicity is rare with stereotactic radiation.  Dose constraints can be applied to the heart and the radiation can be pushed around the heart to minimize the cardiac dose.    He agrees to proceed with SBRT.  Radiation planning will be performed this week and we will begin within the next 1-2 weeks for a total of 5 treatments given over 1-2 weeks.    We discussed the techniques, toxicities and indications of radiation and I answered the patient's questions to their apparent satisfaction.

## 2018-06-25 PROBLEM — C34.32 PRIMARY CANCER OF LEFT LOWER LOBE OF LUNG: Status: ACTIVE | Noted: 2018-01-01

## 2018-06-25 NOTE — LETTER
June 25, 2018      Bradley Ramires MD  [address]  [facility] [address]           [place] - Radiation Oncology  [address] [place] [id number]  Phone: [phone]  Fax: [phone]          Patient: Wallace Vigil   MR Number: [id number]   YOB: 1955   Date of Visit: 6/25/2018       Dear Dr. Bradley Ramires:    Thank you for referring Wallace Vigil to me for evaluation. Attached you will find relevant portions of my assessment and plan of care.    If you have questions, please do not hesitate to call me. I look forward to following Wallace Vigil along with you.    Sincerely,    Jose Eaton II, MD    Enclosure  CC:  No Recipients    If you would like to receive this communication electronically, please contact [email or web address] or [phone] to request more information on [employer] Link access.    For providers and/or their staff who would like to refer a patient to [facility], please contact us through our one-stop-shop provider referral line, [facility], at [phone].    If you feel you have received this communication in error or would no longer like to receive these types of communications, please e-mail [email or web address]

## 2018-07-12 NOTE — PLAN OF CARE
Problem: Patient Care Overview  Goal: Plan of Care Review  Outcome: Ongoing (interventions implemented as appropriate)  Day 1 xrt to lung. No complaints. Will continue to monitor.

## 2018-07-16 NOTE — PROGRESS NOTES
SEBAS met pt briefly after his treatment in Rad/Onc. Pt reported he is doing okay. SW introduced supportive care to pt explaining her role in the dept. SW explained pt had completed a Distress Screening a few weeks back and he indicated mild distress. Pt indicated he feels better now. SW provided him a checklist of potential services and informed him of our monthly nutritional seminars and [facility] group at [facility]. SE[facility] also provided pt with flyers to outside resources such as [employer] ([employer]) and Cancer Services [facility] ([facility]) explaining services at each agency. Pt was interested in Boost through [facility], so SW assisted in completion of referral and will fax on his behalf. SW provided pt with her contact info should further assistance be needed. Pt indicated he and his wife are coping as best they can, although he explained she is having a more difficult time. SW informed him that wife could contact her as well and that [facility] has caregiver support groups if she may be interested. SEBAS will f/u with pt next week.

## 2018-07-17 NOTE — PATIENT INSTRUCTIONS
.  [facility]  [address]  [address] [address]  [phone] phone     [phone] fax  Hours of Operation: Monday- Friday 8:00am- 5:00pm  After hours phone  [phone]  Hematology / Oncology Physicians on call      Dr. Ike Mcdonald                        Please call with any concerns regarding your appointment today.      .FALL PREVENTION   Falls often occur due to slipping, tripping or losing your balance. Here are ways to reduce your risk of falling again.   Was there anything that caused your fall that can be fixed, removed or replaced?   Make your home safe by keeping walkways clear of objects you may trip over.   Use non-slip pads under rugs.   Do not walk in poorly lit areas.   Do not stand on chairs or wobbly ladders.   Use caution when reaching overhead or looking upward. This position can cause a loss of balance.   Be sure your shoes fit properly, have non-slip bottoms and are in good condition.   Be cautious when going up and down stairs, curbs, and when walking on uneven sidewalks.   If your balance is poor, consider using a cane or walker.   If your fall was related to alcohol use, stop or limit alcohol intake.   If your fall was related to use of sleeping medicines, talk to your doctor about this. You may need to reduce your dosage at bedtime if you awaken during the night to go to the bathroom.   To reduce the need for nighttime bathroom trips:   Avoid drinking fluids for several hours before going to bed   Empty your bladder before going to bed   Men can keep a urinal at the bedside   © 3970-9859 [employer] [facility], [address] [place] [address]. All rights reserved. This information is not intended as a substitute for professional medical care. Always follow your healthcare professional's instructions.      Ferumoxytol injection  What is this medicine?  FERUMOXYTOL is an iron complex. Iron is used to make healthy  red blood cells, which carry oxygen and nutrients throughout the body. This medicine is used to treat iron deficiency anemia in people with chronic kidney disease.  How should I use this medicine?  This medicine is for injection into a vein. It is given by a health care professional in a hospital or clinic setting.  Talk to your pediatrician regarding the use of this medicine in children. Special care may be needed.  What side effects may I notice from receiving this medicine?  Side effects that you should report to your doctor or health care professional as soon as possible:  · allergic reactions like skin rash, itching or hives, swelling of the face, lips, or tongue  · breathing problems  · changes in blood pressure  · feeling faint or lightheaded, falls  · fever or chills  · flushing, sweating, or hot feelings  · swelling of the ankles or feet  Side effects that usually do not require medical attention (Report these to your doctor or health care professional if they continue or are bothersome.):  · diarrhea  · headache  · nausea, vomiting  · stomach pain  What may interact with this medicine?  This medicine may interact with the following medications:  · other iron products  What if I miss a dose?  It is important not to miss your dose. Call your doctor or health care professional if you are unable to keep an appointment.  Where should I keep my medicine?  This drug is given in a hospital or clinic and will not be stored at home.  What should I tell my health care provider before I take this medicine?  They need to know if you have any of these conditions:  · anemia not caused by low iron levels  · high levels of iron in the blood  · magnetic resonance imaging (MRI) test scheduled  · an unusual or allergic reaction to iron, other medicines, foods, dyes, or preservatives  · pregnant or trying to get pregnant  · breast-feeding  What should I watch for while using this medicine?  Visit your doctor or healthcare  professional regularly. Tell your doctor or healthcare professional if your symptoms do not start to get better or if they get worse. You may need blood work done while you are taking this medicine.  You may need to follow a special diet. Talk to your doctor. Foods that contain iron include: whole grains/cereals, dried fruits, beans, or peas, leafy green vegetables, and organ meats (liver, kidney).  NOTE:This sheet is a summary. It may not cover all possible information. If you have questions about this medicine, talk to your doctor, pharmacist, or health care provider. Copyright© 2017 [employer]

## 2018-07-17 NOTE — PLAN OF CARE
"Problem: Patient Care Overview  Goal: Plan of Care Review  Outcome: Ongoing (interventions implemented as appropriate)  Pt states, " I feel tired and weak all the time"      "

## 2018-07-17 NOTE — PLAN OF CARE
Problem: Anemia (Adult)  Intervention: Promote Hydration and Nutrition  We reviewed side effects and efficacy of iron product

## 2018-07-18 NOTE — PLAN OF CARE
Problem: Patient Care Overview  Goal: Plan of Care Review  Outcome: Ongoing (interventions implemented as appropriate)  Day 4 xrt to lung. Tolerating therapy well. C/o cold and sweaty last night. Pt received iron infusion yesterday. VS wnl. Will continue to monitor.

## 2018-07-20 NOTE — PLAN OF CARE
Problem: Patient Care Overview  Goal: Plan of Care Review  Outcome: Outcome(s) achieved Date Met: 07/20/18  Completed xrt to lung today 7-20-18. Will make f/u appt.

## 2018-07-24 NOTE — TELEPHONE ENCOUNTER
----- Message from Bradley Ramires MD sent at 7/24/2018  2:03 PM CDT -----  Regarding: RE: concern before feraheme  I do not believe there related please proceed  ----- Message -----  From: Letha Carvajal RN  Sent: 7/24/2018   1:47 PM  To: Bradley Ramires MD  Subject: concern before feraheme                          Pt here today  receiving Feraheme #2. . . States that the night after he received his first dose last week he woke up with heart racing, night sweats, and chills.  He did not seek care for this and it resolved the next day.  Pt did receive IV Solu-Medrol 125 mg as premed prior. . .     Allergies: Diovan/Lisinopril/Levaquin    Ok to give iron?  Do you want to decrease or hold steroid dose today?  Please advise.  Thanks!!

## 2018-07-24 NOTE — PLAN OF CARE
"Problem: Patient Care Overview  Goal: Plan of Care Review  Outcome: Ongoing (interventions implemented as appropriate)  "I had a bad night after my last infusion, so I am nervous about this one."      "

## 2018-07-27 NOTE — TELEPHONE ENCOUNTER
Made call to see how patient was doing after completing xrt last week. Pt stated he was doing fine, experiencing some SOB but is relieved with rest. Made f/u appt with Dr Eaton 8-21-18. Instructed patient to call back if he had any questions or concerns. Pt verbalized understanding.

## 2018-07-31 NOTE — PROGRESS NOTES
Pt returned my call. Pt stated that he currently has lung & thyroid cancer and is battling with that right now. Pt stated Dr MARINELLI wanted to give him some meds but he did not want to take it. Pt stated he is currently on a low dose prednisone that is working for him. He prefers to stay on current Tx and does not feel he needs to see Dr MARINELLI at this time. Pt declined. Appt to see Dr Recinos was scheduled. Pt stated he needs Dr Recinos so he can advise him on his current care. Pt verbalized understanding of needed appt.

## 2018-07-31 NOTE — PROGRESS NOTES
Attempting to contact pt to schedule Rheumatologist appt with Dr BISHOP Unable to reach patient at this time. Left voicemail.

## 2018-08-02 NOTE — PROGRESS NOTES
Chief Complaint:    Chief Complaint   Patient presents with    Follow-up       History of Present Illness:    Patient presents today he was recently diagnosed with lung cancer he has finished radiation therapy and has tolerated well.  His blood pressure is okay  He still smoking but he says he has cut back he understands the hazards of smoking well.  He is requesting Xanax we had told him before also that because he is on narcotics he cannot get Xanax anymore.    ROS:  Review of Systems   Constitutional: Negative for activity change, chills, fatigue, fever and unexpected weight change.   HENT: Negative for congestion, ear discharge, ear pain, hearing loss, postnasal drip and rhinorrhea.    Eyes: Negative for pain and visual disturbance.   Respiratory: Negative for cough, chest tightness and shortness of breath.    Cardiovascular: Negative for chest pain and palpitations.   Gastrointestinal: Negative for abdominal pain, diarrhea and vomiting.   Endocrine: Negative for heat intolerance.   Genitourinary: Negative for dysuria, flank pain, frequency and hematuria.   Musculoskeletal: Negative for back pain, gait problem and neck pain.   Skin: Negative for color change and rash.   Neurological: Negative for dizziness, tremors, seizures, numbness and headaches.   Psychiatric/Behavioral: Negative for agitation, hallucinations, self-injury, sleep disturbance and suicidal ideas. The patient is not nervous/anxious.        Past Medical History:   Diagnosis Date    Anxiety     Arthritis     Asthma     Atherosclerosis of native arteries of the extremities with intermittent claudication     Back pain     Benign hypertensive heart disease without heart failure     Cancer     Carotid artery occlusion     Chronic kidney disease     COPD (chronic obstructive pulmonary disease)     Coronary artery disease     Emphysema of lung     Encounter for blood transfusion     GERD (gastroesophageal reflux disease)     Heart  failure     History of aorto-femoral bypass 8/16/2013    Hyperlipidemia     Hypertension     Hypothyroidism     Iron deficiency anemia due to chronic blood loss 6/7/2018    PVD (peripheral vascular disease)     PVD (peripheral vascular disease) 8/16/2013    Renal artery stenosis 8/16/2013    Renovascular hypertension 8/16/2013    Rheumatoid arthritis     Shingles sept 2015    Stenosis of left subclavian artery 8/16/2013    Thyroid cancer     Thyroid disease     Thyroid cancer 3/1/2017    Tobacco abuse     Tobacco dependence     Trouble in sleeping        Social History:  Social History     Social History    Marital status:      Spouse name: N/A    Number of children: 2    Years of education: N/A     Social History Main Topics    Smoking status: Current Every Day Smoker     Packs/day: 1.00     Years: 50.00     Types: Cigarettes    Smokeless tobacco: Never Used    Alcohol use No    Drug use: No    Sexual activity: Not Currently     Partners: Female     Birth control/ protection: None     Other Topics Concern    None     Social History Narrative    None       Family History:   family history includes COPD in his father; Diabetes in his daughter and daughter; Heart disease in his mother; Heart failure in his mother; Hyperlipidemia in his father and mother; Hypertension in his mother.    Health Maintenance   Topic Date Due    Colonoscopy  05/07/2014    Pneumococcal PCV13 (High Risk) (1 - PCV13 Required) 06/11/2014    Pneumococcal PPSV23 (High Risk) (2) 06/11/2018    Influenza Vaccine  08/01/2018    Lipid Panel  04/18/2019    TETANUS VACCINE  06/17/2023    Hepatitis C Screening  Completed    Zoster Vaccine  Completed       Physical Exam:    Vital Signs  Temp: 97.3 °F (36.3 °C)  Temp src: Tympanic  Pulse: 68  SpO2: 97 %  BP: 128/64  BP Location: Left arm  Patient Position: Sitting  Pain Score:   6  Pain Loc: Generalized  Height and Weight  Weight: 57.5 kg (126 lb 10.5 oz)]    Body  mass index is 18.7 kg/m².    Physical Exam   Constitutional: He is oriented to person, place, and time. He appears cachectic.   HENT:   Mouth/Throat: Oropharynx is clear and moist.   Eyes: Conjunctivae are normal. Pupils are equal, round, and reactive to light.   Neck: Normal range of motion. Neck supple.   Cardiovascular: Normal rate, regular rhythm and normal heart sounds.    No murmur heard.  Pulmonary/Chest: Effort normal and breath sounds normal. No respiratory distress. He has no wheezes. He has no rales. He exhibits no tenderness.   Abdominal: Soft. He exhibits no distension and no mass. There is no tenderness. There is no guarding.   Musculoskeletal: He exhibits no edema or tenderness.   Lymphadenopathy:     He has no cervical adenopathy.   Neurological: He is alert and oriented to person, place, and time. He has normal reflexes.   Skin: Skin is warm and dry.   Psychiatric: He has a normal mood and affect. His behavior is normal. Judgment and thought content normal.       Results for orders placed or performed in visit on 05/31/18   Ferritin   Result Value Ref Range    Ferritin 58 20.0 - 300.0 ng/mL   Comprehensive metabolic panel   Result Value Ref Range    Sodium 138 136 - 145 mmol/L    Potassium 4.2 3.5 - 5.1 mmol/L    Chloride 104 95 - 110 mmol/L    CO2 25 23 - 29 mmol/L    Glucose 157 (H) 70 - 110 mg/dL    BUN, Bld 23 8 - 23 mg/dL    Creatinine 1.8 (H) 0.5 - 1.4 mg/dL    Calcium 9.8 8.7 - 10.5 mg/dL    Total Protein 7.8 6.0 - 8.4 g/dL    Albumin 3.5 3.5 - 5.2 g/dL    Total Bilirubin 0.4 0.1 - 1.0 mg/dL    Alkaline Phosphatase 68 55 - 135 U/L    AST 10 10 - 40 U/L    ALT 7 (L) 10 - 44 U/L    Anion Gap 9 8 - 16 mmol/L    eGFR if African American 46 (A) >60 mL/min/1.73 m^2    eGFR if non African American 39 (A) >60 mL/min/1.73 m^2   Iron and TIBC   Result Value Ref Range    Iron 28 (L) 45 - 160 ug/dL    Transferrin 209 200 - 375 mg/dL    TIBC 309 250 - 450 ug/dL    Saturated Iron 9 (L) 20 - 50 %   Lactate  dehydrogenase   Result Value Ref Range     110 - 260 U/L   CBC auto differential   Result Value Ref Range    WBC 3.07 (L) 3.90 - 12.70 K/uL    RBC 3.60 (L) 4.60 - 6.20 M/uL    Hemoglobin 10.9 (L) 14.0 - 18.0 g/dL    Hematocrit 32.5 (L) 40.0 - 54.0 %    MCV 90 82 - 98 fL    MCH 30.3 27.0 - 31.0 pg    MCHC 33.5 32.0 - 36.0 g/dL    RDW 15.7 (H) 11.5 - 14.5 %    Platelets 71 (L) 150 - 350 K/uL    MPV 8.6 (L) 9.2 - 12.9 fL    Gran # (ANC) 2.4 1.8 - 7.7 K/uL    Lymph # 0.3 (L) 1.0 - 4.8 K/uL    Mono # 0.3 0.3 - 1.0 K/uL    Eos # 0.0 0.0 - 0.5 K/uL    Baso # 0.02 0.00 - 0.20 K/uL    Gran% 78.5 (H) 38.0 - 73.0 %    Lymph% 10.7 (L) 18.0 - 48.0 %    Mono% 9.1 4.0 - 15.0 %    Eosinophil% 1.0 0.0 - 8.0 %    Basophil% 0.7 0.0 - 1.9 %    Differential Method Automated    Reticulocytes   Result Value Ref Range    Retic 1.3 0.4 - 2.0 %   TSH   Result Value Ref Range    TSH 9.073 (H) 0.400 - 4.000 uIU/mL   Haptoglobin   Result Value Ref Range    Haptoglobin 83 30 - 250 mg/dL   Vitamin B12   Result Value Ref Range    Vitamin B-12 205 (L) 210 - 950 pg/mL   T4, free   Result Value Ref Range    Free T4 1.16 0.71 - 1.51 ng/dL         Lab Results   Component Value Date    HGBA1C 4.3 04/18/2018       Assessment:      ICD-10-CM ICD-9-CM   1. B12 deficiency E53.8 266.2   2. Anxiety F41.9 300.00   3. Chronic obstructive pulmonary disease, unspecified COPD type J44.9 496   4. Essential hypertension I10 401.9   5. Hypertensive heart disease with heart failure I11.0 402.91     428.9   6. Iron deficiency anemia due to chronic blood loss D50.0 280.0   7. Primary cancer of left lower lobe of lung C34.32 162.5   8. Dermatophytosis of body B35.4 110.5         Plan:    He will be given a prescription for B12 sublingual  Patient cannot be given Xanax because he is on narcotic pain medication  Continue follow-up with oncologist for the lung cancer  Patient prescribed clotrimazole cream for dermatophytosis  Continue follow with other specialists as  recommended  He says it plan on doing colonoscopy soon which will be part of his anemia workup.  Follow-up 6 months      No orders of the defined types were placed in this encounter.      Current Outpatient Prescriptions   Medication Sig Dispense Refill    albuterol (VENTOLIN HFA) 90 mcg/actuation inhaler INHALE 2 PUFFS BY MOUTH EVERY 4 HOURS IF NEEDED FOR WHEEZING 18 g 11    ALPRAZolam (XANAX) 0.5 MG tablet TAKE 1 TABLET EVERY NIGHT AS NEEDED 21 tablet 0    amLODIPine (NORVASC) 10 MG tablet Take 1 tablet (10 mg total) by mouth once daily. 90 tablet 3    aspirin 81 mg Tab Take 1 tablet by mouth Daily. 1 Tablet Oral Every day      cloNIDine (CATAPRES) 0.1 MG tablet Take 1 tablet (0.1 mg total) by mouth 2 (two) times daily. 180 tablet 3    diltiaZEM (CARTIA XT) 240 MG 24 hr capsule Take 1 capsule (240 mg total) by mouth once daily. 90 capsule 3    doxazosin (CARDURA) 4 MG tablet Take 1 tablet (4 mg total) by mouth every evening. 90 tablet 3    epinephrine (EPIPEN) 0.3 mg/0.3 mL (1:1,000) AtIn 1 Pen Injector Intramuscular once 1 Device 5    fluticasone (FLONASE) 50 mcg/actuation nasal spray 2 sprays by Each Nare route once daily. 1 Bottle 11    hydrALAZINE (APRESOLINE) 100 MG tablet TAKE 1 TABLET THREE TIMES DAILY 270 tablet 3    hydrocodone-acetaminophen 10-325mg (NORCO)  mg Tab Take 1 tablet by mouth every 6 (six) hours as needed.       lactulose (CHRONULAC) 10 gram/15 mL solution Take 30 ml every 12 hours PRN to achieve a good bowel movement 300 mL 6    levothyroxine (SYNTHROID) 125 MCG tablet Take 1 tablet (125 mcg total) by mouth once daily. 90 tablet 3    pravastatin (PRAVACHOL) 20 MG tablet TAKE 1 TABLET BY MOUTH AT BEDTIME TO LOWER CHOLESTEROL 90 tablet 3    predniSONE (DELTASONE) 5 MG tablet Take 1 tablet (5 mg total) by mouth once daily. 90 tablet 1    ranitidine (ZANTAC) 75 MG tablet Take 75 mg by mouth 2 (two) times daily.      clotrimazole (LOTRIMIN) 1 % cream Apply topically 2 (two)  times daily. 113 g 1    cyanocobalamin, vitamin B-12, 1,000 mcg/mL Drop Place 1,000 mcg under the tongue once daily. 200 mL 6     No current facility-administered medications for this visit.        Medications Discontinued During This Encounter   Medication Reason    doxycycline (MONODOX) 100 MG capsule Therapy completed       No Follow-up on file.      Mike Recinos MD

## 2018-08-13 NOTE — TELEPHONE ENCOUNTER
Returned patient's call and states [employer] never got the prescription for cyanocobalamin. Informed patient that the prescription was escribed but would try to resend the prescription. Patient verbalized understanding.

## 2018-08-13 NOTE — TELEPHONE ENCOUNTER
----- Message from Juliet Ng sent at 8/13/2018  8:25 AM CDT -----  Pt at [phone]//he saw  recently and 2 prescriptions was suppose to be prescribed for him//he has not gotten them yet///uses//[facility] in [place]//please call//sarah/julia

## 2018-08-17 NOTE — PROGRESS NOTES
[facility]  RADIATION ONCOLOGY FOLLOW UP    Name: Wallace Vigil  : 1955      DIAGNOSIS:  Presumed lung cancer stage cIA2: cT1b cN0 cM0.    1. 18 PET scan 1.4 cm FDG avid 3.1 SUV lingula nodule.  Patient not candidate for biopsy and refusing surgery.  Dependent subpleural nodular densities within the right lung base within the right lower lobe possibly representing atelectasis without FDG avidity.  2. History of follicular thyroid cancer.    3. 3/1/17 total thyroidectomy.    4. 17 a whole-body iodine-123 scan without abnormal lung uptake.    5. 7/3/17 115.3 mCi of iodide-131 without abnormal lung uptake.  6. 18 completed SBRT left lung 50 Gy in 5 fractions    CURRENT STATUS: Wallace Vigil is a pleasant 63 y.o. male who presents today for follow-up.  He saw his primary care physician and was declined Xanax because he is on narcotics.  He was also given clotrimazole cream for dermatopytosis.  Colonoscopy has been scheduled for the anemia workup.  He has been feeling well.  He has no new dyspnea on exertion, shortness of breath or chest wall pain.    PHYSICAL EXAM:   Constitutional: well appearing, no acute distress, ECOG 0 - Fully Active  Vitals:      Vitals:    18 1035   BP: (!) 111/57   Pulse: 65   Resp: 18   Temp: 97.5 °F (36.4 °C)     Lymphatic: no cervical, supraclavicular  adenopathy  Cardiovascular: regular rate, no murmurs, no edema of the upper or lower extremities, radial pulse 2+  Respiratory: unlabored effort, clear to auscultation, no wheezes, stable bilateral decreased breath sounds    Laboratory & X-Ray Findings: Per above.      ASSESSMENT:  No acute toxicities from stereotactic body radiation therapy    PLAN: I have ordered a CT chest for October and will see the patient at that time.  I will plan on following with serial CT chest every 3-6 months. PET scan will be reserved for equivocal findings on CT, has PET scan can lead to high rates of false  positivity after stereotactic radiation. There is a small area in the right inferior lung which was not FDG avid and could possibly be inflammation. This will be followed on subsequent CT scan.  He will be seeing Dr. Ramires in November.    CHRIS Eaton M.D.  Radiation Oncology  [facility] [address] [place] [address]  Ph: [phone]  [email or web address]

## 2018-08-20 NOTE — TELEPHONE ENCOUNTER
----- Message from Mari Cardenas sent at 8/20/2018 12:59 PM CDT -----  Contact: self  Requesting B-12 to be called into [facility]. Please call back at [phone].    Pt uses:    [facility] - [place], [place] - [address]  [address] [address]  Phone: [phone] Fax: [phone]

## 2018-08-20 NOTE — TELEPHONE ENCOUNTER
Spoke with pt's wife and let them know that B12 sublingual dose not need an RX , you can buy it over the counter . She voiced understanding

## 2018-09-07 NOTE — PROGRESS NOTES
Subjective:       Patient ID: Wallace Vigil is a 63 y.o. male.    Chief Complaint: Results and Anemia    HPI 63-year-old female history of documented recurring iron deficiency anemia patient returns for clinical follow-up    Past Medical History:   Diagnosis Date    Anxiety     Arthritis     Asthma     Atherosclerosis of native arteries of the extremities with intermittent claudication     Back pain     Benign hypertensive heart disease without heart failure     Cancer     Carotid artery occlusion     Chronic kidney disease     COPD (chronic obstructive pulmonary disease)     Coronary artery disease     Emphysema of lung     Encounter for blood transfusion     GERD (gastroesophageal reflux disease)     Heart failure     History of aorto-femoral bypass 8/16/2013    Hyperlipidemia     Hypertension     Hypothyroidism     Iron deficiency anemia due to chronic blood loss 6/7/2018    PVD (peripheral vascular disease)     PVD (peripheral vascular disease) 8/16/2013    Renal artery stenosis 8/16/2013    Renovascular hypertension 8/16/2013    Rheumatoid arthritis     Shingles sept 2015    Stenosis of left subclavian artery 8/16/2013    Thyroid cancer     Thyroid disease     Thyroid cancer 3/1/2017    Tobacco abuse     Tobacco dependence     Trouble in sleeping      Family History   Problem Relation Age of Onset    Hypertension Mother     Hyperlipidemia Mother     Heart failure Mother     Heart disease Mother     Hyperlipidemia Father     COPD Father     Diabetes Daughter     Diabetes Daughter      Social History     Socioeconomic History    Marital status:      Spouse name: Not on file    Number of children: 2    Years of education: Not on file    Highest education level: Not on file   Social Needs    Financial resource strain: Not on file    Food insecurity - worry: Not on file    Food insecurity - inability: Not on file    Transportation needs - medical: Not on file     Transportation needs - non-medical: Not on file   Occupational History    Not on file   Tobacco Use    Smoking status: Current Every Day Smoker     Packs/day: 1.00     Years: 50.00     Pack years: 50.00     Types: Cigarettes    Smokeless tobacco: Never Used   Substance and Sexual Activity    Alcohol use: No    Drug use: No    Sexual activity: Not Currently     Partners: Female     Birth control/protection: None   Other Topics Concern    Not on file   Social History Narrative    Not on file     Past Surgical History:   Procedure Laterality Date    CARDIAC CATHETERIZATION      RENAL ARTERY BYPASS      2012    THYROIDECTOMY      VASCULAR SURGERY      Carotid cleaned out        Labs:  Lab Results   Component Value Date    WBC 5.10 09/04/2018    HGB 11.0 (L) 09/04/2018    HCT 34.3 (L) 09/04/2018    MCV 97 09/04/2018     (L) 09/04/2018     BMP  Lab Results   Component Value Date     09/04/2018    K 4.2 09/04/2018     09/04/2018    CO2 26 09/04/2018    BUN 28 (H) 09/04/2018    CREATININE 1.8 (H) 09/04/2018    CALCIUM 9.6 09/04/2018    ANIONGAP 9 09/04/2018    ESTGFRAFRICA 45.6 (A) 09/04/2018    EGFRNONAA 39.5 (A) 09/04/2018     Lab Results   Component Value Date    ALT 7 (L) 05/31/2018    AST 10 05/31/2018    ALKPHOS 68 05/31/2018    BILITOT 0.4 05/31/2018       Lab Results   Component Value Date    IRON 55 09/04/2018    TIBC 260 09/04/2018    FERRITIN 166 09/04/2018     Lab Results   Component Value Date    DWWOHXUM04 205 (L) 05/31/2018     Lab Results   Component Value Date    FOLATE 3.7 (L) 04/09/2013     Lab Results   Component Value Date    TSH 9.073 (H) 05/31/2018         Review of Systems   Constitutional: Negative for activity change, appetite change, chills, diaphoresis, fatigue, fever and unexpected weight change.   HENT: Negative for congestion, dental problem, drooling, ear discharge, ear pain, facial swelling, hearing loss, mouth sores, nosebleeds, postnasal drip, rhinorrhea,  sinus pressure, sneezing, sore throat, tinnitus, trouble swallowing and voice change.    Eyes: Negative for photophobia, pain, discharge, redness, itching and visual disturbance.   Respiratory: Negative for apnea, cough, choking, chest tightness, shortness of breath, wheezing and stridor.    Cardiovascular: Negative for chest pain, palpitations and leg swelling.   Gastrointestinal: Negative for abdominal distention, abdominal pain, anal bleeding, blood in stool, constipation, diarrhea, nausea, rectal pain and vomiting.   Endocrine: Negative for cold intolerance, heat intolerance, polydipsia, polyphagia and polyuria.   Genitourinary: Negative for decreased urine volume, difficulty urinating, discharge, dysuria, enuresis, flank pain, frequency, genital sores, hematuria, penile pain, penile swelling, scrotal swelling, testicular pain and urgency.   Musculoskeletal: Negative for arthralgias, back pain, gait problem, joint swelling, myalgias, neck pain and neck stiffness.   Skin: Negative for color change, pallor, rash and wound.   Allergic/Immunologic: Negative for environmental allergies, food allergies and immunocompromised state.   Neurological: Negative for dizziness, tremors, seizures, syncope, facial asymmetry, speech difficulty, weakness, light-headedness, numbness and headaches.   Hematological: Negative for adenopathy. Does not bruise/bleed easily.   Psychiatric/Behavioral: Positive for dysphoric mood. Negative for agitation, behavioral problems, confusion, decreased concentration, hallucinations, self-injury, sleep disturbance and suicidal ideas. The patient is nervous/anxious. The patient is not hyperactive.        Objective:      Physical Exam   Constitutional: He is oriented to person, place, and time. He has a sickly appearance. He appears ill. He appears distressed.   HENT:   Head: Normocephalic.   Right Ear: External ear normal.   Left Ear: External ear normal.   Nose: Nose normal. Right sinus exhibits  no maxillary sinus tenderness and no frontal sinus tenderness. Left sinus exhibits no maxillary sinus tenderness and no frontal sinus tenderness.   Mouth/Throat: Oropharynx is clear and moist. No oropharyngeal exudate.   Eyes: EOM and lids are normal. Pupils are equal, round, and reactive to light. Right eye exhibits no discharge. Left eye exhibits no discharge. Right conjunctiva is not injected. Right conjunctiva has no hemorrhage. Left conjunctiva is not injected. Left conjunctiva has no hemorrhage. No scleral icterus. Right eye exhibits normal extraocular motion. Left eye exhibits normal extraocular motion.   Neck: Normal range of motion. Neck supple. No JVD present. No tracheal deviation present. No thyromegaly present.   Cardiovascular: Normal rate and regular rhythm.   Pulmonary/Chest: Effort normal. No stridor. No respiratory distress.   Abdominal: Soft. He exhibits no mass. There is no hepatosplenomegaly, splenomegaly or hepatomegaly. There is no tenderness.   Musculoskeletal: Normal range of motion. He exhibits no edema or tenderness.   Lymphadenopathy:        Head (right side): No posterior auricular and no occipital adenopathy present.        Head (left side): No posterior auricular and no occipital adenopathy present.     He has no cervical adenopathy.        Right cervical: No superficial cervical, no deep cervical and no posterior cervical adenopathy present.       Left cervical: No superficial cervical, no deep cervical and no posterior cervical adenopathy present.     He has no axillary adenopathy.        Right: No supraclavicular adenopathy present.        Left: No supraclavicular adenopathy present.   Neurological: He is alert and oriented to person, place, and time. He has normal strength. No cranial nerve deficit. Coordination normal.   Skin: Skin is dry. No rash noted. He is not diaphoretic. No cyanosis or erythema. Nails show no clubbing.   Psychiatric: He has a normal mood and affect. His  behavior is normal. Judgment and thought content normal. Cognition and memory are normal.   Vitals reviewed.          Assessment:      1. Iron deficiency anemia due to chronic blood loss    2. Primary cancer of left lower lobe of lung    3. Follicular thyroid cancer           Plan:     Patient underwent stereotactic radiation has follow-up with Radiation Oncology.  Iron repleted at the present time return follow-up continue follow-up through both Radiation Oncology as well as heme on for evaluation recurring iron deficiency anemia encouraged to quit smoking patient declined        Bradley Ramires Jr, MD FACP

## 2018-09-10 NOTE — TELEPHONE ENCOUNTER
----- Message from Bradley Ramires MD sent at 9/10/2018 11:14 AM CDT -----  no  ----- Message -----  From: Consuelo Shipley LPN  Sent: 9/10/2018  10:56 AM  To: Bradley Ramires MD    This patient was seen on 9/4 for follow-up and had labs after. He has labs scheduled again for today is this necessary?

## 2018-09-10 NOTE — TELEPHONE ENCOUNTER
----- Message from Mita Lim sent at 9/10/2018 10:34 AM CDT -----  Contact: self/[phone]  Would like to consult with nurse regarding labs work on today. Please call back at [phone]. Thanks/ar

## 2018-09-10 NOTE — TELEPHONE ENCOUNTER
Attempted to contact patient to let him know that labs are not needed for today. No answer, left voicemail with callback number.

## 2018-09-10 NOTE — TELEPHONE ENCOUNTER
Returned patients phone call, let him know that I would ask Dr. Ramires to see about additional lab work that was scheduled for today. Patient verbalized understanding.

## 2018-10-15 NOTE — PROGRESS NOTES
[facility]  RADIATION ONCOLOGY FOLLOW UP    Name: Wallace Vigil  : 1955      DIAGNOSIS:  Presumed lung cancer stage cIA2: cT1b cN0 cM0.    1. History of follicular thyroid cancer   2. 3/1/17 total thyroidectomy  3. 6  stable focal pleural thickening at the left lower lobe posterolaterally compared to prior CT.  There is a similar focal benign appearing pleural thickening at the posterior lateral right lower lobe, not demonstrated on prior CT.  This may be sequelae from focal pneumonia that has resolved.  There is mild thickening of the right major fissure.  Nonspecific 4 mm noncalcified nodule in the lingula not demonstratedon prior CT  4. 17 a whole-body iodine-123 scan without abnormal lung uptake    5. 7/3/17 115.3 mCi of iodide-131 without abnormal lung uptake  6. 18 PET scan 1.4 cm FDG avid 3.1 SUV lingula nodule.  Patient not candidate for biopsy and refusing surgery.  Dependent subpleural nodular densities within the right lung base within the right lower lobe possibly representing atelectasis without FDG avidity  7. 18 completed SBRT left lung 50 Gy in 5 fractions  8. 10/18/18 CT chest resolution of stereotactic body radiation therapy treated anterior left upper lobe lesion.  New 9 mm left upper lobe pulmonary nodule and right middle and lower lobe pulmonary densities    CURRENT STATUS: Wallace Vigil is a pleasant 63 y.o. male who presents today for follow-up.  He is following Dr. Ramires due to iron deficiency anemia.  10/18/18 CT chest was performed and I reviewed these images.  On my review there were post radiation changes in the anterior left upper lobe in the area of stereotactic body radiation therapy.  The report mentions a new 9 mm left upper lobe pulmonary nodule not present on the prior PET scan and nodular densities in the right middle and lower lobe.  There was also an increased spleen size, cholelithiasis, renal cysts, hepatic cysts,  osteoarthritis.  He has not followed with endocrinology in over a year.  He has no change in his respiratory status.    PHYSICAL EXAM:   Constitutional: well appearing, no acute distress, ECOG 1 - Ambulates, capable of light work  Vitals:      Vitals:    10/22/18 0841   BP: (!) 165/78   Pulse: 67   Resp: 18   Temp: 96.8 °F (36 °C)     Lymphatic: no cervical, supraclavicular adenopathy  Cardiovascular: regular rate, no murmurs, no edema of the upper or lower extremities, radial pulse 2+  Respiratory: unlabored effort, clear to auscultation, no wheezes, bilateral decreased breath sounds    Laboratory & X-Ray Findings: Per above.      ASSESSMENT:  Resolution of treated nodule stereotactic body radiation therapy with post radiation changes, new 9 mm left upper lobe nodule, right middle and lower lobe densities with a history of CT changes in this area non FDG avid    PLAN:  He has had a good response in the treated lesion.  He has a new lesion in the left upper lobe.  I will order a PET scan.  He was not a biopsy candidate 6 months ago, so if this is FDG avid we will likely treat presumptively for lung cancer.  I do not think he has metastatic follicular thyroid cancer because he had a 4 mm nodule present June 2017 which, 2 weeks later on a full body iodine scan, did not have iodine uptake.  This 4 mm nodule eventually enlarged and became FDG avid and was treated with stereotactic body radiation therapy completing July 2018.  Most likely, if the new left upper lobe nodule is FDG avid, it is not thyroid cancer and probably lung cancer given his extensive smoking history.  However, he does need to see an endocrinologist.  His last TSH was very high and therefore inadequately suppressed.  He will need a TSH suppression, monitoring of his Synthroid dose, thyroglobulin monitoring etc.  I have scheduled him for endocrinology consult in [place] is soon as possible.    CHRIS Eaton M.D.  Radiation Oncology  [facility]  [address] [place] [address]  Ph: [phone]  [email or web address]

## 2018-10-22 NOTE — TELEPHONE ENCOUNTER
Returned call to patient and booked f/u with Angelito after pet scan on Nov. 1st at 10am. Patient verbalized understanding.

## 2018-10-23 NOTE — PROGRESS NOTES
Please note the following patient has been assigned to Lakia Khanna RN in Outpatient Case Management for COPD Disease Management.    Other referral diagnoses:     Please contact [facility] at Ext. [phone] with any questions.    Thank you,    Consuelo Figueroa    Outpatient Case Management

## 2018-10-26 NOTE — PROGRESS NOTES
[facility]  RADIATION ONCOLOGY FOLLOW UP    Name: Wallace Vigil  : 1955      DIAGNOSIS:  Presumed lung cancer stage cIA2: cT1b cN0 cM0.    1. History of follicular thyroid cancer   2. 3/1/17 total thyroidectomy  3. 6  stable focal pleural thickening at the left lower lobe posterolaterally compared to prior CT.  There is a similar focal benign appearing pleural thickening at the posterior lateral right lower lobe, not demonstrated on prior CT.  This may be sequelae from focal pneumonia that has resolved.  There is mild thickening of the right major fissure.  Nonspecific 4 mm noncalcified nodule in the lingula not demonstratedon prior CT  4. 17 a whole-body iodine-123 scan without abnormal lung uptake    5. 7/3/17 115.3 mCi of iodide-131 without abnormal lung uptake  6. 18 PET scan 1.4 cm FDG avid 3.1 SUV lingula nodule.  Patient not candidate for biopsy and refusing surgery.  Dependent subpleural nodular densities within the right lung base within the right lower lobe possibly representing atelectasis without FDG avidity  7. 18 completed SBRT left lung 50 Gy in 5 fractions  8. 10/18/18 CT chest resolution of stereotactic body radiation therapy treated anterior left upper lobe lesion.  New 9 mm left upper lobe pulmonary nodule and right middle and lower lobe pulmonary densities    CURRENT STATUS: Wallace Vigil is a pleasant 63 y.o. male who presents today for follow-up.  He has been scheduled with an endocrinology appointment with Dr. Rojo 18.  He had a PET scan  which I reviewed.  The nodule in the left upper lobe which is new has 0.8 FDG which is above background lung.  The lingular nodule was no longer FDG avid and was unchanged in size.  There was continued persistent pleural thickening in the right costophrenic angle with low level uptake.    PHYSICAL EXAM:   Constitutional: well appearing, no acute distress, ECOG 0 - Fully Active  Vitals:      Vitals:    11/02/18 0952   BP: 138/70   Pulse: 68   Resp: 18   Temp: 96.9 °F (36.1 °C)     Laboratory & X-Ray Findings: Per above.      ASSESSMENT:  Metachronous malignancy versus benign condition such as rheumatoid nodule    PLAN:  The new nodule in the left upper lobe has extremely low FDG uptake.  It could be benign or new malignancy.  I reviewed a series on rheumatoid nodules showing that these tend to be smooth appearing, low FDG avidity and cavitary but also tend to be peripheral.  Therefore it has some characteristics of a rheumatoid nodule, and some not.  He is not a good candidate for biopsy, so I have offered him observation with a repeat CT in 3 months.  If the lesion shows increased size or uptake, we can consider stereotactic body radiation therapy.  We reviewed the prior imaging in detail to show the post radiation changes of the treated lesion in the left upper lobe, inferior portion anteriorly.      He is scheduled for Dr. Rojo of Endocrinology 12/18/18 and I stressed the importance of regular Endocrinology follow-up.  We spent 15 min today face to face time over half of which was counseling and coordination of care.    CHRIS Eaton M.D.  Radiation Oncology  [facility]  [id number] [place] [address] [place] [address]  Ph: [phone]  [email or web address]

## 2018-10-29 NOTE — PROGRESS NOTES
In review of message and medical record, inquired of Dr. Grajeda of an Endocrinologist who manages pts with a h/o Thyroid CA in [place]. She suggested Dr. Lacey Rojo. Message forwarded.

## 2018-10-29 NOTE — PROGRESS NOTES
10/29/18 - Summary: Phone contact made with pt today for completion of Initial Screen for OPCM. He declined to complete screen at this time, but did agree for CM to contact him again on 11/1/18.   Interventions: Discussed current health status and upcoming treatments/appts. Advised CM will message Dr Rojo's staff to ask if they can move his endocrinology initial consult appt up from 12/18/18 and will follow up with him again on 11/1/18, per his request.   Plan: Phone contact with pt on 11/1/18 to attempt to complete Initial Screen for OPCM. Lakia Khanna RN

## 2018-11-01 NOTE — PROGRESS NOTES
11/1/18 - Summary: 2nd Attempt to complete Initial Screen for Outpatient Care Management with no answer. Voice mail left requesting return call. Will attempt to contact again at a later time. Lakia Khanna RN    Interventions: Voice mail left requesting return call.    Plan: Will attempt to contact again at a later time. Lakia Khanna RN

## 2018-11-02 PROBLEM — R91.1 LEFT UPPER LOBE PULMONARY NODULE: Status: ACTIVE | Noted: 2018-01-01

## 2018-11-05 NOTE — PROGRESS NOTES
11/5/18 - Summary: Phone contact made with pt today for completion of Initial Screen for OPCM. He reports being interested in completing Initial Screen, but states he is in the middle of something right now and requested a call back tomorrow around noon.   Interventions: Explained OPCM and offered to complete Initial Screen.   Plan: Attempt to follow up tomorrow per pt request. Lakia Khanna RN

## 2018-11-06 NOTE — LETTER
November 6, 2018    Wallace Vigil  [address] [facility] [address]             [facility] [address] [place] [address] Dear Mr Vigil,         Thank you for your time in getting admitted to Outpatient Case Management. I have included some educational brochures in this mail out along with my business card.    Please review the enclosed literature and call me if you have any questions. I will call you soon to discuss this.    Thanks again for your time and cooperation,        Lakia Khanna RN  Outpatient Case Management  [phone]  Ext [phone]  [email or web address]

## 2018-11-06 NOTE — PATIENT INSTRUCTIONS
Chronic Lung Disease: Preventing Lung Infections  Chronic lung diseases include chronic obstructive pulmonary disease (COPD), which includes chronic bronchitis and emphysema. Other chronic lung diseases include pulmonary fibrosis, sarcoidosis, and other conditions. When you have chronic lung diseases, it's very important to protect yourself from respiratory infections, like colds, the flu, and lung infections. Infections may cause your lung condition to worsen. Although you can't completely avoid them, there are things you can do to lessen the chance of infections.    Take precautions  Taking the following precautions can help you avoid illness:  · Remember to keep your hands away from your nose and mouth. Germs on your hands get into your respiratory system this way.  · Wash your hands often. When you wash them:  ¨ Use soap and warm water.  ¨ Rub your hands together well for at least 20 seconds.  ¨ Make sure to rinse them well.  ¨ Dry your hands on clean towels or air-dry them.  · Use hand  containing alcohol, if you are unable to wash your hands. Use the  after touching doorknobs, handles, and supermarket carts, for example, since lots of people touch them. Then wash your hands as soon as you can.  · To help prevent the flu, get a flu vaccination every year. This may be given at your healthcare provider's office, a drugstore, or pharmacy, or at work. Get your flu shot as soon as the vaccines are available in your area. This is usually around September each year.  · To help prevent pneumococcal pneumonia, get pneumonia vaccinations. Talk with your healthcare provider about which pneumococcal vaccinations you need.  · Try to stay away from people with respiratory infections, such as colds or the flu. Stay away from crowded places, like shopping centers or movie theatres during cold and flu season.  · If you smoke, think about quitting. In addition to causing or worsening many lung conditions, the  lung damage from smoking increases your risk of infections. Stay away from others who smoke, too. This is also harmful and increases your chance of infections.  Date Last Reviewed: 4/14/2016  © 5747-4914 [employer]. [address] [place] [address], [place] [address]. All rights reserved. This information is not intended as a substitute for professional medical care. Always follow your healthcare professional's instructions.        COPD Flare    You have had a flare-up of your COPD.  COPD, or chronic obstructive pulmonary disease, is a common lung disease. It causes your airways to become irritated and narrower. This makes it harder for you to breathe. Emphysema and chronic bronchitis are both types of COPD. This is a chronic condition, which means you always have it. Sometimes it gets worse. When this happens, it is called a flare-up.  Symptoms of COPD  People with COPD may have symptoms most of the time. In a flare-up, your symptoms get worse. These symptoms may mean you are having a flare-up:  · Shortness of breath, shallow or rapid breathing, or wheezing that gets worse  · Lung infection  · Cough that gets worse  · More mucus, thicker mucus or mucus of a different color  · Tiredness, decreased energy, or trouble doing your usual activities  · Fever  · Chest tightness  · Your symptoms dont get better even when you use your usual medicines, inhalers, and nebulizer  · Trouble talking  · You feel confused  Causes of flare-ups  Unfortunately, a flare-up can happen even though you did everything right, and you followed your doctors instructions. Some causes of flare-ups are:  · Smoking or secondhand smoke  · Colds, the flu, or respiratory infections  · Air pollution  · Sudden change in the weather  · Dust, irritating chemicals, or strong fumes  · Not taking your medicines as prescribed  Home care  Here are some things you can do at home to treat a flare-up:  · Try not to panic. This makes it harder to breathe,  and keeps you from doing the right things.  · Dont smoke or be around others who are smoking.  · Try to drink more fluids than usual during a flare-up, unless your doctor has told you not to because of heart and kidney problems. More fluids can help loosen the mucus.  · Use your inhalers and nebulizer, if you have one, as you have been told to.  · If you were given antibiotics, take them until they are used up or your doctor tells you to stop. Its important to finish the antibiotics, even though you feel better. This will make sure the infection has cleared.  · If you were given prednisone or another steroid, finish it even if you feel better.  Preventing a flare-up  Even though flare-ups happen, the best way to treat one is to prevent it before it starts. Here are some pointers:  · Dont smoke or be around others who are smoking.  · Take your medicines as you have been told.  · Talk with your doctor about getting a flu shot every year. Also find out if you need a pneumonia shot.  · If there is a weather advisory warning to stay indoors, try to stay inside when possible.  · Try to eat healthy and get plenty of sleep.  · Try to avoid things that usually set you off, like dust, chemical fumes, hairsprays, or strong perfumes.  Follow-up care  Follow up with your healthcare provider, or as advised.  If a culture was done, you will be told if your treatment needs to be changed. You can call as directed for the results.  If X-rays were done, you will be notified of any new findings that may affect your care.  Call 911  Call 911 if any of these occur:  · You have trouble breathing  · You feel confused or its difficult to wake you up  · You faint or lose consciousness  · You have a rapid heart rate  · You have new pain in your chest, arm, shoulder, neck or upper back  When to seek medical advice  Call your healthcare provider right away if any of these occur:  · Wheezing or shortness of breath gets worse  · You need to  use your inhalers more often than usual without relief  · Fever of 100.4°F (38ºC) or higher, or as directed by your healthcare provider  · Coughing up lots of dark-colored or bloody mucus (sputum)  · Chest pain with each breath  · You do not start to get better within 24 hours  · Swelling of your ankles gets worse  · Dizziness or weakness  Date Last Reviewed: 9/1/2016  © 9339-3116 [employer]. [address] [place] [address], [place] [address]. All rights reserved. This information is not intended as a substitute for professional medical care. Always follow your healthcare professional's instructions.        Treatment for COPD    Your healthcare provider will prescribe the best treatments for your COPD.  Treatment  Recommendations include the following:  · Medicines. Some medicines help relieve symptoms when you have them. Others are taken daily to control inflammation in the lungs. Always take your medicines as prescribed. Learn the names of your medicines, as well as how and when to use them.  · Oxygen therapy. Oxygen may be prescribed if tests show that your blood contains too little oxygen.  · Smoking. If you smoke, quit. Smoking is the main cause of COPD. Quitting will help you be able to better manage your COPD. Ask your healthcare provider about ways to help you quit smoking.  · Avoiding infections. Infections, like a cold or the flu, can cause your symptoms to worsen. Try to stay away from people who are sick. Wash your hands often. And, ask your healthcare provider about vaccines for the flu and pneumonia.  Coping with shortness of breath  Coping tips include the following:  · Exercise. Try to be as active as possible. This will improve energy levels and strengthen your muscles, so you can do more.  · Breathing techniques. Ask your healthcare provider or nurse to show you how to do pursed-lip breathing.  · Balance rest and activity. Each day, try to balance rest periods with activity. For example, you  might start the day with getting dressed and eating breakfast, then relax and read the paper. After that, take a brief walk. And then sit with your feet up for a while.  · Pulmonary rehabilitation. Ask your provider, or call your local hospital to find out about pulmonary rehab programs. The programs help with managing your disease, breathing techniques, exercise, support and counseling.  · Healthy eating. Eating a healthy, balanced diet and making an effort to maintain your ideal weight are important to staying as healthy as possible. Make sure you have a lot of fruit and vegetables every day, as well as balanced portions of whole grains, lean meats and fish, and low-fat dairy products.  Date Last Reviewed: 5/1/2016  © 9639-9073 [employer]. [address] [place] [address], [place] [address]. All rights reserved. This information is not intended as a substitute for professional medical care. Always follow your healthcare professional's instructions.        Chronic Lung Disease: Avoiding Irritants and Allergens    Many people with chronic lung disease, such as asthma or COPD, need to avoid irritants that can trigger symptoms making it more difficult to breathe. Irritants are certain substances in the air that irritate the airways. Some people are also sensitive to certain allergens. These are substances that cause inflammation in the lungs. Allergens can also cause runny nose, or itchy, watery eyes. You probably cant avoid all these things, all the time. But youll most likely breathe better if you stay away from the substances that bother you.   Try to avoid  Smoke. This includes cigarettes, cigars, pipes, and fireplaces.  · Dont smoke. And dont allow anyone else to smoke near you or in your home.  · Ask for smoke-free hotel rooms and rental cars.  · Make sure fireplaces and wood stoves are well ventilated, and sit well away from them.  Smog. This is made up of car exhaust and other air pollutants.  · Read  or listen to local air quality reports. These let you know when air quality is poor.  · Stay indoors as much as you can on smoggy days.  Strong odors. These include scented room fresheners, mothballs, and insect sprays. Perfume and cooking can be other causes of strong odors.  · Avoid using bleach and ammonia for cleaning.  · Use scent-free deodorant, lotion, and other products.  Other irritants. These include dust, aerosol sprays, and fine powders.  · Wear a mask while doing tasks like dusting, sweeping, and yard work.  Cold weather. This can make breathing more difficult.  · Protect your lungs by wearing a scarf over your nose and mouth.   You may also need to avoid  If you have allergies, you should try to avoid the allergens that cause them. Ask your healthcare provider if you need to avoid any of these:  Pollen. This is a fine powder made by trees, grasses, and weeds.  · Try to learn what types of pollen affect you the most. Pollen levels vary during the year.  · Avoid outdoor activities when pollen levels are high. Use air conditioning instead of opening the windows in your home and car.  Animal dander. This is shed by animals with fur or feathers. The particles can float through the air and stick to carpet, clothing, and furniture.  · Wash your hands and clothes after handling pets.  Dust mites. These are tiny bugs too small to see. They live in mattresses, bedding, carpets, curtains, and indoor dust.  · Wash bedding in hot water (130°F/54.4°C) each week.  · Cover mattresses and pillows with special mite-proof cases.  Mold. This grows in damp places, such as bathrooms, basements, and closets.  · Run an exhaust fan while bathing. Or, leave a window open in the bathroom.  · Use a dehumidifier in damp areas.  Date Last Reviewed: 5/1/2016  © 3984-0090 [employer]. [address] [place] [address], [place] [address]. All rights reserved. This information is not intended as a substitute for professional  medical care. Always follow your healthcare professional's instructions.        Chronic Lung Disease: Maximizing Your Energy  Fear of shortness of breath may stop you from being as active as you once were. You dont have to live this way. Managing your time and pacing yourself can help you conserve energy and do more. Its even OK if youre short of breath sometimes. You can learn to work through this without limiting your activities.    Manage your time  Shortness of breath can make everyday tasks take longer. This means theres less time to do the things you enjoy. You can help prevent this by managing your time. Try these tips:  · Plan ahead so your tasks are spaced throughout the day. As you plan, keep in mind the times of day you tend to have the most energy.  · Do only one thing at a time. Finish one task before starting another.  · Assemble everything you need before you start a task. This cuts out unnecessary steps while youre working.  · Think about what you really need to do. Be realistic about what you can get done in a day.  Balance activity and rest  When youre tired, your activities will take longer. Fatigue also makes you more prone to infection. To avoid fatigue:  · Stop and rest when you need to. Dont wait until youre overtired  · Alternate between hard tasks and easy ones.  · Give yourself plenty of time for each task, so you dont have to hurry.  · Take 20 to 30-minute rest breaks after meals and throughout the day.  · If an activity takes a lot of energy, break it into smaller parts. For instance, fold the laundry first. Then take a break before putting it away.  · Try not to exert yourself in extreme cold or heat.   Find ways to conserve energy  The way you use your body during a task can help you conserve energy. For some tasks, you can also use special aids designed to reduce the amount of energy needed. Here are some tips:  · Sit to dress and undress, shave, brush your teeth, and comb  your hair. Use a long-handled reacher to pull on socks and shoes.  · Sit on a bench to shower. Use warm water, not hot. (Steam can make breathing harder.) Dry off by wrapping yourself in a terrycloth robe.  · Use energy-saving appliances, such as an electric can opener, a power toothbrush, and a .  · Use a cart with wheels to move groceries, laundry, and other items around the house. Some carts have seats so you can rest when you need to.  · Keep the things you use most at waist level, so you can get them without reaching or bending.  Remember to breathe  People with chronic lung disease often try to avoid shortness of breath by rushing through tasks. This uses more energy and can actually increase shortness of breath. Instead, slow down and pace your breathing. These tips may help:  · Move slowly during tasks that take a lot of effort, such as climbing stairs or pushing a shopping cart.  · Use pursed-lip and diaphragmatic breathing while you go about a task.  · Exhale when you exert effort. For example, breathe out as you lift up a grocery bag. Once youre holding the bag, breathe in.  · Concentrate on taking slow, deep breaths. If your breathing is shallow, you dont take in as much air.  · Remember that its OK to be short of breath. Just pace yourself and do pursed-lip breathing.  Pursed-lip breathing  This type of breathing helps you exhale better. Breathing this way during activity will help you reduce shortness of breath:  · Relax your neck and shoulder muscles. Inhale slowly through your nose for 2 counts or more.  · Pucker your lips as if you are going to blow out a candle. Exhale slowly and gently through your lips for at least twice as long as you inhaled.   Date Last Reviewed: 5/1/2016  © 5577-3035 [employer]. [address] [place] [address], [place] [address]. All rights reserved. This information is not intended as a substitute for professional medical care. Always follow your  healthcare professional's instructions.        Chronic Lung Disease: Tips for Quitting Smoking  Cigarette smoke damages lung tissue and irritates airways, which makes breathing harder. Smoking also damages cilia (tiny hairs) in the airways, so the cilia cant do their job of clearing mucus, dirt, and germs from the lungs. Its never too late to quit smoking. Your health will start to improve on the same day you stub out your last cigarette.    You dont have to quit alone  You may be more likely to quit for good if you seek support from others.  · Talk with your healthcare provider about your plans to quit. Ask about medicines that can help. Some contain nicotine and some do not. Some are available by prescription. You can buy others over-the-counter. These medicines help control the desire to use tobacco and the uncomfortable symptoms people have when they try to quit. Others gradually lessen the level of nicotine in the body. Your healthcare provider can tell you about all the choices available including:  ¨ Oral medicines such as bupropion or varenicline  ¨ Nicotine replacement therapy such as gum, lozenge, a patch, inhaler, or nasal spray  · Join a support group or get advice from an ex-smoker.  · Ask other smokers in your household to quit with you.   Tips for quitting smoking  There isnt one right way to stop smoking. Everyone quits in his or her own way. Some of these tips may help:  · Make a list of reasons you want to quit. Keep this list and read it often.  · Pick a date to quit smoking. Then stick to it.  · List the things that make you want to smoke. Think of ways to avoid these triggers.  · Set goals for yourself, such as going for a week without smoking. Reward yourself when you meet your goals.  · If you dont quit the first time, keep trying! Many people have to try more than once before they stop smoking for good.     For more information  · smokefree.gov/irdb-mm-uj-expert  · National Cancer  [place] Smoking Quitline: 877-44U-QUIT (731-742-2883)   Date Last Reviewed: 5/1/2016  © 4389-2411 [employer]. [address] [place] [address], [place] [address]. All rights reserved. This information is not intended as a substitute for professional medical care. Always follow your healthcare professional's instructions.        What is COPD?  COPD stands for chronic obstructive pulmonary disease. It means the airways in your lungs are blocked (obstructed). Because of this, it is hard to breathe. You may have trouble with daily activities because of shortness of breath. Over time the shortness of breath usually worsens making it more and more difficult to take care of yourself and take part in activities. Chronic bronchitis and emphysema are two common types of COPD.  What happens in chronic bronchitis?    The cells in the airways make more mucus than normal. The mucus builds up, narrowing the airways. This means less air travels into and out of the lungs. The lining of the airways may also become inflamed (swollen) and causes the airways to narrow even more.        What happens in emphysema?    The small airways are damaged and lose their stretchiness. The airways collapse when you exhale, causing air to get trapped in the air sacs. This means that less oxygen enters the blood vessels and less oxygen is delivered to all of the cells of your body. This makes it hard to breathe.     Damage to cilia    Cilia are small hairs that line and protect the airways. Smoking damages the cilia. Damaged cilia cant sweep mucus and particles away. Some of the cilia are destroyed. This damage worsens COPD.  How did I get COPD?  Most people get COPD from smoking. Cigarette smoke damages lungs, which can develop into COPD over many years.  How COPD affects you  COPD makes you work harder to breathe. Air may get trapped in the lungs, which prevents your lungs from filling completely with fresh oxygen-filled air when you inhale  (breathe in). It's harder to take deep breaths especially when you are active and start breathing faster. Over time, your lungs may become enlarged filling the lung with air that does not transfer oxygen into the blood. These problems cause you to have shortness of breath (also called dyspnea). Wheezing (hoarse, whistling breathing), chronic cough, and fatigue (feeling tired and worn out) are also common.   Date Last Reviewed: 5/1/2016  © 7976-1468 [employer]. [address] [place] [address], [place] [address]. All rights reserved. This information is not intended as a substitute for professional medical care. Always follow your healthcare professional's instructions.

## 2018-11-06 NOTE — PROGRESS NOTES
11/6/18 - Summary:Phone contact made with pt today for completion of Initial Screen for OPCM. He requested that CM speak to his wife for completion of screen. He is a 62 y/o male with diagnoses of COPD, HTN heart disease with heart failuer, PVD, CAD and CKD, stage 4, hx of thyroid cancer, possible L lung cancer and RA. He lives alone most of the time in a travel trailer as his wife reports they have been  for a long time, but she comes to stay with him at times when he is having health issues. He is independent with all ADL's and IADL's other than she occasionally drives him to provider appts if they are in [place]. He does not currently use O2 or CPAP. He has an adequate understanding of COPD and his respiratory meds. He does not currently have a pulm provider here at [facility] and agrees to be referred to the Pulmonary Disease Management Class so a pulm provider can be set up for him. He has difficulty affording care and reports he recently was approved for assistance thru the [facility] Patient Financial Assistance Program. He does not currently have any type of Advanced Directive and agreed to CM mailing him a packet related to this. He also does not have an Emergency Preparedness Plan.     Interventions: Completed Initial Screen, Nursing Assessment, POC, PHQ 2 and Medication Reconciliation for OPCM with pt's wife, per his request related to hearing deficit making it difficult for him to communicate by phone. Advised CM will mail educational literature about COPD and will refer him to the [facility] Disease Management program.     Plan: Mail literature about COPD. Include Advanced Directives Packet and Emergency Preparedness Brochure. Contact Pul Disease Management providers and ask to schedule pt. Contact [facility] Patient Financial Assistance Program and find out number and contact name for pt to call monthly to renew coverage. Follow up in two weeks to see if pt has rec'd literature and has any questions  about it. Has he heard from the Pul Disease Management Program to schedule class? Has he completed Advanced Directive? Does he have any questions about Emergency Preparedness information? Has he got Patient Financial Assistance Program contact info for monthly renewal of coverage.     Todays OPCM Self-Management Care Plan was developed with the patients/caregivers input and was based on identified barriers from todays assessment.  Goals were written today with the patient/caregiver and the patient has agreed to work towards these goals to improve his/her overall well-being. Patient verbalized understanding of the care plan, goals, and all of today's instructions. Encouraged patient/caregiver to communicate with his/her physician and health care team about health conditions and the treatment plan.  Provided my contact information today and encouraged patient/caregiver to call me with any questions as needed.

## 2018-11-13 NOTE — PROGRESS NOTES
"Pulmonary Disease Management  [facility]  Initial Visit    Referring Provider: Dr Lawson  Diagnosis: COPD, unspecified  Last Hospital Admission: 4/19/18 (pneumonia)  Last provider visit: Dr Recinos      Current Outpatient Medications:        albuterol (VENTOLIN HFA) 90 mcg/actuation inhaler, INHALE 2 PUFFS BY MOUTH EVERY 4 HOURS IF NEEDED FOR WHEEZING, Disp: 18 g, Rfl: 11    fluticasone (FLONASE) 50 mcg/actuation nasal spray, 2 sprays by Each Nare route once daily., Disp: 1 Bottle, Rfl: 11    predniSONE (DELTASONE) 5 MG tablet, TAKE 1 TABLET EVERY DAY, Disp: 90 tablet, Rfl: 1    Review of patient's allergies indicates:   Allergen Reactions    Diovan  [valsartan] Swelling    Levofloxacin Other (See Comments)     Stomach pains    Lisinopril Swelling       Smoking history:   Social History     Tobacco Use   Smoking Status Current Every Day Smoker    Packs/day: 1.00    Years: 50.00    Pack years: 50.00    Types: Cigarettes   Smokeless Tobacco Never Used       Pulse: 86  SpO2: 97 %  Pulse Oximetry Type: Intermittent  Resp: 16  Height: 5' 10" (177.8 cm)  Weight: 58.8 kg (129 lb 11.9 oz)  BMI (kg/m2): 18.65    All Lung Fields Breath Sounds: diminished(diminished bases)  Cough Frequency: frequent(morning time)  Cough Type: productive  Sputum Amount: small  Sputum Color: clear  Sputum Consistency: thick    Resting Winsome Dyspnea Rating : 0      Current Oxygen Use: No      Does the patient use BiPAP, CPAP or Trilogy?: No           COPD Questionnaire:  COPD QUESTIONNAIRE  How often do you cough?: Some of the time  How often do you have phlegm (mucus) in your chest?: Some of the time  How often does your chest feel tight?: Almost never  When you walk up a hill or one flight of stairs, how often are you breathless?: Most of the time  How often are you limited doing any activities at home?: Some of the time  How often are you confident leaving the house despite your lung condition?: All of the time  How often do " "you sleep soundly?: All of the time  How often do you have energy?: Some of the time  Total score: 16    Has this patient had any pulmonary studies (PFTS)?: Yes(today)    PFT Results:Spirometry 11/13/18    No results found for: PREFVC-3.19 (70.3%), PREFEV1- 2.30 (66.1%),  [id number]        Method Used for Education: Literature, Demonstration, Verbal  Did the patient demonstrate understanding?: Yes  What tests has the patient had done today?: Spirometry    Patient Concerns or Expectations: none stated    Therapist Comments:   Reviewed medications purpose and proper technique of Ventolin HFA.   Patient was given and practiced proper technique using the Gauzy valved holding chamber. Demonstrated understanding using teach back method. Good technique practiced.     Patient uses Ventolin inhaler once in morning and once in the evening, regularly.  Uses more often as needed.    Patient does not have controller medications currently ordered.     Patient stated he has some SOB with exertion.  Reviewed and practiced "purse lip breathing" technique.      Reviewed self monitoring (worsening of) signs and symptoms for COPD.    Pre and post spirometry performed, see results    The patient was advised of the adverse effects of cigarette smoking including respiratory diseases.  Patient given information on [facility] smoking cessation program.     Plan:  Reinforce education  Meds: DORIAN, flonase  Action Plan: COPD  Immunization: Pneumococcal- due, Flu-patient declined  Next Provider Visit: none scheduled with Dr Recinos    Spirometry pre/post performed on 11/13/18    Approximate time spent with patient: 65 minutes      Wallace Vigil  was seen 11/13/2018  4:06 PM  in the Pulmonary Disease management clinic for evaluation, educate and reinforce self management techniques and exacerbation action plan.    Keyonna Herrmann    Past Medical History:   Diagnosis Date    Anxiety     Arthritis     Asthma     Atherosclerosis of " native arteries of the extremities with intermittent claudication     Back pain     Benign hypertensive heart disease without heart failure     Cancer     Carotid artery occlusion     Chronic kidney disease     COPD (chronic obstructive pulmonary disease)     Coronary artery disease     Emphysema of lung     Encounter for blood transfusion     GERD (gastroesophageal reflux disease)     Heart failure     History of aorto-femoral bypass 8/16/2013    Hyperlipidemia     Hypertension     Hypothyroidism     Iron deficiency anemia due to chronic blood loss 6/7/2018    PVD (peripheral vascular disease)     PVD (peripheral vascular disease) 8/16/2013    Renal artery stenosis 8/16/2013    Renovascular hypertension 8/16/2013    Rheumatoid arthritis     Shingles sept 2015    Stenosis of left subclavian artery 8/16/2013    Thyroid cancer     Thyroid disease     Thyroid cancer 3/1/2017    Tobacco abuse     Tobacco dependence     Trouble in sleeping           Medication List           Accurate as of 11/13/18  4:06 PM. If you have any questions, ask your nurse or doctor.               CONTINUE taking these medications    * albuterol 90 mcg/actuation inhaler  Commonly known as:  PROVENTIL/VENTOLIN HFA     * albuterol 90 mcg/actuation inhaler  Commonly known as:  VENTOLIN HFA  INHALE 2 PUFFS BY MOUTH EVERY 4 HOURS IF NEEDED FOR WHEEZING     ALPRAZolam 0.5 MG tablet  Commonly known as:  XANAX  TAKE 1 TABLET EVERY NIGHT AS NEEDED     amLODIPine 10 MG tablet  Commonly known as:  NORVASC  Take 1 tablet (10 mg total) by mouth once daily.     aspirin 81 mg Tab     cloNIDine 0.1 MG tablet  Commonly known as:  CATAPRES  Take 1 tablet (0.1 mg total) by mouth 2 (two) times daily.     clotrimazole 1 % cream  Commonly known as:  LOTRIMIN  Apply topically 2 (two) times daily.     cyanocobalamin (vitamin B-12) 1,000 mcg/mL Drop  Place 1,000 mcg under the tongue once daily.     diltiaZEM 240 MG 24 hr capsule  Commonly  known as:  CARTIA XT  Take 1 capsule (240 mg total) by mouth once daily.     doxazosin 4 MG tablet  Commonly known as:  CARDURA  Take 1 tablet (4 mg total) by mouth every evening.     EPINEPHrine 0.3 mg/0.3 mL Atin  Commonly known as:  EPIPEN  1 Pen Injector Intramuscular once     fluticasone 50 mcg/actuation nasal spray  Commonly known as:  FLONASE  2 sprays by Each Nare route once daily.     hydrALAZINE 100 MG tablet  Commonly known as:  APRESOLINE  TAKE 1 TABLET THREE TIMES DAILY     HYDROcodone-acetaminophen  mg per tablet  Commonly known as:  NORCO     lactulose 10 gram/15 mL solution  Commonly known as:  CHRONULAC  Take 30 ml every 12 hours PRN to achieve a good bowel movement     levothyroxine 125 MCG tablet  Commonly known as:  SYNTHROID  Take 1 tablet (125 mcg total) by mouth once daily.     pravastatin 20 MG tablet  Commonly known as:  PRAVACHOL  TAKE 1 TABLET BY MOUTH AT BEDTIME TO LOWER CHOLESTEROL     predniSONE 5 MG tablet  Commonly known as:  DELTASONE  TAKE 1 TABLET EVERY DAY     ranitidine 75 MG tablet  Commonly known as:  ZANTAC     traMADol 50 mg tablet  Commonly known as:  ULTRAM         * This list has 2 medication(s) that are the same as other medications prescribed for you. Read the directions carefully, and ask your doctor or other care provider to review them with you.                1955      Educational assessment:   []            Good  []            Fair  []            Poor    Readiness to learn:   []            Good  []            Fair  []            Poor    Vision Status:   []            Good  []            Fair  []            Poor    Reading Ability Ability:  []            Good  []            Fair  []            Poor    Knowledge of condition:   []            Good  []            Fair  []            Poor    Language Barriers:   []            Good  []            Fair  []            Poor    Cognitive/ Physical Barriers:   []            Good  []            Fair  []             Poor    Learning best by:                       []            Seeing  []            Hearing  []            Reading                         []            Doing    Describe any barrier /Limitation or financial implications of care choices identified     []            Financial  []            Emotional  []            Education  []            Vision/Hearing  []            Physical  []                TOPIC /CONTENT FOR TODAY:    []            MDI with or without spacer  []            Dry power inhaler  []            Accapella   []           Peak Flow meter  []            COPD action plan  []            Nebuliser use  []            Oxygen use safety  []            Breathing and cough techniques  []            Energy coservation  []            Infection prevention  []           OTHER________________________        Learner:    []            Patient   []            Caregiver    Method:    []            Verbal explanation  []            Audio visual    []            Literature  []            Teach back      Evaluation:    []            Teach back  []            Demonsatrate  []            Follow up phone call    []            2 weeks     []            4 weeks   []            PRN    Additional comments:     Wallace Vigil  was seen 11/13/2018  4:07 PM  in the Pulmonary Disease management clinic for evaluation, educate and reinforce self management techniques and exacerbation action plan.    Keyonna Herrmann    Past Medical History:   Diagnosis Date    Anxiety     Arthritis     Asthma     Atherosclerosis of native arteries of the extremities with intermittent claudication     Back pain     Benign hypertensive heart disease without heart failure     Cancer     Carotid artery occlusion     Chronic kidney disease     COPD (chronic obstructive pulmonary disease)     Coronary artery disease     Emphysema of lung     Encounter for blood transfusion     GERD (gastroesophageal reflux disease)     Heart failure      History of aorto-femoral bypass 8/16/2013    Hyperlipidemia     Hypertension     Hypothyroidism     Iron deficiency anemia due to chronic blood loss 6/7/2018    PVD (peripheral vascular disease)     PVD (peripheral vascular disease) 8/16/2013    Renal artery stenosis 8/16/2013    Renovascular hypertension 8/16/2013    Rheumatoid arthritis     Shingles sept 2015    Stenosis of left subclavian artery 8/16/2013    Thyroid cancer     Thyroid disease     Thyroid cancer 3/1/2017    Tobacco abuse     Tobacco dependence     Trouble in sleeping        Educational assessment:   [x]            Good  []            Fair  []            Poor    Readiness to learn:   []            Good  [x]            Fair  []            Poor    Vision Status:   [x]            Good  []            Fair  []            Poor    Reading Ability Ability:  [x]            Good  []            Fair  []            Poor    Knowledge of condition:   []            Good  []            Fair  []            Poor    Language Barriers:   [x]            Good  []            Fair  []            Poor    Cognitive/ Physical Barriers:   [x]            Good  []            Fair  []            Poor    Learning best by:                       [x]            Seeing  [x]            Hearing  []            Reading                         [x]            Doing    Describe any barrier /Limitation or financial implications of care choices identified     []            Financial  []            Emotional  []            Education  []            Vision/Hearing  []            Physical  []                TOPIC /CONTENT FOR TODAY:    [x]            MDI with or without spacer  []            Dry power inhaler  []            Accapella   []           Peak Flow meter  []            COPD action plan  []            Nebuliser use  []            Oxygen use safety  []            Breathing and cough techniques  [x]            Energy coservation  []            Infection prevention  []            OTHER________________________        Learner:    [x]            Patient   []            Caregiver    Method:    [x]            Verbal explanation  []            Audio visual    [x]            Literature  [x]            Teach back      Evaluation:    [x]            Teach back  [x]            Demonsatrate  []            Follow up phone call    []            2 weeks     [x]            4 weeks   []            PRN    Additional comments:           Review of Pulmonary Disease Management Note:  I have personally reviewed clinical notes, pulmonary function and laboratory data and respiratory care assessment. I agree with the exam and treatment plan with the exception or inclusion of  none.  I have reviewed the chart and also discussed the management and care of this  patient with respiratory care and other healthcare providers.     Keep follow up clinic appointment    Dez Lawson MD  Pulmonary / Critical Care Medicine

## 2018-11-20 NOTE — PROGRESS NOTES
18 - SUMMARY: Phone contact made with pt's estranged wife/caregiver, Ying, today for follow up with OPCM. She advised she has been staying with pt for the past few days related to him not feeling good. He attended a Pulm Disease Management appt, but does not yet have a pulm provider appt scheduled. He continues to smoke about a pack a day, per her report. He has rec'd the literature mailed by this CM, but she has not reviewed it. He has tried to call the Patient Financial Services Dept to renew his eligibility, but has been reaching a fax number. Advised CM will dial number given to pt and let her know if it is correct.   INTERVENTION: Reviewed upcoming appts and will send new appt letter, as some adjustments have been made that she reports they are not aware of. Discussed Smoking Cessation Program and she advised she will talk to pt about it. Discussed symptoms of exacerbation and need to seek treatment promptly if he experiences any. Advised mailed information will reinforce what was taught at Disease management class. Phone contact made with Libia in [facility] and confirmed her number. Her name and number were texted to Mrs Vigil, along with info that his coverage  on the 15 of this month and she or he needs to call to reapply so that he will have financial assistance available when next appts come up. Reviewed risk factors for COPD flare. She advised he has not had a flu or pneumonia shot this year. Encouraged her to talk to pt about this as well as this will reduce his risk of lila these if exposed. Reviewed Advanced Directive Packet, including Living Will and Medical Power of  and encouraged completion of one.   PLAN: Follow up in two weeks. Have they initiated the [employer] OTC or transportation programs? Did he contact [employer] to reapply? Have they discussed flu and pneumonia shots?       Todays OPCM Self-Management Care Plan was reviewed  with the patients/caregivers input based on identified barriers from todays and previous assessments.  Goals were reviewed today with the patient/caregiver and the patient has agreed to continue to work towards these goals to improve his/her overall well-being. Patient verbalized understanding of the care plan, goals, and all of today's instructions. Encouraged patient/caregiver to communicate with his/her physician and health care team about health conditions and the treatment plan.  Provided my contact information today and encouraged patient/caregiver to call me with any questions as needed. Lakia Khanna RN

## 2018-11-23 NOTE — PROGRESS NOTES
Incoming call. Pt canceled endoscopy procedure for 11/29/18. He stated he would rather wait until after the new year. Pt stated he will call in January 2019 to reschedule.     Appointment canceled.

## 2018-12-04 NOTE — PROGRESS NOTES
12/4/18 - SUMMARY: Phone contact made with pt today for follow up with OPCM. He advised he is feeling OK today, but has good days and bad days. He reports his bad days are usually related to arthritis pain. He is still smoking and agrees to this CM to sending him information about the Smoking Cessation Program available here at [facility]. He is aware of upcoming appts X 3 next week and plans to attend. He is unsure if he has rec'd the literature mailed by this CM and will check with his wife. He has been approved for assistance from [facility] and is unsure if he has kept it current by reapplying every 30 days, but knows this is necessary.   INTERVENTION: Encouraged him to contact the Financial Assistance program to update his application as he has 3 appts next week that may require copays if his application is not current. Discussed smoking status and he advised he is doing a little better with smoking, but not as good as he should be. Explained Smoking Cessation program available thru [facility] and he agreed to program info being mailed to him. Reviewed need to get flu shot related to COPD status that may cause him to have difficult time recovering from the flu if contracted. He advised he has gotten the flu from the flu shot every time he has taken it, but will think about taking it.   PLAN: Mail Smoking Cessation Program information. Discuss risk factors for and symptoms of COPD and measures to prevent COPD flare up. Have they contacted [employer] to access either transportation or OTC benefit?     Todays OPCM Self-Management Care Plan was reviewed with the patients/caregivers input based on identified barriers from todays and previous assessments.  Goals were reviewed today with the patient/caregiver and the patient has agreed to continue to work towards these goals to improve his/her overall well-being. Patient verbalized understanding of the care plan, goals, and all of today's  instructions. Encouraged patient/caregiver to communicate with his/her physician and health care team about health conditions and the treatment plan.  Provided my contact information today and encouraged patient/caregiver to call me with any questions as needed. Lakia Khanna RN

## 2018-12-13 NOTE — TELEPHONE ENCOUNTER
Jose E wanted to know if Jhoana wanted the patient to take 29185 mcg/mL SL B complex drop. Jhoana doesn't she states it is okay for the patient to take 1,000 or 2,000 sL OTC Tablets. Jose E states he will have patient  sL OTC tablets either from [facility] or another pharmacy.

## 2018-12-13 NOTE — LETTER
December 13, 2018      Jose Eaton II, MD  [address] [place] [address] [address] [place] [id number]           [facility] - Endocrinology  [address] [facility] [address]  [address]  [place] [address]  Phone: [phone]  Fax: [phone]          Patient: Wallace Vigil   MR Number: [id number]   YOB: 1955   Date of Visit: 12/13/2018       Dear Dr. Jose Eaton II:    Thank you for referring Wallace Vigil to me for evaluation. Attached you will find relevant portions of my assessment and plan of care.    A/P:    Thyroid cancer  Sp total thyroidectomy 3/1/17  Sp BRANDT 115 mCi 7/3/17  Path: left 4.2 cm FTC oncocytic variant, right 1.1 cm PTC (follicular variant encapsulated)  + Angioinvasion (focal)    -     Medication Pre-Authorization    Blood test for today:  -     TSH; Future; Expected date: 12/13/2018  -     T4, free; Future; Expected date: 12/13/2018  -     Thyroglobulin; Future; Expected date: 12/13/2018  -     Anti-thyroglobulin antibody; Future; Expected date: 12/13/2018    -     US Soft Tissue Head Neck Thyroid; Future; Expected date: 12/13/2018    Thyroid disease  Postsurgical hypothyroidism  To continue on the same dose of levothyroxine 125 mcg daily   Catchexia    Other orders  Will try to get Thyrogen approved.  -     thyrotropin sergo 0.9 mg daily x 2 days then  Thyroglobulin level to be done 2 days later.    History of significant other medical problems including lung cancer, COPD, CAD, peripheral vascular disease,  Inguinal adenopathy, rheumatoid arthritis, one kidney and other medical problems.    Pt understands the plan and instructions.   Appt in 4 weeks.    If you have questions, please do not hesitate to call me. I look forward to following Wallace Vigil along with you.    Sincerely,    Diallo Pascual MD    Enclosure  CC:  No Recipients    If you would like to receive this communication electronically, please contact [email or web address] or [phone] to request more information on [facility]  access.    For providers and/or their staff who would like to refer a patient to [facility], please contact us through our one-stop-shop provider referral line, Roni Donis, at [phone].    If you feel you have received this communication in error or would no longer like to receive these types of communications, please e-mail [email or web address]

## 2018-12-13 NOTE — TELEPHONE ENCOUNTER
----- Message from Muriel Hammer sent at 12/13/2018  8:32 AM CST -----  Contact: nimisha/ [facility] [phone]  States that he is calling to verify the rx for B12 sublingual. Please call back at [phone]//thank you acc

## 2018-12-13 NOTE — TELEPHONE ENCOUNTER
Chronic Disease Management  Called patient to complete Pulmonary Disease Management Questionnaire. Doing well.     Time  spent with patient:  2 Minutes

## 2018-12-13 NOTE — PROGRESS NOTES
Referring Provider:  Jose Eaton II, MD  PCP:  Mike Recinos MD    Reason for referral: Thyroid cancer    CC and HPI:  Wallace Vigil 63 y.o. male  Patient was referred because of thyroid cancer.  Patient was evaluated by an endocrinologist several months ago.  Patient was treated for thyroid cancer in 2017.  Had radioactive iodine treatment done after he had thyroidectomy.  He has been on levothyroxine 125 mcg tablet daily.  Patient said he was treated with radiation for his lung cancer  And he did not have surgery on his lung because the cancer is close to the heart per patient..  He has no complaints of  Tremors, dysphagia, chest pain, abdominal pain, or vomiting.  He feels he gets swelling sometimes in his legs by the end of the day.  He has extensive medical problems including COPD, lung cancer, thyroid cancer, CAD, 1 kidney, hypertension, peripheral vascular disease,  Rheumatoid arthritis,in addition to several other medical problems.    From the record he had thyroglobulin level 0.4 in association with TSH 19.  Last visit to endocrinologist seems to be from 04/2018.    The following is a copy from Dr. Lundberg note:  Patient is sp total thyroidecotmy on 2/28/17 and sp 115 mCi of BRANDT on 7/3/17  Pathology is noted showing a L sided 4.2 cm FTC and also 1.1 cm PTC on right side. 0/2 lymph nodes were positive.    ------------    Post therapy scan (7/10/17)  WHOLE BODY I-131 POST THERAPY SCAN        After administration of 115.3 mCi of oral I-131 on 7/3/2017, images of the body in the anterior and posterior projections were obtained with a gamma camera with high-energy collimation.   -------------  Imaging from 2017:    1.  Large hypodense mass involving the left lobe of the thyroid gland which essentially replaces the entire left lobe and demonstrates significant FDG uptake.  FNA of this mass is recommended.     -------------------    Sp total thyroidectomy 3/1/17  Path: left 4.2 cm FTC oncocytic variant,  right 1.1 cm PTC (follicular variant encapsulated)  + Angioinvasion (focal)    stim TG noted: 1.8 <-- 6.2  Most recent TG was 0.4 with a TSH of 19  Trend is overall very positive    Sp BRANDT 115 mCi 7/3/17  Post therapy scan - only uptake noted in the post surgical bed.  US done in August 2017 mentioning 2 soft tissue densities on the right fossa        Past Medical History:   Diagnosis Date    Anxiety     Arthritis     Asthma     Atherosclerosis of native arteries of the extremities with intermittent claudication     Back pain     Benign hypertensive heart disease without heart failure     Cancer     Carotid artery occlusion     Chronic kidney disease     COPD (chronic obstructive pulmonary disease)     Coronary artery disease     Emphysema of lung     Encounter for blood transfusion     GERD (gastroesophageal reflux disease)     Heart failure     History of aorto-femoral bypass 8/16/2013    Hyperlipidemia     Hypertension     Hypothyroidism     Iron deficiency anemia due to chronic blood loss 6/7/2018    PVD (peripheral vascular disease)     PVD (peripheral vascular disease) 8/16/2013    Renal artery stenosis 8/16/2013    Renovascular hypertension 8/16/2013    Rheumatoid arthritis     Shingles sept 2015    Stenosis of left subclavian artery 8/16/2013    Thyroid cancer     Thyroid disease     Thyroid cancer 3/1/2017    Tobacco abuse     Tobacco dependence     Trouble in sleeping        Past Surgical History:   Procedure Laterality Date    CARDIAC CATHETERIZATION      RENAL ARTERY BYPASS      2012    THYROIDECTOMY      VASCULAR SURGERY      Carotid cleaned out        Social History     Socioeconomic History    Marital status:      Spouse name: Not on file    Number of children: 2    Years of education: Not on file    Highest education level: Not on file   Social Needs    Financial resource strain: Not on file    Food insecurity - worry: Not on file    Food insecurity -  inability: Not on file    Transportation needs - medical: Not on file    Transportation needs - non-medical: Not on file   Occupational History    Not on file   Tobacco Use    Smoking status: Current Every Day Smoker     Packs/day: 1.00     Years: 50.00     Pack years: 50.00     Types: Cigarettes    Smokeless tobacco: Never Used   Substance and Sexual Activity    Alcohol use: No    Drug use: No    Sexual activity: Not Currently     Partners: Female     Birth control/protection: None   Other Topics Concern    Not on file   Social History Narrative    Not on file         ROS:   Thyroid cancer treated by surgery and radioactive iodine  + thyroid disorder  No swallowing problem  No Diabetes  No pain or itching in eyes  No chest pain   History of COPD  No abdominal pain and no vomiting  One kidney  Diffuse black pigmentation in arms  No pain or numbness in feet  History from a 3rd arthritis  ROS otherwise neg except for what is mentioned in the PMH, PSH and HPI    PE:  Vitals:    12/13/18 1524   BP: (!) 140/82   Pulse: 65   Temp: 97.8 °F (36.6 °C)     Alert and oriented  No acute distress  Patient looks somewhat cachectic  No Proptosis or conjunctivitis  Decreased Hearing  Dentures just bottom  No rash on tongue but it is dry somewhat   No goitre by inspection  Thyroid gland is not palpable  No cervical lymphadenopathy by palpation  Heart reg, no gallop  Lungs cta, no wheezing  Abd soft w/o tnd  No edema in lower legs  No tremor  Diffuse black pigmentation in forearms  No obesity      Lab:    Lab Results   Component Value Date    TSH 9.073 (H) 05/31/2018    M5KRUWD 65 02/06/2017    FREET4 1.16 05/31/2018       No components found for: AGBA1C  Lab Results   Component Value Date    CHOL 98 (L) 04/18/2018    TRIG 88 04/18/2018    HDL 33 (L) 04/18/2018    CHOLHDL 33.7 04/18/2018    TOTALCHOLEST 3.0 04/18/2018    NONHDLCHOL 65 04/18/2018     BMP  Lab Results   Component Value Date     12/10/2018    K 4.2  12/10/2018     12/10/2018    CO2 25 12/10/2018    BUN 27 (H) 12/10/2018    CREATININE 2.2 (H) 12/10/2018    CALCIUM 9.4 12/10/2018    ANIONGAP 9 12/10/2018    ESTGFRAFRICA 35.8 (A) 12/10/2018    EGFRNONAA 31.0 (A) 12/10/2018     Lab Results   Component Value Date    CREATRANDUR 168.0 01/06/2016       A/P:    Thyroid cancer  Sp total thyroidectomy 3/1/17  Sp BRANDT 115 mCi 7/3/17  Path: left 4.2 cm FTC oncocytic variant, right 1.1 cm PTC (follicular variant encapsulated)  + Angioinvasion (focal)    -     Medication Pre-Authorization    Blood test for today:  -     TSH; Future; Expected date: 12/13/2018  -     T4, free; Future; Expected date: 12/13/2018  -     Thyroglobulin; Future; Expected date: 12/13/2018  -     Anti-thyroglobulin antibody; Future; Expected date: 12/13/2018    -     US Soft Tissue Head Neck Thyroid; Future; Expected date: 12/13/2018    Thyroid disease  Postsurgical hypothyroidism  To continue on the same dose of levothyroxine 125 mcg daily   Catchexia    Other orders  Will try to get Thyrogen approved.  -     thyrotropin sergo 0.9 mg daily x 2 days then  Thyroglobulin level to be done 2 days later.    History of significant other medical problems including lung cancer, COPD, CAD, peripheral vascular disease,  Inguinal adenopathy, rheumatoid arthritis, one kidney and other medical problems.    Pt understands the plan and instructions.   Appt in 4 weeks.

## 2018-12-13 NOTE — PATIENT INSTRUCTIONS
If you do not get a call within 2 weeks regarding  A muscle  injection for a test on your thyroid, please call us.

## 2018-12-15 NOTE — PROGRESS NOTES
Subjective:      Patient ID: Wallace Vigil is a 63 y.o. male.    Chief Complaint:  fatigue    HPI:  Patient is a 63 year old male who here for follow up of recurring iron deficiency anemia and CKD IV.  He also is being followed for history of follicular thyroid cancer and states that he is going to be following up with endocrinology in the next couple of days.  In 2013 he had an EGD done which showed gastritis with hemorrhage.  He states that he will be repeating colonoscopy and EGD in 2/2018.      Patient also has documented B-12 deficiency.  He states that he does not take B-12 as prescribed.  He was taught B-12 deficiency could cause anemia as well.  He states that he will start taking medication as ordered.  As of 12/10/18, his iron is replenished.   He states that he continues to smoke cigarettes and has smoked 1 pack a day for 50 years.       Social History     Socioeconomic History    Marital status:      Spouse name: Not on file    Number of children: 2    Years of education: Not on file    Highest education level: Not on file   Social Needs    Financial resource strain: Not on file    Food insecurity - worry: Not on file    Food insecurity - inability: Not on file    Transportation needs - medical: Not on file    Transportation needs - non-medical: Not on file   Occupational History    Not on file   Tobacco Use    Smoking status: Current Every Day Smoker     Packs/day: 1.00     Years: 50.00     Pack years: 50.00     Types: Cigarettes    Smokeless tobacco: Never Used   Substance and Sexual Activity    Alcohol use: No    Drug use: No    Sexual activity: Not Currently     Partners: Female     Birth control/protection: None   Other Topics Concern    Not on file   Social History Narrative    Not on file       Family History   Problem Relation Age of Onset    Hypertension Mother     Hyperlipidemia Mother     Heart failure Mother     Heart disease Mother     Hyperlipidemia Father      COPD Father     Diabetes Daughter     Diabetes Daughter        Past Surgical History:   Procedure Laterality Date    CARDIAC CATHETERIZATION      RENAL ARTERY BYPASS      2012    THYROIDECTOMY      VASCULAR SURGERY      Carotid cleaned out        Past Medical History:   Diagnosis Date    Anxiety     Arthritis     Asthma     Atherosclerosis of native arteries of the extremities with intermittent claudication     Back pain     Benign hypertensive heart disease without heart failure     Cancer     Carotid artery occlusion     Chronic kidney disease     COPD (chronic obstructive pulmonary disease)     Coronary artery disease     Emphysema of lung     Encounter for blood transfusion     GERD (gastroesophageal reflux disease)     Heart failure     History of aorto-femoral bypass 8/16/2013    Hyperlipidemia     Hypertension     Hypothyroidism     Iron deficiency anemia due to chronic blood loss 6/7/2018    PVD (peripheral vascular disease)     PVD (peripheral vascular disease) 8/16/2013    Renal artery stenosis 8/16/2013    Renovascular hypertension 8/16/2013    Rheumatoid arthritis     Shingles sept 2015    Stenosis of left subclavian artery 8/16/2013    Thyroid cancer     Thyroid disease     Thyroid cancer 3/1/2017    Tobacco abuse     Tobacco dependence     Trouble in sleeping        Review of Systems   Constitutional: Positive for fatigue (sometimes). Negative for chills and fever.   HENT: Negative for nosebleeds and trouble swallowing.    Respiratory: Positive for shortness of breath (with exertion). Negative for chest tightness.    Cardiovascular: Negative for chest pain and palpitations.   Gastrointestinal: Positive for abdominal pain (occassional). Negative for anal bleeding, blood in stool, diarrhea, nausea and vomiting.   Endocrine: Negative for cold intolerance and heat intolerance.   Genitourinary: Negative for dysuria and hematuria.   Skin: Negative for rash and wound.    Neurological: Positive for headaches (on/off thinks related to sinus issues). Negative for dizziness, syncope and light-headedness.   Hematological: Negative for adenopathy. Does not bruise/bleed easily.   Psychiatric/Behavioral: Negative for confusion and decreased concentration.             Medication List           Accurate as of 12/12/18 11:59 PM. If you have any questions, ask your nurse or doctor.               CONTINUE taking these medications    * albuterol 90 mcg/actuation inhaler  Commonly known as:  PROVENTIL/VENTOLIN HFA     * albuterol 90 mcg/actuation inhaler  Commonly known as:  VENTOLIN HFA  INHALE 2 PUFFS BY MOUTH EVERY 4 HOURS IF NEEDED FOR WHEEZING     ALPRAZolam 0.5 MG tablet  Commonly known as:  XANAX  TAKE 1 TABLET EVERY NIGHT AS NEEDED     amLODIPine 10 MG tablet  Commonly known as:  NORVASC  Take 1 tablet (10 mg total) by mouth once daily.     aspirin 81 mg Tab     cloNIDine 0.1 MG tablet  Commonly known as:  CATAPRES  Take 1 tablet (0.1 mg total) by mouth 2 (two) times daily.     clotrimazole 1 % cream  Commonly known as:  LOTRIMIN  Apply topically 2 (two) times daily.     cyanocobalamin (vitamin B-12) 1,000 mcg/mL Drop  Place 1,000 mcg under the tongue once daily.     diltiaZEM 240 MG 24 hr capsule  Commonly known as:  CARTIA XT  Take 1 capsule (240 mg total) by mouth once daily.     doxazosin 4 MG tablet  Commonly known as:  CARDURA  Take 1 tablet (4 mg total) by mouth every evening.     EPINEPHrine 0.3 mg/0.3 mL Atin  Commonly known as:  EPIPEN  1 Pen Injector Intramuscular once     fluticasone 50 mcg/actuation nasal spray  Commonly known as:  FLONASE  2 sprays by Each Nare route once daily.     hydrALAZINE 100 MG tablet  Commonly known as:  APRESOLINE  TAKE 1 TABLET THREE TIMES DAILY     HYDROcodone-acetaminophen  mg per tablet  Commonly known as:  NORCO     lactulose 10 gram/15 mL solution  Commonly known as:  CHRONULAC  Take 30 ml every 12 hours PRN to achieve a good bowel  movement     levothyroxine 125 MCG tablet  Commonly known as:  SYNTHROID  Take 1 tablet (125 mcg total) by mouth once daily.     pravastatin 20 MG tablet  Commonly known as:  PRAVACHOL  TAKE 1 TABLET BY MOUTH AT BEDTIME TO LOWER CHOLESTEROL     predniSONE 5 MG tablet  Commonly known as:  DELTASONE  TAKE 1 TABLET EVERY DAY     ranitidine 75 MG tablet  Commonly known as:  ZANTAC     traMADol 50 mg tablet  Commonly known as:  ULTRAM         * This list has 2 medication(s) that are the same as other medications prescribed for you. Read the directions carefully, and ask your doctor or other care provider to review them with you.               Where to Get Your Medications      These medications were sent to [facility] in [facility] [place] - [address], [facility] [address]  [address], [facility] [address], [facility] [address]    Phone:  [phone]   · cyanocobalamin (vitamin B-12) 1,000 mcg/mL Drop          Objective:     Vitals:    12/12/18 1520   BP: (!) 141/67   Pulse: 63   Resp: 17   Temp: 97.9 °F (36.6 °C)       Physical Exam   Constitutional: He is oriented to person, place, and time. He appears well-developed and well-nourished.  Non-toxic appearance. He does not have a sickly appearance. He does not appear ill. No distress.   HENT:   Head: Normocephalic and atraumatic.   Right Ear: External ear normal.   Left Ear: External ear normal.   Nose: Nose normal.   Eyes: Conjunctivae, EOM and lids are normal. Right eye exhibits no discharge. Left eye exhibits no discharge. No scleral icterus.   Neck: Normal range of motion.   Cardiovascular: Normal rate, regular rhythm, S1 normal, S2 normal and normal heart sounds. Exam reveals no gallop and no friction rub.   No murmur heard.  Pulmonary/Chest: Effort normal. No accessory muscle usage or stridor. No apnea, no tachypnea and no bradypnea. No respiratory distress. He has no decreased breath sounds. He has no wheezes. He has no rales.   Abdominal: Soft. Normal appearance. He  exhibits no distension.   Musculoskeletal: Normal range of motion. He exhibits no edema.   Neurological: He is alert and oriented to person, place, and time.   Skin: Skin is warm, dry and intact. Capillary refill takes less than 2 seconds. Bruising noted. No lesion and no rash noted. He is not diaphoretic. No pallor.   Psychiatric: He has a normal mood and affect. His speech is normal and behavior is normal. Judgment and thought content normal. He is not actively hallucinating. Cognition and memory are normal. He is attentive.   Vitals reviewed.      Assessment:     Problem List Items Addressed This Visit        Renal/    Stage 4 chronic kidney disease    Relevant Orders    CBC auto differential    Basic metabolic panel       Oncology    Iron deficiency anemia due to chronic blood loss    Relevant Orders    CBC auto differential    Ferritin    Iron and TIBC      Other Visit Diagnoses     B12 deficiency    -  Primary    Relevant Medications    cyanocobalamin, vitamin B-12, 1,000 mcg/mL Drop    Other Relevant Orders    CBC auto differential    Vitamin B12          Plan:   B12 deficiency  -     Discontinue: cyanocobalamin, vitamin B-12, 1,000 mcg/mL Drop; Place 1,000 mcg under the tongue once daily.  Dispense: 200 mL; Refill: 6  -     CBC auto differential; Future; Expected date: 12/12/2018  -     Vitamin B12; Future; Expected date: 12/12/2018  -     cyanocobalamin, vitamin B-12, 1,000 mcg/mL Drop; Place 1,000 mcg under the tongue once daily.  Dispense: 200 mL; Refill: 6    Iron deficiency anemia due to chronic blood loss  -     CBC auto differential; Future; Expected date: 12/12/2018  -     Ferritin; Future; Expected date: 12/12/2018  -     Iron and TIBC; Future; Expected date: 12/12/2018    Stage 4 chronic kidney disease  -     CBC auto differential; Future; Expected date: 12/12/2018  -     Basic metabolic panel; Future; Expected date: 12/12/2018    Smoking cessation discussed with patient.  Reinforcement needed.   He refused flu shot today.  He will follow up in 3 months and see Dr. Sandra post CT scan of lungs done by Dr. Eaton.       I will review assessment/plan with collaborating physician.    Thank You,  JANIA Alvarez

## 2018-12-18 NOTE — TELEPHONE ENCOUNTER
I have called the pt and explained the abn US, and the good result so far for Tg, and taht we are still working to get the inj ( Thyrogen) approved for further eval.

## 2018-12-18 NOTE — PROGRESS NOTES
"12/18/18 - SUMMARY: Phone contact made with pt's wife today for follow up with [facility]. He was also present, and she frequently relayed CM's questions to him for response. She reports he is not currently having any symptoms of COPD exacerbation. He has had several appts lately, including a follow up with endocrinology. He had a thyroid US yesterday and she reports he is worried and anxious to know results. She advised he is having GI symptoms that sound like reflux as he has gastric irritation after eating that is relieved by Zantac, if he takes it. He has an open order for an EGD, but he advised he does not want to have this until after the holidays. He does not want to participate in the Digital HTN program and she voiced concern about why he still takes so many HTN meds after "he's had everything cleaned out". She advised pt has attempted to make phone contact with the [employer] dept, but has not reached anyone and when a voice mail was left requesting a return call, none came. She does report he has not had to pay copays for any recent appts.   INTERVENTION: Advised  - CM will message Dr Pascual's staff to advise of pt's request to have thyroid US results phoned to him when available.   - CM text messaged another contact number for the Patient Financial Services dept.  - CM offered to contact Dr Recinos about poorly controlled GI irritation symptoms, but she declined, stating pt will follow up with scheduling EGD as recommended after the holidays. CM encouraged him to take Zantac daily, if needed, as he reports good relief of symptoms with use.   - Advised pt's BP at most recent provider appt was 140/82, which is considered mildly hypertensive, so he still needs to take all meds as ordered for management of HTN. Encouraged her to schedule a follow up appt for pt with his cardio provider to discuss their concerns about taking too many BP meds.   - Reviewed all mailed literature, including Advanced " Directive, Emergency Preparedness Brochure, [employer] Info sheet and COPD educational literature. Discussed risk factors related to COPD and ways to avoid exposure to people, things that may cause symptoms of exacerbation.   PLAN: Follow up in two weeks to remind to schedule EGD after holidays. Review symptoms of COPD exacerbation. Plan to d/c from [facility] if no new problems.     Todays [facility] Self-Management Care Plan was reviewed with the patients/caregivers input based on identified barriers from todays and previous assessments.  Goals were reviewed today with the patient/caregiver and the patient has agreed to continue to work towards these goals to improve his/her overall well-being. Patient verbalized understanding of the care plan, goals, and all of today's instructions. Encouraged patient/caregiver to communicate with his/her physician and health care team about health conditions and the treatment plan.  Provided my contact information today and encouraged patient/caregiver to call me with any questions as needed.

## 2018-12-18 NOTE — TELEPHONE ENCOUNTER
Called inquiring about medication Thyrotropin sergo injection for patient.  After looking up patient insurance coverage which is [employer] medicare.  The medication is covered under Medicare Part B.  Lakia will get with the Specialty team to see what his co-pay would be 80/20.  Maybe cancer services could assist with co-pay assistance.  The [facility] is closed at the moment.

## 2018-12-31 NOTE — ED PROVIDER NOTES
"SCRIBE #1 NOTE: I, Marie Mcclendon, am scribing for, and in the presence of, Ledy Pollock MD. I have scribed the entire note.      History      Chief Complaint   Patient presents with    Shortness of Breath     Pt states, "I don't feel good, very short of breath."       Review of patient's allergies indicates:   Allergen Reactions    Diovan  [valsartan] Swelling    Levofloxacin Other (See Comments)     Stomach pains    Lisinopril Swelling        HPI   HPI    12/31/2018, 10:34 AM   History obtained from the patient      History of Present Illness: Wallace Vigil is a 63 y.o. male patient with a PMHx of HTN, COPD, CAD, GERD, HLD, PVD, thyroid cancer s/p thyroidectomy, who presents to the Emergency Department for worsening SOB which onset gradually 2 days ago. Symptoms are constant and moderate in severity.  No mitigating or exacerbating factors reported. Associated sxs include fever this morning, HA, and abd pain. Pt also reports decrease in appetite, but has been drinking fluids. Patient denies any CP, diaphoresis, palpitations, extremity weakness/numbness, leg pain/swelling, dizziness, cough, n/v/d, dysuria, hematuria, blood in stool, constipation, neck pain/stiffness, visual disturbance, chills, and all other sxs at this time. Prior Tx includes 81 mg ASA PTA. Pt was receiving radiation for thyroid cancer, last tx was in August 2018. No further complaints or concerns at this time.       Arrival mode: Personal vehicle     PCP: Mike Recinos MD       Past Medical History:  Past Medical History:   Diagnosis Date    Anxiety     Arthritis     Asthma     Atherosclerosis of native arteries of the extremities with intermittent claudication     Back pain     Benign hypertensive heart disease without heart failure     Cancer     Carotid artery occlusion     Chronic kidney disease     COPD (chronic obstructive pulmonary disease)     Coronary artery disease     Emphysema of lung     Encounter for blood " transfusion     GERD (gastroesophageal reflux disease)     Heart failure     History of aorto-femoral bypass 8/16/2013    Hyperlipidemia     Hypertension     Hypothyroidism     Iron deficiency anemia due to chronic blood loss 6/7/2018    PVD (peripheral vascular disease)     PVD (peripheral vascular disease) 8/16/2013    Renal artery stenosis 8/16/2013    Renovascular hypertension 8/16/2013    Rheumatoid arthritis     Shingles sept 2015    Stenosis of left subclavian artery 8/16/2013    Thyroid cancer     Thyroid disease     Thyroid cancer 3/1/2017    Tobacco abuse     Tobacco dependence     Trouble in sleeping        Past Surgical History:  Past Surgical History:   Procedure Laterality Date    CARDIAC CATHETERIZATION      RENAL ARTERY BYPASS      2012    THYROIDECTOMY      VASCULAR SURGERY      Carotid cleaned out          Family History:  Family History   Problem Relation Age of Onset    Hypertension Mother     Hyperlipidemia Mother     Heart failure Mother     Heart disease Mother     Hyperlipidemia Father     COPD Father     Diabetes Daughter     Diabetes Daughter        Social History:  Social History     Tobacco Use    Smoking status: Current Every Day Smoker     Packs/day: 1.00     Years: 50.00     Pack years: 50.00     Types: Cigarettes    Smokeless tobacco: Never Used   Substance and Sexual Activity    Alcohol use: No    Drug use: No    Sexual activity: Not Currently     Partners: Female     Birth control/protection: None       ROS   Review of Systems   Constitutional: Positive for appetite change (decreased) and fever. Negative for activity change, chills and diaphoresis.   HENT: Negative for congestion, ear pain and trouble swallowing.    Eyes: Negative for photophobia, pain and visual disturbance.   Respiratory: Positive for shortness of breath. Negative for cough, chest tightness and wheezing.    Cardiovascular: Negative for chest pain, palpitations and leg swelling.    Gastrointestinal: Positive for abdominal pain. Negative for abdominal distention, blood in stool, constipation, diarrhea, nausea and vomiting.   Genitourinary: Negative for difficulty urinating, dysuria, frequency, hematuria and urgency.   Musculoskeletal: Negative for arthralgias, back pain, myalgias, neck pain and neck stiffness.   Skin: Negative for rash and wound.   Neurological: Positive for headaches. Negative for dizziness, syncope, weakness and numbness.   Hematological: Does not bruise/bleed easily.   All other systems reviewed and are negative.      Physical Exam      Initial Vitals [12/31/18 1027]   BP Pulse Resp Temp SpO2   (!) 143/71 88 20 98.8 °F (37.1 °C) (!) 94 %      MAP       --          Physical Exam  Nursing Notes and Vital Signs Reviewed.  Constitutional: Patient is in no acute distress. Cachectic.  Head: Atraumatic. Normocephalic.  Eyes: PERRL. EOM intact. Conjunctivae are not pale. No scleral icterus.  ENT: Mucous membranes are moist. Oropharynx is clear and symmetric.    Neck: Supple. Full ROM. No lymphadenopathy.  Cardiovascular: Regular rate. Regular rhythm. No murmurs, rubs, or gallops. Distal pulses are 2+ and symmetric.  Pulmonary/Chest: Diminished breath sounds bilaterally. No wheezing or rales.  Abdominal: Soft and non-distended.  There is no tenderness.  No rebound, guarding, or rigidity.   Musculoskeletal: Moves all extremities. No obvious deformities. No edema. No calf tenderness.  Skin: Warm and dry.  Neurological:  Alert, awake, and appropriate.  Normal speech.  No acute focal neurological deficits are appreciated.  Psychiatric: Normal affect. Good eye contact. Appropriate in content.    ED Course    Procedures  ED Vital Signs:  Vitals:    12/31/18 1027 12/31/18 1036 12/31/18 1143 12/31/18 1443   BP: (!) 143/71  (!) 142/69 (!) 144/74   Pulse: 88  69 68   Resp: 20  (!) 21 (!) 21   Temp: 98.8 °F (37.1 °C)   98.9 °F (37.2 °C)   TempSrc: Oral      SpO2: (!) 94% 99% 100% 100%  "  Weight: 59.1 kg (130 lb 2.9 oz)      Height: 5' 11" (1.803 m)          Abnormal Lab Results:  Labs Reviewed   CBC W/ AUTO DIFFERENTIAL - Abnormal; Notable for the following components:       Result Value    RBC 3.63 (*)     Hemoglobin 11.4 (*)     Hematocrit 33.9 (*)     MCH 31.4 (*)     Platelets 100 (*)     MPV 8.7 (*)     Lymph # 0.8 (*)     Gran% 86.4 (*)     Lymph% 9.7 (*)     Mono% 3.4 (*)     Platelet Estimate Decreased (*)     All other components within normal limits   COMPREHENSIVE METABOLIC PANEL - Abnormal; Notable for the following components:    Sodium 135 (*)     CO2 20 (*)     BUN, Bld 30 (*)     Creatinine 1.9 (*)     ALT <5 (*)     eGFR if  43 (*)     eGFR if non  37 (*)     All other components within normal limits   URINALYSIS - Abnormal; Notable for the following components:    Specific Gravity, UA <=1.005 (*)     All other components within normal limits   INFLUENZA A & B BY MOLECULAR   CULTURE, BLOOD   CULTURE, BLOOD   LACTIC ACID, PLASMA   TROPONIN I   B-TYPE NATRIURETIC PEPTIDE        All Lab Results:  Results for orders placed or performed during the hospital encounter of 12/31/18   Influenza A & B by Molecular   Result Value Ref Range    Influenza A, Molecular Negative Negative    Influenza B, Molecular Negative Negative    Flu A & B Source NP    CBC auto differential   Result Value Ref Range    WBC 8.29 3.90 - 12.70 K/uL    RBC 3.63 (L) 4.60 - 6.20 M/uL    Hemoglobin 11.4 (L) 14.0 - 18.0 g/dL    Hematocrit 33.9 (L) 40.0 - 54.0 %    MCV 93 82 - 98 fL    MCH 31.4 (H) 27.0 - 31.0 pg    MCHC 33.6 32.0 - 36.0 g/dL    RDW 13.3 11.5 - 14.5 %    Platelets 100 (L) 150 - 350 K/uL    MPV 8.7 (L) 9.2 - 12.9 fL    Gran # (ANC) 7.2 1.8 - 7.7 K/uL    Lymph # 0.8 (L) 1.0 - 4.8 K/uL    Mono # 0.3 0.3 - 1.0 K/uL    Eos # 0.0 0.0 - 0.5 K/uL    Baso # 0.02 0.00 - 0.20 K/uL    Gran% 86.4 (H) 38.0 - 73.0 %    Lymph% 9.7 (L) 18.0 - 48.0 %    Mono% 3.4 (L) 4.0 - 15.0 %    " Eosinophil% 0.4 0.0 - 8.0 %    Basophil% 0.2 0.0 - 1.9 %    Platelet Estimate Decreased (A)     Aniso Slight     Poik Slight     Differential Method Automated    Comprehensive metabolic panel   Result Value Ref Range    Sodium 135 (L) 136 - 145 mmol/L    Potassium 4.1 3.5 - 5.1 mmol/L    Chloride 103 95 - 110 mmol/L    CO2 20 (L) 23 - 29 mmol/L    Glucose 92 70 - 110 mg/dL    BUN, Bld 30 (H) 8 - 23 mg/dL    Creatinine 1.9 (H) 0.5 - 1.4 mg/dL    Calcium 9.4 8.7 - 10.5 mg/dL    Total Protein 7.4 6.0 - 8.4 g/dL    Albumin 3.5 3.5 - 5.2 g/dL    Total Bilirubin 0.5 0.1 - 1.0 mg/dL    Alkaline Phosphatase 73 55 - 135 U/L    AST 12 10 - 40 U/L    ALT <5 (L) 10 - 44 U/L    Anion Gap 12 8 - 16 mmol/L    eGFR if African American 43 (A) >60 mL/min/1.73 m^2    eGFR if non African American 37 (A) >60 mL/min/1.73 m^2   Urinalysis - Clean Catch   Result Value Ref Range    Specimen UA Urine, Clean Catch     Color, UA Yellow Yellow, Straw, Nora    Appearance, UA Clear Clear    pH, UA 6.0 5.0 - 8.0    Specific Gravity, UA <=1.005 (A) 1.005 - 1.030    Protein, UA Negative Negative    Glucose, UA Negative Negative    Ketones, UA Negative Negative    Bilirubin (UA) Negative Negative    Occult Blood UA Negative Negative    Nitrite, UA Negative Negative    Urobilinogen, UA Negative <2.0 EU/dL    Leukocytes, UA Negative Negative   Lactic acid, plasma   Result Value Ref Range    Lactate (Lactic Acid) 1.0 0.5 - 2.2 mmol/L   Troponin I   Result Value Ref Range    Troponin I 0.026 0.000 - 0.026 ng/mL   B-Type natriuretic peptide (BNP)   Result Value Ref Range    BNP 64 0 - 99 pg/mL         Imaging Results:  Imaging Results          X-Ray Chest AP Portable (Final result)  Result time 12/31/18 11:14:03    Final result by Andres Castro Jr., MD (12/31/18 11:14:03)                 Impression:      Right lower lobe pulmonary markings, as above.      Electronically signed by: Andres Castro MD  Date:    12/31/2018  Time:    11:14              Narrative:    EXAMINATION:  XR CHEST AP PORTABLE    CLINICAL HISTORY:  Chest Pain;    COMPARISON:  06/28/2017 chest x-ray.  10/29/2018 PET tumor imaging    FINDINGS:  Heart size is normal.  Surgical clips in the lower neck soft tissues, consistent with prior thyroid surgery.  O2 nasal cannula appears present..  Left lung appears clear.  Interval development of some increased markings in the right lower lobe.  Possibilities include atelectasis and/or developing infiltrate.  No large effusions..  No developing discrete focal pulmonary mass.  Scattered aortic calcifications.  Degenerative changes in the spine and visualized right shoulder.                               The EKG was ordered, reviewed, and independently interpreted by the ED provider.  Interpretation time: 1114  Rate: 69 BPM  Rhythm: normal sinus rhythm  Interpretation: T wave abnormality. No STEMI.             The Emergency Provider reviewed the vital signs and test results, which are outlined above.    ED Discussion     2:45 PM: Re-evaluated pt. Pt is resting comfortably and is in no acute distress. Pt has been sleeping. Pt states that he feels better at this time.  D/w pt all pertinent results. D/w pt any concerns expressed at this time. Answered all questions. Pt expresses understanding at this time.    3:45 PM: Reassessed pt at this time.  Pt states his condition has improved at this time. Pt's O2 sat improved. Discussed with pt all pertinent ED information and results. Discussed pt dx and plan of tx. Gave pt all f/u and return to the ED instructions. All questions and concerns were addressed at this time. Pt expresses understanding of information and instructions, and is comfortable with plan to discharge. Pt is stable for discharge.    I discussed with patient and/or family/caretaker that evaluation in the ED does not suggest any emergent or life threatening medical conditions requiring immediate intervention beyond what was provided in the ED, and  I believe patient is safe for discharge.  Regardless, an unremarkable evaluation in the ED does not preclude the development or presence of a serious of life threatening condition. As such, patient was instructed to return immediately for any worsening or change in current symptoms.    Current Discharge Medication List      START taking these medications    Details   azithromycin (Z-INDIA) 250 MG tablet Take 1 tablet (250 mg total) by mouth once daily. Take first 2 tablets together, then 1 every day until finished.  Qty: 6 tablet, Refills: 0         CONTINUE these medications which have NOT CHANGED    Details   !! albuterol (PROVENTIL/VENTOLIN HFA) 90 mcg/actuation inhaler INHALE 2 PUFFS BY MOUTH EVERY 4 HOURS IF NEEDED FOR WHEEZING      !! albuterol (VENTOLIN HFA) 90 mcg/actuation inhaler INHALE 2 PUFFS BY MOUTH EVERY 4 HOURS IF NEEDED FOR WHEEZING  Qty: 18 g, Refills: 11    Comments: 03/28/2016 12:38:54 PM      ALPRAZolam (XANAX) 0.5 MG tablet TAKE 1 TABLET EVERY NIGHT AS NEEDED  Qty: 21 tablet, Refills: 0    Associated Diagnoses: Anxiety      amLODIPine (NORVASC) 10 MG tablet Take 1 tablet (10 mg total) by mouth once daily.  Qty: 90 tablet, Refills: 3      aspirin 81 mg Tab Take 1 tablet by mouth Daily. 1 Tablet Oral Every day      cloNIDine (CATAPRES) 0.1 MG tablet Take 1 tablet (0.1 mg total) by mouth 2 (two) times daily.  Qty: 180 tablet, Refills: 3      clotrimazole (LOTRIMIN) 1 % cream Apply topically 2 (two) times daily.  Qty: 113 g, Refills: 1      cyanocobalamin, vitamin B-12, 1,000 mcg/mL Drop Place 1,000 mcg under the tongue once daily.  Qty: 200 mL, Refills: 6    Associated Diagnoses: B12 deficiency      diltiaZEM (CARTIA XT) 240 MG 24 hr capsule Take 1 capsule (240 mg total) by mouth once daily.  Qty: 90 capsule, Refills: 3      doxazosin (CARDURA) 4 MG tablet Take 1 tablet (4 mg total) by mouth every evening.  Qty: 90 tablet, Refills: 3      epinephrine (EPIPEN) 0.3 mg/0.3 mL (1:1,000) AtIn 1 Pen  Injector Intramuscular once  Qty: 1 Device, Refills: 5      fluticasone (FLONASE) 50 mcg/actuation nasal spray 2 sprays by Each Nare route once daily.  Qty: 1 Bottle, Refills: 11    Associated Diagnoses: Nasal turbinate hypertrophy      hydrALAZINE (APRESOLINE) 100 MG tablet TAKE 1 TABLET THREE TIMES DAILY  Qty: 270 tablet, Refills: 3      hydrocodone-acetaminophen 10-325mg (NORCO)  mg Tab Take 1 tablet by mouth every 6 (six) hours as needed.       lactulose (CHRONULAC) 10 gram/15 mL solution Take 30 ml every 12 hours PRN to achieve a good bowel movement  Qty: 300 mL, Refills: 6      levothyroxine (SYNTHROID) 125 MCG tablet Take 1 tablet (125 mcg total) by mouth once daily.  Qty: 90 tablet, Refills: 3      pravastatin (PRAVACHOL) 20 MG tablet TAKE 1 TABLET BY MOUTH AT BEDTIME TO LOWER CHOLESTEROL  Qty: 90 tablet, Refills: 3      predniSONE (DELTASONE) 5 MG tablet TAKE 1 TABLET EVERY DAY  Qty: 90 tablet, Refills: 1    Associated Diagnoses: Rheumatoid arthritis involving multiple sites with positive rheumatoid factor      ranitidine (ZANTAC) 75 MG tablet Take 75 mg by mouth 2 (two) times daily.      traMADol (ULTRAM) 50 mg tablet TAKE 1 TABLET BY MOUTH 4 TIMES DAILY AS NEEDED  Refills: 2       !! - Potential duplicate medications found. Please discuss with provider.              ED Medication(s):  Medications   cefTRIAXone (ROCEPHIN) 2 g in dextrose 5 % 50 mL IVPB (0 g Intravenous Stopped 12/31/18 1335)   azithromycin 500 mg in dextrose 5 % 250 mL IVPB (ready to mix system) (0 mg Intravenous Stopped 12/31/18 1447)             Medical Decision Making    Medical Decision Making:   Clinical Tests:   Lab Tests: Ordered and Reviewed  Radiological Study: Ordered and Reviewed  Medical Tests: Ordered and Reviewed           Scribe Attestation:   Scribe #1: I performed the above scribed service and the documentation accurately describes the services I performed. I attest to the accuracy of the note.    Attending:    Physician Attestation Statement for Scribe #1: I, Ledy Pollock MD, personally performed the services described in this documentation, as scribed by Marie Mcclendon, in my presence, and it is both accurate and complete.          Clinical Impression       ICD-10-CM ICD-9-CM   1. Pneumonia of right lower lobe due to infectious organism J18.1 486   2. Shortness of breath R06.02 786.05       Disposition:   Disposition: Discharged  Condition: Stable         Ledy Pollock MD  01/01/19 9973

## 2019-01-01 ENCOUNTER — PES CALL (OUTPATIENT)
Dept: ADMINISTRATIVE | Facility: CLINIC | Age: 64
End: 2019-01-01

## 2019-01-01 ENCOUNTER — TELEPHONE (OUTPATIENT)
Dept: PULMONOLOGY | Facility: CLINIC | Age: 64
End: 2019-01-01

## 2019-01-01 ENCOUNTER — TELEPHONE (OUTPATIENT)
Dept: FAMILY MEDICINE | Facility: CLINIC | Age: 64
End: 2019-01-01

## 2019-01-01 ENCOUNTER — HOSPITAL ENCOUNTER (INPATIENT)
Facility: HOSPITAL | Age: 64
LOS: 1 days | DRG: 813 | End: 2019-04-14
Attending: EMERGENCY MEDICINE | Admitting: INTERNAL MEDICINE
Payer: OTHER GOVERNMENT

## 2019-01-01 ENCOUNTER — HOSPITAL ENCOUNTER (OUTPATIENT)
Dept: RADIOLOGY | Facility: HOSPITAL | Age: 64
Discharge: HOME | End: 2019-02-05
Attending: RADIOLOGY
Payer: MEDICARE

## 2019-01-01 ENCOUNTER — INFUSION (OUTPATIENT)
Dept: INFUSION THERAPY | Facility: HOSPITAL | Age: 64
End: 2019-01-01
Attending: INTERNAL MEDICINE
Payer: MEDICARE

## 2019-01-01 ENCOUNTER — HOSPITAL ENCOUNTER (EMERGENCY)
Facility: HOSPITAL | Age: 64
Discharge: HOME | End: 2019-01-01
Attending: EMERGENCY MEDICINE
Payer: MEDICARE

## 2019-01-01 ENCOUNTER — HOSPITAL ENCOUNTER (OUTPATIENT)
Dept: RADIATION THERAPY | Facility: HOSPITAL | Age: 64
Discharge: HOME | End: 2019-04-01
Attending: RADIOLOGY
Payer: MEDICARE

## 2019-01-01 ENCOUNTER — HOSPITAL ENCOUNTER (OUTPATIENT)
Dept: RADIOLOGY | Facility: HOSPITAL | Age: 64
Discharge: HOME | End: 2019-03-11
Attending: FAMILY MEDICINE
Payer: MEDICARE

## 2019-01-01 ENCOUNTER — OFFICE VISIT (OUTPATIENT)
Dept: HEMATOLOGY/ONCOLOGY | Facility: CLINIC | Age: 64
End: 2019-01-01
Payer: MEDICARE

## 2019-01-01 ENCOUNTER — HOSPITAL ENCOUNTER (EMERGENCY)
Facility: HOSPITAL | Age: 64
Discharge: HOME | End: 2019-03-11
Attending: EMERGENCY MEDICINE
Payer: MEDICARE

## 2019-01-01 ENCOUNTER — HOSPITAL ENCOUNTER (OUTPATIENT)
Dept: RADIOLOGY | Facility: HOSPITAL | Age: 64
Discharge: HOME | End: 2019-03-05
Attending: FAMILY MEDICINE
Payer: MEDICARE

## 2019-01-01 ENCOUNTER — OFFICE VISIT (OUTPATIENT)
Dept: ENDOCRINOLOGY | Facility: CLINIC | Age: 64
End: 2019-01-01
Payer: MEDICARE

## 2019-01-01 ENCOUNTER — TELEPHONE (OUTPATIENT)
Dept: HEMATOLOGY/ONCOLOGY | Facility: CLINIC | Age: 64
End: 2019-01-01

## 2019-01-01 ENCOUNTER — OFFICE VISIT (OUTPATIENT)
Dept: FAMILY MEDICINE | Facility: CLINIC | Age: 64
End: 2019-01-01
Payer: MEDICARE

## 2019-01-01 ENCOUNTER — HOSPITAL ENCOUNTER (EMERGENCY)
Facility: HOSPITAL | Age: 64
Discharge: HOME | End: 2019-02-15
Attending: EMERGENCY MEDICINE
Payer: MEDICARE

## 2019-01-01 ENCOUNTER — OUTPATIENT CASE MANAGEMENT (OUTPATIENT)
Dept: ADMINISTRATIVE | Facility: OTHER | Age: 64
End: 2019-01-01

## 2019-01-01 ENCOUNTER — TELEPHONE (OUTPATIENT)
Dept: RADIOLOGY | Facility: HOSPITAL | Age: 64
End: 2019-01-01

## 2019-01-01 ENCOUNTER — HOSPITAL ENCOUNTER (OUTPATIENT)
Dept: RADIOLOGY | Facility: HOSPITAL | Age: 64
Discharge: HOME | End: 2019-03-14
Attending: RADIOLOGY
Payer: MEDICARE

## 2019-01-01 ENCOUNTER — HOSPITAL ENCOUNTER (OUTPATIENT)
Dept: RADIOLOGY | Facility: HOSPITAL | Age: 64
Discharge: HOME | End: 2019-03-20
Attending: INTERNAL MEDICINE
Payer: MEDICARE

## 2019-01-01 ENCOUNTER — TELEPHONE (OUTPATIENT)
Dept: ENDOCRINOLOGY | Facility: CLINIC | Age: 64
End: 2019-01-01

## 2019-01-01 ENCOUNTER — PATIENT OUTREACH (OUTPATIENT)
Dept: ADMINISTRATIVE | Facility: HOSPITAL | Age: 64
End: 2019-01-01

## 2019-01-01 ENCOUNTER — HOSPITAL ENCOUNTER (OUTPATIENT)
Dept: RADIATION THERAPY | Facility: HOSPITAL | Age: 64
Discharge: HOME | End: 2019-03-15
Attending: RADIOLOGY
Payer: MEDICARE

## 2019-01-01 ENCOUNTER — OFFICE VISIT (OUTPATIENT)
Dept: RADIATION ONCOLOGY | Facility: CLINIC | Age: 64
End: 2019-01-01
Payer: MEDICARE

## 2019-01-01 ENCOUNTER — INFUSION (OUTPATIENT)
Dept: INFUSION THERAPY | Facility: HOSPITAL | Age: 64
End: 2019-01-01
Attending: FAMILY MEDICINE
Payer: MEDICARE

## 2019-01-01 ENCOUNTER — DOCUMENTATION ONLY (OUTPATIENT)
Dept: RADIATION ONCOLOGY | Facility: CLINIC | Age: 64
End: 2019-01-01

## 2019-01-01 ENCOUNTER — HOSPITAL ENCOUNTER (EMERGENCY)
Facility: HOSPITAL | Age: 64
Discharge: HOME | End: 2019-03-10
Attending: EMERGENCY MEDICINE
Payer: MEDICARE

## 2019-01-01 ENCOUNTER — OFFICE VISIT (OUTPATIENT)
Dept: NEUROSURGERY | Facility: CLINIC | Age: 64
End: 2019-01-01
Payer: MEDICARE

## 2019-01-01 ENCOUNTER — HOSPITAL ENCOUNTER (OUTPATIENT)
Dept: RADIOLOGY | Facility: HOSPITAL | Age: 64
Discharge: HOME | End: 2019-03-15
Attending: RADIOLOGY
Payer: MEDICARE

## 2019-01-01 ENCOUNTER — NURSE TRIAGE (OUTPATIENT)
Dept: ADMINISTRATIVE | Facility: CLINIC | Age: 64
End: 2019-01-01

## 2019-01-01 ENCOUNTER — APPOINTMENT (OUTPATIENT)
Dept: LAB | Facility: HOSPITAL | Age: 64
End: 2019-01-01
Attending: FAMILY MEDICINE
Payer: MEDICARE

## 2019-01-01 VITALS
HEIGHT: 70 IN | HEART RATE: 72 BPM | TEMPERATURE: 97 F | WEIGHT: 131.19 LBS | SYSTOLIC BLOOD PRESSURE: 126 MMHG | OXYGEN SATURATION: 98 % | DIASTOLIC BLOOD PRESSURE: 65 MMHG | BODY MASS INDEX: 18.78 KG/M2

## 2019-01-01 VITALS
OXYGEN SATURATION: 96 % | TEMPERATURE: 98 F | SYSTOLIC BLOOD PRESSURE: 159 MMHG | HEART RATE: 75 BPM | HEIGHT: 71 IN | WEIGHT: 129.88 LBS | DIASTOLIC BLOOD PRESSURE: 72 MMHG | RESPIRATION RATE: 14 BRPM | BODY MASS INDEX: 18.18 KG/M2

## 2019-01-01 VITALS
HEIGHT: 71 IN | BODY MASS INDEX: 18.18 KG/M2 | SYSTOLIC BLOOD PRESSURE: 120 MMHG | HEART RATE: 44 BPM | RESPIRATION RATE: 9 BRPM | DIASTOLIC BLOOD PRESSURE: 77 MMHG | WEIGHT: 129.88 LBS | OXYGEN SATURATION: 89 % | TEMPERATURE: 99 F

## 2019-01-01 VITALS
DIASTOLIC BLOOD PRESSURE: 80 MMHG | SYSTOLIC BLOOD PRESSURE: 157 MMHG | HEIGHT: 71 IN | HEART RATE: 70 BPM | BODY MASS INDEX: 18.36 KG/M2 | TEMPERATURE: 99 F | OXYGEN SATURATION: 95 % | RESPIRATION RATE: 20 BRPM

## 2019-01-01 VITALS
RESPIRATION RATE: 13 BRPM | BODY MASS INDEX: 18.51 KG/M2 | HEART RATE: 63 BPM | DIASTOLIC BLOOD PRESSURE: 58 MMHG | SYSTOLIC BLOOD PRESSURE: 129 MMHG | WEIGHT: 132.19 LBS | HEIGHT: 71 IN | OXYGEN SATURATION: 92 % | TEMPERATURE: 98 F

## 2019-01-01 VITALS
OXYGEN SATURATION: 98 % | HEART RATE: 70 BPM | DIASTOLIC BLOOD PRESSURE: 64 MMHG | SYSTOLIC BLOOD PRESSURE: 118 MMHG | HEIGHT: 71 IN | TEMPERATURE: 97 F | RESPIRATION RATE: 18 BRPM | BODY MASS INDEX: 17.95 KG/M2 | WEIGHT: 128.19 LBS

## 2019-01-01 VITALS
SYSTOLIC BLOOD PRESSURE: 130 MMHG | BODY MASS INDEX: 18.06 KG/M2 | TEMPERATURE: 98 F | HEART RATE: 59 BPM | HEIGHT: 71 IN | DIASTOLIC BLOOD PRESSURE: 82 MMHG | OXYGEN SATURATION: 98 % | WEIGHT: 129 LBS | RESPIRATION RATE: 18 BRPM

## 2019-01-01 VITALS
DIASTOLIC BLOOD PRESSURE: 60 MMHG | SYSTOLIC BLOOD PRESSURE: 120 MMHG | OXYGEN SATURATION: 98 % | HEIGHT: 70 IN | TEMPERATURE: 97 F | WEIGHT: 128.63 LBS | HEART RATE: 67 BPM | BODY MASS INDEX: 18.42 KG/M2

## 2019-01-01 VITALS
TEMPERATURE: 97 F | BODY MASS INDEX: 18.65 KG/M2 | SYSTOLIC BLOOD PRESSURE: 132 MMHG | HEIGHT: 71 IN | DIASTOLIC BLOOD PRESSURE: 58 MMHG | WEIGHT: 133.19 LBS | HEART RATE: 61 BPM

## 2019-01-01 VITALS
SYSTOLIC BLOOD PRESSURE: 124 MMHG | SYSTOLIC BLOOD PRESSURE: 131 MMHG | HEIGHT: 71 IN | HEART RATE: 55 BPM | BODY MASS INDEX: 18.06 KG/M2 | HEART RATE: 55 BPM | OXYGEN SATURATION: 98 % | RESPIRATION RATE: 18 BRPM | TEMPERATURE: 98 F | WEIGHT: 129 LBS | DIASTOLIC BLOOD PRESSURE: 57 MMHG | DIASTOLIC BLOOD PRESSURE: 63 MMHG

## 2019-01-01 VITALS
OXYGEN SATURATION: 96 % | WEIGHT: 129.88 LBS | RESPIRATION RATE: 20 BRPM | TEMPERATURE: 98 F | HEART RATE: 82 BPM | BODY MASS INDEX: 18.18 KG/M2 | HEIGHT: 71 IN | SYSTOLIC BLOOD PRESSURE: 113 MMHG | DIASTOLIC BLOOD PRESSURE: 57 MMHG

## 2019-01-01 VITALS
WEIGHT: 129.06 LBS | BODY MASS INDEX: 18.43 KG/M2 | HEIGHT: 70 IN | SYSTOLIC BLOOD PRESSURE: 120 MMHG | DIASTOLIC BLOOD PRESSURE: 77 MMHG | SYSTOLIC BLOOD PRESSURE: 161 MMHG | DIASTOLIC BLOOD PRESSURE: 60 MMHG | HEART RATE: 83 BPM | BODY MASS INDEX: 18.48 KG/M2 | OXYGEN SATURATION: 95 % | TEMPERATURE: 98 F | HEART RATE: 66 BPM | RESPIRATION RATE: 20 BRPM | WEIGHT: 131.63 LBS | HEIGHT: 71 IN | OXYGEN SATURATION: 97 % | TEMPERATURE: 99 F

## 2019-01-01 VITALS — DIASTOLIC BLOOD PRESSURE: 60 MMHG | HEART RATE: 60 BPM | SYSTOLIC BLOOD PRESSURE: 121 MMHG | RESPIRATION RATE: 18 BRPM

## 2019-01-01 VITALS
WEIGHT: 130.81 LBS | DIASTOLIC BLOOD PRESSURE: 60 MMHG | TEMPERATURE: 98 F | OXYGEN SATURATION: 98 % | HEIGHT: 71 IN | HEART RATE: 74 BPM | SYSTOLIC BLOOD PRESSURE: 110 MMHG | BODY MASS INDEX: 18.31 KG/M2

## 2019-01-01 VITALS
WEIGHT: 129.13 LBS | HEIGHT: 69 IN | HEART RATE: 70 BPM | TEMPERATURE: 98 F | BODY MASS INDEX: 19.12 KG/M2 | OXYGEN SATURATION: 98 % | RESPIRATION RATE: 18 BRPM | DIASTOLIC BLOOD PRESSURE: 64 MMHG | SYSTOLIC BLOOD PRESSURE: 136 MMHG

## 2019-01-01 DIAGNOSIS — I10 ESSENTIAL HYPERTENSION: ICD-10-CM

## 2019-01-01 DIAGNOSIS — D50.0 IRON DEFICIENCY ANEMIA DUE TO CHRONIC BLOOD LOSS: Primary | ICD-10-CM

## 2019-01-01 DIAGNOSIS — C79.31 SECONDARY ADENOCARCINOMA OF BRAIN: Primary | ICD-10-CM

## 2019-01-01 DIAGNOSIS — D69.6 THROMBOCYTOPENIA: Primary | ICD-10-CM

## 2019-01-01 DIAGNOSIS — M05.79 RHEUMATOID ARTHRITIS INVOLVING MULTIPLE SITES WITH POSITIVE RHEUMATOID FACTOR: ICD-10-CM

## 2019-01-01 DIAGNOSIS — R04.2 HEMOPTYSIS: ICD-10-CM

## 2019-01-01 DIAGNOSIS — J18.9 PNEUMONIA OF BOTH LUNGS DUE TO INFECTIOUS ORGANISM, UNSPECIFIED PART OF LUNG: ICD-10-CM

## 2019-01-01 DIAGNOSIS — C73 THYROID CANCER: Primary | ICD-10-CM

## 2019-01-01 DIAGNOSIS — R26.9 GAIT DISTURBANCE: Primary | ICD-10-CM

## 2019-01-01 DIAGNOSIS — D69.6 THROMBOCYTOPENIA: ICD-10-CM

## 2019-01-01 DIAGNOSIS — N18.9 CHRONIC KIDNEY DISEASE, UNSPECIFIED CKD STAGE: ICD-10-CM

## 2019-01-01 DIAGNOSIS — R07.9 CHEST PAIN: ICD-10-CM

## 2019-01-01 DIAGNOSIS — C34.32 PRIMARY CANCER OF LEFT LOWER LOBE OF LUNG: Primary | ICD-10-CM

## 2019-01-01 DIAGNOSIS — C79.31 SECONDARY ADENOCARCINOMA OF BRAIN: ICD-10-CM

## 2019-01-01 DIAGNOSIS — I11.0 HYPERTENSIVE HEART DISEASE WITH HEART FAILURE: ICD-10-CM

## 2019-01-01 DIAGNOSIS — Z00.00 WELL ADULT EXAM: Primary | ICD-10-CM

## 2019-01-01 DIAGNOSIS — R60.9 EDEMA, UNSPECIFIED TYPE: Primary | ICD-10-CM

## 2019-01-01 DIAGNOSIS — N18.4 STAGE 4 CHRONIC KIDNEY DISEASE: ICD-10-CM

## 2019-01-01 DIAGNOSIS — R79.89 ELEVATED TROPONIN: ICD-10-CM

## 2019-01-01 DIAGNOSIS — R64 CACHEXIA: ICD-10-CM

## 2019-01-01 DIAGNOSIS — E89.0 POSTOPERATIVE HYPOTHYROIDISM: ICD-10-CM

## 2019-01-01 DIAGNOSIS — E78.2 MIXED HYPERLIPIDEMIA: ICD-10-CM

## 2019-01-01 DIAGNOSIS — C79.31 BRAIN METASTASIS: Primary | ICD-10-CM

## 2019-01-01 DIAGNOSIS — E53.8 B12 DEFICIENCY: ICD-10-CM

## 2019-01-01 DIAGNOSIS — C34.32 PRIMARY CANCER OF LEFT LOWER LOBE OF LUNG: ICD-10-CM

## 2019-01-01 DIAGNOSIS — I70.0 ATHEROSCLEROSIS OF AORTA: ICD-10-CM

## 2019-01-01 DIAGNOSIS — N28.1 ACQUIRED RENAL CYST OF RIGHT KIDNEY: ICD-10-CM

## 2019-01-01 DIAGNOSIS — F17.200 SMOKER: ICD-10-CM

## 2019-01-01 DIAGNOSIS — M25.571 ANKLE PAIN, RIGHT: ICD-10-CM

## 2019-01-01 DIAGNOSIS — C73 FOLLICULAR THYROID CANCER: ICD-10-CM

## 2019-01-01 DIAGNOSIS — G93.6 VASOGENIC BRAIN EDEMA: ICD-10-CM

## 2019-01-01 DIAGNOSIS — C73 THYROID CANCER: ICD-10-CM

## 2019-01-01 DIAGNOSIS — R60.9 EDEMA: ICD-10-CM

## 2019-01-01 DIAGNOSIS — R60.0 PEDAL EDEMA: ICD-10-CM

## 2019-01-01 DIAGNOSIS — R26.9 GAIT DISTURBANCE: ICD-10-CM

## 2019-01-01 DIAGNOSIS — E53.8 B12 DEFICIENCY: Primary | ICD-10-CM

## 2019-01-01 DIAGNOSIS — C80.1 CANCER: Primary | ICD-10-CM

## 2019-01-01 DIAGNOSIS — Z09 HOSPITAL DISCHARGE FOLLOW-UP: Primary | ICD-10-CM

## 2019-01-01 DIAGNOSIS — J44.9 CHRONIC OBSTRUCTIVE PULMONARY DISEASE, UNSPECIFIED COPD TYPE: ICD-10-CM

## 2019-01-01 DIAGNOSIS — Z12.11 COLON CANCER SCREENING: ICD-10-CM

## 2019-01-01 DIAGNOSIS — C79.31 BRAIN METASTASES: Primary | ICD-10-CM

## 2019-01-01 DIAGNOSIS — M54.9 MUSCULOSKELETAL BACK PAIN: Primary | ICD-10-CM

## 2019-01-01 DIAGNOSIS — M10.9 ACUTE GOUT OF RIGHT ANKLE, UNSPECIFIED CAUSE: Primary | ICD-10-CM

## 2019-01-01 LAB
ABO + RH BLD: NORMAL
ALBUMIN SERPL BCP-MCNC: 2.3 G/DL (ref 3.5–5.2)
ALBUMIN SERPL BCP-MCNC: 3.3 G/DL
ALBUMIN SERPL BCP-MCNC: 3.3 G/DL
ALBUMIN SERPL BCP-MCNC: 3.4 G/DL
ALBUMIN SERPL BCP-MCNC: 3.5 G/DL
ALP SERPL-CCNC: 66 U/L
ALP SERPL-CCNC: 66 U/L (ref 55–135)
ALP SERPL-CCNC: 67 U/L
ALP SERPL-CCNC: 68 U/L
ALP SERPL-CCNC: 70 U/L
ALT SERPL W/O P-5'-P-CCNC: 23 U/L (ref 10–44)
ALT SERPL W/O P-5'-P-CCNC: 5 U/L
ALT SERPL W/O P-5'-P-CCNC: 6 U/L
ALT SERPL W/O P-5'-P-CCNC: <5 U/L
ALT SERPL W/O P-5'-P-CCNC: <5 U/L
ANION GAP SERPL CALC-SCNC: 10 MMOL/L
ANION GAP SERPL CALC-SCNC: 11 MMOL/L
ANION GAP SERPL CALC-SCNC: 11 MMOL/L
ANION GAP SERPL CALC-SCNC: 11 MMOL/L (ref 8–16)
ANION GAP SERPL CALC-SCNC: 9 MMOL/L
ANION GAP SERPL CALC-SCNC: 9 MMOL/L (ref 8–16)
APTT BLDCRRT: 35.1 SEC (ref 21–32)
APTT BLDCRRT: 39.5 SEC
APTT BLDCRRT: 42.6 SEC
AST SERPL-CCNC: 10 U/L
AST SERPL-CCNC: 11 U/L
AST SERPL-CCNC: 12 U/L
AST SERPL-CCNC: 12 U/L
AST SERPL-CCNC: 6 U/L (ref 10–40)
BACTERIA BLD CULT: NORMAL
BACTERIA BLD CULT: NORMAL
BASOPHILS # BLD AUTO: 0 K/UL (ref 0–0.2)
BASOPHILS # BLD AUTO: 0.01 K/UL
BASOPHILS # BLD AUTO: 0.02 K/UL
BASOPHILS NFR BLD: 0 % (ref 0–1.9)
BASOPHILS NFR BLD: 0.2 %
BASOPHILS NFR BLD: 0.6 %
BASOPHILS NFR BLD: 0.6 %
BASOPHILS NFR BLD: 0.7 %
BILIRUB SERPL-MCNC: 0.3 MG/DL
BILIRUB SERPL-MCNC: 0.3 MG/DL (ref 0.1–1)
BILIRUB SERPL-MCNC: 0.4 MG/DL
BILIRUB UR QL STRIP: NEGATIVE
BLD GP AB SCN CELLS X3 SERPL QL: NORMAL
BNP SERPL-MCNC: 165 PG/ML (ref 0–99)
BNP SERPL-MCNC: 80 PG/ML
BONE MARROW WRIGHT STAIN COMMENT: NORMAL
BUN SERPL-MCNC: 29 MG/DL
BUN SERPL-MCNC: 30 MG/DL
BUN SERPL-MCNC: 49 MG/DL (ref 8–23)
BUN SERPL-MCNC: 52 MG/DL (ref 8–23)
CALCIUM SERPL-MCNC: 8.5 MG/DL (ref 8.7–10.5)
CALCIUM SERPL-MCNC: 8.7 MG/DL (ref 8.7–10.5)
CALCIUM SERPL-MCNC: 8.8 MG/DL
CALCIUM SERPL-MCNC: 8.8 MG/DL
CALCIUM SERPL-MCNC: 8.8 MG/DL (ref 8.7–10.5)
CALCIUM SERPL-MCNC: 9.2 MG/DL
CALCIUM SERPL-MCNC: 9.3 MG/DL (ref 8.7–10.5)
CALCIUM SERPL-MCNC: 9.7 MG/DL
CHLORIDE SERPL-SCNC: 101 MMOL/L (ref 95–110)
CHLORIDE SERPL-SCNC: 101 MMOL/L (ref 95–110)
CHLORIDE SERPL-SCNC: 103 MMOL/L
CHLORIDE SERPL-SCNC: 104 MMOL/L
CHLORIDE SERPL-SCNC: 104 MMOL/L
CHLORIDE SERPL-SCNC: 106 MMOL/L
CHLORIDE SERPL-SCNC: 97 MMOL/L (ref 95–110)
CHLORIDE SERPL-SCNC: 99 MMOL/L (ref 95–110)
CK SERPL-CCNC: 17 U/L (ref 20–200)
CLARITY UR: CLEAR
CO2 SERPL-SCNC: 20 MMOL/L
CO2 SERPL-SCNC: 22 MMOL/L
CO2 SERPL-SCNC: 22 MMOL/L
CO2 SERPL-SCNC: 23 MMOL/L (ref 23–29)
CO2 SERPL-SCNC: 24 MMOL/L
CO2 SERPL-SCNC: 24 MMOL/L (ref 23–29)
COLOR UR: YELLOW
CREAT SERPL-MCNC: 1.4 MG/DL (ref 0.5–1.4)
CREAT SERPL-MCNC: 1.5 MG/DL (ref 0.5–1.4)
CREAT SERPL-MCNC: 1.8 MG/DL
CREAT SERPL-MCNC: 2 MG/DL
CRP SERPL-MCNC: 42.9 MG/L
D DIMER PPP IA.FEU-MCNC: 2.17 MG/L FEU
DIFFERENTIAL METHOD: ABNORMAL
EOSINOPHIL # BLD AUTO: 0 K/UL
EOSINOPHIL # BLD AUTO: 0 K/UL
EOSINOPHIL # BLD AUTO: 0 K/UL (ref 0–0.5)
EOSINOPHIL # BLD AUTO: 0.1 K/UL
EOSINOPHIL # BLD AUTO: 0.1 K/UL
EOSINOPHIL NFR BLD: 0 % (ref 0–8)
EOSINOPHIL NFR BLD: 0.6 % (ref 0–8)
EOSINOPHIL NFR BLD: 0.7 %
EOSINOPHIL NFR BLD: 0.8 %
EOSINOPHIL NFR BLD: 2.5 %
EOSINOPHIL NFR BLD: 3.4 %
ERYTHROCYTE [DISTWIDTH] IN BLOOD BY AUTOMATED COUNT: 13.1 %
ERYTHROCYTE [DISTWIDTH] IN BLOOD BY AUTOMATED COUNT: 14.1 %
ERYTHROCYTE [DISTWIDTH] IN BLOOD BY AUTOMATED COUNT: 14.1 %
ERYTHROCYTE [DISTWIDTH] IN BLOOD BY AUTOMATED COUNT: 14.3 %
ERYTHROCYTE [DISTWIDTH] IN BLOOD BY AUTOMATED COUNT: 14.7 % (ref 11.5–14.5)
ERYTHROCYTE [DISTWIDTH] IN BLOOD BY AUTOMATED COUNT: 14.8 % (ref 11.5–14.5)
ERYTHROCYTE [DISTWIDTH] IN BLOOD BY AUTOMATED COUNT: 14.8 % (ref 11.5–14.5)
ERYTHROCYTE [DISTWIDTH] IN BLOOD BY AUTOMATED COUNT: 14.9 % (ref 11.5–14.5)
EST. GFR  (AFRICAN AMERICAN): 40 ML/MIN/1.73 M^2
EST. GFR  (AFRICAN AMERICAN): 45 ML/MIN/1.73 M^2
EST. GFR  (AFRICAN AMERICAN): 46 ML/MIN/1.73 M^2
EST. GFR  (AFRICAN AMERICAN): 46 ML/MIN/1.73 M^2
EST. GFR  (AFRICAN AMERICAN): 56 ML/MIN/1.73 M^2
EST. GFR  (AFRICAN AMERICAN): >60 ML/MIN/1.73 M^2
EST. GFR  (NON AFRICAN AMERICAN): 34 ML/MIN/1.73 M^2
EST. GFR  (NON AFRICAN AMERICAN): 39 ML/MIN/1.73 M^2
EST. GFR  (NON AFRICAN AMERICAN): 49 ML/MIN/1.73 M^2
EST. GFR  (NON AFRICAN AMERICAN): 53 ML/MIN/1.73 M^2
GLUCOSE SERPL-MCNC: 103 MG/DL (ref 70–110)
GLUCOSE SERPL-MCNC: 121 MG/DL
GLUCOSE SERPL-MCNC: 123 MG/DL
GLUCOSE SERPL-MCNC: 136 MG/DL (ref 70–110)
GLUCOSE SERPL-MCNC: 154 MG/DL (ref 70–110)
GLUCOSE SERPL-MCNC: 78 MG/DL
GLUCOSE SERPL-MCNC: 90 MG/DL (ref 70–110)
GLUCOSE SERPL-MCNC: 99 MG/DL
GLUCOSE UR QL STRIP: NEGATIVE
HAPTOGLOB SERPL-MCNC: 149 MG/DL (ref 30–250)
HCT VFR BLD AUTO: 28.4 %
HCT VFR BLD AUTO: 28.4 % (ref 40–54)
HCT VFR BLD AUTO: 28.7 %
HCT VFR BLD AUTO: 28.8 % (ref 40–54)
HCT VFR BLD AUTO: 29.3 %
HCT VFR BLD AUTO: 30.6 % (ref 40–54)
HCT VFR BLD AUTO: 30.8 % (ref 40–54)
HCT VFR BLD AUTO: 32.9 %
HGB BLD-MCNC: 10 G/DL
HGB BLD-MCNC: 10.4 G/DL (ref 14–18)
HGB BLD-MCNC: 10.5 G/DL (ref 14–18)
HGB BLD-MCNC: 11.3 G/DL
HGB BLD-MCNC: 9.7 G/DL
HGB BLD-MCNC: 9.7 G/DL (ref 14–18)
HGB BLD-MCNC: 9.7 G/DL (ref 14–18)
HGB BLD-MCNC: 9.8 G/DL
HGB UR QL STRIP: NEGATIVE
HYPOCHROMIA BLD QL SMEAR: ABNORMAL
INR PPP: 0.9 (ref 0.8–1.2)
INR PPP: 1
INR PPP: 1
KETONES UR QL STRIP: NEGATIVE
LDH SERPL L TO P-CCNC: 232 U/L (ref 110–260)
LEUKOCYTE ESTERASE UR QL STRIP: NEGATIVE
LYMPHOCYTES # BLD AUTO: 0.1 K/UL (ref 1–4.8)
LYMPHOCYTES # BLD AUTO: 0.2 K/UL (ref 1–4.8)
LYMPHOCYTES # BLD AUTO: 0.3 K/UL (ref 1–4.8)
LYMPHOCYTES # BLD AUTO: 0.3 K/UL (ref 1–4.8)
LYMPHOCYTES # BLD AUTO: 0.4 K/UL
LYMPHOCYTES # BLD AUTO: 0.4 K/UL
LYMPHOCYTES # BLD AUTO: 0.5 K/UL
LYMPHOCYTES # BLD AUTO: 0.6 K/UL
LYMPHOCYTES NFR BLD: 11.4 %
LYMPHOCYTES NFR BLD: 14.6 %
LYMPHOCYTES NFR BLD: 2.4 % (ref 18–48)
LYMPHOCYTES NFR BLD: 20.1 %
LYMPHOCYTES NFR BLD: 4.8 % (ref 18–48)
LYMPHOCYTES NFR BLD: 6.1 % (ref 18–48)
LYMPHOCYTES NFR BLD: 6.4 % (ref 18–48)
LYMPHOCYTES NFR BLD: 9.4 %
MAGNESIUM SERPL-MCNC: 1.8 MG/DL (ref 1.6–2.6)
MCH RBC QN AUTO: 30.2 PG (ref 27–31)
MCH RBC QN AUTO: 30.3 PG (ref 27–31)
MCH RBC QN AUTO: 30.4 PG (ref 27–31)
MCH RBC QN AUTO: 30.6 PG (ref 27–31)
MCH RBC QN AUTO: 30.8 PG
MCH RBC QN AUTO: 31 PG
MCH RBC QN AUTO: 31.2 PG
MCH RBC QN AUTO: 31.8 PG
MCHC RBC AUTO-ENTMCNC: 33.7 G/DL (ref 32–36)
MCHC RBC AUTO-ENTMCNC: 34 G/DL (ref 32–36)
MCHC RBC AUTO-ENTMCNC: 34.1 G/DL
MCHC RBC AUTO-ENTMCNC: 34.1 G/DL
MCHC RBC AUTO-ENTMCNC: 34.1 G/DL (ref 32–36)
MCHC RBC AUTO-ENTMCNC: 34.2 G/DL
MCHC RBC AUTO-ENTMCNC: 34.2 G/DL (ref 32–36)
MCHC RBC AUTO-ENTMCNC: 34.3 G/DL
MCV RBC AUTO: 89 FL (ref 82–98)
MCV RBC AUTO: 90 FL
MCV RBC AUTO: 90 FL (ref 82–98)
MCV RBC AUTO: 91 FL
MCV RBC AUTO: 91 FL
MCV RBC AUTO: 93 FL
MONOCYTES # BLD AUTO: 0.2 K/UL (ref 0.3–1)
MONOCYTES # BLD AUTO: 0.3 K/UL
MONOCYTES # BLD AUTO: 0.3 K/UL
MONOCYTES # BLD AUTO: 0.3 K/UL (ref 0.3–1)
MONOCYTES # BLD AUTO: 0.5 K/UL
MONOCYTES # BLD AUTO: 0.5 K/UL
MONOCYTES NFR BLD: 10.9 %
MONOCYTES NFR BLD: 12.5 %
MONOCYTES NFR BLD: 14.3 %
MONOCYTES NFR BLD: 3.7 % (ref 4–15)
MONOCYTES NFR BLD: 5.1 % (ref 4–15)
MONOCYTES NFR BLD: 5.4 % (ref 4–15)
MONOCYTES NFR BLD: 6.4 % (ref 4–15)
MONOCYTES NFR BLD: 6.8 %
NEUTROPHILS # BLD AUTO: 1.9 K/UL
NEUTROPHILS # BLD AUTO: 2.5 K/UL
NEUTROPHILS # BLD AUTO: 2.7 K/UL
NEUTROPHILS # BLD AUTO: 3.1 K/UL (ref 1.8–7.7)
NEUTROPHILS # BLD AUTO: 3.5 K/UL (ref 1.8–7.7)
NEUTROPHILS # BLD AUTO: 3.6 K/UL (ref 1.8–7.7)
NEUTROPHILS # BLD AUTO: 3.8 K/UL
NEUTROPHILS # BLD AUTO: 3.8 K/UL (ref 1.8–7.7)
NEUTROPHILS NFR BLD: 64.9 %
NEUTROPHILS NFR BLD: 68 %
NEUTROPHILS NFR BLD: 75 %
NEUTROPHILS NFR BLD: 82.9 %
NEUTROPHILS NFR BLD: 88.5 % (ref 38–73)
NEUTROPHILS NFR BLD: 88.8 % (ref 38–73)
NEUTROPHILS NFR BLD: 90.6 % (ref 38–73)
NEUTROPHILS NFR BLD: 92.2 % (ref 38–73)
NITRITE UR QL STRIP: NEGATIVE
OVALOCYTES BLD QL SMEAR: ABNORMAL
PH UR STRIP: 6 [PH] (ref 5–8)
PHOSPHATE SERPL-MCNC: 3.7 MG/DL (ref 2.7–4.5)
PLATELET # BLD AUTO: 104 K/UL
PLATELET # BLD AUTO: 22 K/UL (ref 150–350)
PLATELET # BLD AUTO: 22 K/UL (ref 150–350)
PLATELET # BLD AUTO: 23 K/UL (ref 150–350)
PLATELET # BLD AUTO: 24 K/UL (ref 150–350)
PLATELET # BLD AUTO: 89 K/UL
PLATELET # BLD AUTO: 92 K/UL
PLATELET # BLD AUTO: 95 K/UL
PLATELET BLD QL SMEAR: ABNORMAL
PMV BLD AUTO: 8.7 FL
PMV BLD AUTO: 8.9 FL
PMV BLD AUTO: 9 FL
PMV BLD AUTO: 9 FL
PMV BLD AUTO: ABNORMAL FL (ref 9.2–12.9)
POCT GLUCOSE: 77 MG/DL (ref 70–110)
POIKILOCYTOSIS BLD QL SMEAR: SLIGHT
POTASSIUM SERPL-SCNC: 3.5 MMOL/L
POTASSIUM SERPL-SCNC: 3.6 MMOL/L (ref 3.5–5.1)
POTASSIUM SERPL-SCNC: 3.9 MMOL/L
POTASSIUM SERPL-SCNC: 3.9 MMOL/L
POTASSIUM SERPL-SCNC: 3.9 MMOL/L (ref 3.5–5.1)
POTASSIUM SERPL-SCNC: 3.9 MMOL/L (ref 3.5–5.1)
POTASSIUM SERPL-SCNC: 4 MMOL/L
POTASSIUM SERPL-SCNC: 4 MMOL/L (ref 3.5–5.1)
PROT SERPL-MCNC: 5.1 G/DL (ref 6–8.4)
PROT SERPL-MCNC: 6.8 G/DL
PROT SERPL-MCNC: 6.9 G/DL
PROT SERPL-MCNC: 7.3 G/DL
PROT SERPL-MCNC: 7.5 G/DL
PROT UR QL STRIP: NEGATIVE
PROTHROMBIN TIME: 10.3 SEC (ref 9–12.5)
PROTHROMBIN TIME: 10.9 SEC
PROTHROMBIN TIME: 10.9 SEC
RBC # BLD AUTO: 3.14 M/UL
RBC # BLD AUTO: 3.15 M/UL
RBC # BLD AUTO: 3.17 M/UL (ref 4.6–6.2)
RBC # BLD AUTO: 3.2 M/UL (ref 4.6–6.2)
RBC # BLD AUTO: 3.23 M/UL
RBC # BLD AUTO: 3.42 M/UL (ref 4.6–6.2)
RBC # BLD AUTO: 3.48 M/UL (ref 4.6–6.2)
RBC # BLD AUTO: 3.55 M/UL
SODIUM SERPL-SCNC: 132 MMOL/L (ref 136–145)
SODIUM SERPL-SCNC: 132 MMOL/L (ref 136–145)
SODIUM SERPL-SCNC: 133 MMOL/L (ref 136–145)
SODIUM SERPL-SCNC: 134 MMOL/L (ref 136–145)
SODIUM SERPL-SCNC: 135 MMOL/L
SODIUM SERPL-SCNC: 136 MMOL/L
SODIUM SERPL-SCNC: 136 MMOL/L
SODIUM SERPL-SCNC: 139 MMOL/L
SP GR UR STRIP: 1.01 (ref 1–1.03)
TROPONIN I SERPL DL<=0.01 NG/ML-MCNC: 0.2 NG/ML (ref 0–0.03)
TROPONIN I SERPL DL<=0.01 NG/ML-MCNC: <0.006 NG/ML
TSH SERPL DL<=0.005 MIU/L-ACNC: 2.57 UIU/ML (ref 0.4–4)
URATE SERPL-MCNC: 7.8 MG/DL
URN SPEC COLLECT METH UR: NORMAL
UROBILINOGEN UR STRIP-ACNC: NEGATIVE EU/DL
WBC # BLD AUTO: 2.94 K/UL
WBC # BLD AUTO: 3.34 K/UL (ref 3.9–12.7)
WBC # BLD AUTO: 3.59 K/UL
WBC # BLD AUTO: 3.63 K/UL
WBC # BLD AUTO: 3.92 K/UL (ref 3.9–12.7)
WBC # BLD AUTO: 4.05 K/UL (ref 3.9–12.7)
WBC # BLD AUTO: 4.24 K/UL (ref 3.9–12.7)
WBC # BLD AUTO: 4.59 K/UL

## 2019-01-01 PROCEDURE — 88313 PR  SPECIAL STAINS,GROUP II: ICD-10-PCS | Mod: 26,,, | Performed by: PATHOLOGY

## 2019-01-01 PROCEDURE — 71250 CT CHEST WITHOUT CONTRAST: ICD-10-PCS | Mod: 26,HCNC,, | Performed by: RADIOLOGY

## 2019-01-01 PROCEDURE — 99214 PR OFFICE/OUTPT VISIT, EST, LEVL IV, 30-39 MIN: ICD-10-PCS | Mod: HCNC,S$GLB,, | Performed by: FAMILY MEDICINE

## 2019-01-01 PROCEDURE — 25000242 PHARM REV CODE 250 ALT 637 W/ HCPCS: Performed by: NURSE PRACTITIONER

## 2019-01-01 PROCEDURE — A9585 GADOBUTROL INJECTION: HCPCS | Mod: HCNC | Performed by: RADIOLOGY

## 2019-01-01 PROCEDURE — 85025 COMPLETE CBC W/AUTO DIFF WBC: CPT | Mod: HCNC

## 2019-01-01 PROCEDURE — 77334 RADIATION TREATMENT AID(S): CPT | Mod: 26,HCNC,, | Performed by: RADIOLOGY

## 2019-01-01 PROCEDURE — 3074F SYST BP LT 130 MM HG: CPT | Mod: HCNC,CPTII,S$GLB, | Performed by: NEUROLOGICAL SURGERY

## 2019-01-01 PROCEDURE — 99204 PR OFFICE/OUTPT VISIT, NEW, LEVL IV, 45-59 MIN: ICD-10-PCS | Mod: HCNC,S$GLB,, | Performed by: NEUROLOGICAL SURGERY

## 2019-01-01 PROCEDURE — 96365 THER/PROPH/DIAG IV INF INIT: CPT | Mod: HCNC

## 2019-01-01 PROCEDURE — 36415 COLL VENOUS BLD VENIPUNCTURE: CPT

## 2019-01-01 PROCEDURE — 3078F PR MOST RECENT DIASTOLIC BLOOD PRESSURE < 80 MM HG: ICD-10-PCS | Mod: HCNC,CPTII,S$GLB, | Performed by: NEUROLOGICAL SURGERY

## 2019-01-01 PROCEDURE — 72100 X-RAY EXAM L-S SPINE 2/3 VWS: CPT | Mod: 26,HCNC,, | Performed by: RADIOLOGY

## 2019-01-01 PROCEDURE — 85025 COMPLETE CBC W/AUTO DIFF WBC: CPT

## 2019-01-01 PROCEDURE — 82550 ASSAY OF CK (CPK): CPT

## 2019-01-01 PROCEDURE — 99499 RISK ADDL DX/OHS AUDIT: ICD-10-PCS | Mod: S$GLB,,, | Performed by: INTERNAL MEDICINE

## 2019-01-01 PROCEDURE — 94760 N-INVAS EAR/PLS OXIMETRY 1: CPT

## 2019-01-01 PROCEDURE — 94761 N-INVAS EAR/PLS OXIMETRY MLT: CPT

## 2019-01-01 PROCEDURE — 77290 THER RAD SIMULAJ FIELD CPLX: CPT | Mod: 26,HCNC,, | Performed by: RADIOLOGY

## 2019-01-01 PROCEDURE — 3078F DIAST BP <80 MM HG: CPT | Mod: HCNC,CPTII,S$GLB, | Performed by: INTERNAL MEDICINE

## 2019-01-01 PROCEDURE — 86901 BLOOD TYPING SEROLOGIC RH(D): CPT | Mod: HCNC

## 2019-01-01 PROCEDURE — 3078F PR MOST RECENT DIASTOLIC BLOOD PRESSURE < 80 MM HG: ICD-10-PCS | Mod: HCNC,CPTII,S$GLB, | Performed by: FAMILY MEDICINE

## 2019-01-01 PROCEDURE — 3074F SYST BP LT 130 MM HG: CPT | Mod: HCNC,CPTII,S$GLB, | Performed by: RADIOLOGY

## 2019-01-01 PROCEDURE — 85379 FIBRIN DEGRADATION QUANT: CPT | Mod: HCNC

## 2019-01-01 PROCEDURE — 25000003 PHARM REV CODE 250: Performed by: NURSE PRACTITIONER

## 2019-01-01 PROCEDURE — 63600175 PHARM REV CODE 636 W HCPCS: Mod: HCNC,JG | Performed by: NURSE PRACTITIONER

## 2019-01-01 PROCEDURE — 83735 ASSAY OF MAGNESIUM: CPT

## 2019-01-01 PROCEDURE — 99999 PR PBB SHADOW E&M-EST. PATIENT-LVL III: CPT | Mod: PBBFAC,HCNC,, | Performed by: NURSE PRACTITIONER

## 2019-01-01 PROCEDURE — 84484 ASSAY OF TROPONIN QUANT: CPT | Mod: HCNC

## 2019-01-01 PROCEDURE — 3074F PR MOST RECENT SYSTOLIC BLOOD PRESSURE < 130 MM HG: ICD-10-PCS | Mod: HCNC,CPTII,S$GLB, | Performed by: RADIOLOGY

## 2019-01-01 PROCEDURE — 77290 THER RAD SIMULAJ FIELD CPLX: CPT | Mod: TC,HCNC | Performed by: RADIOLOGY

## 2019-01-01 PROCEDURE — 3008F BODY MASS INDEX DOCD: CPT | Mod: HCNC,CPTII,S$GLB, | Performed by: INTERNAL MEDICINE

## 2019-01-01 PROCEDURE — 99999 PR PBB SHADOW E&M-EST. PATIENT-LVL IV: ICD-10-PCS | Mod: PBBFAC,HCNC,, | Performed by: NEUROLOGICAL SURGERY

## 2019-01-01 PROCEDURE — 99999 PR PBB SHADOW E&M-EST. PATIENT-LVL III: CPT | Mod: PBBFAC,HCNC,, | Performed by: INTERNAL MEDICINE

## 2019-01-01 PROCEDURE — 3008F PR BODY MASS INDEX (BMI) DOCUMENTED: ICD-10-PCS | Mod: HCNC,CPTII,S$GLB, | Performed by: FAMILY MEDICINE

## 2019-01-01 PROCEDURE — 77334 PR  RADN TREATMENT AID(S) COMPLX: ICD-10-PCS | Mod: 26,HCNC,, | Performed by: RADIOLOGY

## 2019-01-01 PROCEDURE — 99285 EMERGENCY DEPT VISIT HI MDM: CPT | Mod: 25,HCNC

## 2019-01-01 PROCEDURE — 77300 PR RADIATION THERAPY,DOSIMETRY PLAN: ICD-10-PCS | Mod: 26,HCNC,, | Performed by: RADIOLOGY

## 2019-01-01 PROCEDURE — 88189 FLOWCYTOMETRY/READ 16 & >: CPT | Mod: ,,, | Performed by: PATHOLOGY

## 2019-01-01 PROCEDURE — 3074F PR MOST RECENT SYSTOLIC BLOOD PRESSURE < 130 MM HG: ICD-10-PCS | Mod: HCNC,CPTII,S$GLB, | Performed by: INTERNAL MEDICINE

## 2019-01-01 PROCEDURE — 80048 BASIC METABOLIC PNL TOTAL CA: CPT

## 2019-01-01 PROCEDURE — 99214 OFFICE O/P EST MOD 30 MIN: CPT | Mod: HCNC,S$GLB,, | Performed by: FAMILY MEDICINE

## 2019-01-01 PROCEDURE — 3078F DIAST BP <80 MM HG: CPT | Mod: HCNC,CPTII,S$GLB, | Performed by: FAMILY MEDICINE

## 2019-01-01 PROCEDURE — 99215 OFFICE O/P EST HI 40 MIN: CPT | Mod: HCNC,S$GLB,, | Performed by: RADIOLOGY

## 2019-01-01 PROCEDURE — 85097 TISSUE SPECIMEN TO PATHOLOGY, BONE MARROW ASPIRATION/BIOPSY PROCEDURE: ICD-10-PCS | Mod: ,,, | Performed by: PATHOLOGY

## 2019-01-01 PROCEDURE — 77373 STRTCTC BDY RAD THER TX DLVR: CPT | Mod: HCNC | Performed by: RADIOLOGY

## 2019-01-01 PROCEDURE — 85730 THROMBOPLASTIN TIME PARTIAL: CPT | Mod: HCNC

## 2019-01-01 PROCEDURE — 99396 PR PREVENTIVE VISIT,EST,40-64: ICD-10-PCS | Mod: HCNC,S$GLB,, | Performed by: FAMILY MEDICINE

## 2019-01-01 PROCEDURE — 3078F PR MOST RECENT DIASTOLIC BLOOD PRESSURE < 80 MM HG: ICD-10-PCS | Mod: HCNC,CPTII,S$GLB, | Performed by: RADIOLOGY

## 2019-01-01 PROCEDURE — 80053 COMPREHEN METABOLIC PANEL: CPT | Mod: HCNC

## 2019-01-01 PROCEDURE — 99999 PR PBB SHADOW E&M-EST. PATIENT-LVL IV: ICD-10-PCS | Mod: PBBFAC,HCNC,, | Performed by: RADIOLOGY

## 2019-01-01 PROCEDURE — 36415 COLL VENOUS BLD VENIPUNCTURE: CPT | Mod: HCNC

## 2019-01-01 PROCEDURE — 94640 AIRWAY INHALATION TREATMENT: CPT

## 2019-01-01 PROCEDURE — 77372 SRS LINEAR BASED: CPT | Mod: HCNC | Performed by: RADIOLOGY

## 2019-01-01 PROCEDURE — 96374 THER/PROPH/DIAG INJ IV PUSH: CPT | Mod: HCNC

## 2019-01-01 PROCEDURE — 99215 PR OFFICE/OUTPT VISIT, EST, LEVL V, 40-54 MIN: ICD-10-PCS | Mod: 25,HCNC,S$GLB, | Performed by: INTERNAL MEDICINE

## 2019-01-01 PROCEDURE — 80053 COMPREHEN METABOLIC PANEL: CPT

## 2019-01-01 PROCEDURE — 3078F DIAST BP <80 MM HG: CPT | Mod: HCNC,CPTII,S$GLB, | Performed by: RADIOLOGY

## 2019-01-01 PROCEDURE — 63600175 PHARM REV CODE 636 W HCPCS: Performed by: HOSPITALIST

## 2019-01-01 PROCEDURE — 25000003 PHARM REV CODE 250: Performed by: EMERGENCY MEDICINE

## 2019-01-01 PROCEDURE — 88237 TISSUE CULTURE BONE MARROW: CPT

## 2019-01-01 PROCEDURE — 93010 ELECTROCARDIOGRAM REPORT: CPT | Mod: ,,, | Performed by: INTERNAL MEDICINE

## 2019-01-01 PROCEDURE — 99214 PR OFFICE/OUTPT VISIT, EST, LEVL IV, 30-39 MIN: ICD-10-PCS | Mod: HCNC,S$GLB,, | Performed by: INTERNAL MEDICINE

## 2019-01-01 PROCEDURE — 3008F PR BODY MASS INDEX (BMI) DOCUMENTED: ICD-10-PCS | Mod: CPTII,HCNC,S$GLB, | Performed by: INTERNAL MEDICINE

## 2019-01-01 PROCEDURE — 99499 UNLISTED E&M SERVICE: CPT | Mod: S$GLB,,, | Performed by: INTERNAL MEDICINE

## 2019-01-01 PROCEDURE — 99214 PR OFFICE/OUTPT VISIT, EST, LEVL IV, 30-39 MIN: ICD-10-PCS | Mod: HCNC,S$GLB,, | Performed by: NURSE PRACTITIONER

## 2019-01-01 PROCEDURE — 3008F PR BODY MASS INDEX (BMI) DOCUMENTED: ICD-10-PCS | Mod: HCNC,CPTII,S$GLB, | Performed by: INTERNAL MEDICINE

## 2019-01-01 PROCEDURE — 99999 PR PBB SHADOW E&M-EST. PATIENT-LVL III: CPT | Mod: PBBFAC,HCNC,, | Performed by: FAMILY MEDICINE

## 2019-01-01 PROCEDURE — G0378 HOSPITAL OBSERVATION PER HR: HCPCS

## 2019-01-01 PROCEDURE — 99999 PR PBB SHADOW E&M-EST. PATIENT-LVL III: ICD-10-PCS | Mod: PBBFAC,HCNC,, | Performed by: INTERNAL MEDICINE

## 2019-01-01 PROCEDURE — 72100 XR LUMBAR SPINE AP AND LATERAL: ICD-10-PCS | Mod: 26,HCNC,, | Performed by: RADIOLOGY

## 2019-01-01 PROCEDURE — 77338 DESIGN MLC DEVICE FOR IMRT: CPT | Mod: TC,HCNC | Performed by: RADIOLOGY

## 2019-01-01 PROCEDURE — 84550 ASSAY OF BLOOD/URIC ACID: CPT | Mod: HCNC

## 2019-01-01 PROCEDURE — 3074F SYST BP LT 130 MM HG: CPT | Mod: HCNC,CPTII,S$GLB, | Performed by: FAMILY MEDICINE

## 2019-01-01 PROCEDURE — 96375 TX/PRO/DX INJ NEW DRUG ADDON: CPT | Mod: HCNC

## 2019-01-01 PROCEDURE — 63600175 PHARM REV CODE 636 W HCPCS: Mod: HCNC | Performed by: INTERNAL MEDICINE

## 2019-01-01 PROCEDURE — 97802 MEDICAL NUTRITION INDIV IN: CPT

## 2019-01-01 PROCEDURE — 93010 EKG 12-LEAD: ICD-10-PCS | Mod: HCNC,,, | Performed by: INTERNAL MEDICINE

## 2019-01-01 PROCEDURE — 25000003 PHARM REV CODE 250: Mod: HCNC | Performed by: INTERNAL MEDICINE

## 2019-01-01 PROCEDURE — 96374 THER/PROPH/DIAG INJ IV PUSH: CPT | Performed by: EMERGENCY MEDICINE

## 2019-01-01 PROCEDURE — 3075F SYST BP GE 130 - 139MM HG: CPT | Mod: HCNC,CPTII,S$GLB, | Performed by: RADIOLOGY

## 2019-01-01 PROCEDURE — 63600175 PHARM REV CODE 636 W HCPCS: Performed by: EMERGENCY MEDICINE

## 2019-01-01 PROCEDURE — 99999 PR PBB SHADOW E&M-EST. PATIENT-LVL III: ICD-10-PCS | Mod: PBBFAC,HCNC,, | Performed by: NURSE PRACTITIONER

## 2019-01-01 PROCEDURE — 3078F PR MOST RECENT DIASTOLIC BLOOD PRESSURE < 80 MM HG: ICD-10-PCS | Mod: CPTII,HCNC,S$GLB, | Performed by: INTERNAL MEDICINE

## 2019-01-01 PROCEDURE — 78815 PET IMAGE W/CT SKULL-THIGH: CPT | Mod: TC,HCNC

## 2019-01-01 PROCEDURE — 3078F DIAST BP <80 MM HG: CPT | Mod: CPTII,HCNC,S$GLB, | Performed by: FAMILY MEDICINE

## 2019-01-01 PROCEDURE — 83010 ASSAY OF HAPTOGLOBIN QUANT: CPT

## 2019-01-01 PROCEDURE — 63600175 PHARM REV CODE 636 W HCPCS: Mod: HCNC | Performed by: EMERGENCY MEDICINE

## 2019-01-01 PROCEDURE — 88305 TISSUE EXAM BY PATHOLOGIST: CPT | Mod: 26,,, | Performed by: PATHOLOGY

## 2019-01-01 PROCEDURE — 84443 ASSAY THYROID STIM HORMONE: CPT

## 2019-01-01 PROCEDURE — 99204 OFFICE O/P NEW MOD 45 MIN: CPT | Mod: HCNC,S$GLB,, | Performed by: NEUROLOGICAL SURGERY

## 2019-01-01 PROCEDURE — 3074F PR MOST RECENT SYSTOLIC BLOOD PRESSURE < 130 MM HG: ICD-10-PCS | Mod: HCNC,CPTII,S$GLB, | Performed by: FAMILY MEDICINE

## 2019-01-01 PROCEDURE — 99999 PR PBB SHADOW E&M-EST. PATIENT-LVL IV: ICD-10-PCS | Mod: PBBFAC,HCNC,, | Performed by: FAMILY MEDICINE

## 2019-01-01 PROCEDURE — 3074F SYST BP LT 130 MM HG: CPT | Mod: HCNC,CPTII,S$GLB, | Performed by: INTERNAL MEDICINE

## 2019-01-01 PROCEDURE — 99284 EMERGENCY DEPT VISIT MOD MDM: CPT | Mod: 25,HCNC

## 2019-01-01 PROCEDURE — 3008F BODY MASS INDEX DOCD: CPT | Mod: HCNC,CPTII,S$GLB, | Performed by: RADIOLOGY

## 2019-01-01 PROCEDURE — 88189 PR  FLOWCYTOMETRY/READ, 16 & > MARKERS: ICD-10-PCS | Mod: ,,, | Performed by: PATHOLOGY

## 2019-01-01 PROCEDURE — 83880 ASSAY OF NATRIURETIC PEPTIDE: CPT

## 2019-01-01 PROCEDURE — 77470 SPECIAL RADIATION TREATMENT: CPT | Mod: 26,HCNC,, | Performed by: RADIOLOGY

## 2019-01-01 PROCEDURE — 88264 CHROMOSOME ANALYSIS 20-25: CPT

## 2019-01-01 PROCEDURE — 3075F PR MOST RECENT SYSTOLIC BLOOD PRESS GE 130-139MM HG: ICD-10-PCS | Mod: CPTII,HCNC,S$GLB, | Performed by: INTERNAL MEDICINE

## 2019-01-01 PROCEDURE — 77014 HC CT GUIDANCE RADIATION THERAPY FLDS PLACEMENT: CPT | Mod: TC,HCNC | Performed by: RADIOLOGY

## 2019-01-01 PROCEDURE — 88185 FLOWCYTOMETRY/TC ADD-ON: CPT | Performed by: PATHOLOGY

## 2019-01-01 PROCEDURE — 71250 CT THORAX DX C-: CPT | Mod: TC,HCNC

## 2019-01-01 PROCEDURE — 86140 C-REACTIVE PROTEIN: CPT | Mod: HCNC

## 2019-01-01 PROCEDURE — 96375 TX/PRO/DX INJ NEW DRUG ADDON: CPT | Performed by: EMERGENCY MEDICINE

## 2019-01-01 PROCEDURE — 99999 PR PBB SHADOW E&M-EST. PATIENT-LVL IV: ICD-10-PCS | Mod: PBBFAC,HCNC,, | Performed by: INTERNAL MEDICINE

## 2019-01-01 PROCEDURE — 88311 PR  DECALCIFY TISSUE: ICD-10-PCS | Mod: 26,,, | Performed by: PATHOLOGY

## 2019-01-01 PROCEDURE — 3075F PR MOST RECENT SYSTOLIC BLOOD PRESS GE 130-139MM HG: ICD-10-PCS | Mod: HCNC,CPTII,S$GLB, | Performed by: INTERNAL MEDICINE

## 2019-01-01 PROCEDURE — 99396 PREV VISIT EST AGE 40-64: CPT | Mod: HCNC,S$GLB,, | Performed by: FAMILY MEDICINE

## 2019-01-01 PROCEDURE — 99214 PR OFFICE/OUTPT VISIT, EST, LEVL IV, 30-39 MIN: ICD-10-PCS | Mod: ,,, | Performed by: INTERNAL MEDICINE

## 2019-01-01 PROCEDURE — 85730 THROMBOPLASTIN TIME PARTIAL: CPT

## 2019-01-01 PROCEDURE — 77300 RADIATION THERAPY DOSE PLAN: CPT | Mod: 26,HCNC,, | Performed by: RADIOLOGY

## 2019-01-01 PROCEDURE — 3078F PR MOST RECENT DIASTOLIC BLOOD PRESSURE < 80 MM HG: ICD-10-PCS | Mod: CPTII,HCNC,S$GLB, | Performed by: FAMILY MEDICINE

## 2019-01-01 PROCEDURE — 83880 ASSAY OF NATRIURETIC PEPTIDE: CPT | Mod: HCNC

## 2019-01-01 PROCEDURE — 71250 CT THORAX DX C-: CPT | Mod: 26,HCNC,, | Performed by: RADIOLOGY

## 2019-01-01 PROCEDURE — 77300 RADIATION THERAPY DOSE PLAN: CPT | Mod: TC,HCNC | Performed by: RADIOLOGY

## 2019-01-01 PROCEDURE — 70553 MRI BRAIN STEM W/O & W/DYE: CPT | Mod: 26,HCNC,, | Performed by: RADIOLOGY

## 2019-01-01 PROCEDURE — 93005 ELECTROCARDIOGRAM TRACING: CPT

## 2019-01-01 PROCEDURE — 63600175 PHARM REV CODE 636 W HCPCS: Performed by: RADIOLOGY

## 2019-01-01 PROCEDURE — 99215 OFFICE O/P EST HI 40 MIN: CPT | Mod: ,,, | Performed by: INTERNAL MEDICINE

## 2019-01-01 PROCEDURE — 99285 EMERGENCY DEPT VISIT HI MDM: CPT

## 2019-01-01 PROCEDURE — 99215 PR OFFICE/OUTPT VISIT, EST, LEVL V, 40-54 MIN: ICD-10-PCS | Mod: HCNC,S$GLB,, | Performed by: RADIOLOGY

## 2019-01-01 PROCEDURE — 88313 SPECIAL STAINS GROUP 2: CPT

## 2019-01-01 PROCEDURE — 25500020 PHARM REV CODE 255: Mod: HCNC | Performed by: RADIOLOGY

## 2019-01-01 PROCEDURE — 27000221 HC OXYGEN, UP TO 24 HOURS

## 2019-01-01 PROCEDURE — 88305 TISSUE EXAM BY PATHOLOGIST: ICD-10-PCS | Mod: 26,,, | Performed by: PATHOLOGY

## 2019-01-01 PROCEDURE — 3008F BODY MASS INDEX DOCD: CPT | Mod: HCNC,CPTII,S$GLB, | Performed by: FAMILY MEDICINE

## 2019-01-01 PROCEDURE — 63600175 PHARM REV CODE 636 W HCPCS: Performed by: INTERNAL MEDICINE

## 2019-01-01 PROCEDURE — 99213 OFFICE O/P EST LOW 20 MIN: CPT | Mod: PBBFAC,25,HCNC | Performed by: NURSE PRACTITIONER

## 2019-01-01 PROCEDURE — 77336 RADIATION PHYSICS CONSULT: CPT | Performed by: RADIOLOGY

## 2019-01-01 PROCEDURE — 99999 PR PBB SHADOW E&M-EST. PATIENT-LVL III: ICD-10-PCS | Mod: PBBFAC,HCNC,, | Performed by: FAMILY MEDICINE

## 2019-01-01 PROCEDURE — 83615 LACTATE (LD) (LDH) ENZYME: CPT

## 2019-01-01 PROCEDURE — 3075F SYST BP GE 130 - 139MM HG: CPT | Mod: CPTII,HCNC,S$GLB, | Performed by: INTERNAL MEDICINE

## 2019-01-01 PROCEDURE — 63600175 PHARM REV CODE 636 W HCPCS: Performed by: NURSE PRACTITIONER

## 2019-01-01 PROCEDURE — 99214 OFFICE O/P EST MOD 30 MIN: CPT | Mod: HCNC,S$GLB,, | Performed by: NURSE PRACTITIONER

## 2019-01-01 PROCEDURE — 3078F DIAST BP <80 MM HG: CPT | Mod: CPTII,HCNC,S$GLB, | Performed by: INTERNAL MEDICINE

## 2019-01-01 PROCEDURE — 25000003 PHARM REV CODE 250: Mod: HCNC | Performed by: EMERGENCY MEDICINE

## 2019-01-01 PROCEDURE — 77338 PR  MLC IMRT DESIGN & CONSTRUCTION PER IMRT PLAN: ICD-10-PCS | Mod: 26,HCNC,, | Performed by: RADIOLOGY

## 2019-01-01 PROCEDURE — 99214 OFFICE O/P EST MOD 30 MIN: CPT | Mod: HCNC,S$GLB,, | Performed by: INTERNAL MEDICINE

## 2019-01-01 PROCEDURE — 70553 MRI BRAIN STEM W/O & W/DYE: CPT | Mod: TC,HCNC

## 2019-01-01 PROCEDURE — 3008F PR BODY MASS INDEX (BMI) DOCUMENTED: ICD-10-PCS | Mod: HCNC,CPTII,S$GLB, | Performed by: NEUROLOGICAL SURGERY

## 2019-01-01 PROCEDURE — 99232 SBSQ HOSP IP/OBS MODERATE 35: CPT | Mod: ,,, | Performed by: INTERNAL MEDICINE

## 2019-01-01 PROCEDURE — 77263 THER RADIOLOGY TX PLNG CPLX: CPT | Mod: HCNC,,, | Performed by: RADIOLOGY

## 2019-01-01 PROCEDURE — 88184 FLOWCYTOMETRY/ TC 1 MARKER: CPT | Performed by: PATHOLOGY

## 2019-01-01 PROCEDURE — 77470 SPECIAL RADIATION TREATMENT: CPT | Mod: TC,HCNC | Performed by: RADIOLOGY

## 2019-01-01 PROCEDURE — 99214 OFFICE O/P EST MOD 30 MIN: CPT | Mod: ,,, | Performed by: INTERNAL MEDICINE

## 2019-01-01 PROCEDURE — 3008F PR BODY MASS INDEX (BMI) DOCUMENTED: ICD-10-PCS | Mod: HCNC,CPTII,S$GLB, | Performed by: RADIOLOGY

## 2019-01-01 PROCEDURE — 77334 RADIATION TREATMENT AID(S): CPT | Mod: TC,HCNC | Performed by: RADIOLOGY

## 2019-01-01 PROCEDURE — 99999 PR PBB SHADOW E&M-EST. PATIENT-LVL IV: CPT | Mod: PBBFAC,HCNC,, | Performed by: FAMILY MEDICINE

## 2019-01-01 PROCEDURE — 85097 BONE MARROW INTERPRETATION: CPT | Mod: ,,, | Performed by: PATHOLOGY

## 2019-01-01 PROCEDURE — 93010 ELECTROCARDIOGRAM REPORT: CPT | Mod: HCNC,,, | Performed by: INTERNAL MEDICINE

## 2019-01-01 PROCEDURE — 99999 PR PBB SHADOW E&M-EST. PATIENT-LVL IV: CPT | Mod: PBBFAC,HCNC,, | Performed by: NEUROLOGICAL SURGERY

## 2019-01-01 PROCEDURE — 77470 PR  SPECIAL RADIATION TREATMENT: ICD-10-PCS | Mod: 26,HCNC,, | Performed by: RADIOLOGY

## 2019-01-01 PROCEDURE — 25000003 PHARM REV CODE 250: Mod: HCNC | Performed by: NURSE PRACTITIONER

## 2019-01-01 PROCEDURE — 93010 EKG 12-LEAD: ICD-10-PCS | Mod: ,,, | Performed by: INTERNAL MEDICINE

## 2019-01-01 PROCEDURE — 88311 DECALCIFY TISSUE: CPT | Mod: 26,,, | Performed by: PATHOLOGY

## 2019-01-01 PROCEDURE — 78815 PET IMAGE W/CT SKULL-THIGH: CPT | Mod: 26,HCNC,PS, | Performed by: RADIOLOGY

## 2019-01-01 PROCEDURE — 99999 PR PBB SHADOW E&M-EST. PATIENT-LVL IV: CPT | Mod: PBBFAC,HCNC,, | Performed by: INTERNAL MEDICINE

## 2019-01-01 PROCEDURE — 77290 PR  SET RADN THERAPY FIELD COMPLEX: ICD-10-PCS | Mod: 26,HCNC,, | Performed by: RADIOLOGY

## 2019-01-01 PROCEDURE — 99999 PR PBB SHADOW E&M-EST. PATIENT-LVL IV: CPT | Mod: PBBFAC,HCNC,, | Performed by: RADIOLOGY

## 2019-01-01 PROCEDURE — 3074F SYST BP LT 130 MM HG: CPT | Mod: CPTII,HCNC,S$GLB, | Performed by: FAMILY MEDICINE

## 2019-01-01 PROCEDURE — 99214 PR OFFICE/OUTPT VISIT, EST, LEVL IV, 30-39 MIN: ICD-10-PCS | Mod: HCNC,S$GLB,, | Performed by: RADIOLOGY

## 2019-01-01 PROCEDURE — 85025 COMPLETE CBC W/AUTO DIFF WBC: CPT | Mod: 91,HCNC

## 2019-01-01 PROCEDURE — 88313 SPECIAL STAINS GROUP 2: CPT | Mod: 26,,, | Performed by: PATHOLOGY

## 2019-01-01 PROCEDURE — 99232 PR SUBSEQUENT HOSPITAL CARE,LEVL II: ICD-10-PCS | Mod: ,,, | Performed by: INTERNAL MEDICINE

## 2019-01-01 PROCEDURE — 70450 CT HEAD/BRAIN W/O DYE: CPT | Mod: TC,HCNC

## 2019-01-01 PROCEDURE — 99215 OFFICE O/P EST HI 40 MIN: CPT | Mod: 25,HCNC,S$GLB, | Performed by: INTERNAL MEDICINE

## 2019-01-01 PROCEDURE — 99285 EMERGENCY DEPT VISIT HI MDM: CPT | Mod: HCNC

## 2019-01-01 PROCEDURE — 3075F SYST BP GE 130 - 139MM HG: CPT | Mod: HCNC,CPTII,S$GLB, | Performed by: INTERNAL MEDICINE

## 2019-01-01 PROCEDURE — A9552 F18 FDG: HCPCS | Mod: HCNC

## 2019-01-01 PROCEDURE — 78815 NM PET CT ROUTINE: ICD-10-PCS | Mod: 26,HCNC,PS, | Performed by: RADIOLOGY

## 2019-01-01 PROCEDURE — 99499 RISK ADDL DX/OHS AUDIT: ICD-10-PCS | Mod: S$GLB,,, | Performed by: FAMILY MEDICINE

## 2019-01-01 PROCEDURE — 3008F BODY MASS INDEX DOCD: CPT | Mod: HCNC,CPTII,S$GLB, | Performed by: NEUROLOGICAL SURGERY

## 2019-01-01 PROCEDURE — 21400001 HC TELEMETRY ROOM

## 2019-01-01 PROCEDURE — 81003 URINALYSIS AUTO W/O SCOPE: CPT

## 2019-01-01 PROCEDURE — 77263 PR  RADIATION THERAPY PLAN COMPLEX: ICD-10-PCS | Mod: HCNC,,, | Performed by: RADIOLOGY

## 2019-01-01 PROCEDURE — 3008F BODY MASS INDEX DOCD: CPT | Mod: CPTII,HCNC,S$GLB, | Performed by: INTERNAL MEDICINE

## 2019-01-01 PROCEDURE — 25000003 PHARM REV CODE 250: Performed by: INTERNAL MEDICINE

## 2019-01-01 PROCEDURE — 3074F PR MOST RECENT SYSTOLIC BLOOD PRESSURE < 130 MM HG: ICD-10-PCS | Mod: CPTII,HCNC,S$GLB, | Performed by: FAMILY MEDICINE

## 2019-01-01 PROCEDURE — 84100 ASSAY OF PHOSPHORUS: CPT

## 2019-01-01 PROCEDURE — 3075F PR MOST RECENT SYSTOLIC BLOOD PRESS GE 130-139MM HG: ICD-10-PCS | Mod: HCNC,CPTII,S$GLB, | Performed by: RADIOLOGY

## 2019-01-01 PROCEDURE — 99499 UNLISTED E&M SERVICE: CPT | Mod: S$GLB,,, | Performed by: FAMILY MEDICINE

## 2019-01-01 PROCEDURE — 3079F PR MOST RECENT DIASTOLIC BLOOD PRESSURE 80-89 MM HG: ICD-10-PCS | Mod: HCNC,CPTII,S$GLB, | Performed by: INTERNAL MEDICINE

## 2019-01-01 PROCEDURE — 70553 MRI BRAIN W WO CONTRAST: ICD-10-PCS | Mod: 26,HCNC,, | Performed by: RADIOLOGY

## 2019-01-01 PROCEDURE — 85610 PROTHROMBIN TIME: CPT | Mod: HCNC

## 2019-01-01 PROCEDURE — 99215 PR OFFICE/OUTPT VISIT, EST, LEVL V, 40-54 MIN: ICD-10-PCS | Mod: ,,, | Performed by: INTERNAL MEDICINE

## 2019-01-01 PROCEDURE — 99214 OFFICE O/P EST MOD 30 MIN: CPT | Mod: HCNC,S$GLB,, | Performed by: RADIOLOGY

## 2019-01-01 PROCEDURE — 88342 CHG IMMUNOCYTOCHEMISTRY: ICD-10-PCS | Mod: 26,,, | Performed by: PATHOLOGY

## 2019-01-01 PROCEDURE — 77370 RADIATION PHYSICS CONSULT: CPT | Mod: HCNC | Performed by: RADIOLOGY

## 2019-01-01 PROCEDURE — 3078F PR MOST RECENT DIASTOLIC BLOOD PRESSURE < 80 MM HG: ICD-10-PCS | Mod: HCNC,CPTII,S$GLB, | Performed by: INTERNAL MEDICINE

## 2019-01-01 PROCEDURE — 3008F PR BODY MASS INDEX (BMI) DOCUMENTED: ICD-10-PCS | Mod: CPTII,HCNC,S$GLB, | Performed by: FAMILY MEDICINE

## 2019-01-01 PROCEDURE — 88342 IMHCHEM/IMCYTCHM 1ST ANTB: CPT | Mod: 26,,, | Performed by: PATHOLOGY

## 2019-01-01 PROCEDURE — 85610 PROTHROMBIN TIME: CPT

## 2019-01-01 PROCEDURE — 77338 DESIGN MLC DEVICE FOR IMRT: CPT | Mod: 26,HCNC,, | Performed by: RADIOLOGY

## 2019-01-01 PROCEDURE — 3074F PR MOST RECENT SYSTOLIC BLOOD PRESSURE < 130 MM HG: ICD-10-PCS | Mod: HCNC,CPTII,S$GLB, | Performed by: NEUROLOGICAL SURGERY

## 2019-01-01 PROCEDURE — 72100 X-RAY EXAM L-S SPINE 2/3 VWS: CPT | Mod: TC,HCNC,FY,PO

## 2019-01-01 PROCEDURE — 3078F DIAST BP <80 MM HG: CPT | Mod: HCNC,CPTII,S$GLB, | Performed by: NEUROLOGICAL SURGERY

## 2019-01-01 PROCEDURE — 3008F BODY MASS INDEX DOCD: CPT | Mod: CPTII,HCNC,S$GLB, | Performed by: FAMILY MEDICINE

## 2019-01-01 PROCEDURE — 84484 ASSAY OF TROPONIN QUANT: CPT

## 2019-01-01 PROCEDURE — 3079F DIAST BP 80-89 MM HG: CPT | Mod: HCNC,CPTII,S$GLB, | Performed by: INTERNAL MEDICINE

## 2019-01-01 RX ORDER — IPRATROPIUM BROMIDE 0.5 MG/2.5ML
0.5 SOLUTION RESPIRATORY (INHALATION) EVERY 12 HOURS
Status: DISCONTINUED | OUTPATIENT
Start: 2019-01-01 | End: 2019-01-01 | Stop reason: HOSPADM

## 2019-01-01 RX ORDER — SODIUM CHLORIDE 9 MG/ML
INJECTION, SOLUTION INTRAVENOUS ONCE
Status: COMPLETED | OUTPATIENT
Start: 2019-01-01 | End: 2019-01-01

## 2019-01-01 RX ORDER — DILTIAZEM HYDROCHLORIDE 240 MG/1
240 CAPSULE, COATED, EXTENDED RELEASE ORAL DAILY
Status: DISCONTINUED | OUTPATIENT
Start: 2019-01-01 | End: 2019-01-01 | Stop reason: HOSPADM

## 2019-01-01 RX ORDER — LEVOTHYROXINE SODIUM 137 UG/1
137 TABLET ORAL
Qty: 30 TABLET | Refills: 3 | Status: SHIPPED | OUTPATIENT
Start: 2019-01-01 | End: 2019-01-01

## 2019-01-01 RX ORDER — FUROSEMIDE 20 MG/1
20 TABLET ORAL DAILY
Qty: 30 TABLET | Refills: 11 | Status: SHIPPED | OUTPATIENT
Start: 2019-01-01 | End: 2020-04-02

## 2019-01-01 RX ORDER — AMLODIPINE BESYLATE 10 MG/1
TABLET ORAL
Qty: 90 TABLET | Refills: 3 | Status: SHIPPED | OUTPATIENT
Start: 2019-01-01

## 2019-01-01 RX ORDER — MORPHINE SULFATE 10 MG/ML
4 INJECTION INTRAMUSCULAR; INTRAVENOUS; SUBCUTANEOUS
Status: COMPLETED | OUTPATIENT
Start: 2019-01-01 | End: 2019-01-01

## 2019-01-01 RX ORDER — PRAVASTATIN SODIUM 20 MG/1
TABLET ORAL
Qty: 90 TABLET | Refills: 3 | Status: SHIPPED | OUTPATIENT
Start: 2019-01-01 | End: 2019-01-01 | Stop reason: CLARIF

## 2019-01-01 RX ORDER — ALPRAZOLAM 0.5 MG/1
0.5 TABLET ORAL 2 TIMES DAILY PRN
Status: DISCONTINUED | OUTPATIENT
Start: 2019-01-01 | End: 2019-01-01 | Stop reason: HOSPADM

## 2019-01-01 RX ORDER — MORPHINE SULFATE 2 MG/ML
2 INJECTION, SOLUTION INTRAMUSCULAR; INTRAVENOUS ONCE
Status: COMPLETED | OUTPATIENT
Start: 2019-01-01 | End: 2019-01-01

## 2019-01-01 RX ORDER — IBUPROFEN 200 MG
16 TABLET ORAL
Status: DISCONTINUED | OUTPATIENT
Start: 2019-01-01 | End: 2019-01-01 | Stop reason: HOSPADM

## 2019-01-01 RX ORDER — ONDANSETRON 2 MG/ML
4 INJECTION INTRAMUSCULAR; INTRAVENOUS
Status: COMPLETED | OUTPATIENT
Start: 2019-01-01 | End: 2019-01-01

## 2019-01-01 RX ORDER — CLONIDINE HYDROCHLORIDE 0.1 MG/1
0.1 TABLET ORAL
Status: COMPLETED | OUTPATIENT
Start: 2019-01-01 | End: 2019-01-01

## 2019-01-01 RX ORDER — ONDANSETRON 2 MG/ML
4 INJECTION INTRAMUSCULAR; INTRAVENOUS EVERY 12 HOURS PRN
Status: DISCONTINUED | OUTPATIENT
Start: 2019-01-01 | End: 2019-01-01 | Stop reason: HOSPADM

## 2019-01-01 RX ORDER — DEXAMETHASONE 4 MG/1
40 TABLET ORAL DAILY
Status: DISCONTINUED | OUTPATIENT
Start: 2019-01-01 | End: 2019-01-01 | Stop reason: DRUGHIGH

## 2019-01-01 RX ORDER — HYDROCODONE BITARTRATE AND ACETAMINOPHEN 7.5; 325 MG/15ML; MG/15ML
5 SOLUTION ORAL EVERY 4 HOURS PRN
Status: DISCONTINUED | OUTPATIENT
Start: 2019-01-01 | End: 2019-01-01 | Stop reason: HOSPADM

## 2019-01-01 RX ORDER — FENTANYL CITRATE 50 UG/ML
INJECTION, SOLUTION INTRAMUSCULAR; INTRAVENOUS CODE/TRAUMA/SEDATION MEDICATION
Status: COMPLETED | OUTPATIENT
Start: 2019-01-01 | End: 2019-01-01

## 2019-01-01 RX ORDER — ACETAMINOPHEN 325 MG/1
650 TABLET ORAL EVERY 8 HOURS PRN
Status: DISCONTINUED | OUTPATIENT
Start: 2019-01-01 | End: 2019-01-01

## 2019-01-01 RX ORDER — GLUCAGON 1 MG
1 KIT INJECTION
Status: DISCONTINUED | OUTPATIENT
Start: 2019-01-01 | End: 2019-01-01 | Stop reason: HOSPADM

## 2019-01-01 RX ORDER — CLONIDINE HYDROCHLORIDE 0.1 MG/1
TABLET ORAL
Qty: 180 TABLET | Refills: 3 | Status: SHIPPED | OUTPATIENT
Start: 2019-01-01

## 2019-01-01 RX ORDER — LACTULOSE 10 G/15ML
SOLUTION ORAL; RECTAL
Qty: 300 ML | Refills: 6 | Status: SHIPPED | OUTPATIENT
Start: 2019-01-01

## 2019-01-01 RX ORDER — BUDESONIDE 0.5 MG/2ML
0.5 INHALANT ORAL EVERY 12 HOURS
Status: DISCONTINUED | OUTPATIENT
Start: 2019-01-01 | End: 2019-01-01 | Stop reason: HOSPADM

## 2019-01-01 RX ORDER — CLONIDINE HYDROCHLORIDE 0.1 MG/1
0.1 TABLET ORAL 2 TIMES DAILY
Status: DISCONTINUED | OUTPATIENT
Start: 2019-01-01 | End: 2019-01-01 | Stop reason: HOSPADM

## 2019-01-01 RX ORDER — PANTOPRAZOLE SODIUM 40 MG/1
40 TABLET, DELAYED RELEASE ORAL DAILY
Status: DISCONTINUED | OUTPATIENT
Start: 2019-01-01 | End: 2019-01-01 | Stop reason: HOSPADM

## 2019-01-01 RX ORDER — HEPARIN 100 UNIT/ML
500 SYRINGE INTRAVENOUS
Status: CANCELLED | OUTPATIENT
Start: 2019-01-01

## 2019-01-01 RX ORDER — DEXAMETHASONE 4 MG/1
20 TABLET ORAL EVERY 12 HOURS
Status: DISCONTINUED | OUTPATIENT
Start: 2019-01-01 | End: 2019-01-01 | Stop reason: HOSPADM

## 2019-01-01 RX ORDER — DOXAZOSIN 2 MG/1
4 TABLET ORAL NIGHTLY
Status: DISCONTINUED | OUTPATIENT
Start: 2019-01-01 | End: 2019-01-01 | Stop reason: HOSPADM

## 2019-01-01 RX ORDER — LEVOTHYROXINE SODIUM 137 UG/1
137 TABLET ORAL
Qty: 30 TABLET | Refills: 3 | Status: SHIPPED | OUTPATIENT
Start: 2019-01-01 | End: 2020-01-15

## 2019-01-01 RX ORDER — FUROSEMIDE 20 MG/1
20 TABLET ORAL DAILY
Status: DISCONTINUED | OUTPATIENT
Start: 2019-01-01 | End: 2019-01-01 | Stop reason: HOSPADM

## 2019-01-01 RX ORDER — PREDNISONE 5 MG/1
TABLET ORAL
Qty: 90 TABLET | Refills: 1 | Status: SHIPPED | OUTPATIENT
Start: 2019-01-01

## 2019-01-01 RX ORDER — THIAMINE HCL 100 MG
100 TABLET ORAL DAILY
Status: DISCONTINUED | OUTPATIENT
Start: 2019-01-01 | End: 2019-01-01 | Stop reason: HOSPADM

## 2019-01-01 RX ORDER — SODIUM CHLORIDE 0.9 % (FLUSH) 0.9 %
10 SYRINGE (ML) INJECTION
Status: CANCELLED | OUTPATIENT
Start: 2019-01-01

## 2019-01-01 RX ORDER — LEVETIRACETAM 500 MG/1
500 TABLET ORAL 2 TIMES DAILY
Qty: 60 TABLET | Refills: 0 | Status: SHIPPED | OUTPATIENT
Start: 2019-01-01 | End: 2020-03-10

## 2019-01-01 RX ORDER — DEXAMETHASONE 4 MG/1
4 TABLET ORAL EVERY 6 HOURS
Qty: 40 TABLET | Refills: 0 | Status: SHIPPED | OUTPATIENT
Start: 2019-01-01 | End: 2019-01-01 | Stop reason: SDUPTHER

## 2019-01-01 RX ORDER — DEXAMETHASONE SODIUM PHOSPHATE 4 MG/ML
20 INJECTION, SOLUTION INTRA-ARTICULAR; INTRALESIONAL; INTRAMUSCULAR; INTRAVENOUS; SOFT TISSUE
Status: COMPLETED | OUTPATIENT
Start: 2019-01-01 | End: 2019-01-01

## 2019-01-01 RX ORDER — SODIUM CHLORIDE 0.9 % (FLUSH) 0.9 %
10 SYRINGE (ML) INJECTION
Status: DISCONTINUED | OUTPATIENT
Start: 2019-01-01 | End: 2019-01-01 | Stop reason: HOSPADM

## 2019-01-01 RX ORDER — ACETAMINOPHEN 325 MG/1
650 TABLET ORAL EVERY 8 HOURS PRN
Status: DISCONTINUED | OUTPATIENT
Start: 2019-01-01 | End: 2019-01-01 | Stop reason: HOSPADM

## 2019-01-01 RX ORDER — DEXAMETHASONE 4 MG/1
4 TABLET ORAL DAILY
Status: DISCONTINUED | OUTPATIENT
Start: 2019-01-01 | End: 2019-01-01

## 2019-01-01 RX ORDER — LEVETIRACETAM 500 MG/1
500 TABLET ORAL 2 TIMES DAILY
Status: DISCONTINUED | OUTPATIENT
Start: 2019-01-01 | End: 2019-01-01 | Stop reason: HOSPADM

## 2019-01-01 RX ORDER — IBUPROFEN 200 MG
24 TABLET ORAL
Status: DISCONTINUED | OUTPATIENT
Start: 2019-01-01 | End: 2019-01-01 | Stop reason: HOSPADM

## 2019-01-01 RX ORDER — HYDRALAZINE HYDROCHLORIDE 25 MG/1
25 TABLET, FILM COATED ORAL EVERY 8 HOURS PRN
Status: DISCONTINUED | OUTPATIENT
Start: 2019-01-01 | End: 2019-01-01 | Stop reason: HOSPADM

## 2019-01-01 RX ORDER — METOPROLOL TARTRATE 25 MG/1
25 TABLET, FILM COATED ORAL 2 TIMES DAILY
Status: DISCONTINUED | OUTPATIENT
Start: 2019-01-01 | End: 2019-01-01 | Stop reason: HOSPADM

## 2019-01-01 RX ORDER — AMLODIPINE BESYLATE 10 MG/1
10 TABLET ORAL DAILY
Status: DISCONTINUED | OUTPATIENT
Start: 2019-01-01 | End: 2019-01-01 | Stop reason: HOSPADM

## 2019-01-01 RX ORDER — IPRATROPIUM BROMIDE AND ALBUTEROL SULFATE 2.5; .5 MG/3ML; MG/3ML
3 SOLUTION RESPIRATORY (INHALATION) EVERY 6 HOURS PRN
Status: DISCONTINUED | OUTPATIENT
Start: 2019-01-01 | End: 2019-01-01 | Stop reason: HOSPADM

## 2019-01-01 RX ORDER — METHYLPREDNISOLONE 4 MG/1
TABLET ORAL
Qty: 1 PACKAGE | Refills: 0 | Status: SHIPPED | OUTPATIENT
Start: 2019-01-01

## 2019-01-01 RX ORDER — ALPRAZOLAM 0.5 MG/1
0.5 TABLET ORAL NIGHTLY PRN
Status: DISCONTINUED | OUTPATIENT
Start: 2019-01-01 | End: 2019-01-01

## 2019-01-01 RX ORDER — MIDAZOLAM HYDROCHLORIDE 1 MG/ML
INJECTION INTRAMUSCULAR; INTRAVENOUS CODE/TRAUMA/SEDATION MEDICATION
Status: COMPLETED | OUTPATIENT
Start: 2019-01-01 | End: 2019-01-01

## 2019-01-01 RX ORDER — DILTIAZEM HYDROCHLORIDE 240 MG/1
CAPSULE, COATED, EXTENDED RELEASE ORAL
Qty: 90 CAPSULE | Refills: 3 | Status: SHIPPED | OUTPATIENT
Start: 2019-01-01

## 2019-01-01 RX ORDER — GADOBUTROL 604.72 MG/ML
5.5 INJECTION INTRAVENOUS
Status: COMPLETED | OUTPATIENT
Start: 2019-01-01 | End: 2019-01-01

## 2019-01-01 RX ORDER — HYDROCODONE BITARTRATE AND ACETAMINOPHEN 10; 325 MG/1; MG/1
1 TABLET ORAL EVERY 4 HOURS PRN
Status: DISCONTINUED | OUTPATIENT
Start: 2019-01-01 | End: 2019-01-01

## 2019-01-01 RX ORDER — BISACODYL 10 MG
10 SUPPOSITORY, RECTAL RECTAL DAILY PRN
Status: DISCONTINUED | OUTPATIENT
Start: 2019-01-01 | End: 2019-01-01 | Stop reason: HOSPADM

## 2019-01-01 RX ORDER — HYDRALAZINE HYDROCHLORIDE 25 MG/1
100 TABLET, FILM COATED ORAL
Status: COMPLETED | OUTPATIENT
Start: 2019-01-01 | End: 2019-01-01

## 2019-01-01 RX ORDER — DEXAMETHASONE 4 MG/1
36 TABLET ORAL ONCE
Status: COMPLETED | OUTPATIENT
Start: 2019-01-01 | End: 2019-01-01

## 2019-01-01 RX ORDER — NAPROXEN SODIUM 220 MG/1
81 TABLET, FILM COATED ORAL DAILY
Status: DISCONTINUED | OUTPATIENT
Start: 2019-01-01 | End: 2019-01-01

## 2019-01-01 RX ORDER — DEXAMETHASONE 4 MG/1
4 TABLET ORAL EVERY 6 HOURS
Qty: 90 TABLET | Refills: 11 | Status: SHIPPED | OUTPATIENT
Start: 2019-01-01 | End: 2019-01-01

## 2019-01-01 RX ORDER — ATORVASTATIN CALCIUM 20 MG/1
20 TABLET, FILM COATED ORAL DAILY
Qty: 90 TABLET | Refills: 3 | Status: SHIPPED | OUTPATIENT
Start: 2019-01-01 | End: 2019-01-01

## 2019-01-01 RX ORDER — HYDRALAZINE HYDROCHLORIDE 50 MG/1
100 TABLET, FILM COATED ORAL 3 TIMES DAILY
Status: DISCONTINUED | OUTPATIENT
Start: 2019-01-01 | End: 2019-01-01 | Stop reason: HOSPADM

## 2019-01-01 RX ORDER — DEXAMETHASONE 4 MG/1
4 TABLET ORAL
Status: DISCONTINUED | OUTPATIENT
Start: 2019-01-01 | End: 2019-01-01

## 2019-01-01 RX ORDER — SODIUM CHLORIDE 0.9 % (FLUSH) 0.9 %
5 SYRINGE (ML) INJECTION
Status: DISCONTINUED | OUTPATIENT
Start: 2019-01-01 | End: 2019-01-01 | Stop reason: HOSPADM

## 2019-01-01 RX ORDER — DOXAZOSIN 4 MG/1
TABLET ORAL
Qty: 90 TABLET | Refills: 3 | Status: SHIPPED | OUTPATIENT
Start: 2019-01-01

## 2019-01-01 RX ORDER — LEVETIRACETAM 500 MG/1
500 TABLET ORAL
Status: COMPLETED | OUTPATIENT
Start: 2019-01-01 | End: 2019-01-01

## 2019-01-01 RX ADMIN — IPRATROPIUM BROMIDE 0.5 MG: 0.5 SOLUTION RESPIRATORY (INHALATION) at 07:04

## 2019-01-01 RX ADMIN — CLONIDINE HYDROCHLORIDE 0.1 MG: 0.1 TABLET ORAL at 10:04

## 2019-01-01 RX ADMIN — ALPRAZOLAM 0.5 MG: 0.5 TABLET ORAL at 10:04

## 2019-01-01 RX ADMIN — THERA TABS 1 TABLET: TAB at 11:04

## 2019-01-01 RX ADMIN — DEXAMETHASONE 40 MG: 4 TABLET ORAL at 03:04

## 2019-01-01 RX ADMIN — IPRATROPIUM BROMIDE 0.5 MG: 0.5 SOLUTION RESPIRATORY (INHALATION) at 06:04

## 2019-01-01 RX ADMIN — DEXAMETHASONE 4 MG: 4 TABLET ORAL at 06:04

## 2019-01-01 RX ADMIN — BUDESONIDE 0.5 MG: 0.5 SUSPENSION RESPIRATORY (INHALATION) at 07:04

## 2019-01-01 RX ADMIN — CLONIDINE HYDROCHLORIDE 0.1 MG: 0.1 TABLET ORAL at 08:04

## 2019-01-01 RX ADMIN — LORAZEPAM 2 MG: 2 INJECTION INTRAMUSCULAR; INTRAVENOUS at 06:03

## 2019-01-01 RX ADMIN — DOXAZOSIN MESYLATE 4 MG: 2 TABLET ORAL at 10:04

## 2019-01-01 RX ADMIN — DEXAMETHASONE SODIUM PHOSPHATE 20 MG: 4 INJECTION, SOLUTION INTRAMUSCULAR; INTRAVENOUS at 09:03

## 2019-01-01 RX ADMIN — DEXAMETHASONE SODIUM PHOSPHATE 20 MG: 4 INJECTION, SOLUTION INTRA-ARTICULAR; INTRALESIONAL; INTRAMUSCULAR; INTRAVENOUS; SOFT TISSUE at 01:03

## 2019-01-01 RX ADMIN — FOLIC ACID-PYRIDOXINE-CYANOCOBALAMIN TAB 2.5-25-2 MG 1 TABLET: 2.5-25-2 TAB at 11:04

## 2019-01-01 RX ADMIN — BISACODYL 10 MG: 10 SUPPOSITORY RECTAL at 10:04

## 2019-01-01 RX ADMIN — ALUMINUM HYDROXIDE, MAGNESIUM HYDROXIDE, AND SIMETHICONE: 200; 200; 20 SUSPENSION ORAL at 03:04

## 2019-01-01 RX ADMIN — GADOBUTROL 5.5 ML: 604.72 INJECTION INTRAVENOUS at 09:03

## 2019-01-01 RX ADMIN — FENTANYL CITRATE 50 MCG: 50 INJECTION, SOLUTION INTRAMUSCULAR; INTRAVENOUS at 02:04

## 2019-01-01 RX ADMIN — DOXAZOSIN MESYLATE 4 MG: 2 TABLET ORAL at 08:04

## 2019-01-01 RX ADMIN — DEXAMETHASONE 36 MG: 4 TABLET ORAL at 02:04

## 2019-01-01 RX ADMIN — DEXAMETHASONE 20 MG: 4 TABLET ORAL at 11:04

## 2019-01-01 RX ADMIN — MIDAZOLAM HYDROCHLORIDE 1 MG: 1 INJECTION, SOLUTION INTRAMUSCULAR; INTRAVENOUS at 02:04

## 2019-01-01 RX ADMIN — PANTOPRAZOLE SODIUM 40 MG: 40 TABLET, DELAYED RELEASE ORAL at 03:04

## 2019-01-01 RX ADMIN — FERUMOXYTOL 510 MG: 510 INJECTION INTRAVENOUS at 01:03

## 2019-01-01 RX ADMIN — BUDESONIDE 0.5 MG: 0.5 SUSPENSION RESPIRATORY (INHALATION) at 08:04

## 2019-01-01 RX ADMIN — HYDRALAZINE HYDROCHLORIDE 100 MG: 25 TABLET, FILM COATED ORAL at 08:04

## 2019-01-01 RX ADMIN — FERUMOXYTOL 510 MG: 510 INJECTION INTRAVENOUS at 02:03

## 2019-01-01 RX ADMIN — MORPHINE SULFATE 2 MG: 2 INJECTION, SOLUTION INTRAMUSCULAR; INTRAVENOUS at 08:04

## 2019-01-01 RX ADMIN — METOPROLOL TARTRATE 25 MG: 25 TABLET ORAL at 04:04

## 2019-01-01 RX ADMIN — LEVETIRACETAM 500 MG: 500 TABLET ORAL at 09:03

## 2019-01-01 RX ADMIN — BUDESONIDE 0.5 MG: 0.5 SUSPENSION RESPIRATORY (INHALATION) at 06:04

## 2019-01-01 RX ADMIN — HYDRALAZINE HYDROCHLORIDE 100 MG: 25 TABLET, FILM COATED ORAL at 10:01

## 2019-01-01 RX ADMIN — MORPHINE SULFATE 4 MG: 10 INJECTION INTRAVENOUS at 11:02

## 2019-01-01 RX ADMIN — PANTOPRAZOLE SODIUM 40 MG: 40 TABLET, DELAYED RELEASE ORAL at 08:04

## 2019-01-01 RX ADMIN — HYDROCODONE BITARTRATE AND ACETAMINOPHEN 1 TABLET: 10; 325 TABLET ORAL at 01:04

## 2019-01-01 RX ADMIN — ONDANSETRON 4 MG: 2 INJECTION INTRAMUSCULAR; INTRAVENOUS at 11:02

## 2019-01-01 RX ADMIN — CLONIDINE HYDROCHLORIDE 0.1 MG: 0.1 TABLET ORAL at 10:01

## 2019-01-01 RX ADMIN — IPRATROPIUM BROMIDE 0.5 MG: 0.5 SOLUTION RESPIRATORY (INHALATION) at 08:04

## 2019-01-01 RX ADMIN — ALPRAZOLAM 0.5 MG: 0.5 TABLET ORAL at 06:04

## 2019-01-01 RX ADMIN — Medication 100 MG: at 11:04

## 2019-01-01 RX ADMIN — SODIUM CHLORIDE: 9 INJECTION, SOLUTION INTRAVENOUS at 02:04

## 2019-01-01 NOTE — TELEPHONE ENCOUNTER
Reason for Disposition   Face or lip swelling    Protocols used: ST RASH - WIDESPREAD ON DRUGS-A-AH

## 2019-01-02 NOTE — TELEPHONE ENCOUNTER
----- Message from Lakia Khanna RN sent at 1/2/2019 11:05 AM CST -----  Contact: Lakia Recinos/Staff,    Mr Vigil was seen in the ED and diagnosed with pneumonia on 12/31/18 and was prescribed azithromycin. After his first dose he had some hemoptysis and went back to the ED, thinking he was having an allergic reaction to the med, per his wife, Ying's report. He was told the hemoptysis was not an allergic reaction and he needed to continue to take it and follow up with you within 3 days.     Ying advised he has not yet taken another dose of the azithromycin and she is not sure she is going to be able to get him to. He is very weak and lethargic since returning from the ED last nite.     She said she was going to call to schedule him an appt, but I felt I needed to message you to relay the symptoms she is describing.     Thank you,     Lakia Khanna RN, [facility]  Outpatient Case Management  [phone]  [facility] [id number]  [email or web address]

## 2019-01-02 NOTE — PROGRESS NOTES
"19 - SUMMARY: Phone contact made with pt's wife, Ying, today for follow up with OPCM. Observed on chart review prior to contact that pt was seen in the ED on 18 and diagnosed with pneumonia, then went back to the ED yesterday after coughing up what she describes as a small amount of blood. He believed he was having an allergic reaction to the antibiotic he was given, but was told the blood in his sputum was not an allergic reaction. He was d/c'd home again last nite, but Ying reports he still has not taken another dose of azithromycin since his original dose. He is currently very weak and she reports he feels he should be hospitalized. He was instructed to follow up with his PCP within the next 3 days and she is going to call Dr Recinos's office today to try to get him an appt to be seen this week. She reports she told the ED financial staff they qualified for financial assistance, so they were not asked to pay a copay for his treatment, but his record did not show current coverage per ED staff.  INTERVENTION: Offered emotional support to Ying as she became tearful during the conversation. She relayed that she is doing everything she knows to do for him, but "he is just so mean". She denies physical abuse and repeatedly states she cannot leave him now as he doesn't have anyone else. Advised CM will message Dr Recinos's staff to advise of recommendation in ED for follow up within 3 days. Advised CM will also message the Patient [employer] program reps to advise that pt's coverage has , but they have tried repeatedly, per Ying's report, to contact Patient [employer] services to complete the 30 reapplication, but have not gotten an answer or rec'd a return call when message was left requesting one.   PLAN: Message sent to Dr Recinos's staff and to Laura Alegre in pt financial assistance requesting contact with Yign from both. Follow up in one week to see if pt has seen Dr Recinos and " what his current status is. Plan to d/c if pneuimonia symptoms have improved and he is waiting to be seen by endocrinologist. Also review whether they were contacted by [employer].       Todays OPCM Self-Management Care Plan was reviewed with the patients/caregivers input based on identified barriers from todays and previous assessments.  Goals were reviewed today with the patient/caregiver and the patient has agreed to continue to work towards these goals to improve his/her overall well-being. Patient verbalized understanding of the care plan, goals, and all of today's instructions. Encouraged patient/caregiver to communicate with his/her physician and health care team about health conditions and the treatment plan.  Provided my contact information today and encouraged patient/caregiver to call me with any questions as needed. Lakia Khanna RN

## 2019-01-02 NOTE — ED PROVIDER NOTES
SCRIBE #1 NOTE: I, Merari Horan, am scribing for, and in the presence of, Sergio Ding MD. I have scribed the entire note.         History     Chief Complaint   Patient presents with    Hemoptysis     report beeing seen here yesertday and given zithromax. Started with coughing up blood and sore throat this morning       Review of patient's allergies indicates:   Allergen Reactions    Diovan  [valsartan] Swelling    Levofloxacin Other (See Comments)     Stomach pains    Lisinopril Swelling         History of Present Illness   HPI    1/1/2019, 8:41 PM  History obtained from the patient      History of Present Illness: Wallace Vigil is a 63 y.o. male patient with PMHx of thyroid cancer, lung cancer, CKD, CAD, COPD, emphysema of lung, PVD, and HTN who presents to the Emergency Department for hemoptysis which onset gradually today. Patient was evaluated in the ED yesterday for SOB and diagnosed with right lower lobe pneumonia. Patient states he was discharged with azithromycin; had dose in ED prior to discharge. Symptoms are episodic and moderate in severity. No mitigating or exacerbating factors reported. Associated sxs include throat closing sensation and generalized myalgias. Patient denies any fever, chills, CP, SOB, abd pain, N/V, extremity weakness/numbness, dizziness, lightheadedness, and all other sxs at this time. No further complaints or concerns at this time.     Arrival mode: Personal vehicle    PCP: Mike Recinos MD        Past Medical History:  Past Medical History:   Diagnosis Date    Anxiety     Arthritis     Asthma     Atherosclerosis of native arteries of the extremities with intermittent claudication     Back pain     Benign hypertensive heart disease without heart failure     Cancer     Carotid artery occlusion     Chronic kidney disease     COPD (chronic obstructive pulmonary disease)     Coronary artery disease     Emphysema of lung     Encounter for blood transfusion     GERD  (gastroesophageal reflux disease)     Heart failure     History of aorto-femoral bypass 8/16/2013    Hyperlipidemia     Hypertension     Hypothyroidism     Iron deficiency anemia due to chronic blood loss 6/7/2018    PVD (peripheral vascular disease)     PVD (peripheral vascular disease) 8/16/2013    Renal artery stenosis 8/16/2013    Renovascular hypertension 8/16/2013    Rheumatoid arthritis     Shingles sept 2015    Stenosis of left subclavian artery 8/16/2013    Thyroid cancer     Thyroid disease     Thyroid cancer 3/1/2017    Tobacco abuse     Tobacco dependence     Trouble in sleeping        Past Surgical History:  Past Surgical History:   Procedure Laterality Date    CARDIAC CATHETERIZATION      RENAL ARTERY BYPASS      2012    THYROIDECTOMY      VASCULAR SURGERY      Carotid cleaned out          Family History:  Family History   Problem Relation Age of Onset    Hypertension Mother     Hyperlipidemia Mother     Heart failure Mother     Heart disease Mother     Hyperlipidemia Father     COPD Father     Diabetes Daughter     Diabetes Daughter        Social History:  Social History     Tobacco Use    Smoking status: Current Every Day Smoker     Packs/day: 1.00     Years: 50.00     Pack years: 50.00     Types: Cigarettes    Smokeless tobacco: Never Used   Substance and Sexual Activity    Alcohol use: No    Drug use: No    Sexual activity: Not Currently     Partners: Female     Birth control/protection: None        Review of Systems   Review of Systems   Constitutional: Negative for chills and fever.   HENT: Negative for sore throat.         (+) throat closing sensation   Respiratory: Positive for cough (hemoptysis). Negative for shortness of breath.    Cardiovascular: Negative for chest pain.   Gastrointestinal: Negative for nausea and vomiting.   Genitourinary: Negative for dysuria.   Musculoskeletal: Positive for myalgias (generalized). Negative for back pain.   Skin: Negative  "for rash.   Neurological: Negative for dizziness, weakness, light-headedness and numbness.   Hematological: Does not bruise/bleed easily.   All other systems reviewed and are negative.       Physical Exam     Initial Vitals [01/01/19 1929]   BP Pulse Resp Temp SpO2   (!) 174/77 85 19 97.9 °F (36.6 °C) (!) 93 %      MAP       --          Physical Exam  Nursing Notes and Vital Signs Reviewed.  Constitutional: Patient is in no acute distress. Cachetic.  Head: Atraumatic. Normocephalic.  Eyes: PERRL. EOM intact. Conjunctivae are not pale. No scleral icterus.  ENT: Moist mucous membranes. Posterior oropharynx is symmetric without erythema. Tonsillar exudate is not present. No trismus. Normal handling of secretions. No stridor. Airway patent.  Neck: Supple. Full ROM. No lymphadenopathy.  Cardiovascular: Regular rate. Regular rhythm. No murmurs, rubs, or gallops. Distal pulses are 2+ and symmetric.  Pulmonary/Chest: No respiratory distress. Clear to auscultation bilaterally. No wheezing or rales.  Abdominal: Soft and non-distended.  There is no tenderness.  No rebound, guarding, or rigidity.   Musculoskeletal: Moves all extremities. No obvious deformities.   Skin: Warm and dry.  Neurological:  Alert, awake, and appropriate.  Normal speech.  No acute focal neurological deficits are appreciated.  Psychiatric: Normal affect. Good eye contact. Appropriate in content.     ED Course   Procedures  ED Vital Signs:  Vitals:    01/01/19 1929 01/01/19 2117 01/01/19 2130 01/01/19 2220   BP: (!) 174/77  (!) 179/77 (!) 180/82   Pulse: 85 70 71 71   Resp: 19  15 10   Temp: 97.9 °F (36.6 °C)      TempSrc: Oral      SpO2: (!) 93%  98% 96%   Weight: 58.9 kg (129 lb 13.6 oz)      Height: 5' 11" (1.803 m)       01/01/19 2240 01/01/19 2300   BP: (!) 176/84 (!) 159/72   Pulse: 72 75   Resp: 12 14   Temp:     TempSrc:     SpO2: 95% 96%   Weight:     Height:         Abnormal Lab Results:  Labs Reviewed   CBC W/ AUTO DIFFERENTIAL - Abnormal; " Notable for the following components:       Result Value    RBC 3.55 (*)     Hemoglobin 11.3 (*)     Hematocrit 32.9 (*)     MCH 31.8 (*)     Platelets 104 (*)     MPV 9.0 (*)     Lymph # 0.4 (*)     Gran% 82.9 (*)     Lymph% 9.4 (*)     All other components within normal limits   COMPREHENSIVE METABOLIC PANEL - Abnormal; Notable for the following components:    CO2 22 (*)     Glucose 121 (*)     BUN, Bld 30 (*)     Creatinine 1.8 (*)     Albumin 3.3 (*)     ALT <5 (*)     eGFR if  46 (*)     eGFR if non  39 (*)     All other components within normal limits   APTT - Abnormal; Notable for the following components:    aPTT 42.6 (*)     All other components within normal limits   PROTIME-INR   TYPE & SCREEN        All Lab Results:  Results for orders placed or performed during the hospital encounter of 01/01/19   CBC auto differential   Result Value Ref Range    WBC 4.59 3.90 - 12.70 K/uL    RBC 3.55 (L) 4.60 - 6.20 M/uL    Hemoglobin 11.3 (L) 14.0 - 18.0 g/dL    Hematocrit 32.9 (L) 40.0 - 54.0 %    MCV 93 82 - 98 fL    MCH 31.8 (H) 27.0 - 31.0 pg    MCHC 34.3 32.0 - 36.0 g/dL    RDW 13.1 11.5 - 14.5 %    Platelets 104 (L) 150 - 350 K/uL    MPV 9.0 (L) 9.2 - 12.9 fL    Gran # (ANC) 3.8 1.8 - 7.7 K/uL    Lymph # 0.4 (L) 1.0 - 4.8 K/uL    Mono # 0.3 0.3 - 1.0 K/uL    Eos # 0.0 0.0 - 0.5 K/uL    Baso # 0.01 0.00 - 0.20 K/uL    Gran% 82.9 (H) 38.0 - 73.0 %    Lymph% 9.4 (L) 18.0 - 48.0 %    Mono% 6.8 4.0 - 15.0 %    Eosinophil% 0.7 0.0 - 8.0 %    Basophil% 0.2 0.0 - 1.9 %    Differential Method Automated    Comprehensive metabolic panel   Result Value Ref Range    Sodium 136 136 - 145 mmol/L    Potassium 3.9 3.5 - 5.1 mmol/L    Chloride 103 95 - 110 mmol/L    CO2 22 (L) 23 - 29 mmol/L    Glucose 121 (H) 70 - 110 mg/dL    BUN, Bld 30 (H) 8 - 23 mg/dL    Creatinine 1.8 (H) 0.5 - 1.4 mg/dL    Calcium 9.7 8.7 - 10.5 mg/dL    Total Protein 7.5 6.0 - 8.4 g/dL    Albumin 3.3 (L) 3.5 - 5.2 g/dL     Total Bilirubin 0.4 0.1 - 1.0 mg/dL    Alkaline Phosphatase 67 55 - 135 U/L    AST 12 10 - 40 U/L    ALT <5 (L) 10 - 44 U/L    Anion Gap 11 8 - 16 mmol/L    eGFR if African American 46 (A) >60 mL/min/1.73 m^2    eGFR if non African American 39 (A) >60 mL/min/1.73 m^2   APTT   Result Value Ref Range    aPTT 42.6 (H) 21.0 - 32.0 sec   Protime-INR   Result Value Ref Range    Prothrombin Time 10.9 9.0 - 12.5 sec    INR 1.0 0.8 - 1.2   Type & Screen   Result Value Ref Range    Group & Rh B POS     Indirect Darrion NEG        Imaging Results:  Imaging Results          CT Chest Without Contrast (Final result)  Result time 01/01/19 22:30:38    Final result by Claudio Platt MD (01/01/19 22:30:38)                 Impression:      1. Small to moderate size infiltrate identified at the right lung base and mild infiltrate identified left lung base.  Infiltrates are new since 10/08/2018.  No suspicious pulmonary nodule or lung mass.  2. Severe atherosclerotic change of the aorta and coronary vasculature.  Additional severe atherosclerotic change the mesenteric vasculature.  No change compared to the previous exam.  3. Bilateral renal cysts with probable complex or hemorrhagic cyst at the superior pole right kidney.  No change.  All CT scans at this facility are performed  using dose modulation techniques as appropriate to performed exam including the following:  automated exposure control; adjustment of mA and/or kV according to the patients size (this includes techniques or standardized protocols for targeted exams where dose is matched to indication/reason for exam: i.e. extremities or head);  iterative reconstruction technique.      Electronically signed by: Claudio Platt MD  Date:    01/01/2019  Time:    22:30             Narrative:      CT CHEST WITHOUT CONTRAST    CLINICAL HISTORY:  hemoptysis with history of lung cancer;    TECHNIQUE:  Low dose axial images, sagittal and coronal reformations were obtained  from the thoracic inlet to the lung bases.  Images obtained without IV contrast.    COMPARISON:  None    FINDINGS:  Small to moderate size infiltrate identified the right lung base with mild dependent atelectasis.  Mild atelectasis also identified along the minor fissure.  No pulmonary nodule or lung mass on the right.  Mild left lower lobe infiltrate is present.  Focal cystic bronchiectasis is identified left upper lobe, unchanged since the previous exam.  No pulmonary nodule or lung mass on the left.    The heart is normal in size.  Coronary artery calcifications are identified.  Atherosclerotic calcification of the aorta.  Aorta normal in caliber.    Limited evaluation the upper abdomen demonstrates a 0.8 cm cyst within the left lobe of the liver.  The visualized spleen is normal.  Extensive atherosclerotic mesenteric calcifications are identified.  Bilateral renal cysts are present.  Hyperdense masses superior pole right kidney most likely represent hemorrhagic cysts.  The largest measures 2.2 cm.  No change compared to the previous exam.                               CT Soft Tissue Neck WO Contrast (Final result)  Result time 01/01/19 22:43:06    Final result by Claudio Platt MD (01/01/19 22:43:06)                 Impression:      1. No soft tissue mass or adenopathy throughout the neck.  2. Postoperative changes bilaterally likely secondary to previous left no dissections para  All CT scans at this facility are performed  using dose modulation techniques as appropriate to performed exam including the following:  automated exposure control; adjustment of mA and/or kV according to the patients size (this includes techniques or standardized protocols for targeted exams where dose is matched to indication/reason for exam: i.e. extremities or head);  iterative reconstruction technique.      Electronically signed by: Claudio Platt MD  Date:    01/01/2019  Time:    22:43             Narrative:     EXAMINATION:  CT SOFT TISSUE NECK WITHOUT CONTRAST    CLINICAL HISTORY:  history of lung cancer, endorsing dysphagia, evaluate for mass;    TECHNIQUE:  Low dose axial images as well as sagittal and coronal reconstructions were performed from the skull base to the clavicles.    COMPARISON:  None.    FINDINGS:  The nasopharynx and oropharynx are normal.  Epiglottis and supraglottic airway normal.  No abnormality identified at the level of the true vocal folds.  The visualized portions of the trachea esophagus are normal.    The parotid glands and submandibular glands are normal.  The thyroid gland is absent.  Vascular calcifications of the carotid arteries are present bilaterally.  Surgical clips identified within the neck bilaterally, likely secondary to bilateral lymph node dissection.  No pathologic adenopathy.  No discrete soft tissue mass is detected.    Degenerative changes of the cervical spine noted.  No suspicious osseous lesion.  The visualized paranasal sinuses and mastoid air cells are clear.                               X-Ray Chest 1 View (Final result)  Result time 01/01/19 21:34:32    Final result by Claudio Platt MD (01/01/19 21:34:32)                 Impression:      1. Atelectasis left lung base.  2. Previously identified atelectasis or infiltrate right lung base improved.      Electronically signed by: Claudio Platt MD  Date:    01/01/2019  Time:    21:34             Narrative:    EXAMINATION:  XR CHEST 1 VIEW    CLINICAL HISTORY:  Hemoptysis    COMPARISON:  12/31/2018    FINDINGS:  Mild atelectasis identified left lung base.  Mildly increased interstitial markings thought to represent chronic change.  Previous identified atelectasis or infiltrate right lung base has resolved.  Heart size within normal limits.  Mildly tortuous aorta with vascular calcifications present.No suspicious bony abnormality.  Severe osteoarthritis of the right hip is present.    .                                            The Emergency Provider reviewed the vital signs and test results, which are outlined above.     ED Discussion     10:55 PM: Reassessed pt at this time. Discussed with pt all pertinent ED information and results. Discussed pt dx and plan of tx. Gave pt all f/u and return to the ED instructions. All questions and concerns were addressed at this time. Pt expresses understanding of information and instructions, and is comfortable with plan to discharge. Pt is stable for discharge.  Advised patient to take Azithromycin as previously prescribed.    I discussed with patient and/or family/caretaker that evaluation in the ED does not suggest any emergent or life threatening medical conditions requiring immediate intervention beyond what was provided in the ED, and I believe patient is safe for discharge.  Regardless, an unremarkable evaluation in the ED does not preclude the development or presence of a serious of life threatening condition. As such, patient was instructed to return immediately for any worsening or change in current symptoms.          ED Medication(s):  Medications   cloNIDine tablet 0.1 mg (0.1 mg Oral Given 1/1/19 2224)   hydrALAZINE tablet 100 mg (100 mg Oral Given 1/1/19 2224)     Current Discharge Medication List   None       Follow-up Information     Mike Recinos MD. Schedule an appointment as soon as possible for a visit in 3 days.    Specialty:  Family Medicine  Why:  to follow-up on today's visit  Contact information:  [address] [address] [address] [phone]             Go to  [facility] - .    Specialty:  Emergency Medicine  Why:  As needed, If symptoms worsen  Contact information:  [address] [facility] [address]  [facility] [address] [phone]                      Medical Decision Making     Medical Decision Making:   Clinical Tests:   Lab Tests: Ordered and Reviewed  Radiological Study: Ordered and Reviewed             Scribe  Attestation:   Scribe #1: I performed the above scribed service and the documentation accurately describes the services I performed. I attest to the accuracy of the note.     Attending:   Physician Attestation Statement for Scribe #1: I, Sergio Ding MD, personally performed the services described in this documentation, as scribed by Merari Horan, in my presence, and it is both accurate and complete.           Clinical Impression       ICD-10-CM ICD-9-CM   1. Hemoptysis R04.2 786.30       Disposition:   Disposition: Discharged  Condition: Stable         Sergio Ding MD  01/13/19 0850

## 2019-01-02 NOTE — TELEPHONE ENCOUNTER
Notified patient and his spouse of advice per Dr. Recinos. Verbalized understanding. Appointment has been scheduled.

## 2019-01-02 NOTE — TELEPHONE ENCOUNTER
Account tell much without actually seen him, all I can says that if he is feeling bad he needs to go back to the ED but if he can wait in when he comes in we will do an evaluation

## 2019-01-04 PROBLEM — N28.1 ACQUIRED RENAL CYST OF RIGHT KIDNEY: Status: ACTIVE | Noted: 2019-01-01

## 2019-01-04 NOTE — ASSESSMENT & PLAN NOTE
Says he follows with Dr. Shay Wilburn with [facility] and he is following.   Was given an option of aspiration of the cyst given at one point.

## 2019-01-04 NOTE — PROGRESS NOTES
Chief Complaint:    Chief Complaint   Patient presents with    Follow-up       History of Present Illness:    Patient is status post ED follow-up he was seen in the ED with complaints of shortness of breath cough fever and hemoptysis she was diagnosed with bilateral pneumonia on the CT scan prescribe Z-Rustam which he is taking he reports significant improvement his pulse ox is normal today all symptoms improved.    He also was found to have some possibly complex cyst on the right kidney or hemorrhagic cyst he says he has a nephrologist who is aware of that and is following on that patient was given the option of aspiration but he refused.    ROS:  Review of Systems   Constitutional: Negative for activity change, chills, fatigue, fever and unexpected weight change.   HENT: Negative for congestion, ear discharge, ear pain, hearing loss, postnasal drip and rhinorrhea.    Eyes: Negative for pain and visual disturbance.   Respiratory: Negative for cough, chest tightness and shortness of breath.    Cardiovascular: Negative for chest pain and palpitations.   Gastrointestinal: Negative for abdominal pain, diarrhea and vomiting.   Endocrine: Negative for heat intolerance.   Genitourinary: Negative for dysuria, flank pain, frequency and hematuria.   Musculoskeletal: Negative for back pain, gait problem and neck pain.   Skin: Negative for color change and rash.   Neurological: Negative for dizziness, tremors, seizures, numbness and headaches.   Psychiatric/Behavioral: Negative for agitation, hallucinations, self-injury, sleep disturbance and suicidal ideas. The patient is not nervous/anxious.        Past Medical History:   Diagnosis Date    Anxiety     Arthritis     Asthma     Atherosclerosis of native arteries of the extremities with intermittent claudication     Back pain     Benign hypertensive heart disease without heart failure     Cancer     Carotid artery occlusion     Chronic kidney disease     COPD (chronic  obstructive pulmonary disease)     Coronary artery disease     Emphysema of lung     Encounter for blood transfusion     GERD (gastroesophageal reflux disease)     Heart failure     History of aorto-femoral bypass 8/16/2013    Hyperlipidemia     Hypertension     Hypothyroidism     Iron deficiency anemia due to chronic blood loss 6/7/2018    PVD (peripheral vascular disease)     PVD (peripheral vascular disease) 8/16/2013    Renal artery stenosis 8/16/2013    Renovascular hypertension 8/16/2013    Rheumatoid arthritis     Shingles sept 2015    Stenosis of left subclavian artery 8/16/2013    Thyroid cancer     Thyroid disease     Thyroid cancer 3/1/2017    Tobacco abuse     Tobacco dependence     Trouble in sleeping        Social History:  Social History     Socioeconomic History    Marital status:      Spouse name: None    Number of children: 2    Years of education: None    Highest education level: None   Social Needs    Financial resource strain: None    Food insecurity - worry: None    Food insecurity - inability: None    Transportation needs - medical: None    Transportation needs - non-medical: None   Occupational History    None   Tobacco Use    Smoking status: Current Every Day Smoker     Packs/day: 1.00     Years: 50.00     Pack years: 50.00     Types: Cigarettes    Smokeless tobacco: Never Used   Substance and Sexual Activity    Alcohol use: No    Drug use: No    Sexual activity: Not Currently     Partners: Female     Birth control/protection: None   Other Topics Concern    None   Social History Narrative    None       Family History:   family history includes COPD in his father; Diabetes in his daughter and daughter; Heart disease in his mother; Heart failure in his mother; Hyperlipidemia in his father and mother; Hypertension in his mother.    Health Maintenance   Topic Date Due    Colonoscopy  05/07/2014    Pneumococcal Vaccine (Highest Risk) (2 of 3 -  "PCV13) 06/11/2014    Influenza Vaccine  08/01/2018    Lipid Panel  04/18/2019    TETANUS VACCINE  06/17/2023    Hepatitis C Screening  Completed    Zoster Vaccine  Completed       Physical Exam:    Vital Signs  Temp: 97.5 °F (36.4 °C)  Temp src: Tympanic  Pulse: 74  SpO2: 98 %  BP: 110/60  BP Location: Left arm  Patient Position: Sitting  Pain Score:   6  Pain Loc: Generalized  Height and Weight  Height: 5' 11" (180.3 cm)  Weight: 59.4 kg (130 lb 13.5 oz)  BSA (Calculated - sq m): 1.72 sq meters  BMI (Calculated): 18.3  Weight in (lb) to have BMI = 25: 178.9]    Body mass index is 18.25 kg/m².    Physical Exam   Constitutional: He is oriented to person, place, and time. He appears cachectic.   HENT:   Mouth/Throat: Oropharynx is clear and moist.   Eyes: Conjunctivae are normal. Pupils are equal, round, and reactive to light.   Neck: Normal range of motion. Neck supple.   Cardiovascular: Normal rate, regular rhythm and normal heart sounds.   No murmur heard.  Pulmonary/Chest: Effort normal and breath sounds normal. No respiratory distress. He has no wheezes. He has no rales. He exhibits no tenderness.   Abdominal: Soft. He exhibits no distension and no mass. There is no tenderness. There is no guarding.   Musculoskeletal: He exhibits no edema or tenderness.   Lymphadenopathy:     He has no cervical adenopathy.   Neurological: He is alert and oriented to person, place, and time. He has normal reflexes.   Skin: Skin is warm and dry.   Psychiatric: He has a normal mood and affect. His behavior is normal. Judgment and thought content normal.         Assessment:      ICD-10-CM ICD-9-CM   1. Hospital discharge follow-up Z09 V67.59   2. Pneumonia of both lungs due to infectious organism, unspecified part of lung J18.9 483.8   3. Acquired renal cyst of right kidney N28.1 593.2         Plan:    Continue current medication repeat chest x-ray in 2 weeks  Please continue follow-up with Nephrology on the renal cyst  Patient " is still smoking he has cut back a lot he says.      Orders Placed This Encounter   Procedures    X-Ray Chest PA And Lateral       Current Outpatient Medications   Medication Sig Dispense Refill    albuterol (VENTOLIN HFA) 90 mcg/actuation inhaler INHALE 2 PUFFS BY MOUTH EVERY 4 HOURS IF NEEDED FOR WHEEZING 18 g 11    ALPRAZolam (XANAX) 0.5 MG tablet TAKE 1 TABLET EVERY NIGHT AS NEEDED 21 tablet 0    amLODIPine (NORVASC) 10 MG tablet Take 1 tablet (10 mg total) by mouth once daily. 90 tablet 3    aspirin 81 mg Tab Take 1 tablet by mouth Daily. 1 Tablet Oral Every day      azithromycin (Z-INDIA) 250 MG tablet Take 1 tablet (250 mg total) by mouth once daily. Take first 2 tablets together, then 1 every day until finished. 6 tablet 0    cloNIDine (CATAPRES) 0.1 MG tablet Take 1 tablet (0.1 mg total) by mouth 2 (two) times daily. 180 tablet 3    clotrimazole (LOTRIMIN) 1 % cream Apply topically 2 (two) times daily. 113 g 1    cyanocobalamin, vitamin B-12, 1,000 mcg/mL Drop Place 1,000 mcg under the tongue once daily. 200 mL 6    diltiaZEM (CARTIA XT) 240 MG 24 hr capsule Take 1 capsule (240 mg total) by mouth once daily. 90 capsule 3    doxazosin (CARDURA) 4 MG tablet Take 1 tablet (4 mg total) by mouth every evening. 90 tablet 3    epinephrine (EPIPEN) 0.3 mg/0.3 mL (1:1,000) AtIn 1 Pen Injector Intramuscular once 1 Device 5    fluticasone (FLONASE) 50 mcg/actuation nasal spray 2 sprays by Each Nare route once daily. 1 Bottle 11    hydrALAZINE (APRESOLINE) 100 MG tablet TAKE 1 TABLET THREE TIMES DAILY 270 tablet 3    hydrocodone-acetaminophen 10-325mg (NORCO)  mg Tab Take 1 tablet by mouth every 6 (six) hours as needed.       lactulose (CHRONULAC) 10 gram/15 mL solution Take 30 ml every 12 hours PRN to achieve a good bowel movement 300 mL 6    levothyroxine (SYNTHROID) 125 MCG tablet Take 1 tablet (125 mcg total) by mouth once daily. 90 tablet 3    pravastatin (PRAVACHOL) 20 MG tablet TAKE 1  TABLET BY MOUTH AT BEDTIME TO LOWER CHOLESTEROL 90 tablet 3    predniSONE (DELTASONE) 5 MG tablet TAKE 1 TABLET EVERY DAY 90 tablet 1    ranitidine (ZANTAC) 75 MG tablet Take 75 mg by mouth 2 (two) times daily.      traMADol (ULTRAM) 50 mg tablet TAKE 1 TABLET BY MOUTH 4 TIMES DAILY AS NEEDED  2     No current facility-administered medications for this visit.        Medications Discontinued During This Encounter   Medication Reason    albuterol (PROVENTIL/VENTOLIN HFA) 90 mcg/actuation inhaler Duplicate Order       No Follow-up on file.      Mike Recinos MD

## 2019-01-08 NOTE — PROGRESS NOTES
1/8/19 - SUMMARY: Phone contact made with pt today for follow up with [facility]. Observed on chart review pt saw Dr Recinos on 1/4/19 and reported feeling much better from pneumonia symptoms after completing  azithromycin. He reports today his resp symptoms continue to be greatly improved from when he was in the ED last week.   INTERVENTION: Reviewed upcoming appt with endocrinologist and questioned whether he has been notified about being eligible for assistance with the cost of Thyrogen injections that are recommended. He advised he has not made up his mind he is going to have Thyrogen injections, stating it will depend on whether they have any effect on his kidneys. Encouraged him to discuss these concerns with Dr Richardson at scheduled appt and reviewed upcoming appt date and time. He states he has not been notified about any assistance with the cost of the injections, but without assistance he cannot afford them.   PLAN: Follow up in two weeks, after endocrinology appt to answer any questions he may have about outcome of appt or recommended treatments. Discuss smoking cessation program and offer contact information. Advise of presence of financial assistance counselors at each location that he can see when he comes in for appts to update his financial assistance status. D/C at that time if no new problems/concerns.       Todays [facility] Self-Management Care Plan was reviewed with the patients/caregivers input based on identified barriers from todays and previous assessments.  Goals were reviewed today with the patient/caregiver and the patient has agreed to continue to work towards these goals to improve his/her overall well-being. Patient verbalized understanding of the care plan, goals, and all of today's instructions. Encouraged patient/caregiver to communicate with his/her physician and health care team about health conditions and the treatment plan.  Provided my contact information today and encouraged  patient/caregiver to call me with any questions as needed. Lakia Khanna RN

## 2019-01-15 PROBLEM — C73 THYROID CANCER: Status: ACTIVE | Noted: 2019-01-01

## 2019-01-15 PROBLEM — E89.0 POSTOPERATIVE HYPOTHYROIDISM: Status: ACTIVE | Noted: 2019-01-01

## 2019-01-15 NOTE — PROGRESS NOTES
Referring Provider:  No ref. provider found  PCP:  Mike Recinos MD    Reason for referral: Thyroid cancer    CC and HPI:  Wallace Vigil 63 y.o. male   Pt is accompanied by his wife. Here for f/u.  Pt was treated for Pneumonia couple weeks ago.    Pt is here for follow up. He has no new complaints. Working on Thyrogen coverage  To find the cost to the pt is still in progress.  Blood test recently showed TSH 12.  Patient was referred because of thyroid cancer.  Patient was evaluated by an endocrinologist several months ago. Patient was treated for thyroid cancer in 2017.  Had radioactive iodine treatment done after he had thyroidectomy. He has been on levothyroxine 125 mcg tablet daily.  Patient said he was treated with radiation for his lung cancer and he did not have surgery on his lung because the cancer is close to the heart per patient..  He has no complaints of tremors, dysphagia, chest pain, abdominal pain, or vomiting.  He feels he gets swelling sometimes in his legs by the end of the day.  He has extensive medical problems including COPD, lung cancer, thyroid cancer, CAD, 1 kidney, hypertension, peripheral vascular disease,  Rheumatoid arthritis,in addition to several other medical problems.    From the record he had thyroglobulin level 0.4 in association with TSH 19.  Last visit to endocrinologist seems to be from 04/2018.    The following is a copy from Dr. Lundberg note:  Patient is sp total thyroidecotmy on 2/28/17 and sp 115 mCi of BRANDT on 7/3/17  Pathology is noted showing a L sided 4.2 cm FTC and also 1.1 cm PTC on right side. 0/2 lymph nodes were positive.    ------------    Post therapy scan (7/10/17)  WHOLE BODY I-131 POST THERAPY SCAN        After administration of 115.3 mCi of oral I-131 on 7/3/2017, images of the body in the anterior and posterior projections were obtained with a gamma camera with high-energy collimation.   -------------  Imaging from 2017:    1.  Large hypodense mass  involving the left lobe of the thyroid gland which essentially replaces the entire left lobe and demonstrates significant FDG uptake.  FNA of this mass is recommended.     -------------------    Sp total thyroidectomy 3/1/17  Path: left 4.2 cm FTC oncocytic variant, right 1.1 cm PTC (follicular variant encapsulated)  + Angioinvasion (focal)    stim TG noted: 1.8 <-- 6.2  Most recent TG was 0.4 with a TSH of 19  Trend is overall very positive    Sp BRANDT 115 mCi 7/3/17  Post therapy scan - only uptake noted in the post surgical bed.  US done in August 2017 mentioning 2 soft tissue densities on the right fossa        Past Medical History:   Diagnosis Date    Anxiety     Arthritis     Asthma     Atherosclerosis of native arteries of the extremities with intermittent claudication     Back pain     Benign hypertensive heart disease without heart failure     Cancer     Carotid artery occlusion     Chronic kidney disease     COPD (chronic obstructive pulmonary disease)     Coronary artery disease     Emphysema of lung     Encounter for blood transfusion     GERD (gastroesophageal reflux disease)     Heart failure     History of aorto-femoral bypass 8/16/2013    Hyperlipidemia     Hypertension     Hypothyroidism     Iron deficiency anemia due to chronic blood loss 6/7/2018    PVD (peripheral vascular disease)     PVD (peripheral vascular disease) 8/16/2013    Renal artery stenosis 8/16/2013    Renovascular hypertension 8/16/2013    Rheumatoid arthritis     Shingles sept 2015    Stenosis of left subclavian artery 8/16/2013    Thyroid cancer     Thyroid disease     Thyroid cancer 3/1/2017    Tobacco abuse     Tobacco dependence     Trouble in sleeping        Past Surgical History:   Procedure Laterality Date    CARDIAC CATHETERIZATION      RENAL ARTERY BYPASS      2012    THYROIDECTOMY      VASCULAR SURGERY      Carotid cleaned out        Social History     Socioeconomic History    Marital  status:      Spouse name: Not on file    Number of children: 2    Years of education: Not on file    Highest education level: Not on file   Social Needs    Financial resource strain: Not on file    Food insecurity - worry: Not on file    Food insecurity - inability: Not on file    Transportation needs - medical: Not on file    Transportation needs - non-medical: Not on file   Occupational History    Not on file   Tobacco Use    Smoking status: Current Every Day Smoker     Packs/day: 1.00     Years: 50.00     Pack years: 50.00     Types: Cigarettes    Smokeless tobacco: Never Used   Substance and Sexual Activity    Alcohol use: No    Drug use: No    Sexual activity: Not Currently     Partners: Female     Birth control/protection: None   Other Topics Concern    Not on file   Social History Narrative    Not on file         ROS:   Thyroid cancer treated by surgery and radioactive iodine  S/P thyroid surgery  No swallowing problem  Decreased hearing  No Diabetes  No chest pain   History of COPD, and   lung cancer  No vomiting  One kidney  Diffuse black pigmentation in arms , s/p treatment  With [employer] [facility] 35 y ago  No pain or numbness in feet      PE:  Vitals:    01/15/19 1433   BP: (!) 132/58   Pulse: 61   Temp: 97.1 °F (36.2 °C)     Alert and oriented  No acute distress  Patient looks somewhat cachectic  No Proptosis or conjunctivitis  Decreased Hearing  Dentures just bottom  No rash on tongue, dentures just bottom  No goitre by inspection  Thyroid gland is not palpable  No cervical lymphadenopathy by palpation  Heart reg, no gallop  Lungs cta, no wheezing  Abd soft w/o tnd  No edema in lower legs  No tremor  Diffuse black pigmentation in hands  No obesity      Lab:    Lab Results   Component Value Date    TSH 12.790 (H) 12/13/2018    W0IOAOA 65 02/06/2017    FREET4 1.06 12/13/2018       No components found for: AGBA1C  Lab Results   Component Value Date    CHOL 98 (L) 04/18/2018    TRIG 88  04/18/2018    HDL 33 (L) 04/18/2018    CHOLHDL 33.7 04/18/2018    TOTALCHOLEST 3.0 04/18/2018    NONHDLCHOL 65 04/18/2018     BMP  Lab Results   Component Value Date     01/01/2019    K 3.9 01/01/2019     01/01/2019    CO2 22 (L) 01/01/2019    BUN 30 (H) 01/01/2019    CREATININE 1.8 (H) 01/01/2019    CALCIUM 9.7 01/01/2019    ANIONGAP 11 01/01/2019    ESTGFRAFRICA 46 (A) 01/01/2019    EGFRNONAA 39 (A) 01/01/2019     Lab Results   Component Value Date    CREATRANDUR 168.0 01/06/2016       A/P:    Thyroid cancer  S/p total thyroidectomy 3/1/17  S/p BRANDT 115 mCi 7/3/17  Path: left 4.2 cm FTC oncocytic variant, right 1.1 cm PTC (follicular variant encapsulated)  + Angioinvasion (focal)  -     Awaiting more info for patient's cost on THYROGEN  Hopefully will get the answer within couple days. Pt is advised to call me if he does get  a call from us in 2 days    Postsurgical hypothyroidism  Levothyroxine dose to be increased to 137 mcg daily  TSH will need to be borderline low  Catchexia    -     thyrotropin sergo 0.9 mg daily x 2 days then  Thyroglobulin level to be done 2 days later.    History of significant other medical problems including lung cancer, COPD, CAD, peripheral vascular disease,  Inguinal adenopathy, rheumatoid arthritis, one kidney and other medical problems.    Pt understands the plan and instructions.   Appt in 5 weeks.

## 2019-01-22 NOTE — PROGRESS NOTES
1/22/19 - SUMMARY: Phone contact made with pt today for follow up with [facility]. He reports he is feeling better since completing antibiotic for recent pneumonia. He reports he still has difficulty with SOB at times. He does not know the status of getting Thyrotropin injections recommended by his endocrinologist and has no appts scheduled for these currently.   INTERVENTION: Discussed Smoking Cessation program and he advised he is interested in receiving literature about the program. Discussed symptoms of exacerbation of resp illness and he reports being aware of symptoms that indicate need for treatment. Advised CM will message Dr Pascual's staff to find out whether he is supposed to have Thyrotropin injections and, if so, when they will be scheduled. He voices concerns about cost and CM advised he qualifies for financial assistance and injections should be covered by this program. Advised CM will plan to d/c him from [facility] after response from Dr Pascual's staff.    PLAN: Mail Smoking Cessation brochure. Follow up if/when info rec'd about whether he is to take Thyrotropin injections.     Todays [facility] Self-Management Care Plan was reviewed with the patients/caregivers input based on identified barriers from todays and previous assessments.  Goals were reviewed today with the patient/caregiver and the patient has agreed to continue to work towards these goals to improve his/her overall well-being. Patient verbalized understanding of the care plan, goals, and all of today's instructions. Encouraged patient/caregiver to communicate with his/her physician and health care team about health conditions and the treatment plan.  Provided my contact information today and encouraged patient/caregiver to call me with any questions as needed. Lakia Khanna RN

## 2019-02-01 NOTE — TELEPHONE ENCOUNTER
----- Message from Taylor Dailey sent at 2/1/2019  3:44 PM CST -----  Contact: pt wife  Caller is requesting a call back from the nurse in regards to them want to know if the pt should wait and book his apt after 02/05/2019  [phone] (home)

## 2019-02-01 NOTE — TELEPHONE ENCOUNTER
Spoke with patient and confirmed his follow up appointment with Dr Recinos on 2/4/19 @ 1020 am. Patient verbalized understanding.

## 2019-02-04 PROBLEM — D69.6 THROMBOCYTOPENIA: Status: ACTIVE | Noted: 2019-01-01

## 2019-02-04 NOTE — PROGRESS NOTES
Chief Complaint:    Chief Complaint   Patient presents with    Follow-up       History of Present Illness:    Presents today for six-month follow-up,  He has had pneumonia recently finished his antibiotic doing well he has a follow-up CT scan  Patient has chronic kidney disease only has 1 kidney and he follows with his nephrologist who is outside of [facility] O'Neal  He has rheumatoid arthritis he is choosing not to take any treatment for it  He has thyroid cancer following with endocrinology and he has lung cancer follows with Hematology and Radiation Oncology.  His blood pressure is stable  He continues to smoke down to about half a pack unable to quit.  Insurance suggested his statin be changed to high-dose statin.    ROS:  Review of Systems   Constitutional: Negative for activity change, chills, fatigue, fever and unexpected weight change.   HENT: Negative for congestion, ear discharge, ear pain, hearing loss, postnasal drip and rhinorrhea.    Eyes: Negative for pain and visual disturbance.   Respiratory: Negative for cough, chest tightness and shortness of breath.    Cardiovascular: Negative for chest pain and palpitations.   Gastrointestinal: Negative for abdominal pain, diarrhea and vomiting.   Endocrine: Negative for heat intolerance.   Genitourinary: Negative for dysuria, flank pain, frequency and hematuria.   Musculoskeletal: Negative for back pain, gait problem and neck pain.   Skin: Negative for color change and rash.   Neurological: Negative for dizziness, tremors, seizures, numbness and headaches.   Psychiatric/Behavioral: Negative for agitation, hallucinations, self-injury, sleep disturbance and suicidal ideas. The patient is not nervous/anxious.        Past Medical History:   Diagnosis Date    Anxiety     Arthritis     Asthma     Atherosclerosis of native arteries of the extremities with intermittent claudication     Back pain     Benign hypertensive heart disease without heart failure      Cancer     Carotid artery occlusion     Chronic kidney disease     COPD (chronic obstructive pulmonary disease)     Coronary artery disease     Emphysema of lung     Encounter for blood transfusion     GERD (gastroesophageal reflux disease)     Heart failure     History of aorto-femoral bypass 8/16/2013    Hyperlipidemia     Hypertension     Hypothyroidism     Iron deficiency anemia due to chronic blood loss 6/7/2018    PVD (peripheral vascular disease)     PVD (peripheral vascular disease) 8/16/2013    Renal artery stenosis 8/16/2013    Renovascular hypertension 8/16/2013    Rheumatoid arthritis     Shingles sept 2015    Stenosis of left subclavian artery 8/16/2013    Thyroid cancer     Thyroid disease     Thyroid cancer 3/1/2017    Tobacco abuse     Tobacco dependence     Trouble in sleeping        Social History:  Social History     Socioeconomic History    Marital status:      Spouse name: None    Number of children: 2    Years of education: None    Highest education level: None   Social Needs    Financial resource strain: None    Food insecurity - worry: None    Food insecurity - inability: None    Transportation needs - medical: None    Transportation needs - non-medical: None   Occupational History    None   Tobacco Use    Smoking status: Current Every Day Smoker     Packs/day: 1.00     Years: 50.00     Pack years: 50.00     Types: Cigarettes    Smokeless tobacco: Never Used   Substance and Sexual Activity    Alcohol use: No    Drug use: No    Sexual activity: Not Currently     Partners: Female     Birth control/protection: None   Other Topics Concern    None   Social History Narrative    None       Family History:   family history includes COPD in his father; Diabetes in his daughter and daughter; Heart disease in his mother; Heart failure in his mother; Hyperlipidemia in his father and mother; Hypertension in his mother.    Health Maintenance   Topic Date  "Due    Pneumococcal Vaccine (Highest Risk) (2 of 3 - PCV13) 06/11/2014    Influenza Vaccine  08/01/2018    Colonoscopy  02/04/2020 (Originally 5/7/2014)    Lipid Panel  04/18/2019    TETANUS VACCINE  06/17/2023    Hepatitis C Screening  Completed    Zoster Vaccine  Completed       Physical Exam:    Vital Signs  Temp: 97.4 °F (36.3 °C)  Temp src: Tympanic  Pulse: 67  SpO2: 98 %  BP: 120/60  BP Location: Left arm  Patient Position: Sitting  Pain Score:   7  Pain Loc: Generalized  Height and Weight  Height: 5' 9.5" (176.5 cm)  Weight: 58.3 kg (128 lb 10.2 oz)  BSA (Calculated - sq m): 1.69 sq meters  BMI (Calculated): 18.8  Weight in (lb) to have BMI = 25: 171.4]    Body mass index is 18.72 kg/m².    Physical Exam   Constitutional: He is oriented to person, place, and time. He appears cachectic.   HENT:   Mouth/Throat: Oropharynx is clear and moist.   Eyes: Conjunctivae are normal. Pupils are equal, round, and reactive to light.   Neck: Normal range of motion. Neck supple.   Cardiovascular: Normal rate, regular rhythm and normal heart sounds.   No murmur heard.  Pulmonary/Chest: Effort normal and breath sounds normal. No respiratory distress. He has no wheezes. He has no rales. He exhibits no tenderness.   Abdominal: Soft. He exhibits no distension and no mass. There is no tenderness. There is no guarding.   Musculoskeletal: He exhibits no edema or tenderness.   Lymphadenopathy:     He has no cervical adenopathy.   Neurological: He is alert and oriented to person, place, and time. He has normal reflexes.   Skin: Skin is warm and dry.   Psychiatric: He has a normal mood and affect. His behavior is normal. Judgment and thought content normal.         Assessment:      ICD-10-CM ICD-9-CM   1. Essential hypertension I10 401.9   2. Follicular thyroid cancer C73 193   3. Chronic obstructive pulmonary disease, unspecified COPD type J44.9 496   4. Colon cancer screening Z12.11 V76.51   5. Mixed hyperlipidemia E78.2 272.2 "   6. Stage 4 chronic kidney disease N18.4 585.4   7. Thyroid cancer C73 193   8. Primary cancer of left lower lobe of lung C34.32 162.5   9. Cachexia R64 799.4   10. Rheumatoid arthritis involving multiple sites with positive rheumatoid factor M05.79 714.0   11. Thrombocytopenia D69.6 287.5   12. Hypertensive heart disease with heart failure I11.0 402.91     428.9   13. Atherosclerosis of aorta I70.0 440.0         Plan:    Changing to atorvastatin 20 mg daily watch for muscle pain fatigue DC pravastatin  Smoking cessation advised patient not quite ready  Continue follow-up with Nephrology for chronic kidney disease and single kidney  Thyroid cancer continue follow-up with Endocrinology  Lung cancer per Hematology  Atherosclerosis of the aorta please minimize risk factors control blood pressure and must quit smoking  Patient not interested in doing colonoscopy will do a fit test instead  Follow-up 6 months      Orders Placed This Encounter   Procedures    Fecal Immunochemical Test (iFOBT)    Comprehensive metabolic panel    Lipid panel       Current Outpatient Medications   Medication Sig Dispense Refill    albuterol (VENTOLIN HFA) 90 mcg/actuation inhaler INHALE 2 PUFFS BY MOUTH EVERY 4 HOURS IF NEEDED FOR WHEEZING 18 g 11    ALPRAZolam (XANAX) 0.5 MG tablet TAKE 1 TABLET EVERY NIGHT AS NEEDED 21 tablet 0    amLODIPine (NORVASC) 10 MG tablet Take 1 tablet (10 mg total) by mouth once daily. 90 tablet 3    aspirin 81 mg Tab Take 1 tablet by mouth Daily. 1 Tablet Oral Every day      cloNIDine (CATAPRES) 0.1 MG tablet Take 1 tablet (0.1 mg total) by mouth 2 (two) times daily. 180 tablet 3    cyanocobalamin, vitamin B-12, 1,000 mcg/mL Drop Place 1,000 mcg under the tongue once daily. 200 mL 6    diltiaZEM (CARTIA XT) 240 MG 24 hr capsule Take 1 capsule (240 mg total) by mouth once daily. 90 capsule 3    doxazosin (CARDURA) 4 MG tablet Take 1 tablet (4 mg total) by mouth every evening. 90 tablet 3     epinephrine (EPIPEN) 0.3 mg/0.3 mL (1:1,000) AtIn 1 Pen Injector Intramuscular once 1 Device 5    fluticasone (FLONASE) 50 mcg/actuation nasal spray 2 sprays by Each Nare route once daily. 1 Bottle 11    hydrALAZINE (APRESOLINE) 100 MG tablet TAKE 1 TABLET THREE TIMES DAILY 270 tablet 3    hydrocodone-acetaminophen 10-325mg (NORCO)  mg Tab Take 1 tablet by mouth every 6 (six) hours as needed.       lactulose (CHRONULAC) 10 gram/15 mL solution Take 30 ml every 12 hours PRN to achieve a good bowel movement 300 mL 6    levothyroxine (SYNTHROID) 137 MCG Tab tablet Take 1 tablet (137 mcg total) by mouth before breakfast. 30 tablet 3    predniSONE (DELTASONE) 5 MG tablet TAKE 1 TABLET EVERY DAY 90 tablet 1    ranitidine (ZANTAC) 75 MG tablet Take 75 mg by mouth 2 (two) times daily.      traMADol (ULTRAM) 50 mg tablet TAKE 1 TABLET BY MOUTH 4 TIMES DAILY AS NEEDED  2    atorvastatin (LIPITOR) 20 MG tablet Take 1 tablet (20 mg total) by mouth once daily. 90 tablet 3     No current facility-administered medications for this visit.        Medications Discontinued During This Encounter   Medication Reason    clotrimazole (LOTRIMIN) 1 % cream Patient no longer taking    azithromycin (Z-INDIA) 250 MG tablet Therapy completed    pravastatin (PRAVACHOL) 20 MG tablet        Follow-up in about 6 months (around 8/4/2019).      Mike Recinos MD

## 2019-02-04 NOTE — PROGRESS NOTES
[facility]  RADIATION ONCOLOGY FOLLOW UP    Name: Wallace Vigil  : 1955      DIAGNOSIS:  Presumed lung cancer stage cIA2: cT1b cN0 cM0.    1. History of follicular thyroid cancer   2. 3/1/17 total thyroidectomy  3. 6  stable focal pleural thickening at the left lower lobe posterolaterally compared to prior CT.  There is a similar focal benign appearing pleural thickening at the posterior lateral right lower lobe, not demonstrated on prior CT.  This may be sequelae from focal pneumonia that has resolved.  There is mild thickening of the right major fissure.  Nonspecific 4 mm noncalcified nodule in the lingula not demonstratedon prior CT  4. 17 a whole-body iodine-123 scan without abnormal lung uptake    5. 7/3/17 115.3 mCi of iodide-131 without abnormal lung uptake  6. 18 PET scan 1.4 cm FDG avid 3.1 SUV lingula nodule.  Patient not candidate for biopsy and refusing surgery.  Dependent subpleural nodular densities within the right lung base within the right lower lobe possibly representing atelectasis without FDG avidity  7. 18 completed SBRT left lung 50 Gy in 5 fractions  8. 10/18/18 CT chest resolution of stereotactic body radiation therapy treated anterior left upper lobe lesion.  New 9 mm left upper lobe pulmonary nodule and right middle and lower lobe pulmonary densities  9. 10/29/18 PET The nodule in the left upper lobe which is new has 0.8 FDG which is above background lung.  The lingular nodule was no longer FDG avid and was unchanged in size. There was continued persistent pleural thickening in the right costophrenic angle with low level uptake.  10. 19 CT chest without contrast showed clearing of the previous patchy infiltrates in the bilateral lower lobes and right middle lobe.  There was chronic scarring in the right middle and lower lobes.  There is increased opacity in the lingula which was larger compared with the prior.  There was a 6 mm  "cavitary nodule in the left upper lobe smaller compared with the prior.  There was a 6 mm right lower lobe nodule unchanged.  There was a 7 mm subpleural right lower lobe nodule unchanged.  There was a new 6 mm nodule in the right lower lobe.  There was a stable 1.8 x 1.1 cm precarinal lymph node.    CURRENT STATUS: Wallace Vigil is a pleasant 63 y.o. male who presents today for follow-up.  In the interval he followed up with Endocrinology for thyroid cancer, and is being worked up for stimulated thyroglobulin testing.  He was also diagnosed with bilateral pneumonia.  He again presented to the ED with hemoptysis.  CT neck showed no abnormality in the neck.  1/1/19 CT chest without contrast showed bilateral lung base infiltrate which were new compared with October 2018.  There were no suspicious pulmonary nodules.  2/5/19 CT chest without contrast was performed and I reviewed these images.  The report is above the diagnosis section.    PHYSICAL EXAM:   Constitutional: well appearing, no acute distress, ECOG 1 - Ambulates, capable of light work  Vitals:    /64   Pulse 70   Temp 97.8 °F (36.6 °C)   Resp 18   Ht 5' 9" (1.753 m)   Wt 58.6 kg (129 lb 1.6 oz)   SpO2 98%   BMI 19.06 kg/m²   Lymphatic: no cervical, supraclavicular adenopathy  Cardiovascular: regular rate, no murmurs, no edema of the upper or lower extremities, radial pulse 2+  Respiratory: unlabored effort, clear to auscultation, no wheezes, stable decreased bilateral breath sounds    Laboratory & X-Ray Findings: Per above.      ASSESSMENT:  Post radiation changes in the left lingula, stable subcentimeter pulmonary nodularity with single new right lower lobe nodule    PLAN:  I reassured him that the left lingula is post radiation change most likely.  There are criteria (Hernandez) which can be used in the post stereotactic body radiation therapy scenario to clarify radiographic changes. Thus far, I think he has an excellent response to radiation.  " I have ordered a chest CT in 4 months.  He prefers not to have contrast due to his solitary kidney.    The tumor in the lingula which was treated with stereotactic body radiation therapy in July 2018 was close to the stomach.  If he develops symptoms such as melena, hematemesis, epigastric pain or ulcer like symptoms EGD will be indicated to rule out radiation damage to the stomach.    CHRIS Eaton M.D.  Radiation Oncology  [facility] [address] [place] [address]  Ph: [phone]  [email or web address]

## 2019-02-05 NOTE — PROGRESS NOTES
2/5/19 - Summary: Pt is scheduled for a follow up contact by this CM today, but observed on chart review prior to attempting pt contact that he has already arrived at scheduled appts this AM for lab work and a chest CT.  Interventions: Chart review.  Plan: Attempt to follow up in one week. Lakia Khanna RN

## 2019-02-12 NOTE — PROGRESS NOTES
2/12/19 - SUMMARY: Phone contact made with pt and his ex-wife/girlfriend today for follow up and d/c from [facility]. She reports he was happy to get information yesterday after appt with hem/onc provider that all recent CT scans of chest show no evidence of lung cancer recurrence. He is feeling good and respiratory status has improved and cough has almost resolved with just an occasional cough reported.      INTERVENTION: Advised CM will d/c from [facility] today related to goals met. Reviewed upcoming appts and will mail appt letter per pt request. Encouraged to follow up with the appropriate provider for any symptoms causing concern.     PLAN: D/C from [facility] today related to goals met.     Todays [facility] Self-Management Care Plan was reviewed with the patients/caregivers input based on identified barriers from todays and previous assessments.  Goals were reviewed today with the patient/caregiver and the patient has agreed to continue to work towards these goals to improve his/her overall well-being. Patient verbalized understanding of the care plan, goals, and all of today's instructions. Encouraged patient/caregiver to communicate with his/her physician and health care team about health conditions and the treatment plan.  Provided my contact information today and encouraged patient/caregiver to call me with any questions as needed. Lakia Khanna RN

## 2019-02-14 NOTE — TELEPHONE ENCOUNTER
----- Message from Yareli Sidhu sent at 2/14/2019  2:05 PM CST -----  Contact: Ying/friend  Type:  Needs Medical Advice    Who Called:  Ying  Symptoms (please be specific): n/a  How long has patient had these symptoms:  n/a  Pharmacy name and phone #:  n/a  Would the patient rather a call back or a response via [facility]? Call back  Best Call Back Number: [phone]  Additional Information: the nurse called about getting the patient set up for Thyroid injections.They have more questions about that. Please call her.    Thanks,  Yareli

## 2019-02-14 NOTE — TELEPHONE ENCOUNTER
Returned call to cindy and patient and they stated that he needs to know how much he has to pay before he gets the injection.

## 2019-02-14 NOTE — TELEPHONE ENCOUNTER
----- Message from Parish Richards LPN sent at 2/13/2019  8:48 PM CST -----      ----- Message -----  From: Diallo Pascual MD  Sent: 2/13/2019   6:04 PM  To: Samara Landrum Staff    Would you check with pt to see if he agrees on Thyrogen inj?  ----- Message -----  From: Diallo Pascual MD  Sent: 1/15/2019  11:06 AM  To: Callie Duarte, Diallo Pascual MD, #    Hello Callie Duarte,  Thank you for checking on coverage. Do you have a contact for Lakia  So I can send her an email?         12/18/18 4:33 PM   Note        Called inquiring about medication Thyrotropin sergo injection for patient.  After looking up patient insurance coverage which is [employer] medicare.  The medication is covered under Medicare Part B.  Lakia will get with the Specialty team to see what his co-pay would be 80/20.  Maybe cancer services could assist with co-pay assistance.  The [employer] is closed at the moment.

## 2019-02-15 NOTE — ED PROVIDER NOTES
SCRIBE #1 NOTE: I, Jcarlos Solares, am scribing for, and in the presence of, Jovanna Garza Do, MD. I have scribed the HPI, ROS, and PEx.     SCRIBE #2 NOTE: I, Marie Mcclendon, am scribing for, and in the presence of,  Sergio Bocanegra MD. I have scribed the remaining portions of the note not scribed by Scribe #1.     History      Chief Complaint   Patient presents with    Back Pain     pt reports back pain that started this am. pt reports he was sitting  around when the pain satarted. pt has a hx COPD, lung CA and thyroid CA       Review of patient's allergies indicates:   Allergen Reactions    Diovan  [valsartan] Swelling    Levofloxacin Other (See Comments)     Stomach pains    Lisinopril Swelling        HPI   HPI    2/14/2019, 10:57 PM   History obtained from the patient      History of Present Illness: Wallace Vigil is a 63 y.o. male patient with PMHx of HTN, PVD, arthritis, COPD, CAD, GERD, heart failure, HLD, hypothyroidism, who presents to the Emergency Department for an evaluation of back pain which onset gradually this morning. Pt denies any physical activity when the pain onset. Symptoms are constant and moderate in severity. No mitigating factors reported. Sxs worsen when lying down. No associated sxs reported. Patient denies any numbness, weakness, dizziness, light-headedness, urinary incontinence, fecal incontinence, dysuria, hematuria, urinary frequency, urgency, fever, chills, and all other sxs at this time. Pt had lung CA and radiation completed in 08/2018. Pt had CAT scan done last Friday which showed no new lesions, but there was nodules present. No further complaints or concerns at this time.         Arrival mode: Personal vehicle     PCP: Mike Recinos MD       Past Medical History:  Past Medical History:   Diagnosis Date    Anxiety     Arthritis     Asthma     Atherosclerosis of native arteries of the extremities with intermittent claudication     Back pain     Benign hypertensive  heart disease without heart failure     Cancer     Carotid artery occlusion     Chronic kidney disease     COPD (chronic obstructive pulmonary disease)     Coronary artery disease     Emphysema of lung     Encounter for blood transfusion     GERD (gastroesophageal reflux disease)     Heart failure     History of aorto-femoral bypass 8/16/2013    Hyperlipidemia     Hypertension     Hypothyroidism     Iron deficiency anemia due to chronic blood loss 6/7/2018    PVD (peripheral vascular disease)     PVD (peripheral vascular disease) 8/16/2013    Renal artery stenosis 8/16/2013    Renovascular hypertension 8/16/2013    Rheumatoid arthritis     Shingles sept 2015    Stenosis of left subclavian artery 8/16/2013    Thyroid cancer     Thyroid disease     Thyroid cancer 3/1/2017    Tobacco abuse     Tobacco dependence     Trouble in sleeping        Past Surgical History:  Past Surgical History:   Procedure Laterality Date    CARDIAC CATHETERIZATION      RENAL ARTERY BYPASS      2012    THYROIDECTOMY      VASCULAR SURGERY      Carotid cleaned out          Family History:  Family History   Problem Relation Age of Onset    Hypertension Mother     Hyperlipidemia Mother     Heart failure Mother     Heart disease Mother     Hyperlipidemia Father     COPD Father     Diabetes Daughter     Diabetes Daughter        Social History:  Social History     Tobacco Use    Smoking status: Current Every Day Smoker     Packs/day: 1.00     Years: 50.00     Pack years: 50.00     Types: Cigarettes    Smokeless tobacco: Never Used   Substance and Sexual Activity    Alcohol use: No    Drug use: No    Sexual activity: Not Currently     Partners: Female     Birth control/protection: None       ROS   Review of Systems   Constitutional: Negative for chills and fever.   HENT: Negative for congestion and sore throat.    Eyes: Negative for photophobia and visual disturbance.   Respiratory: Negative for chest  tightness, shortness of breath and wheezing.    Cardiovascular: Negative for chest pain and palpitations.   Gastrointestinal: Negative for abdominal pain, diarrhea, nausea and vomiting.   Genitourinary: Negative for dysuria, flank pain, frequency and urgency.        (-) urinary/fecal incontinence     Musculoskeletal: Positive for back pain. Negative for neck pain and neck stiffness.   Skin: Negative for rash.   Neurological: Negative for dizziness, weakness, light-headedness, numbness and headaches.   Hematological: Does not bruise/bleed easily.   All other systems reviewed and are negative.      Physical Exam      Initial Vitals [02/14/19 2213]   BP Pulse Resp Temp SpO2   (!) 165/79 71 19 97.9 °F (36.6 °C) 96 %      MAP       --          Physical Exam  Nursing Notes and Vital Signs Reviewed.  Constitutional: Patient is in no acute distress. Well-developed and well-nourished.  Head: Atraumatic. Normocephalic.  Eyes: PERRL. EOM intact. Conjunctivae are not pale. No scleral icterus.  ENT: Mucous membranes are moist. Oropharynx is clear and symmetric.    Neck: Supple. Full ROM. No lymphadenopathy.  Cardiovascular: Regular rate. Regular rhythm. No murmurs, rubs, or gallops. Distal pulses are 2+ and symmetric.  Pulmonary/Chest: No respiratory distress. Clear to auscultation bilaterally. No wheezing or rales.  Abdominal: Soft and non-distended.  There is no tenderness.  No rebound, guarding, or rigidity. Good bowel sounds.  Genitourinary: No CVA tenderness  Musculoskeletal: Moves all extremities. No obvious deformities. No edema. No calf tenderness.   Back: R side posterior ribs tenderness. No midline bony tenderness, deformities, or step-offs of the T-spine or L-spine. Skin appears normal without abrasions or bruising. No erythema, induration, or fluctuance.   Skin: Warm and dry.  Neurological:  Alert, awake, and appropriate.  Normal speech.  No acute focal neurological deficits are appreciated.  Psychiatric: Normal  "affect. Good eye contact. Appropriate in content.    ED Course    Procedures  ED Vital Signs:  Vitals:    02/14/19 2213 02/14/19 2322 02/15/19 0002 02/15/19 0102   BP: (!) 165/79 (!) 142/64 (!) 121/55 (!) 129/58   Pulse: 71 66 63 63   Resp: 19  (!) 51 13   Temp: 97.9 °F (36.6 °C)      TempSrc: Oral      SpO2: 96% 96% (!) 91% (!) 92%   Weight: 59.9 kg (132 lb 2.7 oz)      Height: 5' 11" (1.803 m)          Abnormal Lab Results:  Labs Reviewed   CBC W/ AUTO DIFFERENTIAL - Abnormal; Notable for the following components:       Result Value    WBC 3.63 (*)     RBC 3.14 (*)     Hemoglobin 9.8 (*)     Hematocrit 28.7 (*)     MCH 31.2 (*)     Platelets 92 (*)     MPV 8.7 (*)     Lymph # 0.5 (*)     Lymph% 14.6 (*)     All other components within normal limits   COMPREHENSIVE METABOLIC PANEL - Abnormal; Notable for the following components:    CO2 22 (*)     BUN, Bld 30 (*)     Creatinine 1.8 (*)     Albumin 3.4 (*)     ALT 6 (*)     eGFR if  46 (*)     eGFR if non  39 (*)     All other components within normal limits   TROPONIN I   B-TYPE NATRIURETIC PEPTIDE        All Lab Results:  Results for orders placed or performed during the hospital encounter of 02/14/19   CBC auto differential   Result Value Ref Range    WBC 3.63 (L) 3.90 - 12.70 K/uL    RBC 3.14 (L) 4.60 - 6.20 M/uL    Hemoglobin 9.8 (L) 14.0 - 18.0 g/dL    Hematocrit 28.7 (L) 40.0 - 54.0 %    MCV 91 82 - 98 fL    MCH 31.2 (H) 27.0 - 31.0 pg    MCHC 34.1 32.0 - 36.0 g/dL    RDW 14.1 11.5 - 14.5 %    Platelets 92 (L) 150 - 350 K/uL    MPV 8.7 (L) 9.2 - 12.9 fL    Gran # (ANC) 2.5 1.8 - 7.7 K/uL    Lymph # 0.5 (L) 1.0 - 4.8 K/uL    Mono # 0.5 0.3 - 1.0 K/uL    Eos # 0.1 0.0 - 0.5 K/uL    Baso # 0.02 0.00 - 0.20 K/uL    Gran% 68.0 38.0 - 73.0 %    Lymph% 14.6 (L) 18.0 - 48.0 %    Mono% 14.3 4.0 - 15.0 %    Eosinophil% 2.5 0.0 - 8.0 %    Basophil% 0.6 0.0 - 1.9 %    Differential Method Automated    Comprehensive metabolic panel   Result " Value Ref Range    Sodium 136 136 - 145 mmol/L    Potassium 4.0 3.5 - 5.1 mmol/L    Chloride 104 95 - 110 mmol/L    CO2 22 (L) 23 - 29 mmol/L    Glucose 99 70 - 110 mg/dL    BUN, Bld 30 (H) 8 - 23 mg/dL    Creatinine 1.8 (H) 0.5 - 1.4 mg/dL    Calcium 8.8 8.7 - 10.5 mg/dL    Total Protein 6.8 6.0 - 8.4 g/dL    Albumin 3.4 (L) 3.5 - 5.2 g/dL    Total Bilirubin 0.3 0.1 - 1.0 mg/dL    Alkaline Phosphatase 68 55 - 135 U/L    AST 11 10 - 40 U/L    ALT 6 (L) 10 - 44 U/L    Anion Gap 10 8 - 16 mmol/L    eGFR if African American 46 (A) >60 mL/min/1.73 m^2    eGFR if non African American 39 (A) >60 mL/min/1.73 m^2   Troponin I #1   Result Value Ref Range    Troponin I <0.006 0.000 - 0.026 ng/mL   B-Type natriuretic peptide (BNP)   Result Value Ref Range    BNP 80 0 - 99 pg/mL         Imaging Results:  Imaging Results          X-Ray Chest PA And Lateral (Final result)  Result time 02/14/19 23:48:57    Final result by Andres Castro Jr., MD (02/14/19 23:48:57)                 Impression:      No acute findings.      Electronically signed by: Andres Castro MD  Date:    02/14/2019  Time:    23:48             Narrative:    EXAMINATION:  XR CHEST PA AND LATERAL    CLINICAL HISTORY:  Chest Pain;    COMPARISON:  01/01/2019    FINDINGS:  Heart size is normal.  Coarse bronchovascular markings indicating some underlying emphysema.  Some mild fibrotic changes in both lungs.  No superimposed active infiltrates.  Some persistent suspected scarring or atelectasis in the left lung base..  Lungs appear clear of active disease.  No infiltrates or effusions.  No suspicious mass.                               The EKG was ordered, reviewed, and independently interpreted by the ED provider.  Interpretation time: 2314  Rate: 66 BPM  Rhythm: normal sinus rhythm  Interpretation: Minimal voltage criteria for LVH. Nonspecific T wave abnormality. No STEMI.             The Emergency Provider reviewed the vital signs and test results, which are  outlined above.    ED Discussion     11:23 PM: Dr. Fitch transfers care of pt to Dr. Adams pending lab results.    1:04 AM: Re-evaluated pt. Pt is awake, alert, and in NAD at this time. There is tenderness to R posterior thoracic cage. Discussed with pt all pertinent ED information and results. Discussed pt dx and plan of tx. Gave pt all f/u and return to the ED instructions. All questions and concerns were addressed at this time. Pt expresses understanding of information and instructions, and is comfortable with plan to discharge. Pt is stable for discharge.    I discussed with patient and/or family/caretaker that evaluation in the ED does not suggest any emergent or life threatening medical conditions requiring immediate intervention beyond what was provided in the ED, and I believe patient is safe for discharge.  Regardless, an unremarkable evaluation in the ED does not preclude the development or presence of a serious of life threatening condition. As such, patient was instructed to return immediately for any worsening or change in current symptoms.      Discharge Medication List as of 2/15/2019  1:05 AM      CONTINUE these medications which have NOT CHANGED    Details   albuterol (VENTOLIN HFA) 90 mcg/actuation inhaler INHALE 2 PUFFS BY MOUTH EVERY 4 HOURS IF NEEDED FOR WHEEZING, Normal      ALPRAZolam (XANAX) 0.5 MG tablet TAKE 1 TABLET EVERY NIGHT AS NEEDED, Normal      amLODIPine (NORVASC) 10 MG tablet TAKE 1 TABLET EVERY DAY, Normal      aspirin 81 mg Tab Take 1 tablet by mouth Daily. 1 Tablet Oral Every day, Until Discontinued, Historical Med      atorvastatin (LIPITOR) 20 MG tablet Take 1 tablet (20 mg total) by mouth once daily., Starting Mon 2/4/2019, Until Tue 2/4/2020, Normal      cloNIDine (CATAPRES) 0.1 MG tablet TAKE 1 TABLET TWICE DAILY, Normal      cyanocobalamin, vitamin B-12, 1,000 mcg/mL Drop Place 1,000 mcg under the tongue once daily., Starting Wed 12/12/2018, Normal      diltiaZEM (CARDIZEM  CD) 240 MG 24 hr capsule TAKE 1 CAPSULE EVERY DAY, Normal      doxazosin (CARDURA) 4 MG tablet TAKE 1 TABLET EVERY EVENING, Normal      fluticasone (FLONASE) 50 mcg/actuation nasal spray 2 sprays by Each Nare route once daily., Starting 1/10/2017, Until Discontinued, Normal      hydrALAZINE (APRESOLINE) 100 MG tablet TAKE 1 TABLET THREE TIMES DAILY, Normal      hydrocodone-acetaminophen 10-325mg (NORCO)  mg Tab Take 1 tablet by mouth every 6 (six) hours as needed. , Starting Thu 5/15/2014, Historical Med      lactulose (CHRONULAC) 10 gram/15 mL solution Take 30 ml every 12 hours PRN to achieve a good bowel movement, Normal      levothyroxine (SYNTHROID) 137 MCG Tab tablet Take 1 tablet (137 mcg total) by mouth before breakfast., Starting Tue 1/15/2019, Until Wed 1/15/2020, Normal      predniSONE (DELTASONE) 5 MG tablet TAKE 1 TABLET EVERY DAY, Normal      ranitidine (ZANTAC) 75 MG tablet Take 75 mg by mouth 2 (two) times daily., Until Discontinued, Historical Med      traMADol (ULTRAM) 50 mg tablet TAKE 1 TABLET BY MOUTH 4 TIMES DAILY AS NEEDED, Historical Med      epinephrine (EPIPEN) 0.3 mg/0.3 mL (1:1,000) AtIn 1 Pen Injector Intramuscular once, Print               ED Medication(s):  Medications   ondansetron injection 4 mg (4 mg Intravenous Given 2/14/19 2326)   morphine injection 4 mg (4 mg Intravenous Given 2/14/19 2320)       Follow-up Information     Mike Recinos MD In 1 week.    Specialty:  Family Medicine  Contact information:  [address] [address] [address] [phone]             [facility]    Specialty:  Emergency Medicine  Why:  As needed, If symptoms worsen  Contact information:  [address]  [facility] [address] [phone]                   Medical Decision Making    Medical Decision Making:   Clinical Tests:   Lab Tests: Ordered and Reviewed  Radiological Study: Ordered and Reviewed  Medical Tests: Ordered and Reviewed            Scribe Attestation:   Scribe #1: I performed the above scribed service and the documentation accurately describes the services I performed. I attest to the accuracy of the note.    Attending:   Physician Attestation Statement for Scribe #1: I, Jovanna Garza Do, MD, personally performed the services described in this documentation, as scribed by Jcarlos Solares, in my presence, and it is both accurate and complete.       Scribe Attestation:   Scribe #2: I performed the above scribed service and the documentation accurately describes the services I performed. I attest to the accuracy of the note.    Attending Attestation:           Physician Attestation for Scribe:    Physician Attestation Statement for Scribe #2: I, Sergio Bocanegra MD, reviewed documentation, as scribed by Marie Mcclendon in my presence, and it is both accurate and complete. I also acknowledge and confirm the content of the note done by Scribe #1.          Clinical Impression       ICD-10-CM ICD-9-CM   1. Musculoskeletal back pain M54.9 724.5   2. Chest pain R07.9 786.50       Disposition:   Disposition: Discharged  Condition: Stable         Дмитрий Adams MD  02/15/19 0823

## 2019-03-05 NOTE — PROGRESS NOTES
Chief Complaint:    Chief Complaint   Patient presents with    Leg Pain       History of Present Illness:  Presents today he says he has been having some weakness in his legs and his balance is off he says he staggers a lot has not had any falls.  This has been going on for the last several weeks.  No weakness in any other part of body.  Also had some swelling in the legs but he denies any new shortness of breath.  He has had B12 deficiency in the past currently not on any replacement therapies not been checked since then  He still a smoker      ROS:  Review of Systems   Constitutional: Negative for activity change, chills, fatigue, fever and unexpected weight change.   HENT: Negative for congestion, ear discharge, ear pain, hearing loss, postnasal drip and rhinorrhea.    Eyes: Negative for pain and visual disturbance.   Respiratory: Negative for cough, chest tightness and shortness of breath.    Cardiovascular: Negative for chest pain and palpitations.   Gastrointestinal: Negative for abdominal pain, diarrhea and vomiting.   Endocrine: Negative for heat intolerance.   Genitourinary: Negative for dysuria, flank pain, frequency and hematuria.   Musculoskeletal: Negative for back pain, gait problem and neck pain.   Skin: Negative for color change and rash.   Neurological: Negative for dizziness, tremors, seizures, numbness and headaches.   Psychiatric/Behavioral: Negative for agitation, hallucinations, self-injury, sleep disturbance and suicidal ideas. The patient is not nervous/anxious.        Past Medical History:   Diagnosis Date    Anxiety     Arthritis     Asthma     Atherosclerosis of native arteries of the extremities with intermittent claudication     Back pain     Benign hypertensive heart disease without heart failure     Cancer     Carotid artery occlusion     Chronic kidney disease     COPD (chronic obstructive pulmonary disease)     Coronary artery disease     Emphysema of lung      Encounter for blood transfusion     GERD (gastroesophageal reflux disease)     Heart failure     History of aorto-femoral bypass 8/16/2013    Hyperlipidemia     Hypertension     Hypothyroidism     Iron deficiency anemia due to chronic blood loss 6/7/2018    PVD (peripheral vascular disease)     PVD (peripheral vascular disease) 8/16/2013    Renal artery stenosis 8/16/2013    Renovascular hypertension 8/16/2013    Rheumatoid arthritis     Shingles sept 2015    Stenosis of left subclavian artery 8/16/2013    Thyroid cancer     Thyroid disease     Thyroid cancer 3/1/2017    Tobacco abuse     Tobacco dependence     Trouble in sleeping        Social History:  Social History     Socioeconomic History    Marital status:      Spouse name: None    Number of children: 2    Years of education: None    Highest education level: None   Social Needs    Financial resource strain: None    Food insecurity - worry: None    Food insecurity - inability: None    Transportation needs - medical: None    Transportation needs - non-medical: None   Occupational History    None   Tobacco Use    Smoking status: Current Every Day Smoker     Packs/day: 1.00     Years: 50.00     Pack years: 50.00     Types: Cigarettes    Smokeless tobacco: Never Used   Substance and Sexual Activity    Alcohol use: No    Drug use: No    Sexual activity: Not Currently     Partners: Female     Birth control/protection: None   Other Topics Concern    None   Social History Narrative    None       Family History:   family history includes COPD in his father; Diabetes in his daughter and daughter; Heart disease in his mother; Heart failure in his mother; Hyperlipidemia in his father and mother; Hypertension in his mother.    Health Maintenance   Topic Date Due    Pneumococcal Vaccine (Highest Risk) (2 of 3 - PCV13) 06/11/2014    Influenza Vaccine  08/01/2018    Lipid Panel  04/18/2019    Fecal Occult Blood Test (FOBT)/[employer]  " 02/06/2020    High Dose Statin  03/05/2020    Aspirin/Antiplatelet Therapy  03/05/2020    TETANUS VACCINE  06/17/2023    Hepatitis C Screening  Completed    Zoster Vaccine  Completed       Physical Exam:    Vital Signs  Temp: 98.5 °F (36.9 °C)  Temp src: Tympanic  Pulse: 83  SpO2: 97 %  BP: 120/60  BP Location: Left arm  Patient Position: Sitting  Pain Score:   7  Pain Loc: Leg  Height and Weight  Height: 5' 11" (180.3 cm)  Weight: 59.7 kg (131 lb 9.8 oz)  BSA (Calculated - sq m): 1.73 sq meters  BMI (Calculated): 18.4  Weight in (lb) to have BMI = 25: 178.9]    Body mass index is 18.36 kg/m².    Physical Exam   Constitutional: He is oriented to person, place, and time.   HENT:   Mouth/Throat: Oropharynx is clear and moist.   Eyes: Conjunctivae are normal. Pupils are equal, round, and reactive to light.   Neck: Normal range of motion. Neck supple.   Cardiovascular: Normal rate, regular rhythm and normal heart sounds.   No murmur heard.  Pulmonary/Chest: Effort normal and breath sounds normal. No respiratory distress. He has no wheezes. He has no rales. He exhibits no tenderness.   Abdominal: Soft. He exhibits no distension and no mass. There is no tenderness. There is no guarding.   Musculoskeletal: He exhibits edema. He exhibits no tenderness.        Feet:    Lymphadenopathy:     He has no cervical adenopathy.   Neurological: He is alert and oriented to person, place, and time. He has normal reflexes.   Patient has some weakness bilateral lower extremity there is no pain or tenderness and his gait is not well balanced   Skin: Skin is warm and dry.   Psychiatric: He has a normal mood and affect. His behavior is normal. Judgment and thought content normal.         Assessment:      ICD-10-CM ICD-9-CM   1. Gait disturbance R26.9 781.2   2. B12 deficiency E53.8 266.2   3. Smoker F17.200 305.1   4. Pedal edema R60.0 782.3         Plan:    Differential include possible CVA, B12 deficiency neuropathy, lumbar spinal " stenosis causing leg weakness.  Will start with CT of the head and x-ray of the lumbar spine and B12 levels.  Patient refused to do MRI  Also check BNP      Orders Placed This Encounter   Procedures    X-Ray Lumbar Spine AP And Lateral    CT Head Without Contrast    Vitamin B12    Brain natriuretic peptide       Current Outpatient Medications   Medication Sig Dispense Refill    albuterol (VENTOLIN HFA) 90 mcg/actuation inhaler INHALE 2 PUFFS BY MOUTH EVERY 4 HOURS IF NEEDED FOR WHEEZING 18 g 11    ALPRAZolam (XANAX) 0.5 MG tablet TAKE 1 TABLET EVERY NIGHT AS NEEDED 21 tablet 0    amLODIPine (NORVASC) 10 MG tablet TAKE 1 TABLET EVERY DAY 90 tablet 3    aspirin 81 mg Tab Take 1 tablet by mouth Daily. 1 Tablet Oral Every day      atorvastatin (LIPITOR) 20 MG tablet Take 1 tablet (20 mg total) by mouth once daily. 90 tablet 3    cloNIDine (CATAPRES) 0.1 MG tablet TAKE 1 TABLET TWICE DAILY 180 tablet 3    cyanocobalamin, vitamin B-12, 1,000 mcg/mL Drop Place 1,000 mcg under the tongue once daily. 200 mL 6    diltiaZEM (CARDIZEM CD) 240 MG 24 hr capsule TAKE 1 CAPSULE EVERY DAY 90 capsule 3    doxazosin (CARDURA) 4 MG tablet TAKE 1 TABLET EVERY EVENING 90 tablet 3    epinephrine (EPIPEN) 0.3 mg/0.3 mL (1:1,000) AtIn 1 Pen Injector Intramuscular once 1 Device 5    fluticasone (FLONASE) 50 mcg/actuation nasal spray 2 sprays by Each Nare route once daily. 1 Bottle 11    hydrALAZINE (APRESOLINE) 100 MG tablet TAKE 1 TABLET THREE TIMES DAILY 270 tablet 3    hydrocodone-acetaminophen 10-325mg (NORCO)  mg Tab Take 1 tablet by mouth every 6 (six) hours as needed.       lactulose (CHRONULAC) 10 gram/15 mL solution Take 30 ml every 12 hours PRN to achieve a good bowel movement 300 mL 6    levothyroxine (SYNTHROID) 137 MCG Tab tablet Take 1 tablet (137 mcg total) by mouth before breakfast. 30 tablet 3    predniSONE (DELTASONE) 5 MG tablet TAKE 1 TABLET EVERY DAY 90 tablet 1    ranitidine (ZANTAC) 75 MG  tablet Take 75 mg by mouth 2 (two) times daily.       No current facility-administered medications for this visit.        Medications Discontinued During This Encounter   Medication Reason    traMADol (ULTRAM) 50 mg tablet Patient no longer taking       No Follow-up on file.      Mike Recinos MD

## 2019-03-06 NOTE — TELEPHONE ENCOUNTER
Already spoke with the lab and the patient. Patient will have the lab drawn at [facility] on 3/12/19 per patient's request due to already having labs to be drawn for another provider on that day.

## 2019-03-06 NOTE — TELEPHONE ENCOUNTER
----- Message from Ayesha Michael sent at 3/6/2019  3:13 AM CST -----  Regarding: [facility]  Contact: [phone]  Hi my name is Ayesha I work in the [facility]. We had a problem with some lab work on this patient. If someone from your office could call us at [phone] or ext [phone] that would be great. Anyone in my department can help. Thank you

## 2019-03-06 NOTE — TELEPHONE ENCOUNTER
Spoke with Meghana at [facility] and states the B12 was not completed because it was collected in the wrong tube: green top.    Patient notified to return for lab. Patient verbalized understanding.    Please sign pended lab order.

## 2019-03-06 NOTE — TELEPHONE ENCOUNTER
Please notify the lab that it needs to be collected in the proper tube and call pt to come back for labs

## 2019-03-07 NOTE — TELEPHONE ENCOUNTER
Chronic Disease Management  Called patient to complete Pulmonary Disease Management Questionnaire. No answer    Time  spent with patient: 0  Minutes

## 2019-03-10 NOTE — DISCHARGE INSTRUCTIONS
Continue all the home medications.  Add Medrol Dosepak as prescribed.  Weightbear as tolerated.  Use crutches as needed or a walker.  Follow up with her doctor tomorrow for re-evaluation.  Return as needed for any worsening symptoms, problems, questions or concerns.

## 2019-03-10 NOTE — ED PROVIDER NOTES
SCRIBE #1 NOTE: I, Zoë Pinto, am scribing for, and in the presence of, Aby Bass MD. I have scribed the HPI, ROS, and PEx of the note.     SCRIBE #2 NOTE: I, Haleigh Smyth, am scribing for, and in the presence of,  Anival Neville MD. I have scribed the remaining portions of the note not scribed by Scribe #1.     History      Chief Complaint   Patient presents with    Leg Swelling     bilateral leg swelling and c/o generalized pain all over       Review of patient's allergies indicates:   Allergen Reactions    Diovan  [valsartan] Swelling    Levofloxacin Other (See Comments)     Stomach pains    Lisinopril Swelling        HPI   HPI    3/10/2019, 1:51 AM   History obtained from the patient      History of Present Illness: Wallace Vigil is a 64 y.o. male patient with PMHx of arthritis, back pain, cancer, CKD, HTN, and RA, who presents to the Emergency Department for bilateral leg pain and swelling which onset gradually 1 week ago. Symptoms are constant and moderate in severity. Pt was seen by Dr. Recinos on 3/5/19 for sxs, had CT of head, x-ray of lumbar spine, and B12 levels, discharged with tramadol. Pt reports sxs have worsened. No mitigating or exacerbating factors reported. Associated sxs include right ankle pain and generalized body aches. Patient denies any CP, SOB, HA, back pain, n/v/d, abd pain, numbness/weakness, and all other sxs at this time. No further complaints or concerns at this time.     Arrival mode: Personal vehicle      PCP: Mike Recinos MD       Past Medical History:  Past Medical History:   Diagnosis Date    Anxiety     Arthritis     Asthma     Atherosclerosis of native arteries of the extremities with intermittent claudication     Back pain     Benign hypertensive heart disease without heart failure     Cancer     Carotid artery occlusion     Chronic kidney disease     COPD (chronic obstructive pulmonary disease)     Coronary artery disease     Emphysema of lung      Encounter for blood transfusion     GERD (gastroesophageal reflux disease)     Heart failure     History of aorto-femoral bypass 8/16/2013    Hyperlipidemia     Hypertension     Hypothyroidism     Iron deficiency anemia due to chronic blood loss 6/7/2018    PVD (peripheral vascular disease)     PVD (peripheral vascular disease) 8/16/2013    Renal artery stenosis 8/16/2013    Renovascular hypertension 8/16/2013    Rheumatoid arthritis     Shingles sept 2015    Stenosis of left subclavian artery 8/16/2013    Thyroid cancer     Thyroid disease     Thyroid cancer 3/1/2017    Tobacco abuse     Tobacco dependence     Trouble in sleeping        Past Surgical History:  Past Surgical History:   Procedure Laterality Date    CARDIAC CATHETERIZATION      RENAL ARTERY BYPASS      2012    THYROIDECTOMY      VASCULAR SURGERY      Carotid cleaned out          Family History:  Family History   Problem Relation Age of Onset    Hypertension Mother     Hyperlipidemia Mother     Heart failure Mother     Heart disease Mother     Hyperlipidemia Father     COPD Father     Diabetes Daughter     Diabetes Daughter        Social History:  Social History     Tobacco Use    Smoking status: Current Every Day Smoker     Packs/day: 1.00     Years: 50.00     Pack years: 50.00     Types: Cigarettes    Smokeless tobacco: Never Used   Substance and Sexual Activity    Alcohol use: No    Drug use: No    Sexual activity: Not Currently     Partners: Female     Birth control/protection: None       ROS   Review of Systems   Constitutional: Negative for chills and fever.   HENT: Negative for sore throat.    Respiratory: Negative for shortness of breath.    Cardiovascular: Negative for chest pain.   Gastrointestinal: Negative for abdominal pain, diarrhea, nausea and vomiting.   Genitourinary: Negative for dysuria.   Musculoskeletal: Positive for arthralgias. Negative for back pain and neck pain.        (+) Bilateral leg  "pain/swelling  (+) R ankle pain   Skin: Negative for rash.   Neurological: Negative for weakness.   Hematological: Does not bruise/bleed easily.   All other systems reviewed and are negative.    Physical Exam      Initial Vitals [03/10/19 0040]   BP Pulse Resp Temp SpO2   123/65 66 20 97.7 °F (36.5 °C) 96 %      MAP       --          Physical Exam  Nursing Notes and Vital Signs Reviewed.  Constitutional: Patient is in no acute distress. Well-developed and well-nourished.  Head: Atraumatic. Normocephalic.  Eyes: PERRL. EOM intact. Conjunctivae are not pale. No scleral icterus.  ENT: Mucous membranes are moist. Oropharynx is clear and symmetric.    Neck: Supple. Full ROM. No lymphadenopathy.  Cardiovascular: Regular rate. Regular rhythm. No murmurs, rubs, or gallops. Distal pulses are 2+ and symmetric.  Pulmonary/Chest: No respiratory distress. Clear to auscultation bilaterally. No wheezing or rales.  Abdominal: Soft and non-distended.  There is no tenderness.  No rebound, guarding, or rigidity. Good bowel sounds.  Genitourinary: No CVA tenderness  Musculoskeletal: Moves all extremities. No obvious deformities. 2+ edema bilaterally. Tenderness to R ankle. Decreased ROM secondary to pain.  Skin: Warm and dry.  Neurological:  Alert, awake, and appropriate.  Normal speech.  No acute focal neurological deficits are appreciated.  Psychiatric: Normal affect. Good eye contact. Appropriate in content.    ED Course    Procedures  ED Vital Signs:  Vitals:    03/10/19 0040 03/10/19 0626   BP: 123/65 (!) 157/80   Pulse: 66 70   Resp: 20    Temp: 97.7 °F (36.5 °C) 98.7 °F (37.1 °C)   TempSrc: Oral Oral   SpO2: 96% 95%   Height: 5' 11" (1.803 m)        Abnormal Lab Results:  Labs Reviewed   C-REACTIVE PROTEIN - Abnormal; Notable for the following components:       Result Value    CRP 42.9 (*)     All other components within normal limits   D DIMER, QUANTITATIVE - Abnormal; Notable for the following components:    D-Dimer 2.17 (*)  "    All other components within normal limits   CBC W/ AUTO DIFFERENTIAL - Abnormal; Notable for the following components:    WBC 2.94 (*)     RBC 3.15 (*)     Hemoglobin 9.7 (*)     Hematocrit 28.4 (*)     Platelets 89 (*)     MPV 8.9 (*)     Lymph # 0.6 (*)     All other components within normal limits   URIC ACID - Abnormal; Notable for the following components:    Uric Acid 7.8 (*)     All other components within normal limits   COMPREHENSIVE METABOLIC PANEL - Abnormal; Notable for the following components:    BUN, Bld 29 (*)     Creatinine 1.8 (*)     Albumin 3.3 (*)     ALT <5 (*)     eGFR if  45 (*)     eGFR if non  39 (*)     All other components within normal limits        All Lab Results:  Results for orders placed or performed during the hospital encounter of 03/10/19   C-reactive protein   Result Value Ref Range    CRP 42.9 (H) 0.0 - 8.2 mg/L   D dimer, quantitative   Result Value Ref Range    D-Dimer 2.17 (H) <0.50 mg/L FEU   CBC auto differential   Result Value Ref Range    WBC 2.94 (L) 3.90 - 12.70 K/uL    RBC 3.15 (L) 4.60 - 6.20 M/uL    Hemoglobin 9.7 (L) 14.0 - 18.0 g/dL    Hematocrit 28.4 (L) 40.0 - 54.0 %    MCV 90 82 - 98 fL    MCH 30.8 27.0 - 31.0 pg    MCHC 34.2 32.0 - 36.0 g/dL    RDW 14.3 11.5 - 14.5 %    Platelets 89 (L) 150 - 350 K/uL    MPV 8.9 (L) 9.2 - 12.9 fL    Gran # (ANC) 1.9 1.8 - 7.7 K/uL    Lymph # 0.6 (L) 1.0 - 4.8 K/uL    Mono # 0.3 0.3 - 1.0 K/uL    Eos # 0.1 0.0 - 0.5 K/uL    Baso # 0.02 0.00 - 0.20 K/uL    Gran% 64.9 38.0 - 73.0 %    Lymph% 20.1 18.0 - 48.0 %    Mono% 10.9 4.0 - 15.0 %    Eosinophil% 3.4 0.0 - 8.0 %    Basophil% 0.7 0.0 - 1.9 %    Differential Method Automated    Uric acid   Result Value Ref Range    Uric Acid 7.8 (H) 3.4 - 7.0 mg/dL   Comprehensive metabolic panel   Result Value Ref Range    Sodium 139 136 - 145 mmol/L    Potassium 3.5 3.5 - 5.1 mmol/L    Chloride 106 95 - 110 mmol/L    CO2 24 23 - 29 mmol/L    Glucose 78 70 -  110 mg/dL    BUN, Bld 29 (H) 8 - 23 mg/dL    Creatinine 1.8 (H) 0.5 - 1.4 mg/dL    Calcium 8.8 8.7 - 10.5 mg/dL    Total Protein 6.9 6.0 - 8.4 g/dL    Albumin 3.3 (L) 3.5 - 5.2 g/dL    Total Bilirubin 0.4 0.1 - 1.0 mg/dL    Alkaline Phosphatase 66 55 - 135 U/L    AST 10 10 - 40 U/L    ALT <5 (L) 10 - 44 U/L    Anion Gap 9 8 - 16 mmol/L    eGFR if African American 45 (A) >60 mL/min/1.73 m^2    eGFR if non African American 39 (A) >60 mL/min/1.73 m^2         Imaging Results:  Imaging Results          X-Ray Ankle Complete Right (In process)    Per ED physician, pt's XR results foreign body noted to dorsum of L foot              Per ED physician, pt's US results: NAF.            The Emergency Provider reviewed the vital signs and test results, which are outlined above.    ED Discussion       6:25 AM: Dr. Bass transfers care of pt to Dr. Neville, pending US results.    8:01 AM: Reassessed pt at this time. Discussed with pt all pertinent ED information and results. Discussed pt dx and plan of tx. Gave pt all f/u and return to the ED instructions. All questions and concerns were addressed at this time. Pt expresses understanding of information and instructions, and is comfortable with plan to discharge. Pt is stable for discharge.    8:05 AM  Patient is stable nontoxic.  Has elevation in his uric acid and CRP.  He has been told in the past every does not have gout but this is all rheumatoid arthritis based upon biopsy.  Patient also has a solitary kidney with mild bump in his creatinine from normal. Light of this I will avoid NSAIDs.  I will add Medrol Dosepak.  He is in pain management so he will continue his Norco as previously prescribed.  Weightbear as tolerated on crutches.  I have advised follow-up with primary care doctor tomorrow for re-evaluation.  He verbalized understanding agreement with all.  He seems reliable.    ED Medication(s):  Medications - No data to display     Current Discharge Medication List       START taking these medications    Details   methylPREDNISolone (MEDROL DOSEPACK) 4 mg tablet Take as directed on the package.  Qty: 1 Package, Refills: 0             Follow-up Information     Mike Recinos MD In 2 days.    Specialty:  Family Medicine  Contact information:  [id number] [address] [address]  [phone]                     Medical Decision Making    Medical Decision Making:   Clinical Tests:   Lab Tests: Ordered and Reviewed  Radiological Study: Ordered and Reviewed           Scribe Attestation:   Scribe #1: I performed the above scribed service and the documentation accurately describes the services I performed. I attest to the accuracy of the note.    Attending:   Physician Attestation Statement for Scribe #1: I, Aby Bass MD, personally performed the services described in this documentation, as scribed by Zoë Pinto, in my presence, and it is both accurate and complete.       Scribe Attestation:   Scribe #2: I performed the above scribed service and the documentation accurately describes the services I performed. I attest to the accuracy of the note.    Attending Attestation:           Physician Attestation for Scribe:    Physician Attestation Statement for Scribe #2: I, Anival Neville MD, reviewed documentation, as scribed by Haleigh Smyth in my presence, and it is both accurate and complete. I also acknowledge and confirm the content of the note done by Scribe #1.          Clinical Impression       ICD-10-CM ICD-9-CM   1. Acute gout of right ankle, unspecified cause M10.9 274.01   2. Ankle pain, right M25.571 719.47   3. Edema R60.9 782.3       Disposition:   Disposition: Discharged  Condition: Stable         Anival Neville Jr., MD  03/10/19 0806

## 2019-03-11 NOTE — ED PROVIDER NOTES
"SCRIBE #1 NOTE: I, Lynne Beard, am scribing for, and in the presence of, Wilner Morfin MD. I have scribed the entire note.      History      Chief Complaint   Patient presents with    Abnormal MRI     patient was sent by MD for further eval of MRI results of the brain. patient is a poor historian and doesnt know why he had to have the scan       Review of patient's allergies indicates:   Allergen Reactions    Diovan  [valsartan] Swelling    Levofloxacin Other (See Comments)     Stomach pains    Lisinopril Swelling        HPI   HPI    3/11/2019, 5:51 PM   History obtained from the patient and wife      History of Present Illness: Wallace Vigil is a 64 y.o. male patient with a PMHx of thyroid CA, lung CA, carotid artery occlusion, CKD, COPD, HLD, HTN, PVD, Renal artery stenosis, RA, PSHx thyroidectomy, renal artery bypass who presents to the Emergency Department for an abnormal head CT. Pt saw his PCP on 3/5 for BLE pain/weakness. Pt was also c/o being off-balance at that time. Dr. Recinos ordered an XR L-spine which showed NAF. He also ordered a BNP (116). Dr. Recinos scheduled an OP head CT for pt as well. Pt presented to the ED yesterday for increased BLE pain/R ankle pain. Pt had lab work done, a R ankle XR (NAF), and BLE US which was negative for DVT. Pt was discharged home with a medrol dosepack. Pt had his OP head CT scheduled today and was called by the radiologist and told to report to the ED. Head CT showed "Findings concerning for left-sided frontoparietal and right frontal vasogenic edema related to breakdown of the blood brain barrier." Pt reports the BLE pain/weakness has been intermittent and moderate in severity. He is intermittently off-balance. Wife reports pt has also seemed confused at times and not himself. No modifying factors. Pt denies any fever, chills, n/v, falls, head trauma, facial droop, incontinence, saddle anesthesia, HA, visual disturbances, and all other sxs. Pt's care is " followed by Dr. Ramires (Hem/Onc). No further complaints or concerns.       Arrival mode: Personal vehicle     PCP: Mike Recinos MD       Past Medical History:  Past Medical History:   Diagnosis Date    Anxiety     Arthritis     Asthma     Atherosclerosis of native arteries of the extremities with intermittent claudication     Back pain     Benign hypertensive heart disease without heart failure     Cancer     Carotid artery occlusion     Chronic kidney disease     COPD (chronic obstructive pulmonary disease)     Coronary artery disease     Emphysema of lung     Encounter for blood transfusion     GERD (gastroesophageal reflux disease)     Heart failure     History of aorto-femoral bypass 8/16/2013    Hyperlipidemia     Hypertension     Hypothyroidism     Iron deficiency anemia due to chronic blood loss 6/7/2018    PVD (peripheral vascular disease)     PVD (peripheral vascular disease) 8/16/2013    Renal artery stenosis 8/16/2013    Renovascular hypertension 8/16/2013    Rheumatoid arthritis     Shingles sept 2015    Stenosis of left subclavian artery 8/16/2013    Thyroid cancer     Thyroid disease     Thyroid cancer 3/1/2017    Tobacco abuse     Tobacco dependence     Trouble in sleeping        Past Surgical History:  Past Surgical History:   Procedure Laterality Date    CARDIAC CATHETERIZATION      RENAL ARTERY BYPASS      2012    THYROIDECTOMY      VASCULAR SURGERY      Carotid cleaned out          Family History:  Family History   Problem Relation Age of Onset    Hypertension Mother     Hyperlipidemia Mother     Heart failure Mother     Heart disease Mother     Hyperlipidemia Father     COPD Father     Diabetes Daughter     Diabetes Daughter        Social History:  Social History     Tobacco Use    Smoking status: Current Every Day Smoker     Packs/day: 1.00     Years: 50.00     Pack years: 50.00     Types: Cigarettes    Smokeless tobacco: Never Used   Substance and  Sexual Activity    Alcohol use: No    Drug use: No    Sexual activity: Not Currently     Partners: Female     Birth control/protection: None       ROS   Review of Systems   Constitutional: Negative for chills and fever.   HENT: Negative for sore throat.    Eyes: Negative for visual disturbance.   Respiratory: Negative for shortness of breath.    Cardiovascular: Negative for chest pain.   Gastrointestinal: Negative for nausea and vomiting.   Genitourinary: Negative for dysuria.   Musculoskeletal: Positive for myalgias (BLE). Negative for back pain.   Skin: Negative for rash.   Neurological: Positive for weakness (BLE). Negative for dizziness, facial asymmetry, speech difficulty, numbness and headaches.        (+) Off balance   Hematological: Does not bruise/bleed easily.   Psychiatric/Behavioral: Positive for confusion.     Physical Exam      Initial Vitals [03/11/19 1722]   BP Pulse Resp Temp SpO2   (!) 146/66 70 20 98.2 °F (36.8 °C) 96 %      MAP       --          Physical Exam  Nursing Notes and Vital Signs Reviewed.  Constitutional: Patient is in no acute distress. Well-developed and well-nourished.  Head: Atraumatic. Normocephalic.  Eyes: PERRL. EOM intact. Conjunctivae are not pale. No scleral icterus.  ENT: Mucous membranes are moist. Oropharynx is clear and symmetric.    Neck: Supple. Full ROM. No lymphadenopathy.  Cardiovascular: Regular rate. Regular rhythm. No murmurs, rubs, or gallops. Distal pulses are 2+ and symmetric.  Pulmonary/Chest: No respiratory distress. Clear to auscultation bilaterally. No wheezing or rales.  Abdominal: Soft and non-distended.  There is no tenderness.  No rebound, guarding, or rigidity.   Musculoskeletal: Moves all extremities. No obvious deformities. No edema. No calf tenderness.  Skin: Warm and dry.  Neurological:  Alert, awake, and appropriate. GCS 15. No pronator drift. Slight ataxia. Normal speech.  No acute focal neurological deficits are appreciated.  Psychiatric:  "Normal affect. Good eye contact. Appropriate in content.    ED Course    Procedures  ED Vital Signs:  Vitals:    03/11/19 1722 03/11/19 1836 03/11/19 2005 03/11/19 2102   BP: (!) 146/66 (!) 155/72 (!) 179/81 (!) 147/70   Pulse: 70 61 (!) 59 61   Resp: 20      Temp: 98.2 °F (36.8 °C)      TempSrc: Oral      SpO2: 96% 97% 98% 95%   Weight: 58.6 kg (129 lb 1.3 oz)      Height: 5' 10" (1.778 m)       03/11/19 2201   BP: (!) 161/77   Pulse: 66   Resp:    Temp:    TempSrc:    SpO2: 95%   Weight:    Height:        Abnormal Lab Results:  Labs Reviewed   CBC W/ AUTO DIFFERENTIAL - Abnormal; Notable for the following components:       Result Value    WBC 3.59 (*)     RBC 3.23 (*)     Hemoglobin 10.0 (*)     Hematocrit 29.3 (*)     Platelets 95 (*)     MPV 9.0 (*)     Lymph # 0.4 (*)     Gran% 75.0 (*)     Lymph% 11.4 (*)     All other components within normal limits   COMPREHENSIVE METABOLIC PANEL - Abnormal; Notable for the following components:    Sodium 135 (*)     CO2 20 (*)     Glucose 123 (*)     BUN, Bld 30 (*)     Creatinine 2.0 (*)     ALT 5 (*)     eGFR if  40 (*)     eGFR if non  34 (*)     All other components within normal limits   APTT - Abnormal; Notable for the following components:    aPTT 39.5 (*)     All other components within normal limits   PROTIME-INR        All Lab Results:  Results for orders placed or performed during the hospital encounter of 03/11/19   CBC auto differential   Result Value Ref Range    WBC 3.59 (L) 3.90 - 12.70 K/uL    RBC 3.23 (L) 4.60 - 6.20 M/uL    Hemoglobin 10.0 (L) 14.0 - 18.0 g/dL    Hematocrit 29.3 (L) 40.0 - 54.0 %    MCV 91 82 - 98 fL    MCH 31.0 27.0 - 31.0 pg    MCHC 34.1 32.0 - 36.0 g/dL    RDW 14.1 11.5 - 14.5 %    Platelets 95 (L) 150 - 350 K/uL    MPV 9.0 (L) 9.2 - 12.9 fL    Gran # (ANC) 2.7 1.8 - 7.7 K/uL    Lymph # 0.4 (L) 1.0 - 4.8 K/uL    Mono # 0.5 0.3 - 1.0 K/uL    Eos # 0.0 0.0 - 0.5 K/uL    Baso # 0.02 0.00 - 0.20 K/uL    " Gran% 75.0 (H) 38.0 - 73.0 %    Lymph% 11.4 (L) 18.0 - 48.0 %    Mono% 12.5 4.0 - 15.0 %    Eosinophil% 0.8 0.0 - 8.0 %    Basophil% 0.6 0.0 - 1.9 %    Differential Method Automated    Comprehensive metabolic panel   Result Value Ref Range    Sodium 135 (L) 136 - 145 mmol/L    Potassium 3.9 3.5 - 5.1 mmol/L    Chloride 104 95 - 110 mmol/L    CO2 20 (L) 23 - 29 mmol/L    Glucose 123 (H) 70 - 110 mg/dL    BUN, Bld 30 (H) 8 - 23 mg/dL    Creatinine 2.0 (H) 0.5 - 1.4 mg/dL    Calcium 9.2 8.7 - 10.5 mg/dL    Total Protein 7.3 6.0 - 8.4 g/dL    Albumin 3.5 3.5 - 5.2 g/dL    Total Bilirubin 0.4 0.1 - 1.0 mg/dL    Alkaline Phosphatase 70 55 - 135 U/L    AST 12 10 - 40 U/L    ALT 5 (L) 10 - 44 U/L    Anion Gap 11 8 - 16 mmol/L    eGFR if African American 40 (A) >60 mL/min/1.73 m^2    eGFR if non African American 34 (A) >60 mL/min/1.73 m^2   Protime-INR   Result Value Ref Range    Prothrombin Time 10.9 9.0 - 12.5 sec    INR 1.0 0.8 - 1.2   APTT   Result Value Ref Range    aPTT 39.5 (H) 21.0 - 32.0 sec       Imaging Results:  Imaging Results          MRI Brain Without Contrast (Final result)  Result time 03/11/19 19:41:46   Procedure changed from MRI Brain W WO Contrast     Final result by Chris Tarango MD (03/11/19 19:41:46)                 Impression:      Limited MRI secondary to lack of IV contrast utilization.  Right frontal and left frontoparietal vasogenic edema.  Probable right frontal and left parietal central nervous system metastatic disease.  Follow-up contrast enhanced MRI suggested for confirmation.  Please see above for detail.    Mild atrophy with white matter degeneration.      Electronically signed by: Chris Tarango MD  Date:    03/11/2019  Time:    19:41             Narrative:    EXAMINATION:  MRI BRAIN WITHOUT CONTRAST    CLINICAL HISTORY:  Lung cancer with abnormal CT scan of the brain.  Possible metastatic disease.    TECHNIQUE:  Standard multiplanar noncontrast sequences of the  brain.    COMPARISON:  CT brain 03/11/2019.    FINDINGS:  The exam is limited without the use of IV contrast.    There are broad zones of vasogenic edema involving the right frontal lobe and involving the left frontoparietal lobe.  On the T2 weighted sequence there is a small nodule in the right frontal lobe which measures approximately 5 mm in size.  A nodule with cystic central component in the left parietal lobe is present approximately 1.2 x 1.4 cm in size.  Probable central nervous system metastatic disease.    The ventricles are mildly enlarged.  Scattered white matter hyperintensity is present consistent with small vessel ischemic degeneration.    Pituitary gland region is normal.    No additional findings.                               Reading Physician Reading Date Result Priority   Mac Colorado MD 3/11/2019       Narrative     EXAMINATION:  CT HEAD WITHOUT CONTRAST    CLINICAL HISTORY:  leg weakness; Unspecified abnormalities of gait and mobility    TECHNIQUE:  Low dose axial CT images obtained throughout the head without intravenous contrast. Sagittal and coronal reconstructions were performed.  All CT scans at this facility use dose modulation, iterative reconstruction and/or weight based dosing when appropriate to reduce radiation dose to as low as reasonably achievable.    COMPARISON:  07/15/2017.    FINDINGS:  Intracranial compartment:    Findings concerning for left-sided frontoparietal and right frontal vasogenic edema related to breakdown of the blood brain barrier.    Focal effacement is seen within the left frontoparietal region consistent with edema and some mass effect.  There is mass effect on the left lateral ventricle.  No evidence of hydrocephalus.  No evidence of midline shift or uncal herniation.  Vascular calcifications are noted.    No extra-axial blood or fluid collections.    Skull/extracranial contents (limited evaluation): No fracture. Mastoid air cells and paranasal sinuses are  essentially clear.      Impression       Findings concerning for left-sided frontoparietal and right frontal vasogenic edema related to breakdown of the blood brain barrier. Findings can be seen in the setting of cerebral edema related to underlying malignancy or metastatic disease versus intracranial infection versus malignant hypertension.  Recommend MRI with contrast for further evaluation.  Attempts were made to contact the patient unsuccessfully.  Secured CT message was sent to Dr. Recinos.      Electronically signed by: Mac Colorado MD  Date: 03/11/2019  Time: 16:47             Last Resulted: 03/11/19 16:47                 The Emergency Provider reviewed the vital signs and test results, which are outlined above.    ED Discussion     8:52 PM: Discussed pt's case with Dr. Gamble (Neurosurgery) who states there is no indication for acute neurosurgical intervention at this time. He is concerned about the amount of vasogenic edema on the MRI and recommends decadron 4mg q6hr and keppra 500mg BID for seizure prophylaxis. He also recommends consulting oncology to determine the next steps.    9:03 PM: Discussed pt's case with Dr. Mcleod (Hem/Onc) who recommends discharging pt and having pt f/u in clinic tomorrow. He also recommends 20mg IV decadron while pt is in the ED.    9:40 PM: Reassessed pt at this time. Pt understands he is to f/u with hem/onc tomorrow. Discussed with pt/wife all pertinent ED information and results. Discussed pt dx and plan of tx. Gave pt/wife all f/u and return to the ED instructions. All questions and concerns were addressed at this time. Pt/wife expresses understanding of information and instructions, and is comfortable with plan to discharge. Pt is stable for discharge.    I discussed with patient and/or family/caretaker that evaluation in the ED does not suggest any emergent or life threatening medical conditions requiring immediate intervention beyond what was provided in the ED, and  I believe patient is safe for discharge.  Regardless, an unremarkable evaluation in the ED does not preclude the development or presence of a serious of life threatening condition. As such, patient was instructed to return immediately for any worsening or change in current symptoms.      ED Medication(s):  Medications   lorazepam (ATIVAN) injection 2 mg (2 mg Intravenous Given 3/11/19 1852)   dexamethasone injection 20 mg (20 mg Intravenous Given 3/11/19 2142)   levETIRAcetam tablet 500 mg (500 mg Oral Given 3/11/19 2138)     Follow-up Information     [facility] HEMATOLOGY/ONCOLOGY In 1 day.    Specialty:  Hematology and Oncology  Contact information:  [address]  [facility] [address]  [phone]           Jonathon Gamble MD In 1 day.    Specialty:  Neurosurgery  Contact information:  [address]  [place] [address]  [phone]             Jose Eaton II, MD In 1 day.    Specialty:  Radiation Oncology  Contact information:  [facility] [place] [address]  [phone]             [facility]    Specialty:  Emergency Medicine  Why:  If symptoms worsen  Contact information:  [address] [facility] [id number] [phone]                 Discharge Medication List as of 3/11/2019  9:43 PM      START taking these medications    Details   levETIRAcetam (KEPPRA) 500 MG Tab Take 1 tablet (500 mg total) by mouth 2 (two) times daily., Starting Mon 3/11/2019, Until Tue 3/10/2020, Print      dexamethasone (DECADRON) 4 MG Tab Take 1 tablet (4 mg total) by mouth every 6 (six) hours. for 10 days, Starting Mon 3/11/2019, Until Thu 3/21/2019, Print             Medical Decision Making    Medical Decision Making:   Clinical Tests:   Lab Tests: Ordered and Reviewed  Radiological Study: Ordered and Reviewed           Scribe Attestation:   Scribe #1: I performed the above scribed service and the documentation accurately describes the services I  performed. I attest to the accuracy of the note.    Attending:   Physician Attestation Statement for Scribe #1: I, Wilner Morfin MD, personally performed the services described in this documentation, as scribed by Lynne Beard, in my presence, and it is both accurate and complete.          Clinical Impression       ICD-10-CM ICD-9-CM   1. Brain metastasis C79.31 198.3   2. Vasogenic brain edema G93.6 348.5   3. Primary cancer of left lower lobe of lung C34.32 162.5   4. Stage 4 chronic kidney disease N18.4 585.4   5. Follicular thyroid cancer C73 193       Disposition:   Disposition: Discharged  Condition: Stable         Wilner Morfin MD  03/13/19 1200

## 2019-03-11 NOTE — TELEPHONE ENCOUNTER
Got a message from radiologist about patient's abnormal MRI of the brain, he needs to go to the ED he has got brain edema.    Please try to reach his emergency contact in case you are unable to reach him

## 2019-03-12 PROBLEM — C79.31 SECONDARY ADENOCARCINOMA OF BRAIN: Status: ACTIVE | Noted: 2019-01-01

## 2019-03-12 NOTE — ED NOTES
NAD noted. AAO x 3. MD Morfin notified of BP. Pt states that he has not taken his nightly BP medicine. MD Has informed pt to take his BP medicine when he gets home. Pt verbalizes understanding. RR e/u, airway open and patent. Will continue with d/c.

## 2019-03-12 NOTE — LETTER
March 13, 2019      Bradley Ramires MD  [address]  [place] [address]           [facility] Radiation Oncology  [address] [place] [id number]  Phone: [phone]  Fax: [phone]          Patient: Wallace Vigil   MR Number: [id number]   YOB: 1955   Date of Visit: 3/12/2019       Dear Dr. Bradley Ramires:    Thank you for referring Wallace Vigil to me for evaluation. Attached you will find relevant portions of my assessment and plan of care.    If you have questions, please do not hesitate to call me. I look forward to following Wallace Vigil along with you.    Sincerely,    Jose Eaton II, MD    Enclosure  CC:  No Recipients    If you would like to receive this communication electronically, please contact [email or web address] or [phone] to request more information on [facility] access.    For providers and/or their staff who would like to refer a patient to [facility], please contact us through our one-stop-shop provider referral line, [facility], at [phone].    If you feel you have received this communication in error or would no longer like to receive these types of communications, please e-mail [email or web address]

## 2019-03-12 NOTE — ED NOTES
Pt returned from MRI. Pt states that he is tired from Ativan administration. NAD noted. RR e/u, airway open and patent. Will continue to monitor.

## 2019-03-12 NOTE — PROGRESS NOTES
Subjective:       Patient ID: Wallace Vigil is a 64 y.o. male.    Chief Complaint: Follow-up; Results; and Lung Cancer    HPI 64-year-old male seen because of increasing headaches nausea patient was found MRI yesterday evening to have multiple areas with vasogenic edema in brain consistent with brain metastasis previous history of non-small cell lung carcinoma and follicular carcinoma thyroid ECOG status 3    Past Medical History:   Diagnosis Date    Anxiety     Arthritis     Asthma     Atherosclerosis of native arteries of the extremities with intermittent claudication     Back pain     Benign hypertensive heart disease without heart failure     Cancer     Carotid artery occlusion     Chronic kidney disease     COPD (chronic obstructive pulmonary disease)     Coronary artery disease     Emphysema of lung     Encounter for blood transfusion     GERD (gastroesophageal reflux disease)     Heart failure     History of aorto-femoral bypass 8/16/2013    Hyperlipidemia     Hypertension     Hypothyroidism     Iron deficiency anemia due to chronic blood loss 6/7/2018    PVD (peripheral vascular disease)     PVD (peripheral vascular disease) 8/16/2013    Renal artery stenosis 8/16/2013    Renovascular hypertension 8/16/2013    Rheumatoid arthritis     Shingles sept 2015    Stenosis of left subclavian artery 8/16/2013    Thyroid cancer     Thyroid disease     Thyroid cancer 3/1/2017    Tobacco abuse     Tobacco dependence     Trouble in sleeping      Family History   Problem Relation Age of Onset    Hypertension Mother     Hyperlipidemia Mother     Heart failure Mother     Heart disease Mother     Hyperlipidemia Father     COPD Father     Diabetes Daughter     Diabetes Daughter      Social History     Socioeconomic History    Marital status:      Spouse name: Not on file    Number of children: 2    Years of education: Not on file    Highest education level: Not on file    Social Needs    Financial resource strain: Not on file    Food insecurity - worry: Not on file    Food insecurity - inability: Not on file    Transportation needs - medical: Not on file    Transportation needs - non-medical: Not on file   Occupational History    Not on file   Tobacco Use    Smoking status: Current Every Day Smoker     Packs/day: 1.00     Years: 50.00     Pack years: 50.00     Types: Cigarettes    Smokeless tobacco: Never Used   Substance and Sexual Activity    Alcohol use: No    Drug use: No    Sexual activity: Not Currently     Partners: Female     Birth control/protection: None   Other Topics Concern    Not on file   Social History Narrative    Not on file     Past Surgical History:   Procedure Laterality Date    CARDIAC CATHETERIZATION      RENAL ARTERY BYPASS      2012    THYROIDECTOMY      VASCULAR SURGERY      Carotid cleaned out        Labs:  Lab Results   Component Value Date    WBC 3.59 (L) 03/11/2019    HGB 10.0 (L) 03/11/2019    HCT 29.3 (L) 03/11/2019    MCV 91 03/11/2019    PLT 95 (L) 03/11/2019     BMP  Lab Results   Component Value Date     (L) 03/11/2019    K 3.9 03/11/2019     03/11/2019    CO2 20 (L) 03/11/2019    BUN 30 (H) 03/11/2019    CREATININE 2.0 (H) 03/11/2019    CALCIUM 9.2 03/11/2019    ANIONGAP 11 03/11/2019    ESTGFRAFRICA 40 (A) 03/11/2019    EGFRNONAA 34 (A) 03/11/2019     Lab Results   Component Value Date    ALT 5 (L) 03/11/2019    AST 12 03/11/2019    ALKPHOS 70 03/11/2019    BILITOT 0.4 03/11/2019       Lab Results   Component Value Date    IRON 29 (L) 03/11/2019    TIBC 274 03/11/2019    FERRITIN 122 03/11/2019     Lab Results   Component Value Date    ILLRJVIF35 479 03/11/2019    FTKYXRCG71 479 03/11/2019     Lab Results   Component Value Date    FOLATE 3.7 (L) 04/09/2013     Lab Results   Component Value Date    TSH 12.790 (H) 12/13/2018         Review of Systems   Constitutional: Positive for activity change, appetite change,  fatigue and unexpected weight change. Negative for chills, diaphoresis and fever.   HENT: Negative for congestion, dental problem, drooling, ear discharge, ear pain, facial swelling, hearing loss, mouth sores, nosebleeds, postnasal drip, rhinorrhea, sinus pressure, sneezing, sore throat, tinnitus, trouble swallowing and voice change.    Eyes: Negative for photophobia, pain, discharge, redness, itching and visual disturbance.   Respiratory: Positive for cough and shortness of breath. Negative for apnea, choking, chest tightness, wheezing and stridor.    Cardiovascular: Negative for chest pain, palpitations and leg swelling.   Gastrointestinal: Negative for abdominal distention, abdominal pain, anal bleeding, blood in stool, constipation, diarrhea, nausea, rectal pain and vomiting.   Endocrine: Negative for cold intolerance, heat intolerance, polydipsia, polyphagia and polyuria.   Genitourinary: Negative for decreased urine volume, difficulty urinating, discharge, dysuria, enuresis, flank pain, frequency, genital sores, hematuria, penile pain, penile swelling, scrotal swelling, testicular pain and urgency.   Musculoskeletal: Negative for arthralgias, back pain, gait problem, joint swelling, myalgias, neck pain and neck stiffness.   Skin: Negative for color change, pallor, rash and wound.   Allergic/Immunologic: Negative for environmental allergies, food allergies and immunocompromised state.   Neurological: Positive for weakness. Negative for dizziness, tremors, seizures, syncope, facial asymmetry, speech difficulty, light-headedness, numbness and headaches.   Hematological: Negative for adenopathy. Does not bruise/bleed easily.   Psychiatric/Behavioral: Positive for confusion, decreased concentration, dysphoric mood and sleep disturbance. Negative for agitation, behavioral problems, hallucinations, self-injury and suicidal ideas. The patient is nervous/anxious. The patient is not hyperactive.        Objective:       Physical Exam   Constitutional: He is oriented to person, place, and time. He appears cachectic. He has a sickly appearance. He appears ill. He appears distressed.   HENT:   Head: Normocephalic.   Right Ear: External ear normal.   Left Ear: External ear normal.   Nose: Nose normal. Right sinus exhibits no maxillary sinus tenderness and no frontal sinus tenderness. Left sinus exhibits no maxillary sinus tenderness and no frontal sinus tenderness.   Mouth/Throat: Oropharynx is clear and moist. No oropharyngeal exudate.   Eyes: EOM and lids are normal. Pupils are equal, round, and reactive to light. Right eye exhibits no discharge. Left eye exhibits no discharge. Right conjunctiva is not injected. Right conjunctiva has no hemorrhage. Left conjunctiva is not injected. Left conjunctiva has no hemorrhage. No scleral icterus. Right eye exhibits normal extraocular motion. Left eye exhibits normal extraocular motion.   Neck: Normal range of motion. Neck supple. No JVD present. No tracheal deviation present. No thyromegaly present.   Cardiovascular: Normal rate, regular rhythm and normal heart sounds.   Pulmonary/Chest: Effort normal and breath sounds normal. No stridor. No respiratory distress.   Abdominal: Soft. Bowel sounds are normal. He exhibits no mass. There is no hepatosplenomegaly, splenomegaly or hepatomegaly. There is no tenderness.   Musculoskeletal: Normal range of motion. He exhibits no edema or tenderness.   Lymphadenopathy:        Head (right side): No posterior auricular and no occipital adenopathy present.        Head (left side): No posterior auricular and no occipital adenopathy present.     He has no cervical adenopathy.        Right cervical: No superficial cervical, no deep cervical and no posterior cervical adenopathy present.       Left cervical: No superficial cervical, no deep cervical and no posterior cervical adenopathy present.     He has no axillary adenopathy.        Right: No supraclavicular  adenopathy present.        Left: No supraclavicular adenopathy present.   Neurological: He is alert and oriented to person, place, and time. He has normal strength. No cranial nerve deficit. Coordination abnormal.   Skin: Skin is dry. No rash noted. He is not diaphoretic. No cyanosis or erythema. Nails show no clubbing.   Psychiatric: His behavior is normal. Judgment and thought content normal. His mood appears anxious. Cognition and memory are normal. He exhibits a depressed mood.   Vitals reviewed.          Assessment:      1. Secondary adenocarcinoma of brain    2. Primary cancer of left lower lobe of lung    3. Follicular thyroid cancer           Plan:     Reviewed imaging personally with family photographs taken widespread vasogenic edema in brain patient did not start on dexamethasone as ordered last night from the emergency room patient be given Decadron 20 mg IV piggyback today prescription sent to pharmacy to start 4 mg q.6 hours last Radiation Oncology to see the patient soon as possible neurosurgery to see this afternoon.  Will proceed with PET scan to compare to previous to see if systemic spread of malignancy or isolated brain metastasis for prognostic standpoint answered questions by family told that this is treatable but not curable seen urgently at request.  PATIENT HAS BEEN TOLD IN NO UNCERTAIN TERMS WITH HIS WIFE PRESENT THAT HE SHOULD NOT BE DRIVING CALL OR OR OPERATING ANY TYPE OF MOTORIZED VEHICLE BECAUSE OF HIGH RISK OF INJURY TO HIMSELF AND OTHERS.  40 min face-to-face time coordination of care        Bradley Ramires Jr, MD FACP

## 2019-03-12 NOTE — PLAN OF CARE
Problem: Adult Inpatient Plan of Care  Goal: Plan of Care Review  Outcome: Ongoing (interventions implemented as appropriate)  Pt states he is cold and tired

## 2019-03-12 NOTE — TELEPHONE ENCOUNTER
LM to notify patient of appts on 03/20, PET Scan 9:45 , Dr Ramires 1:30 then Infusion at 2:30 at the [facility]

## 2019-03-12 NOTE — PROGRESS NOTES
Subjective:      Patient ID: Wallace Vigil is a 64 y.o. male.    Chief Complaint: No chief complaint on file.    Patient presents to clinic for evaluation of multiple brain lesions with edema on MRI.    The patient has a history of both thyroid as well as lung carcinoma.  Status post chemotherapy and radiation.  Patient presented to his PCP with progressive leg weakness over the past several weeks.  CT scan of the head was done which showed edema.  He was subsequently referred to the ER where an MRI of the brain without contrast was done.  He is unable to have contrast secondary to only having 1 kidney.  He was given Decadron and referred to Heme-Onc and Neurosurgery today.  He denies any headache, nausea vomiting, blurry vision, he has weakness in the left side greater than the right.  He has been ambulating unassisted however.  He has numbness and tingling in the same distribution.  No bowel or bladder symptoms.          Review of Systems   Constitutional: Negative for activity change, appetite change and chills.   HENT: Negative for hearing loss, sore throat and tinnitus.    Eyes: Negative for pain, discharge and itching.   Respiratory: Positive for shortness of breath.    Cardiovascular: Negative for chest pain.   Gastrointestinal: Negative for abdominal pain.   Endocrine: Negative for cold intolerance and heat intolerance.   Genitourinary: Negative for difficulty urinating and dysuria.   Musculoskeletal: Positive for back pain and gait problem.   Allergic/Immunologic: Negative for environmental allergies.   Neurological: Positive for weakness. Negative for dizziness, tremors, light-headedness and headaches.   Hematological: Negative for adenopathy.   Psychiatric/Behavioral: Negative for agitation, behavioral problems and confusion.         Objective:       Physical Exam:  Nursing note and vitals reviewed.    Constitutional: He appears well-nourished. He appears distressed.     Eyes: Pupils are equal, round,  and reactive to light. EOM are normal.     Cardiovascular: Intact distal pulses.     Abdominal: Soft.     Psych/Behavior: He is alert. He is oriented to person, place, and time. He has a normal mood and affect.     Neurological:        DTRs: Tricep reflexes are 2+ on the right side and 2+ on the left side. Bicep reflexes are 2+ on the right side and 2+ on the left side. Brachioradialis reflexes are 2+ on the right side and 2+ on the left side. Achilles reflexes are 2+ on the right side and 2+ on the left side. He displays no Babinski's sign on the right side. He displays no Babinski's sign on the left side.     General    Nursing note and vitals reviewed.  Constitutional: He is oriented to person, place, and time. He appears well-nourished. He appears distressed.   HENT:   Head: Normocephalic and atraumatic.   Nose: Nose normal.   Eyes: EOM are normal. Pupils are equal, round, and reactive to light.   Neck: Normal range of motion. Neck supple.   Cardiovascular: Intact distal pulses.    Abdominal: Soft.   Neurological: He is alert and oriented to person, place, and time. He has normal reflexes. He displays normal reflexes. No cranial nerve deficit. He exhibits normal muscle tone. Coordination normal.   Psychiatric: He has a normal mood and affect. His behavior is normal.         Right Ankle/Foot Exam     Tests   Heel Walk: unable to perform  Tiptoe Walk: unable to perform  Single Heel Rise: unable to perform    Left Ankle/Foot Exam     Tests   Heel Walk: unable to perform  Tiptoe Walk: unable to perform  Single Heel Rise: unable to perform  Double Heel Rise: unable to perform  Back (L-Spine & T-Spine) / Neck (C-Spine) Exam     Back (L-Spine & T-Spine) Range of Motion   Extension: normal   Flexion: normal   Lateral bend right: normal   Lateral bend left: normal   Rotation right: normal   Rotation left: normal     Neck (C-Spine) Range of Motion   Flexion:     Normal  Extension: Normal  Right Lateral Bend: normal  Left  Lateral Bend: normal  Right Rotation: normal  Left Rotation: normal    Spinal Sensation   Right Side Sensation  C-Spine Level: normal   L-Spine Level: normal  T-Spine Level: normal  Left Side Sensation  C-Spine Level: normal  L-Spine Level: normal  T-Spine Level: normal    Back (L-Spine & T-Spine) Tests   Right Side Tests  Squat Test: unable to perform  Left Side Tests  Squat: unable to perform    Neck (C-Spine) Tests   Spurling's Test   Left:  Negative  Right: negative    Other He has no scoliosis .  Spinal Kyphosis:  Absent    Comments:  Patient is weak pain limited examination bilateral lower extremities in all muscle groups      Muscle Strength   Right Upper Extremity   Biceps: 5/5/5   Deltoid:  5/5  Triceps:  5/5  Wrist extension: 5/5/5   Wrist flexion: 5/5/5   Finger Flexors:  5/5  Finger Extensors:  5/5  Left Upper Extremity  Biceps: 5/5/5   Deltoid:  5/5  Triceps:  5/5  Wrist extension: 5/5/5   Wrist flexion: 5/5/5   Finger Flexors:  5/5  Finger Extensors:  5/5  Right Lower Extremity   Hip Abduction: 4/5   Hip Flexion: 4/5   Hip Extensors: 4/5  Quadriceps:  4/5   Hamstrin/5   Anterior tibial:  4/5/5  Gastrocsoleus:  4/5/5  EHL:  4/5  Left Lower Extremity   Hip Abduction: 4/5   Hip Flexion: 4/5   Hip Extensors: 4/5  Quadriceps:  4/5   Hamstrin/5   Anterior tibial:  4/5/5   Gastrocsoleus:  4/5/5  EHL:  4/5    Reflexes     Left Side  Biceps:  2+  Triceps:  2+  Brachioradialis:  2+  Quadriceps:  2+  Post Tibial:  2+  Achilles:  2+  Left Krishna's Sign:  Absent  Babinski Sign:  absent  Ankle Clonus:  absent    Right Side   Biceps:  2+  Triceps:  2+  Brachioradialis:  2+  Quadriceps:  2+  Post Tibial:  2+  Achilles:  2+  Right Krishna's Sign:  absent  Babinski Sign:  absent  Ankle Clonus:  absent    Vascular Exam     Right Pulses      Radial:                    2+  Carotid:                  2+    Left Pulses      Radial:                    2+  Carotid:                  2+    MRI brain  There are broad  zones of vasogenic edema involving the right frontal lobe and involving the left frontoparietal lobe.  On the T2 weighted sequence there is a small nodule in the right frontal lobe which measures approximately 5 mm in size.  A nodule with cystic central component in the left parietal lobe is present approximately 1.2 x 1.4 cm in size.  Probable central nervous system metastatic disease.  Assessment:     1. Brain metastases      Plan:     Brain metastases     Patient with multiple medical issues presents with multiple brain metastasis.  I have explained to the patient that I have no clinical recommendations unless he is willing to get a MRI with contrast.  Patient and wife were present they state that the do not on any further workup for his brain lesions.  Expressed understanding.  He is scheduled to have a PET scan in the next several days.  He also has a follow-up with Dr. Eaton for any other palliative treatments.  I have told him to please follow-up if he wishes to have further workup for his brain lesions.      Jonathon Gamble MD  Neurosurgery

## 2019-03-13 NOTE — PROGRESS NOTES
[facility]  RADIATION ONCOLOGY FOLLOW UP    Name: Wallace Vigil : 1955     DIAGNOSIS:  Presumed lung cancer stage cIA2: cT1b cN0 cM0.    1. History of follicular thyroid cancer   2. 3/1/17 total thyroidectomy  3. 6  stable focal pleural thickening at the left lower lobe posterolaterally compared to prior CT.  There is a similar focal benign appearing pleural thickening at the posterior lateral right lower lobe, not demonstrated on prior CT.  This may be sequelae from focal pneumonia that has resolved.  There is mild thickening of the right major fissure.  Nonspecific 4 mm noncalcified nodule in the lingula not demonstratedon prior CT  4. 17 a whole-body iodine-123 scan without abnormal lung uptake    5. 7/3/17 115.3 mCi of iodide-131 without abnormal lung uptake  6. 18 PET scan 1.4 cm FDG avid 3.1 SUV lingula nodule.  Patient not candidate for biopsy and refusing surgery.  Dependent subpleural nodular densities within the right lung base within the right lower lobe possibly representing atelectasis without FDG avidity  7. 18 completed SBRT left lung 50 Gy in 5 fractions  8. 10/18/18 CT chest resolution of stereotactic body radiation therapy treated anterior left upper lobe lesion.  New 9 mm left upper lobe pulmonary nodule and right middle and lower lobe pulmonary densities  9. 10/29/18 PET The nodule in the left upper lobe which is new has 0.8 FDG which is above background lung.  The lingular nodule was no longer FDG avid and was unchanged in size. There was continued persistent pleural thickening in the right costophrenic angle with low level uptake.  10. 19 CT chest without contrast showed clearing of the previous patchy infiltrates in the bilateral lower lobes and right middle lobe.  There was chronic scarring in the right middle and lower lobes.  There is increased opacity in the lingula which was larger compared with the prior.  There was a 6 mm  "cavitary nodule in the left upper lobe smaller compared with the prior.  There was a 6 mm right lower lobe nodule unchanged.  There was a 7 mm subpleural right lower lobe nodule unchanged.  There was a new 6 mm nodule in the right lower lobe.  There was a stable 1.8 x 1.1 cm precarinal lymph node.    CURRENT STATUS: Wallace Vigil is a pleasant 64 y.o. male who presents today for follow-up.  He presented to the ER 3 days ago with leg swelling generalized pain and falling over his yard.  CT head without contrast showed left frontal parietal and right frontal edema.  Reviewed the images.  MRI brain without contrast showed right frontal and left frontoparietal edema with a possible right frontal 5 mm lesion and left parietal 1.4 cm lesion.  He started Decadron 20 mg IV yesterday taking 4 mg every 6 hr by mouth for Ramires.  He was also placed on Keppra.  Feels a bit better since starting steroids with less balance problems.    PHYSICAL EXAM:   Constitutional: well appearing, no acute distress, ECOG 2 - Ambulates, capable of self care only  Vitals:    /64   Pulse 70   Temp 97.2 °F (36.2 °C)   Resp 18   Ht 5' 11" (1.803 m)   Wt 58.2 kg (128 lb 3.2 oz)   SpO2 98%   BMI 17.88 kg/m²   Eyes: sclera anicteric, EOMI, pupils equal, round and reactive to light  Neuro:  Cranial nerves 2-12 intact, finger-to-nose test negative with some difficulty following directions, strength 5/5 upper and lower extremities    Laboratory & X-Ray Findings: Per above.      ASSESSMENT:  Brain metastases    PLAN:  I discussed this case with Radiology and he should be able to get contrast.  I have ordered an MRI brain stealth protocol with contrast for radiation planning.  He cannot get contrast I will still need the stealth MRI T2 fat sat which should show the tumors as good as possible without contrast.  He will continue Decadron.  He has some reflux in the mornings which is a bit worse since starting the Decadron so we can increase the " Ranitidine up to 150 mg twice a day.  He has a PET scan scheduled March 20th.  I also discussed the case with Dr. Gamble of Neurosurgery to see if there would be a role for surgery.  At this point the patient is comfortable with radio surgery alone although I explained that with a large amount of edema particularly with a left-sided lesion, surgery would provide the most rapid edema relief.  He would still require radiosurgery to the tumor bed after surgery.    CHRIS Eaton M.D.  Radiation Oncology  [facility] [address] [place] [address]  Ph: [phone]  [email or web address]

## 2019-03-20 NOTE — PLAN OF CARE
"Problem: Adult Inpatient Plan of Care  Goal: Plan of Care Review  Outcome: Ongoing (interventions implemented as appropriate)  Pt states," I hurt all over and feel bad"      "

## 2019-03-20 NOTE — NURSING
Pt requesting to stay only 30 mins following iron infusion today, pt has tolerated past iron infusions well without problem. Instructed pt on what s/s to seek medical attention for, pt verbalized understanding.

## 2019-03-20 NOTE — PROGRESS NOTES
Subjective:       Patient ID: Wallace Vigil is a 64 y.o. male.    Chief Complaint: Results and Cancer ( brain cancer)    HPI 64-year-old male returns after PET scan previous history non-small cell lung carcinoma treated with stereotactic radiation follicular cell carcinoma  Thyroid returns after PET scan for review    Past Medical History:   Diagnosis Date    Anxiety     Arthritis     Asthma     Atherosclerosis of native arteries of the extremities with intermittent claudication     Back pain     Benign hypertensive heart disease without heart failure     Cancer     Carotid artery occlusion     Chronic kidney disease     COPD (chronic obstructive pulmonary disease)     Coronary artery disease     Emphysema of lung     Encounter for blood transfusion     GERD (gastroesophageal reflux disease)     Heart failure     History of aorto-femoral bypass 8/16/2013    Hyperlipidemia     Hypertension     Hypothyroidism     Iron deficiency anemia due to chronic blood loss 6/7/2018    PVD (peripheral vascular disease)     PVD (peripheral vascular disease) 8/16/2013    Renal artery stenosis 8/16/2013    Renovascular hypertension 8/16/2013    Rheumatoid arthritis     Shingles sept 2015    Stenosis of left subclavian artery 8/16/2013    Thyroid cancer     Thyroid disease     Thyroid cancer 3/1/2017    Tobacco abuse     Tobacco dependence     Trouble in sleeping      Family History   Problem Relation Age of Onset    Hypertension Mother     Hyperlipidemia Mother     Heart failure Mother     Heart disease Mother     Hyperlipidemia Father     COPD Father     Diabetes Daughter     Diabetes Daughter      Social History     Socioeconomic History    Marital status:      Spouse name: Not on file    Number of children: 2    Years of education: Not on file    Highest education level: Not on file   Social Needs    Financial resource strain: Not on file    Food insecurity - worry: Not on file     Food insecurity - inability: Not on file    Transportation needs - medical: Not on file    Transportation needs - non-medical: Not on file   Occupational History    Not on file   Tobacco Use    Smoking status: Current Every Day Smoker     Packs/day: 1.00     Years: 50.00     Pack years: 50.00     Types: Cigarettes    Smokeless tobacco: Never Used   Substance and Sexual Activity    Alcohol use: No    Drug use: No    Sexual activity: Not Currently     Partners: Female     Birth control/protection: None   Other Topics Concern    Not on file   Social History Narrative    Not on file     Past Surgical History:   Procedure Laterality Date    CARDIAC CATHETERIZATION      RENAL ARTERY BYPASS      2012    THYROIDECTOMY      VASCULAR SURGERY      Carotid cleaned out        Labs:  Lab Results   Component Value Date    WBC 3.59 (L) 03/11/2019    HGB 10.0 (L) 03/11/2019    HCT 29.3 (L) 03/11/2019    MCV 91 03/11/2019    PLT 95 (L) 03/11/2019     BMP  Lab Results   Component Value Date     (L) 03/11/2019    K 3.9 03/11/2019     03/11/2019    CO2 20 (L) 03/11/2019    BUN 30 (H) 03/11/2019    CREATININE 2.0 (H) 03/11/2019    CALCIUM 9.2 03/11/2019    ANIONGAP 11 03/11/2019    ESTGFRAFRICA 40 (A) 03/11/2019    EGFRNONAA 34 (A) 03/11/2019     Lab Results   Component Value Date    ALT 5 (L) 03/11/2019    AST 12 03/11/2019    ALKPHOS 70 03/11/2019    BILITOT 0.4 03/11/2019       Lab Results   Component Value Date    IRON 29 (L) 03/11/2019    TIBC 274 03/11/2019    FERRITIN 122 03/11/2019     Lab Results   Component Value Date    RJSOMBNM40 479 03/11/2019    TUOFTHFR59 479 03/11/2019     Lab Results   Component Value Date    FOLATE 3.7 (L) 04/09/2013     Lab Results   Component Value Date    TSH 12.790 (H) 12/13/2018         Review of Systems   Constitutional: Positive for activity change, appetite change, fatigue and unexpected weight change. Negative for chills, diaphoresis and fever.   HENT: Negative for  congestion, dental problem, drooling, ear discharge, ear pain, facial swelling, hearing loss, mouth sores, nosebleeds, postnasal drip, rhinorrhea, sinus pressure, sneezing, sore throat, tinnitus, trouble swallowing and voice change.    Eyes: Negative for photophobia, pain, discharge, redness, itching and visual disturbance.   Respiratory: Negative for apnea, cough, choking, chest tightness, shortness of breath, wheezing and stridor.    Cardiovascular: Negative for chest pain, palpitations and leg swelling.   Gastrointestinal: Negative for abdominal distention, abdominal pain, anal bleeding, blood in stool, constipation, diarrhea, nausea, rectal pain and vomiting.   Endocrine: Negative for cold intolerance, heat intolerance, polydipsia, polyphagia and polyuria.   Genitourinary: Negative for decreased urine volume, difficulty urinating, discharge, dysuria, enuresis, flank pain, frequency, genital sores, hematuria, penile pain, penile swelling, scrotal swelling, testicular pain and urgency.   Musculoskeletal: Negative for arthralgias, back pain, gait problem, joint swelling, myalgias, neck pain and neck stiffness.   Skin: Negative for color change, pallor, rash and wound.   Allergic/Immunologic: Negative for environmental allergies, food allergies and immunocompromised state.   Neurological: Positive for weakness. Negative for dizziness, tremors, seizures, syncope, facial asymmetry, speech difficulty, light-headedness, numbness and headaches.   Hematological: Negative for adenopathy. Does not bruise/bleed easily.   Psychiatric/Behavioral: Positive for dysphoric mood. Negative for agitation, behavioral problems, confusion, decreased concentration, hallucinations, self-injury, sleep disturbance and suicidal ideas. The patient is nervous/anxious. The patient is not hyperactive.        Objective:      Physical Exam   Constitutional: He is oriented to person, place, and time. He appears well-developed and well-nourished. He  appears cachectic. He has a sickly appearance. He appears ill. He appears distressed.   HENT:   Head: Normocephalic.   Right Ear: External ear normal.   Left Ear: External ear normal.   Nose: Nose normal. Right sinus exhibits no maxillary sinus tenderness and no frontal sinus tenderness. Left sinus exhibits no maxillary sinus tenderness and no frontal sinus tenderness.   Mouth/Throat: Oropharynx is clear and moist. No oropharyngeal exudate.   Eyes: EOM and lids are normal. Pupils are equal, round, and reactive to light. Right eye exhibits no discharge. Left eye exhibits no discharge. Right conjunctiva is not injected. Right conjunctiva has no hemorrhage. Left conjunctiva is not injected. Left conjunctiva has no hemorrhage. No scleral icterus. Right eye exhibits normal extraocular motion. Left eye exhibits normal extraocular motion.   Neck: Normal range of motion. Neck supple. No JVD present. No tracheal deviation present. No thyromegaly present.   Cardiovascular: Normal rate and regular rhythm.   Pulmonary/Chest: Effort normal. No stridor. No respiratory distress.   Abdominal: Soft. He exhibits no mass. There is no hepatosplenomegaly, splenomegaly or hepatomegaly. There is no tenderness.   Musculoskeletal: Normal range of motion. He exhibits no edema or tenderness.   Lymphadenopathy:        Head (right side): No posterior auricular and no occipital adenopathy present.        Head (left side): No posterior auricular and no occipital adenopathy present.     He has no cervical adenopathy.        Right cervical: No superficial cervical, no deep cervical and no posterior cervical adenopathy present.       Left cervical: No superficial cervical, no deep cervical and no posterior cervical adenopathy present.     He has no axillary adenopathy.        Right: No supraclavicular adenopathy present.        Left: No supraclavicular adenopathy present.   Neurological: He is alert and oriented to person, place, and time. He has  normal strength. No cranial nerve deficit. Coordination normal.   Skin: Skin is dry. No rash noted. He is not diaphoretic. No cyanosis or erythema. Nails show no clubbing.   Psychiatric: His behavior is normal. Judgment and thought content normal. His mood appears anxious. Cognition and memory are normal. He exhibits a depressed mood.   Vitals reviewed.          Assessment:      1. Primary cancer of left lower lobe of lung    2. Secondary adenocarcinoma of brain    3. Thyroid cancer    4. Cachexia           Plan:   Extensive conversation with the patient at this time patient has history of metastatic disease in brain at he will proceed with primary radiation.  No clear evidence of systemic recurrence discussed implications with him in terms of biopsy is not interested he has not started on his care for his where he should not be driving and operating any heavy machinery at present time discussed implications return 3-4 weeks clinical follow-up          Bradley Ramires Jr, MD FACP

## 2019-03-20 NOTE — PLAN OF CARE
Problem: Adult Inpatient Plan of Care  Goal: Patient-Specific Goal (Individualization)  Outcome: Ongoing (interventions implemented as appropriate)  Pt likes feet up

## 2019-03-20 NOTE — DISCHARGE INSTRUCTIONS
.  [facility]  [address] [facility]  [address]  [phone] phone     [phone] fax  Hours of Operation: Monday- Friday 8:00am- 5:00pm  After hours phone  [phone]  Hematology / Oncology Physicians on call      Dr. Ike Falcon    Please call with any concerns regarding your appointment today.  .FALL PREVENTION   Falls often occur due to slipping, tripping or losing your balance. Here are ways to reduce your risk of falling again.   Was there anything that caused your fall that can be fixed, removed or replaced?   Make your home safe by keeping walkways clear of objects you may trip over.   Use non-slip pads under rugs.   Do not walk in poorly lit areas.   Do not stand on chairs or wobbly ladders.   Use caution when reaching overhead or looking upward. This position can cause a loss of balance.   Be sure your shoes fit properly, have non-slip bottoms and are in good condition.   Be cautious when going up and down stairs, curbs, and when walking on uneven sidewalks.   If your balance is poor, consider using a cane or walker.   If your fall was related to alcohol use, stop or limit alcohol intake.   If your fall was related to use of sleeping medicines, talk to your doctor about this. You may need to reduce your dosage at bedtime if you awaken during the night to go to the bathroom.   To reduce the need for nighttime bathroom trips:   Avoid drinking fluids for several hours before going to bed   Empty your bladder before going to bed   Men can keep a urinal at the bedside   © 1733-3286 [employer] Whitt, [address] [place] [address], [place] [address]. All rights reserved. This information is not intended as a substitute for professional medical care. Always follow your healthcare professional's instructions.

## 2019-03-27 NOTE — DISCHARGE INSTRUCTIONS
[facility]  [address] [facility]  [address]  [phone] phone     [phone] fax  Hours of Operation: Monday- Friday 8:00am- 5:00pm  After hours phone  [phone]  Hematology / Oncology Physicians on call      Dr. Ike Falcon      Please call with any concerns regarding your appointment today.    FALL PREVENTION   Falls often occur due to slipping, tripping or losing your balance. Here are ways to reduce your risk of falling again.   Was there anything that caused your fall that can be fixed, removed or replaced?   Make your home safe by keeping walkways clear of objects you may trip over.   Use non-slip pads under rugs.   Do not walk in poorly lit areas.   Do not stand on chairs or wobbly ladders.   Use caution when reaching overhead or looking upward. This position can cause a loss of balance.   Be sure your shoes fit properly, have non-slip bottoms and are in good condition.   Be cautious when going up and down stairs, curbs, and when walking on uneven sidewalks.   If your balance is poor, consider using a cane or walker.   If your fall was related to alcohol use, stop or limit alcohol intake.   If your fall was related to use of sleeping medicines, talk to your doctor about this. You may need to reduce your dosage at bedtime if you awaken during the night to go to the bathroom.   To reduce the need for nighttime bathroom trips:   Avoid drinking fluids for several hours before going to bed   Empty your bladder before going to bed   Men can keep a urinal at the bedside   © 4015-4905 [employer] Whitt, [address] [place] [address]. All rights reserved. This information is not intended as a substitute for professional medical care. Always follow your healthcare professional's instructions.    IRON DEFICIENCY ANEMIA:  Anemia is a condition where the size or number of red blood cells in the body is  reduced. Iron is needed in the diet to make red cells. The red blood cells carry oxygen to all parts of the body. Anemia limits the delivery of oxygen to where it is needed. This causes a feeling of being tired and run down. When anemia becomes severe, the skin becomes pale and there is shortness of breath with exertion, headaches, dizziness, drowsiness and fatigue.  The cause of your anemia is lack of iron in your body. This may occur due to blood loss (for example, heavy menstrual periods or bleeding from the stomach or intestines) or a poor diet (not eating enough iron-containing foods), inability to absorb iron from your diet, or pregnancy.  If the blood count is low enough, an IRON SUPPLEMENT will be prescribed. It usually takes about 2-3 months of treatment with iron supplements to correct an anemia. Severe cases of anemia requires a blood transfusion to rapidly correct symptoms and deliver more oxygen to the cells.  HOME CARE:  1) Increase the iron stores in your body by eating foods high in iron content. This is a natural way of building your blood cells back up again. Beef, liver, spinach and other dark green leafy vegetables, whole grain products, beans and nuts are all natural sources of iron.  2) If you are having symptoms of anemia listed above:  -- Do not overexert yourself.  -- Talk to your doctor before flying on an airplane or travelling to high altitudes.  FOLLOW UP with your doctor in 2 months for a repeat red blood cell count, or as recommended by our staff, to be sure that the anemia has been corrected.  GET PROMPT MEDICAL ATTENTION if any of the following occur or worsen:  -- Shortness of breath or chest pain  -- Dizziness or fainting  -- Vomiting blood or passing red or black-colored stool  © 4199-1575 [employer]ignesh Whitt, [address] [place] [address], [place] [address]. All rights reserved. This information is not intended as a substitute for professional medical care. Always follow your healthcare  professional's instructions.

## 2019-03-27 NOTE — PLAN OF CARE
Problem: Adult Inpatient Plan of Care  Goal: Plan of Care Review  Outcome: Ongoing (interventions implemented as appropriate)  Pt states he feels tired all of the time and has chronic pain from arthritis.

## 2019-03-28 NOTE — PLAN OF CARE
Problem: Adult Inpatient Plan of Care  Goal: Plan of Care Review  Outcome: Ongoing (interventions implemented as appropriate)  Day 1 outpatient xrt to brain. Verbal instructions and brain handout given. Contact info given. Skin care and side effects reviewed. Patient verbalized understanding.

## 2019-04-01 NOTE — DISCHARGE SUMMARY
General Sunscreen Counseling: I recommended a broad spectrum sunscreen with a SPF of 30 or higher.  I explained that SPF 30 sunscreens block approximately 97 percent of the sun's harmful rays.  Sunscreens should be applied at least 15 minutes prior to expected sun exposure and then every 2 hours after that as long as sun exposure continues. If swimming or exercising sunscreen should be reapplied every 45 minutes to an hour after getting wet or sweating.  One ounce, or the equivalent of a shot glass full of sunscreen, is adequate to protect the skin not covered by a bathing suit. I also recommended a lip balm with a sunscreen as well. Sun protective clothing can be used in lieu of sunscreen but must be worn the entire time you are exposed to the sun's rays. Procedure was performed by me.  Sterile technique was performed in the left neck area, lidocaine was used as a local anesthetic.  Multiple samples taken from left thyroid nodule.  Pt tolerated the procedure well without immediate complications.  Please see radiologist report for details. F/u with PCP and/or ordering physician.      Detail Level: Generalized

## 2019-04-02 NOTE — TELEPHONE ENCOUNTER
Spoke with patient, stated his feet have been swollen for the past couple of days and he would like to be seen. Offered patient appointment with Dr. Ramires at [facility]. Patient stated he will only go to [facility]. Scheduled appointment with Deya Quintana for tomorrow afternoon before his radiation appointment.

## 2019-04-02 NOTE — TELEPHONE ENCOUNTER
----- Message from Taylor Dailey sent at 4/2/2019  2:11 PM CDT -----  Contact: pt   Type:  Needs Medical Advice    Who Called: KISHOR DANIEL   Symptoms (please be specific): pt feet are swollen  How long has patient had these symptoms:   Pharmacy name and phone #:     [facility] in [place], [place] - [address]  [address]  [facility] [address]  Phone: [phone] Fax: [phone]      Would the patient rather a call back or a response via [facility]? Call   Best Call Back Number:  [phone] (home)   Additional Information: pt is requesting a call back from the nurse in regards to the pt feet swelling

## 2019-04-03 NOTE — PROGRESS NOTES
Subjective:       Patient ID: Wallace Vigil is a 64 y.o. male.    Chief Complaint: Foot Swelling    64 year old male, presents for evaluation of bilateral pedal edema. Patient has history of lung cancer and follicular thyroid cancer. Recently diagnosed with Metastasis to brain currently undergoing treatment with radiation to brain. Currently taking dexamethasone for prevention of side effects due to brain radiation. Patient c/o bilateral lower extremity edema present over the past few days. States edema was much worse yesterday and he was not urinating very large amounts. He drank a coke and soon after began urinating and the edema decreased.     Review of Systems   Constitutional: Positive for fatigue. Negative for activity change, appetite change, chills, diaphoresis, fever and unexpected weight change.   HENT: Negative for congestion, hearing loss, mouth sores, nosebleeds and trouble swallowing.    Eyes: Negative for discharge and visual disturbance.   Respiratory: Negative for cough, chest tightness and shortness of breath.    Cardiovascular: Positive for leg swelling. Negative for chest pain and palpitations.   Gastrointestinal: Positive for abdominal distention. Negative for abdominal pain, constipation, diarrhea, nausea and vomiting.   Endocrine: Negative for cold intolerance and heat intolerance.   Genitourinary: Negative for difficulty urinating, dysuria, flank pain, frequency and hematuria.   Musculoskeletal: Positive for arthralgias and back pain. Negative for myalgias.   Skin: Negative.    Neurological: Negative for dizziness, weakness, light-headedness and headaches.   Hematological: Negative for adenopathy. Does not bruise/bleed easily.   Psychiatric/Behavioral: Negative for agitation, behavioral problems and confusion. The patient is nervous/anxious.        Medication List with Changes/Refills   New Medications    FUROSEMIDE (LASIX) 20 MG TABLET    Take 1 tablet (20 mg total) by mouth once daily.    Current Medications    ALBUTEROL (VENTOLIN HFA) 90 MCG/ACTUATION INHALER    INHALE 2 PUFFS BY MOUTH EVERY 4 HOURS IF NEEDED FOR WHEEZING    ALPRAZOLAM (XANAX) 0.5 MG TABLET    TAKE 1 TABLET EVERY NIGHT AS NEEDED    AMLODIPINE (NORVASC) 10 MG TABLET    TAKE 1 TABLET EVERY DAY    ASPIRIN 81 MG TAB    Take 1 tablet by mouth Daily. 1 Tablet Oral Every day    CLONIDINE (CATAPRES) 0.1 MG TABLET    TAKE 1 TABLET TWICE DAILY    CYANOCOBALAMIN, VITAMIN B-12, 1,000 MCG/ML DROP    Place 1,000 mcg under the tongue once daily.    DEXAMETHASONE (DECADRON) 4 MG TAB    Take 1 tablet (4 mg total) by mouth every 6 (six) hours. for 10 days    DILTIAZEM (CARDIZEM CD) 240 MG 24 HR CAPSULE    TAKE 1 CAPSULE EVERY DAY    DOXAZOSIN (CARDURA) 4 MG TABLET    TAKE 1 TABLET EVERY EVENING    EPINEPHRINE (EPIPEN) 0.3 MG/0.3 ML (1:1,000) ATIN    1 Pen Injector Intramuscular once    FLUTICASONE (FLONASE) 50 MCG/ACTUATION NASAL SPRAY    2 sprays by Each Nare route once daily.    HYDRALAZINE (APRESOLINE) 100 MG TABLET    TAKE 1 TABLET THREE TIMES DAILY    HYDROCODONE-ACETAMINOPHEN 10-325MG (NORCO)  MG TAB    Take 1 tablet by mouth every 6 (six) hours as needed.     LACTULOSE (CHRONULAC) 10 GRAM/15 ML SOLUTION    TAKE 30 MLS (2 TABLESPOONSFUL) EVERY 12 HOURS AS NEEDED TO ACHIEVE A GOOD BOWEL MOVEMENT    LEVETIRACETAM (KEPPRA) 500 MG TAB    Take 1 tablet (500 mg total) by mouth 2 (two) times daily.    LEVOTHYROXINE (SYNTHROID) 137 MCG TAB TABLET    Take 1 tablet (137 mcg total) by mouth before breakfast.    METHYLPREDNISOLONE (MEDROL DOSEPACK) 4 MG TABLET    Take as directed on the package.    PREDNISONE (DELTASONE) 5 MG TABLET    TAKE 1 TABLET EVERY DAY    RANITIDINE (ZANTAC) 75 MG TABLET    Take 75 mg by mouth 2 (two) times daily.     Objective:     Vitals:    04/03/19 1357   BP: 126/65   Pulse: 72   Temp: 97.4 °F (36.3 °C)     Physical Exam   Constitutional: He is oriented to person, place, and time. He appears cachectic. No distress.    HENT:   Head: Normocephalic and atraumatic.   Right Ear: Hearing and external ear normal.   Left Ear: Hearing and external ear normal.   Nose: Nose normal. No mucosal edema or rhinorrhea. No epistaxis.   Mouth/Throat: Uvula is midline, oropharynx is clear and moist and mucous membranes are normal.   Eyes: Pupils are equal, round, and reactive to light. Conjunctivae and EOM are normal. Right eye exhibits no chemosis and no discharge. Left eye exhibits no chemosis and no discharge.   Neck: Trachea normal and normal range of motion. Neck supple. No thyroid mass and no thyromegaly present.   Cardiovascular: Normal rate, regular rhythm, normal heart sounds and intact distal pulses.   No murmur heard.  Pulses:       Dorsalis pedis pulses are 1+ on the right side, and 1+ on the left side.   Pulmonary/Chest: Effort normal and breath sounds normal. No respiratory distress. He has no decreased breath sounds. He has no wheezes.   Abdominal: Soft. Bowel sounds are normal. He exhibits no distension. There is no tenderness.   Musculoskeletal: Normal range of motion. He exhibits edema (Bilateral pedal edema).   Lymphadenopathy:     He has no cervical adenopathy.     He has no axillary adenopathy.        Right: No supraclavicular adenopathy present.        Left: No supraclavicular adenopathy present.   Neurological: He is alert and oriented to person, place, and time. He has normal strength.   Skin: Skin is warm, dry and intact. Capillary refill takes less than 2 seconds. No rash noted. He is not diaphoretic. There is pallor.   Psychiatric: His speech is normal and behavior is normal. Judgment and thought content normal. His mood appears anxious. Cognition and memory are normal.   Nursing note and vitals reviewed.      Assessment:       Problem List Items Addressed This Visit        Cardiac/Vascular    Hypertensive heart disease with heart failure       Renal/    Stage 4 chronic kidney disease       Oncology    Follicular  thyroid cancer    Primary cancer of left lower lobe of lung    Secondary adenocarcinoma of brain       Orthopedic    Rheumatoid arthritis involving multiple sites with positive rheumatoid factor      Other Visit Diagnoses     Edema, unspecified type    -  Primary    Relevant Medications    furosemide (LASIX) 20 MG tablet          Plan:       1) Take Lasix 20mg PO Daily as needed for edema, do not take medication if swelling not present  2) Continue all other medications  3) Referral to cardiology  4) Educated patient to schedule appointment with Nephrology ASAP, he sees a provider outside of [facility]    I will review assessment/plan with collaborating physician Dr. Mcleod.

## 2019-04-03 NOTE — PATIENT INSTRUCTIONS
Furosemide tablets  What is this medicine?  FUROSEMIDE (ayden [place] se mide) is a diuretic. It helps you make more urine and to lose salt and excess water from your body. This medicine is used to treat high blood pressure, and edema or swelling from heart, kidney, or liver disease.  How should I use this medicine?  Take this medicine by mouth with a glass of water. Follow the directions on the prescription label. You may take this medicine with or without food. If it upsets your stomach, take it with food or milk. Do not take your medicine more often than directed. Remember that you will need to pass more urine after taking this medicine. Do not take your medicine at a time of day that will cause you problems. Do not take at bedtime.  Talk to your pediatrician regarding the use of this medicine in children. While this drug may be prescribed for selected conditions, precautions do apply.  What side effects may I notice from receiving this medicine?  Side effects that you should report to your doctor or health care professional as soon as possible:  · blood in urine or stools  · dry mouth  · fever or chills  · hearing loss or ringing in the ears  · irregular heartbeat  · muscle pain or weakness, cramps  · skin rash  · stomach upset, pain, or nausea  · tingling or numbness in the hands or feet  · unusually weak or tired  · vomiting or diarrhea  · yellowing of the eyes or skin  Side effects that usually do not require medical attention (report to your doctor or health care professional if they continue or are bothersome):  · headache  · loss of appetite  · unusual bleeding or bruising  What may interact with this medicine?  · aspirin and aspirin-like medicines  · certain antibiotics  · chloral hydrate  · cisplatin  · cyclosporine  · digoxin  · diuretics  · laxatives  · lithium  · medicines for blood pressure  · medicines that relax muscles for surgery  · methotrexate  · NSAIDs, medicines for pain and inflammation like  ibuprofen, naproxen, or indomethacin  · phenytoin  · steroid medicines like prednisone or cortisone  · sucralfate  · thyroid hormones  What if I miss a dose?  If you miss a dose, take it as soon as you can. If it is almost time for your next dose, take only that dose. Do not take double or extra doses.  Where should I keep my medicine?  Keep out of the reach of children.  Store at room temperature between 15 and 30 degrees C (59 and 86 degrees F). Protect from light. Throw away any unused medicine after the expiration date.  What should I tell my health care provider before I take this medicine?  They need to know if you have any of these conditions:  · abnormal blood electrolytes  · diarrhea or vomiting  · gout  · heart disease  · kidney disease, small amounts of urine, or difficulty passing urine  · liver disease  · thyroid disease  · an unusual or allergic reaction to furosemide, sulfa drugs, other medicines, foods, dyes, or preservatives  · pregnant or trying to get pregnant  · breast-feeding  What should I watch for while using this medicine?  Visit your doctor or health care professional for regular checks on your progress. Check your blood pressure regularly. Ask your doctor or health care professional what your blood pressure should be, and when you should contact him or her. If you are a diabetic, check your blood sugar as directed.  You may need to be on a special diet while taking this medicine. Check with your doctor. Also, ask how many glasses of fluid you need to drink a day. You must not get dehydrated.  You may get drowsy or dizzy. Do not drive, use machinery, or do anything that needs mental alertness until you know how this drug affects you. Do not stand or sit up quickly, especially if you are an older patient. This reduces the risk of dizzy or fainting spells. Alcohol can make you more drowsy and dizzy. Avoid alcoholic drinks.  This medicine can make you more sensitive to the sun. Keep out of the  sun. If you cannot avoid being in the sun, wear protective clothing and use sunscreen. Do not use sun lamps or tanning beds/booths.  NOTE:This sheet is a summary. It may not cover all possible information. If you have questions about this medicine, talk to your doctor, pharmacist, or health care provider. Copyright© 2017 [employer]        Leg Swelling in Both Legs    Swelling of the feet, ankles, and legs is called edema. It is caused by excess fluid that has collected in the tissues. Extra fluid in the body settles in the lowest part because of gravity. This is why the legs and feet are most affected.  Some of the causes for edema include:  · Disease of the heart like congestive heart failure  · Standing or sitting for long periods of time  · Infection of the feet or legs  · Blood pooling in the veins of your legs (venous insufficiency)  · Dilated veins in your lower leg (varicose veins)  · Garters or other clothing that is tight on your legs. This will cause blood to pool in your legs because the clothing limits blood flow.  · Some medicines such as hormones like birth control pills, some blood pressure medicines like calcium channel blockers (amlodipine) and steroids, some antidepressants like MAO inhibitors and tricyclics  · Menstrual periods that cause you to retain fluids  · Many types of renal disease  · Liver failure or cirrhosis  · Pregnancy, some swelling is normal, but a sudden increase in leg swelling or weight gain can be a sign of a dangerous complication of pregnancy  · Poor nutrition  · Thyroid disease  Medical treatment will depend on what is causing the swelling in your legs. Your healthcare provider may prescribe water pills (diuretics) to get rid of the extra fluid.  Home care  Follow these guidelines when caring for yourself at home:  · Don't wear clothing like garters that is tight on your legs.  · Keep your legs up while lying or sitting.  · If infection, injury, or recent surgery is  causing the swelling, stay off your legs as much as possible until symptoms get better.  · If your healthcare provider says that your leg swelling is caused by venous insufficiency or varicose veins, don't sit or  one place for long periods of time. Take breaks and walk about every few hours. Brisk walking is a good exercise. It helps circulate the blood that has collected in your leg. Talk with your provider about using support stockings to stop daytime leg swelling.  · If your provider says that heart disease is causing your leg swelling, follow a low-salt diet to stop extra fluid from staying in your body. You may also need medicine.  Follow-up care  Follow up with your healthcare provider, or as advised.  When to seek medical advice  Call your healthcare provider right away if any of these occur:  · New shortness of breath or chest pain  · Shortness of breath or chest pain that gets worse  · Swelling in both legs or ankles that gets worse  · Swelling of the abdomen  · Redness, warmth, or swelling in one leg  · Fever of 100.4ºF (38ºC) or higher, or as directed by your healthcare provider  · Yellow color to your skin or eyes  · Rapid, unexplained weight gain  · Having to sleep upright or use an increased number of pillows  Date Last Reviewed: 3/31/2016  © 6565-1977 [employer]. [address] [place] [address], [place] [address]. All rights reserved. This information is not intended as a substitute for professional medical care. Always follow your healthcare professional's instructions.

## 2019-04-04 NOTE — PLAN OF CARE
Problem: Adult Inpatient Plan of Care  Goal: Plan of Care Review  Outcome: Ongoing (interventions implemented as appropriate)  Day 3 outpatient xrt to brain. Tolerating therapy well. Seen med-onc for swelling feet, placed on lasix. Will continue to monitor.

## 2019-04-08 NOTE — PLAN OF CARE
Problem: Adult Inpatient Plan of Care  Goal: Plan of Care Review  Outcome: Outcome(s) achieved Date Met: 04/08/19  Completed xrt to brain today 4-8-19. Will make f/u appt.

## 2019-04-10 NOTE — ED NOTES
Bed rails are up and call light is within patient reach. Patient instructed to call if he needs anything, wife at bedside

## 2019-04-10 NOTE — ED NOTES
Pt was sent over for abnormal lab values, Pt states Platelet Ct was 28. Pt denies pain at this time

## 2019-04-10 NOTE — ED PROVIDER NOTES
"SCRIBE #1 NOTE: I, Lynne Beard, am scribing for, and in the presence of, Christian Lopez MD. I have scribed the entire note.      History      Chief Complaint   Patient presents with    Abnormal Lab     States he was sent from dr. Christianson for low platelets        Review of patient's allergies indicates:   Allergen Reactions    Levofloxacin Other (See Comments)     Stomach pains    Lisinopril Swelling    Valsartan Swelling        HPI   HPI    4/10/2019, 4:39 PM   History obtained from the patient      History of Present Illness: Wallace Vigil is a 64 y.o. male patient with a PMHx of lung CA with mets to the brain, COPD, CKD, CAD, heart failure, PVD, RA who presents to the Emergency Department for abnormal labs. Pt was referred to the ED by Dr. Christianson (Rheumatology) for low platelets. Pt is c/o fatigue. He states "I feel like the walking dead". Sxs are constant and moderate. No modifying factors. No associated sxs. Pt denies any fever, chills, n/v, abd pain, palpitations, CP, SOB, and all other sxs. No further complaints or concerns.       Arrival mode: Personal vehicle     PCP: Mike Recinos MD       Past Medical History:  Past Medical History:   Diagnosis Date    Anxiety     Arthritis     Asthma     Atherosclerosis of native arteries of the extremities with intermittent claudication     Back pain     Benign hypertensive heart disease without heart failure     Cancer     Carotid artery occlusion     Chronic kidney disease     COPD (chronic obstructive pulmonary disease)     Coronary artery disease     Emphysema of lung     Encounter for blood transfusion     GERD (gastroesophageal reflux disease)     Heart failure     History of aorto-femoral bypass 8/16/2013    Hyperlipidemia     Hypertension     Hypothyroidism     Iron deficiency anemia due to chronic blood loss 6/7/2018    PVD (peripheral vascular disease)     PVD (peripheral vascular disease) 8/16/2013    Renal artery stenosis " 8/16/2013    Renovascular hypertension 8/16/2013    Rheumatoid arthritis     Shingles sept 2015    Stenosis of left subclavian artery 8/16/2013    Thyroid cancer     Thyroid disease     Thyroid cancer 3/1/2017    Tobacco abuse     Tobacco dependence     Trouble in sleeping        Past Surgical History:  Past Surgical History:   Procedure Laterality Date    CARDIAC CATHETERIZATION      RENAL ARTERY BYPASS      2012    THYROIDECTOMY      VASCULAR SURGERY      Carotid cleaned out          Family History:  Family History   Problem Relation Age of Onset    Hypertension Mother     Hyperlipidemia Mother     Heart failure Mother     Heart disease Mother     Hyperlipidemia Father     COPD Father     Diabetes Daughter     Diabetes Daughter        Social History:  Social History     Tobacco Use    Smoking status: Current Every Day Smoker     Packs/day: 1.00     Years: 50.00     Pack years: 50.00     Types: Cigarettes    Smokeless tobacco: Never Used   Substance and Sexual Activity    Alcohol use: No    Drug use: No    Sexual activity: Not Currently     Partners: Female     Birth control/protection: None       ROS   Review of Systems   Constitutional: Positive for fatigue. Negative for chills and fever.   HENT: Negative for sore throat.    Respiratory: Negative for shortness of breath.    Cardiovascular: Negative for chest pain and palpitations.   Gastrointestinal: Negative for abdominal pain, nausea and vomiting.   Genitourinary: Negative for dysuria.   Musculoskeletal: Negative for back pain.   Skin: Negative for rash.   Neurological: Negative for weakness.   Hematological: Does not bruise/bleed easily.   All other systems reviewed and are negative.    Physical Exam      Initial Vitals [04/10/19 1618]   BP Pulse Resp Temp SpO2   129/70 76 16 98.1 °F (36.7 °C) 96 %      MAP       --          Physical Exam  Nursing Notes and Vital Signs Reviewed.  Constitutional: Patient is in no acute distress.  "Well-developed and well-nourished.  Head: Atraumatic. Normocephalic.  Eyes: PERRL. EOM intact. Conjunctivae are not pale. No scleral icterus.  ENT: Mucous membranes are moist. Oropharynx is clear and symmetric.    Neck: Supple. Full ROM. No lymphadenopathy.  Cardiovascular: Regular rate. Regular rhythm. No murmurs, rubs, or gallops. Distal pulses are 2+ and symmetric.  Pulmonary/Chest: No respiratory distress. Clear to auscultation bilaterally. No wheezing or rales.  Abdominal: Soft and non-distended.  There is no tenderness.  No rebound, guarding, or rigidity.   Musculoskeletal: Moves all extremities. No obvious deformities. No edema. No calf tenderness.  Skin: Warm and dry.  Neurological:  Alert, awake, and appropriate.  Normal speech.  No acute focal neurological deficits are appreciated.  Psychiatric: Normal affect. Good eye contact. Appropriate in content.    ED Course    Procedures  ED Vital Signs:  Vitals:    04/10/19 1618 04/10/19 1657 04/10/19 1732 04/10/19 1736   BP: 129/70  (!) 146/76 134/75   Pulse: 76 69 63 74   Resp: 16      Temp: 98.1 °F (36.7 °C)      TempSrc: Oral      SpO2: 96%  96% 96%   Weight:       Height: 5' 11" (1.803 m)       04/10/19 1740 04/10/19 1819   BP: 130/72    Pulse: 82    Resp:     Temp:     TempSrc:     SpO2: 97%    Weight:  59 kg (130 lb)   Height:         Abnormal Lab Results:  Labs Reviewed   CBC W/ AUTO DIFFERENTIAL - Abnormal; Notable for the following components:       Result Value    RBC 3.17 (*)     Hemoglobin 9.7 (*)     Hematocrit 28.4 (*)     RDW 14.9 (*)     Platelets 22 (*)     Lymph # 0.3 (*)     Mono # 0.2 (*)     Gran% 90.6 (*)     Lymph% 6.4 (*)     Mono% 3.7 (*)     Platelet Estimate Decreased (*)     All other components within normal limits    Narrative:        Plt critical result(s) called and verbal readback obtained from   Komal Brito RN, 04/10/2019 17:47   COMPREHENSIVE METABOLIC PANEL - Abnormal; Notable for the following components:    Sodium 132 (*)     " Glucose 136 (*)     BUN, Bld 52 (*)     Creatinine 1.5 (*)     Calcium 8.5 (*)     Total Protein 5.1 (*)     Albumin 2.3 (*)     AST 6 (*)     eGFR if  56 (*)     eGFR if non  49 (*)     All other components within normal limits   B-TYPE NATRIURETIC PEPTIDE - Abnormal; Notable for the following components:     (*)     All other components within normal limits   CK - Abnormal; Notable for the following components:    CPK 17 (*)     All other components within normal limits   TROPONIN I - Abnormal; Notable for the following components:    Troponin I 0.197 (*)     All other components within normal limits   URINALYSIS, REFLEX TO URINE CULTURE    Narrative:     Preferred Collection Type->Urine, Clean Catch        All Lab Results:  Results for orders placed or performed during the hospital encounter of 04/10/19   CBC auto differential   Result Value Ref Range    WBC 4.05 3.90 - 12.70 K/uL    RBC 3.17 (L) 4.60 - 6.20 M/uL    Hemoglobin 9.7 (L) 14.0 - 18.0 g/dL    Hematocrit 28.4 (L) 40.0 - 54.0 %    MCV 90 82 - 98 fL    MCH 30.6 27.0 - 31.0 pg    MCHC 34.2 32.0 - 36.0 g/dL    RDW 14.9 (H) 11.5 - 14.5 %    Platelets 22 (LL) 150 - 350 K/uL    MPV SEE COMMENT 9.2 - 12.9 fL    Gran # (ANC) 3.6 1.8 - 7.7 K/uL    Lymph # 0.3 (L) 1.0 - 4.8 K/uL    Mono # 0.2 (L) 0.3 - 1.0 K/uL    Eos # 0.0 0.0 - 0.5 K/uL    Baso # 0.00 0.00 - 0.20 K/uL    Gran% 90.6 (H) 38.0 - 73.0 %    Lymph% 6.4 (L) 18.0 - 48.0 %    Mono% 3.7 (L) 4.0 - 15.0 %    Eosinophil% 0.0 0.0 - 8.0 %    Basophil% 0.0 0.0 - 1.9 %    Platelet Estimate Decreased (A)     Differential Method Automated    Comprehensive metabolic panel   Result Value Ref Range    Sodium 132 (L) 136 - 145 mmol/L    Potassium 3.6 3.5 - 5.1 mmol/L    Chloride 99 95 - 110 mmol/L    CO2 24 23 - 29 mmol/L    Glucose 136 (H) 70 - 110 mg/dL    BUN, Bld 52 (H) 8 - 23 mg/dL    Creatinine 1.5 (H) 0.5 - 1.4 mg/dL    Calcium 8.5 (L) 8.7 - 10.5 mg/dL    Total Protein  5.1 (L) 6.0 - 8.4 g/dL    Albumin 2.3 (L) 3.5 - 5.2 g/dL    Total Bilirubin 0.3 0.1 - 1.0 mg/dL    Alkaline Phosphatase 66 55 - 135 U/L    AST 6 (L) 10 - 40 U/L    ALT 23 10 - 44 U/L    Anion Gap 9 8 - 16 mmol/L    eGFR if African American 56 (A) >60 mL/min/1.73 m^2    eGFR if non African American 49 (A) >60 mL/min/1.73 m^2   Urinalysis, Reflex to Urine Culture Urine, Clean Catch   Result Value Ref Range    Specimen UA Urine, Clean Catch     Color, UA Yellow Yellow, Straw, Nora    Appearance, UA Clear Clear    pH, UA 6.0 5.0 - 8.0    Specific Gravity, UA 1.015 1.005 - 1.030    Protein, UA Negative Negative    Glucose, UA Negative Negative    Ketones, UA Negative Negative    Bilirubin (UA) Negative Negative    Occult Blood UA Negative Negative    Nitrite, UA Negative Negative    Urobilinogen, UA Negative <2.0 EU/dL    Leukocytes, UA Negative Negative   Brain natriuretic peptide   Result Value Ref Range     (H) 0 - 99 pg/mL   CK   Result Value Ref Range    CPK 17 (L) 20 - 200 U/L   Troponin I   Result Value Ref Range    Troponin I 0.197 (H) 0.000 - 0.026 ng/mL       Imaging Results:  Imaging Results          X-Ray Chest AP Portable (Final result)  Result time 04/10/19 18:00:49    Final result by Deven Rodriguez MD (04/10/19 18:00:49)                 Impression:      Right upper lobe pneumonia.      Electronically signed by: Deven Rodriguez MD  Date:    04/10/2019  Time:    18:00             Narrative:    EXAMINATION:  XR CHEST AP PORTABLE    CLINICAL HISTORY:  weakness;    COMPARISON:  02/14/2019.    FINDINGS:  The heart is normal in size.  Interval development of a patchy right upper lobe infiltrate.  Left lung is clear.  The aorta is atherosclerotic.  Surgical clips in the soft tissues of the base of the neck and in the upper abdomen.                               The EKG was ordered, reviewed, and independently interpreted by the ED provider.  Interpretation time: 1657  Rate: 69 BPM  Rhythm: sinus rhythm with  PAC's  Interpretation: No acute ST changes. No STEMI.         The Emergency Provider reviewed the vital signs and test results, which are outlined above.    ED Discussion     5:54 PM: Discussed case with Dr. Mcleod (Hem/onc) who recommends dexamethasone 4mg PO qd for 4 days and admitting pt for observation overnight.     6:35 PM: Discussed case with Cindy Bob NP (Hospital Medicine). Dr. Aviles agrees with current care and management of pt and accepts admission.   Admitting Service: Hospital medicine   Admitting Physician: Dr. Aviles  Admit to: Telemetry (Observation)    6:39 PM: Re-evaluated pt. I have discussed test results, shared treatment plan, and the need for admission with patient and family at bedside. Pt and family express understanding at this time and agree with all information. All questions answered. Pt and family have no further questions or concerns at this time. Pt is ready for admit.      ED Medication(s):  Medications   dexamethasone tablet 4 mg (has no administration in time range)             Medical Decision Making    Medical Decision Making:   Clinical Tests:   Lab Tests: Ordered and Reviewed  Radiological Study: Ordered and Reviewed  Medical Tests: Ordered and Reviewed           Scribe Attestation:   Scribe #1: I performed the above scribed service and the documentation accurately describes the services I performed. I attest to the accuracy of the note.    Attending:   Physician Attestation Statement for Scribe #1: I, Christian Lopez MD, personally performed the services described in this documentation, as scribed by Lynne Beard, in my presence, and it is both accurate and complete.          Clinical Impression       ICD-10-CM ICD-9-CM   1. Thrombocytopenia D69.6 287.5   2. Elevated troponin R74.8 790.6       Disposition:   Disposition: Placed in Observation  Condition: Fair         Christian Lopez MD  04/10/19 [id number]

## 2019-04-10 NOTE — TELEPHONE ENCOUNTER
Patient's wife/Ying wanted to tell you Thank you for taking good care of her , he came to see you back in March with gait disturbance and you sent him for CT scan , and he has a brain mass. He will start treatment soon

## 2019-04-11 PROBLEM — E43 SEVERE MALNUTRITION: Status: ACTIVE | Noted: 2019-01-01

## 2019-04-11 NOTE — H&P (VIEW-ONLY)
[facility]  Hematology/Oncology  Consult Note    Patient Name: Wallace Vigil  MRN: [id number]  Admission Date: 4/10/2019  Hospital Length of Stay: 0 days  Code Status: Full Code   Attending Provider: Gopal Ghosh MD  Consulting Provider: Karina Cai NP  Primary Care Physician: Mike Recinos MD  Principal Problem:Thrombocytopenia    Consults  Subjective:     HPI:  1. 64 y.o. male patient with a PMHx of Thyroid cancer (tx thyroidectomy 2017), presumed lung cancer (previously treated with SBRT left lung 50 Gy in 5 fractions--followed in Heme-Onc clinic by Dr. Ramires) with recent diagnosis of brain metastasis (completed radiation treatment 4/8/19 and taking Dexamethasone 4 mg PO TID at home), COPD, CKD, CAD, heart failure, PVD, RA who presented to the Emergency Department as recommended by Dr. Christianson following outpatient office visit for abnormal labs.  Associated symptoms include fatigue, bruising and lower extremity/edema    Most recent PET 3/2019 did not show any clear evidence of systemic recurrence of malignancy.     ED workup revealed Hg 9.7, platelet count 22. Patient's baseline platelet count near 100 range    Heme-Onc consulted for further evaluation and management of thrombocytopenia               Interval History: Patient upset upon entry into room. Reports plans to leave hospital. After speaking with patient regarding his current clinical situation and desired workup. Patient agrees to workup  Oncology Treatment Plan:   [No treatment plan]    Medications:  Continuous Infusions:  Scheduled Meds:   sodium chloride 0.9%   Intravenous Once    amLODIPine  10 mg Oral Daily    budesonide  0.5 mg Nebulization Q12H    cloNIDine  0.1 mg Oral BID    dexamethasone  36 mg Oral Once    [START ON 4/12/2019] dexamethasone  40 mg Oral Daily    diltiaZEM  240 mg Oral Daily    doxazosin  4 mg Oral QHS    folic acid-vit B6-vit B12 2.5-25-2 mg  1 tablet Oral Daily    furosemide  20 mg Oral Daily     hydrALAZINE  100 mg Oral TID    ipratropium  0.5 mg Nebulization Q12H    levETIRAcetam  500 mg Oral BID    levothyroxine  137 mcg Oral Before breakfast    multivitamin  1 tablet Oral Daily    pantoprazole  40 mg Oral Daily    thiamine  100 mg Oral Daily     PRN Meds:acetaminophen, albuterol-ipratropium, ALPRAZolam, dextrose 50%, dextrose 50%, glucagon (human recombinant), glucose, glucose, influenza, ondansetron, pneumoc 13-kayla conj-dip cr(PF), promethazine (PHENERGAN) IVPB, sodium chloride 0.9%, sodium chloride 0.9%     Review of patient's allergies indicates:   Allergen Reactions    Levofloxacin Other (See Comments)     Stomach pains    Lisinopril Swelling    Valsartan Swelling        Past Medical History:   Diagnosis Date    Anxiety     Arthritis     Asthma     Atherosclerosis of native arteries of the extremities with intermittent claudication     Back pain     Benign hypertensive heart disease without heart failure     Cancer     Carotid artery occlusion     Chronic kidney disease     COPD (chronic obstructive pulmonary disease)     Coronary artery disease     Emphysema of lung     Encounter for blood transfusion     GERD (gastroesophageal reflux disease)     Heart failure     History of aorto-femoral bypass 8/16/2013    Hyperlipidemia     Hypertension     Hypothyroidism     Iron deficiency anemia due to chronic blood loss 6/7/2018    PVD (peripheral vascular disease)     PVD (peripheral vascular disease) 8/16/2013    Renal artery stenosis 8/16/2013    Renovascular hypertension 8/16/2013    Rheumatoid arthritis     Shingles sept 2015    Stenosis of left subclavian artery 8/16/2013    Thyroid cancer     Thyroid disease     Thyroid cancer 3/1/2017    Tobacco abuse     Tobacco dependence     Trouble in sleeping      Past Surgical History:   Procedure Laterality Date    CARDIAC CATHETERIZATION      RENAL ARTERY BYPASS      2012    THYROIDECTOMY      VASCULAR SURGERY       Carotid cleaned out      Family History     Problem Relation (Age of Onset)    COPD Father    Diabetes Daughter, Daughter    Heart disease Mother    Heart failure Mother    Hyperlipidemia Mother, Father    Hypertension Mother        Tobacco Use    Smoking status: Current Every Day Smoker     Packs/day: 1.00     Years: 50.00     Pack years: 50.00     Types: Cigarettes    Smokeless tobacco: Never Used   Substance and Sexual Activity    Alcohol use: No    Drug use: No    Sexual activity: Not Currently     Partners: Female     Birth control/protection: None       Review of Systems   Constitutional: Positive for fatigue. Negative for activity change, appetite change, chills, diaphoresis, fever and unexpected weight change.   HENT: Negative for congestion, mouth sores, nosebleeds, sore throat, trouble swallowing and voice change.    Eyes: Negative for photophobia and visual disturbance.   Respiratory: Negative for cough, choking, chest tightness, shortness of breath and wheezing.    Cardiovascular: Positive for leg swelling. Negative for chest pain and palpitations.   Gastrointestinal: Negative for abdominal distention, abdominal pain, blood in stool, constipation, diarrhea, nausea and vomiting.   Genitourinary: Negative for difficulty urinating, dysuria and hematuria.   Musculoskeletal: Negative for arthralgias, back pain and myalgias.   Skin: Negative for pallor, rash and wound.   Allergic/Immunologic: Positive for immunocompromised state.   Neurological: Negative for dizziness, syncope, weakness and headaches.   Hematological: Negative for adenopathy. Bruises/bleeds easily.   Psychiatric/Behavioral: The patient is nervous/anxious.      Objective:     Vital Signs (Most Recent):  Temp: 98.7 °F (37.1 °C) (04/11/19 1130)  Pulse: 65 (04/11/19 1130)  Resp: 16 (04/11/19 1130)  BP: (!) 141/67 (04/11/19 1130)  SpO2: 97 % (04/11/19 1130) Vital Signs (24h Range):  Temp:  [98.1 °F (36.7 °C)-98.7 °F (37.1 °C)] 98.7 °F  (37.1 °C)  Pulse:  [63-85] 65  Resp:  [14-18] 16  SpO2:  [95 %-99 %] 97 %  BP: (129-170)/(67-85) 141/67     Weight: 55 kg (121 lb 4.1 oz)  Body mass index is 16.91 kg/m².  Body surface area is 1.66 meters squared.      Intake/Output Summary (Last 24 hours) at 4/11/2019 1353  Last data filed at 4/11/2019 1300  Gross per 24 hour   Intake 1854 ml   Output 1500 ml   Net 354 ml       Physical Exam   Constitutional: He is oriented to person, place, and time. He appears well-developed. He appears cachectic. He has a sickly appearance. He appears ill. He appears distressed.   HENT:   Head: Normocephalic.   Right Ear: Hearing normal. No drainage.   Left Ear: Hearing normal. No drainage.   Nose: Nose normal.   Mouth/Throat: Oropharynx is clear and moist.   Eyes: Conjunctivae, EOM and lids are normal. Right eye exhibits no discharge. Left eye exhibits no discharge. No scleral icterus.   Neck: Normal range of motion. No thyroid mass present.   Cardiovascular: Normal rate, regular rhythm and normal heart sounds.   No murmur heard.  Pulmonary/Chest: Effort normal. No respiratory distress. He has decreased breath sounds. He has no wheezes. He has no rhonchi. He has no rales.   Abdominal: Soft. Bowel sounds are normal. He exhibits no distension. There is no tenderness.   Genitourinary:   Genitourinary Comments: deferred   Musculoskeletal: Normal range of motion. He exhibits edema and tenderness.   Bilateral ankle and foot edema   Lymphadenopathy:        Head (right side): No submandibular, no preauricular and no posterior auricular adenopathy present.        Head (left side): No submandibular, no preauricular and no posterior auricular adenopathy present.        Right cervical: No superficial cervical adenopathy present.       Left cervical: No superficial cervical adenopathy present.   Neurological: He is alert and oriented to person, place, and time.   Skin: Skin is warm, dry and intact.   Psychiatric: His speech is normal.  Thought content normal. His mood appears anxious. His affect is angry. He is agitated.   Vitals reviewed.      Significant Labs:   CBC:   Recent Labs   Lab 04/10/19  1712 04/11/19  0517   WBC 4.05 3.92   HGB 9.7* 10.4*   HCT 28.4* 30.6*   PLT 22* 23*   , CMP:   Recent Labs   Lab 04/10/19  1712 04/11/19  0517   * 133*   K 3.6 4.0   CL 99 101   CO2 24 23   * 103   BUN 52* 49*   CREATININE 1.5* 1.4   CALCIUM 8.5* 8.7   PROT 5.1*  --    ALBUMIN 2.3*  --    BILITOT 0.3  --    ALKPHOS 66  --    AST 6*  --    ALT 23  --    ANIONGAP 9 9   EGFRNONAA 49* 53*    and All pertinent labs from the last 24 hours have been reviewed.    Diagnostic Results:  I have reviewed all pertinent imaging results/findings within the past 24 hours.    Assessment/Plan:     * Thrombocytopenia  April 11, 2019  --differential dx include ITP vs bone marrow malignancy. Pneumonia likely a contributing factor but not to this degree  --Platelet count 23. No urgent need for platelet transfusion. Transfuse if platelet <10 or S&S bleeding  --Check LDH, Haptoglobin  --Dexamethasone 40 mg PO daily x 4 days. Monitor Cbc daily for response  --CT chest/abdomen/pelvis w/o contrast. Patient refusing CT with contrast.   --Spoke with Radiology regarding BMBx tomorrow. Unable to give time but supposed to call back. Would like patient to be NPO after midnight  --Follow up BMBx  --Daily CBC  --Continue supportive care    Secondary adenocarcinoma of brain  Patient is s/p radiation for brain mets.    Primary cancer of left lower lobe of lung  April 11, 2019  --Patient completed SBRT left lung 50 Gy in 5 fractions 7/20/18  --Most recent PET CT performed on 3/2019 no evidence of recurrence  --s/p radiation treatment for brain metastasis noted on MRI brain 3/2019. Patient completed radiation 4/8/2019. Planned outpatient f/u with Rad/Onc 4/25/19 for 2 week post radiation f/u  --CT Chest/Abdomen/Pelvis today to r/o metastatic disease          Thank you for your  consult. I will follow-up with patient. Please contact us if you have any additional questions.    Karina Cai NP  Hematology/Oncology  [facility]

## 2019-04-11 NOTE — ASSESSMENT & PLAN NOTE
Continue home meds, monitor trends.  Further evaluation/diagnostics/interventions/consults pending course

## 2019-04-11 NOTE — PLAN OF CARE
Problem: Adult Inpatient Plan of Care  Goal: Plan of Care Review  Outcome: Ongoing (interventions implemented as appropriate)  Pt lying in bed, AAO, progressing toward goal

## 2019-04-11 NOTE — ASSESSMENT & PLAN NOTE
April 11, 2019  --differential dx include ITP vs bone marrow malignancy. Pneumonia likely a contributing factor but not to this degree  --Platelet count 23. No urgent need for platelet transfusion. Transfuse if platelet <10 or S&S bleeding  --Check LDH, Haptoglobin  --Dexamethasone 40 mg PO daily x 4 days. Monitor Cbc daily for response  --CT chest/abdomen/pelvis w/o contrast. Patient refusing CT with contrast.   --Spoke with Radiology regarding BMBx tomorrow. Unable to give time but supposed to call back. Would like patient to be NPO after midnight  --Follow up BMBx  --Daily CBC  --Continue supportive care

## 2019-04-11 NOTE — PLAN OF CARE
Problem: Adult Inpatient Plan of Care  Goal: Plan of Care Review  Outcome: Ongoing (interventions implemented as appropriate)  Patient very standoffish, argumentative. Dr Ghosh informed of patient's refusal to take meds from hospital and that he takes his own meds. No family present. VSS. POC discussed.

## 2019-04-11 NOTE — CONSULTS
"  [facility] -   Adult Nutrition  Consult Note    SUMMARY     Recommendations    Recommendation: 1. Continue current diet & ONS. 2. Will continue to monitor.   Goals: Meet > 85 % EEN/EPN while admitted  Nutrition Goal Status: new  Communication of RD Recs: (POc, sticky note)    Reason for Assessment    Reason For Assessment: consult   Dx: Thrombocytopenia, Elevated troponin, Lung CA  1. Thrombocytopenia    2. Elevated troponin    Hx: Asthma, Atherosclerosis, Cancer, COPD, GERD, Heart Failure, HTN, HLD  General info comments: Pt w/ lung CA. Pt currently on cardiac diet, pt reports good appetite today, PO intake 100 %. Pt and Pt's wife report good appetite and intake PTA. Pt reports no recent wt loss. Per epic records, pt weighed 129 lbs on 3.11.19, current wt = 121 lbs ( wt loss of 10 lbs within the last month). Per NFPE 4.11.19, moderate to severe subcutaneous fat and muscle loss.   Discharge plan: cardiac diet       Nutrition Risk Screen    Nutrition Risk Screen: dysphagia or difficulty swallowing    Nutrition/Diet History    Spiritual, Cultural Beliefs, Hoahaoism Practices, Values that Affect Care: no    Anthropometrics    Temp: 98.7 °F (37.1 °C)  Height Method: Stated  Height: 5' 11" (180.3 cm)  Height (inches): 71 in  Weight Method: Bed Scale  Weight: 55 kg (121 lb 4.1 oz)  Weight (lb): 121.25 lb  Ideal Body Weight (IBW), Male: 172 lb  % Ideal Body Weight, Male (lb): 70.49 lb  BMI (Calculated): 16.9  BMI Grade: 16 - 16.9 protein-energy malnutrition grade II       Lab/Procedures/Meds    Pertinent Labs Reviewed: reviewed  BMP  Lab Results   Component Value Date     (L) 04/11/2019    K 4.0 04/11/2019     04/11/2019    CO2 23 04/11/2019    BUN 49 (H) 04/11/2019    CREATININE 1.4 04/11/2019    CALCIUM 8.7 04/11/2019    ANIONGAP 9 04/11/2019    ESTGFRAFRICA >60 04/11/2019    EGFRNONAA 53 (A) 04/11/2019     Lab Results   Component Value Date    CALCIUM 8.7 04/11/2019    PHOS 3.7 04/10/2019 "     Lab Results   Component Value Date    ALBUMIN 2.3 (L) 04/10/2019     No results for input(s): POCTGLUCOSE in the last 24 hours.    Pertinent Medications Reviewed: reviewed    Physical Findings/Assessment     skin: amarilis 19    Estimated/Assessed Needs    Weight Used For Calorie Calculations: 55 kg (121 lb 4.1 oz)  Energy Calorie Requirements (kcal): 1925 - 2200  Energy Need Method: Kcal/kg  Protein Requirements: 66 g  Weight Used For Protein Calculations: 55 kg (121 lb 4.1 oz)     Estimated Fluid Requirement Method: RDA Method(or per mD)  RDA Method (mL): 1925         Nutrition Prescription Ordered    Nutrition Order Comments: cardiac diet + boost plus all meals    Evaluation of Received Nutrient/Fluid Intake          Intake/Output Summary (Last 24 hours) at 4/11/2019 1223  Last data filed at 4/11/2019 1100  Gross per 24 hour   Intake 1494 ml   Output 1200 ml   Net 294 ml       % Intake of Estimated Energy Needs: 75 - 100 %  % Meal Intake: 75 - 100 %    Nutrition Risk      2xweekly     Assessment and Plan    Severe malnutrition  Malnutrition in the context of Chronic Illness/Injury    Related to (etiology):  Physiological causes increasing nutrient needs d/t cancer     Signs and Symptoms (as evidenced by):  Body Fat Depletion: moderate depletion of orbitals and thoracic and lumbar region   Muscle Mass Depletion: severe depletion of temples, clavicle region, scapular region and lower extremities   Weight Loss: 7.67 % within the last month     Interventions/Recommendations (treatment strategy):  See above     Nutrition Diagnosis Status:  New       Monitor and Evaluation    Food and Nutrient Intake: energy intake, food and beverage intake  Food and Nutrient Adminstration: diet order  Anthropometric Measurements: weight  Biochemical Data, Medical Tests and Procedures: electrolyte and renal panel, glucose/endocrine profile  Nutrition-Focused Physical Findings: overall appearance     Malnutrition Assessment              Weight Loss (Malnutrition): greater than 5% in 1 month   Orbital Region (Subcutaneous Fat Loss): moderate depletion  Thoracic and Lumbar Region: moderate depletion   [place] Region (Muscle Loss): severe depletion  Clavicle Bone Region (Muscle Loss): severe depletion  Clavicle and Acromion Bone Region (Muscle Loss): severe depletion  Scapular Bone Region (Muscle Loss): severe depletion  Anterior Thigh Region (Muscle Loss): severe depletion  Posterior Calf Region (Muscle Loss): severe depletion                 Nutrition Follow-Up    RD Follow-up?: Yes

## 2019-04-11 NOTE — PLAN OF CARE
Problem: Adult Inpatient Plan of Care  Goal: Plan of Care Review  Outcome: Ongoing (interventions implemented as appropriate)  Recommendations     Recommendation: 1. Continue current diet & ONS. 2. Will continue to monitor.   Goals: Meet > 85 % EEN/EPN while admitted  Nutrition Goal Status: new  Communication of RD Recs: (POc, sticky note)

## 2019-04-11 NOTE — NURSING
Received pt lying in bed resting comfortably, AAO all questions answered. Pt verbalized understanding

## 2019-04-11 NOTE — PLAN OF CARE
04/11/19 1600   DUQUE Message   Medicare Outpatient and Observation Notification regarding financial responsibility Given to patient/caregiver;Explained to patient/caregiver;Signed/date by patient/caregiver   Date DUQUE was signed 04/11/19   Time DUQUE was signed 1600

## 2019-04-11 NOTE — ED NOTES
Report received. Care assumed. Pt lying quietly in bed without distress. No new complaints. Pt AAOx3. resp E/U. Skin W/d. Bed low and locked. Side rails up x's 2. Family at bedside. Will cont to monitor.

## 2019-04-11 NOTE — ASSESSMENT & PLAN NOTE
Malnutrition in the context of Chronic Illness/Injury    Related to (etiology):  Physiological causes increasing nutrient needs d/t cancer     Signs and Symptoms (as evidenced by):  Body Fat Depletion: moderate depletion of orbitals and thoracic and lumbar region   Muscle Mass Depletion: severe depletion of temples, clavicle region, scapular region and lower extremities   Weight Loss: 7.67 % within the last month     Interventions/Recommendations (treatment strategy):  See above     Nutrition Diagnosis Status:  New

## 2019-04-11 NOTE — ASSESSMENT & PLAN NOTE
Platelet count on arrival 22.  No gross active bleeding noted on exam.  Monitor CBC.  Steroids as recommended by --Heme/onc following  --CT scan abd, pelvis, & chest pending  --monitor daily cbc  --monitor for bleeding

## 2019-04-11 NOTE — SUBJECTIVE & OBJECTIVE
Interval History: Patient kept on OBS for thrombocytopenia under the care of Hospital Medicine. Platelets were 95 1 month ago and have dropped to 22 as of admit. Heme/onc was consulted and they have ordered CT of chest, abd, and pelvis today looking for metastasis. They are planning on dexamethazone 40mg daily X 4 days and are planning on a bone marrow biopsy during stay looking for cause of sudden drop. Patient is high risk for bleeding and will need emergent care if platelets drop further. Currently he denies bleeding, SOB, and Chest pain. Patient has hx of rheumatoid arthritis - low platelets might be autoimmune.    Review of Systems   Constitutional: Positive for fatigue. Negative for chills, diaphoresis and fever.   HENT: Negative for congestion, sore throat and voice change.    Eyes: Negative for photophobia and visual disturbance.   Respiratory: Negative for cough, shortness of breath, wheezing and stridor.    Cardiovascular: Positive for leg swelling. Negative for chest pain.   Gastrointestinal: Negative for abdominal distention, abdominal pain, constipation, diarrhea, nausea and vomiting.   Endocrine: Negative for polydipsia, polyphagia and polyuria.   Genitourinary: Negative for difficulty urinating, dysuria, flank pain, testicular pain and urgency.   Musculoskeletal: Negative for back pain, joint swelling, neck pain and neck stiffness.   Skin: Negative for color change and rash.   Allergic/Immunologic: Negative for immunocompromised state.   Neurological: Negative for dizziness, syncope, weakness, numbness and headaches.   Hematological: Bruises/bleeds easily.   Psychiatric/Behavioral: Negative for agitation, behavioral problems and confusion.     Objective:     Vital Signs (Most Recent):  Temp: 98.7 °F (37.1 °C) (04/11/19 1130)  Pulse: 65 (04/11/19 1130)  Resp: 16 (04/11/19 1130)  BP: (!) 141/67 (04/11/19 1130)  SpO2: 97 % (04/11/19 1130) Vital Signs (24h Range):  Temp:  [98.1 °F (36.7 °C)-98.7 °F (37.1  °C)] 98.7 °F (37.1 °C)  Pulse:  [63-85] 65  Resp:  [14-18] 16  SpO2:  [95 %-99 %] 97 %  BP: (129-170)/(67-85) 141/67     Weight: 55 kg (121 lb 4.1 oz)  Body mass index is 16.91 kg/m².    Intake/Output Summary (Last 24 hours) at 4/11/2019 1134  Last data filed at 4/11/2019 1000  Gross per 24 hour   Intake 1074 ml   Output 1200 ml   Net -126 ml      Physical Exam   Constitutional: He is oriented to person, place, and time. He appears well-developed. No distress.   Thin chronically ill-appearing male appears older than stated age.   HENT:   Head: Normocephalic and atraumatic.   Nose: Nose normal.   Eyes: Pupils are equal, round, and reactive to light. Conjunctivae and EOM are normal. No scleral icterus.   Neck: Normal range of motion. Neck supple. No tracheal deviation present.   Cardiovascular: Normal rate, regular rhythm, normal heart sounds and intact distal pulses.   No murmur heard.  Pulmonary/Chest: Effort normal and breath sounds normal. No stridor. No respiratory distress. He has no wheezes. He has no rales.   Abdominal: Soft. Bowel sounds are normal. He exhibits no distension. There is no tenderness. There is no guarding.   Thin   Genitourinary:   Genitourinary Comments: Check urine   Musculoskeletal: Normal range of motion. He exhibits no edema or deformity.   Generalized weakness   Neurological: He is alert and oriented to person, place, and time. No cranial nerve deficit.   Skin: Skin is warm and dry. Capillary refill takes 2 to 3 seconds. No rash noted. He is not diaphoretic.   Generalized bruising to extremities noted.   Psychiatric: He has a normal mood and affect. His behavior is normal. Judgment and thought content normal.   Nursing note and vitals reviewed.      Significant Labs:   Recent Lab Results       04/11/19  0517   04/10/19  1819   04/10/19  1712        Albumin     2.3     Alkaline Phosphatase     66     ALT     23     Anion Gap 9   9     Appearance, UA   Clear       aPTT 35.1  Comment:  aPTT  therapeutic range = 39-69 seconds         AST     6     Baso # 0.00   0.00     Basophil% 0.0   0.0     Bilirubin (UA)   Negative       Total Bilirubin     0.3  Comment:  For infants and newborns, interpretation of results should be based  on gestational age, weight and in agreement with clinical  observations.  Premature Infant recommended reference ranges:  Up to 24 hours.............<8.0 mg/dL  Up to 48 hours............<12.0 mg/dL  3-5 days..................<15.0 mg/dL  6-29 days.................<15.0 mg/dL       BNP     165  Comment:  Values of less than 100 pg/ml are consistent with non-CHF populations.     BUN, Bld 49   52     Calcium 8.7   8.5     Chloride 101   99     CO2 23   24     Color, UA   Yellow       CPK     17     Creatinine 1.4   1.5     Differential Method Automated   Automated     eGFR if  >60   56     eGFR if non  53  Comment:  Calculation used to obtain the estimated glomerular filtration  rate (eGFR) is the CKD-EPI equation.      49  Comment:  Calculation used to obtain the estimated glomerular filtration  rate (eGFR) is the CKD-EPI equation.        Eos # 0.0   0.0     Eosinophil% 0.0   0.0     Glucose 103   136     Glucose, UA   Negative       Gran # (ANC) 3.5   3.6     Gran% 88.8   90.6     Hematocrit 30.6   28.4     Hemoglobin 10.4   9.7     Coumadin Monitoring INR 0.9  Comment:  Coumadin Therapy:  2.0 - 3.0 for INR for all indicators except mechanical heart valves  and antiphospholipid syndromes which should use 2.5 - 3.5.           Ketones, UA   Negative       Leukocytes, UA   Negative       Lymph # 0.2   0.3     Lymph% 4.8   6.4     Magnesium     1.8     MCH 30.4   30.6     MCHC 34.0   34.2     MCV 90   90     Mono # 0.3   0.2     Mono% 6.4   3.7     MPV SEE COMMENT  Comment:  Result not available.   SEE COMMENT  Comment:  Result not available.     Nitrite, UA   Negative       Occult Blood UA   Negative       pH, UA   6.0       Phosphorus     3.7      Platelet Estimate Decreased   Decreased     Platelets 23  Comment:  PLATELETS  critical result(s) called and verbal readback obtained   from AMY OROZCO RN, 04/11/2019 07:12     22  Comment:  Plt critical result(s) called and verbal readback obtained from   Komal Brito RN, 04/10/2019 17:47       Potassium 4.0   3.6     Total Protein     5.1     Protein, UA   Negative  Comment:  Recommend a 24 hour urine protein or a urine   protein/creatinine ratio if globulin induced proteinuria is  clinically suspected.         Protime 10.3         RBC 3.42   3.17     RDW 14.8   14.9     Sodium 133   132     Specific [place], UA   1.015       Specimen UA   Urine, Clean Catch       Troponin I     0.197  Comment:  The reference interval for Troponin I represents the 99th percentile   cutoff   for our facility and is consistent with 3rd generation assay   performance.       TSH 2.568         Urobilinogen, UA   Negative       WBC 3.92   4.05         All pertinent labs within the past 24 hours have been reviewed.    Significant Imaging: I have reviewed all pertinent imaging results/findings within the past 24 hours.

## 2019-04-11 NOTE — HPI
Wallace Vigil is a 64 y.o. male patient with a PMHx of lung CA with mets to the brain, COPD, CKD, CAD, heart failure, PVD, RA who presents to the Emergency Department for abnormal labs. Pt was referred to the ED from OP clinic for low platelets.  No modifying factors.  Associated symptoms include fatigue, bruising and lower extremity/edema from steroid use.  Prior treatment include steroid use.  No further complaints or concerns.  Patient was evaluated in the emergency room platelet count of 22.  ER Discussed case with Dr. Mcleod (Hem/onc) who recommended dexamethasone 4mg PO qd for 4 days and admitting pt for observation overnight.  hospital Medicine consulted.  Patient placed in observation.

## 2019-04-11 NOTE — ASSESSMENT & PLAN NOTE
No coronary/cardiac complaint   Continue patient's home meds.  Further evaluation/diagnostics/interventions/consults pending course

## 2019-04-11 NOTE — HPI
1. 64 y.o. male patient with a PMHx of Thyroid cancer (tx thyroidectomy 2017), presumed lung cancer (previously treated with SBRT left lung 50 Gy in 5 fractions--followed in Heme-Onc clinic by Dr. Ramires) with recent diagnosis of brain metastasis (completed radiation treatment 4/8/19 and taking Dexamethasone 4 mg PO TID at home), COPD, CKD, CAD, heart failure, PVD, RA who presented to the Emergency Department as recommended by Dr. Christianson following outpatient office visit for abnormal labs.  Associated symptoms include fatigue, bruising and lower extremity/edema    Most recent PET 3/2019 did not show any clear evidence of systemic recurrence of malignancy.     ED workup revealed Hg 9.7, platelet count 22. Patient's baseline platelet count near 100 range    Heme-Onc consulted for further evaluation and management of thrombocytopenia

## 2019-04-11 NOTE — SUBJECTIVE & OBJECTIVE
Past Medical History:   Diagnosis Date    Anxiety     Arthritis     Asthma     Atherosclerosis of native arteries of the extremities with intermittent claudication     Back pain     Benign hypertensive heart disease without heart failure     Cancer     Carotid artery occlusion     Chronic kidney disease     COPD (chronic obstructive pulmonary disease)     Coronary artery disease     Emphysema of lung     Encounter for blood transfusion     GERD (gastroesophageal reflux disease)     Heart failure     History of aorto-femoral bypass 8/16/2013    Hyperlipidemia     Hypertension     Hypothyroidism     Iron deficiency anemia due to chronic blood loss 6/7/2018    PVD (peripheral vascular disease)     PVD (peripheral vascular disease) 8/16/2013    Renal artery stenosis 8/16/2013    Renovascular hypertension 8/16/2013    Rheumatoid arthritis     Shingles sept 2015    Stenosis of left subclavian artery 8/16/2013    Thyroid cancer     Thyroid disease     Thyroid cancer 3/1/2017    Tobacco abuse     Tobacco dependence     Trouble in sleeping        Past Surgical History:   Procedure Laterality Date    CARDIAC CATHETERIZATION      RENAL ARTERY BYPASS      2012    THYROIDECTOMY      VASCULAR SURGERY      Carotid cleaned out        Review of patient's allergies indicates:   Allergen Reactions    Levofloxacin Other (See Comments)     Stomach pains    Lisinopril Swelling    Valsartan Swelling       No current facility-administered medications on file prior to encounter.      Current Outpatient Medications on File Prior to Encounter   Medication Sig    albuterol (VENTOLIN HFA) 90 mcg/actuation inhaler INHALE 2 PUFFS BY MOUTH EVERY 4 HOURS IF NEEDED FOR WHEEZING    ALPRAZolam (XANAX) 0.5 MG tablet TAKE 1 TABLET EVERY NIGHT AS NEEDED    amLODIPine (NORVASC) 10 MG tablet TAKE 1 TABLET EVERY DAY    aspirin 81 mg Tab Take 1 tablet by mouth Daily. 1 Tablet Oral Every day    cloNIDine (CATAPRES)  0.1 MG tablet TAKE 1 TABLET TWICE DAILY    cyanocobalamin, vitamin B-12, 1,000 mcg/mL Drop Place 1,000 mcg under the tongue once daily.    diltiaZEM (CARDIZEM CD) 240 MG 24 hr capsule TAKE 1 CAPSULE EVERY DAY    doxazosin (CARDURA) 4 MG tablet TAKE 1 TABLET EVERY EVENING    epinephrine (EPIPEN) 0.3 mg/0.3 mL (1:1,000) AtIn 1 Pen Injector Intramuscular once    fluticasone (FLONASE) 50 mcg/actuation nasal spray 2 sprays by Each Nare route once daily.    furosemide (LASIX) 20 MG tablet Take 1 tablet (20 mg total) by mouth once daily.    hydrALAZINE (APRESOLINE) 100 MG tablet TAKE 1 TABLET THREE TIMES DAILY    hydrocodone-acetaminophen 10-325mg (NORCO)  mg Tab Take 1 tablet by mouth every 6 (six) hours as needed.     lactulose (CHRONULAC) 10 gram/15 mL solution TAKE 30 MLS (2 TABLESPOONSFUL) EVERY 12 HOURS AS NEEDED TO ACHIEVE A GOOD BOWEL MOVEMENT    levETIRAcetam (KEPPRA) 500 MG Tab Take 1 tablet (500 mg total) by mouth 2 (two) times daily.    levothyroxine (SYNTHROID) 137 MCG Tab tablet Take 1 tablet (137 mcg total) by mouth before breakfast.    methylPREDNISolone (MEDROL DOSEPACK) 4 mg tablet Take as directed on the package.    predniSONE (DELTASONE) 5 MG tablet TAKE 1 TABLET EVERY DAY    ranitidine (ZANTAC) 75 MG tablet Take 75 mg by mouth 2 (two) times daily.     Family History     Problem Relation (Age of Onset)    COPD Father    Diabetes Daughter, Daughter    Heart disease Mother    Heart failure Mother    Hyperlipidemia Mother, Father    Hypertension Mother        Tobacco Use    Smoking status: Current Every Day Smoker     Packs/day: 1.00     Years: 50.00     Pack years: 50.00     Types: Cigarettes    Smokeless tobacco: Never Used   Substance and Sexual Activity    Alcohol use: No    Drug use: No    Sexual activity: Not Currently     Partners: Female     Birth control/protection: None     Review of Systems   Constitutional: Positive for fatigue. Negative for chills, diaphoresis and  fever.   HENT: Negative for congestion, sore throat and voice change.    Eyes: Negative for photophobia and visual disturbance.   Respiratory: Negative for cough, shortness of breath, wheezing and stridor.    Cardiovascular: Positive for leg swelling. Negative for chest pain.   Gastrointestinal: Negative for abdominal distention, abdominal pain, constipation, diarrhea, nausea and vomiting.   Endocrine: Negative for polydipsia, polyphagia and polyuria.   Genitourinary: Negative for difficulty urinating, dysuria, flank pain, testicular pain and urgency.   Musculoskeletal: Negative for back pain, joint swelling, neck pain and neck stiffness.   Skin: Negative for color change and rash.   Allergic/Immunologic: Negative for immunocompromised state.   Neurological: Negative for dizziness, syncope, weakness, numbness and headaches.   Hematological: Bruises/bleeds easily.   Psychiatric/Behavioral: Negative for agitation, behavioral problems and confusion.     Objective:     Vital Signs (Most Recent):  Temp: 98.5 °F (36.9 °C) (04/11/19 0402)  Pulse: 71 (04/11/19 0500)  Resp: 18 (04/11/19 0402)  BP: (!) 170/85 (04/11/19 0402)  SpO2: 95 % (04/11/19 0402) Vital Signs (24h Range):  Temp:  [98.1 °F (36.7 °C)-98.5 °F (36.9 °C)] 98.5 °F (36.9 °C)  Pulse:  [63-82] 71  Resp:  [16-18] 18  SpO2:  [95 %-99 %] 95 %  BP: (129-170)/(70-85) 170/85     Weight: 55 kg (121 lb 4.1 oz)  Body mass index is 16.91 kg/m².    Physical Exam   Constitutional: He is oriented to person, place, and time. He appears well-developed. No distress.   Thin chronically ill-appearing male appears older than stated age.   HENT:   Head: Normocephalic and atraumatic.   Nose: Nose normal.   Eyes: Pupils are equal, round, and reactive to light. Conjunctivae and EOM are normal. No scleral icterus.   Neck: Normal range of motion. Neck supple. No tracheal deviation present.   Cardiovascular: Normal rate, regular rhythm, normal heart sounds and intact distal pulses.   No  murmur heard.  Pulmonary/Chest: Effort normal and breath sounds normal. No stridor. No respiratory distress. He has no wheezes. He has no rales.   Abdominal: Soft. Bowel sounds are normal. He exhibits no distension. There is no tenderness. There is no guarding.   Thin   Genitourinary:   Genitourinary Comments: Check urine   Musculoskeletal: Normal range of motion. He exhibits no edema or deformity.   Generalized weakness   Neurological: He is alert and oriented to person, place, and time. No cranial nerve deficit.   Skin: Skin is warm and dry. Capillary refill takes 2 to 3 seconds. No rash noted. He is not diaphoretic.   Generalized bruising to extremities noted.   Psychiatric: He has a normal mood and affect. His behavior is normal. Judgment and thought content normal.   Nursing note and vitals reviewed.        CRANIAL NERVES     CN III, IV, VI   Pupils are equal, round, and reactive to light.  Extraocular motions are normal.        Significant Labs: All pertinent labs within the past 24 hours have been reviewed.  Results for orders placed or performed during the hospital encounter of 04/10/19   CBC auto differential   Result Value Ref Range    WBC 4.05 3.90 - 12.70 K/uL    RBC 3.17 (L) 4.60 - 6.20 M/uL    Hemoglobin 9.7 (L) 14.0 - 18.0 g/dL    Hematocrit 28.4 (L) 40.0 - 54.0 %    MCV 90 82 - 98 fL    MCH 30.6 27.0 - 31.0 pg    MCHC 34.2 32.0 - 36.0 g/dL    RDW 14.9 (H) 11.5 - 14.5 %    Platelets 22 (LL) 150 - 350 K/uL    MPV SEE COMMENT 9.2 - 12.9 fL    Gran # (ANC) 3.6 1.8 - 7.7 K/uL    Lymph # 0.3 (L) 1.0 - 4.8 K/uL    Mono # 0.2 (L) 0.3 - 1.0 K/uL    Eos # 0.0 0.0 - 0.5 K/uL    Baso # 0.00 0.00 - 0.20 K/uL    Gran% 90.6 (H) 38.0 - 73.0 %    Lymph% 6.4 (L) 18.0 - 48.0 %    Mono% 3.7 (L) 4.0 - 15.0 %    Eosinophil% 0.0 0.0 - 8.0 %    Basophil% 0.0 0.0 - 1.9 %    Platelet Estimate Decreased (A)     Differential Method Automated    Comprehensive metabolic panel   Result Value Ref Range    Sodium 132 (L) 136 - 145  mmol/L    Potassium 3.6 3.5 - 5.1 mmol/L    Chloride 99 95 - 110 mmol/L    CO2 24 23 - 29 mmol/L    Glucose 136 (H) 70 - 110 mg/dL    BUN, Bld 52 (H) 8 - 23 mg/dL    Creatinine 1.5 (H) 0.5 - 1.4 mg/dL    Calcium 8.5 (L) 8.7 - 10.5 mg/dL    Total Protein 5.1 (L) 6.0 - 8.4 g/dL    Albumin 2.3 (L) 3.5 - 5.2 g/dL    Total Bilirubin 0.3 0.1 - 1.0 mg/dL    Alkaline Phosphatase 66 55 - 135 U/L    AST 6 (L) 10 - 40 U/L    ALT 23 10 - 44 U/L    Anion Gap 9 8 - 16 mmol/L    eGFR if African American 56 (A) >60 mL/min/1.73 m^2    eGFR if non African American 49 (A) >60 mL/min/1.73 m^2   Urinalysis, Reflex to Urine Culture Urine, Clean Catch   Result Value Ref Range    Specimen UA Urine, Clean Catch     Color, UA Yellow Yellow, Straw, Nora    Appearance, UA Clear Clear    pH, UA 6.0 5.0 - 8.0    Specific Gravity, UA 1.015 1.005 - 1.030    Protein, UA Negative Negative    Glucose, UA Negative Negative    Ketones, UA Negative Negative    Bilirubin (UA) Negative Negative    Occult Blood UA Negative Negative    Nitrite, UA Negative Negative    Urobilinogen, UA Negative <2.0 EU/dL    Leukocytes, UA Negative Negative   Brain natriuretic peptide   Result Value Ref Range     (H) 0 - 99 pg/mL   CK   Result Value Ref Range    CPK 17 (L) 20 - 200 U/L   Troponin I   Result Value Ref Range    Troponin I 0.197 (H) 0.000 - 0.026 ng/mL   Magnesium   Result Value Ref Range    Magnesium 1.8 1.6 - 2.6 mg/dL   Phosphorus   Result Value Ref Range    Phosphorus 3.7 2.7 - 4.5 mg/dL       Significant Imaging: I have reviewed all pertinent imaging results/findings within the past 24 hours.   Imaging Results          X-Ray Chest AP Portable (Final result)  Result time 04/10/19 18:00:49    Final result by Deven Rodriguez MD (04/10/19 18:00:49)                 Impression:      Right upper lobe pneumonia.      Electronically signed by: Deven Rodriguez MD  Date:    04/10/2019  Time:    18:00             Narrative:    EXAMINATION:  XR CHEST AP  PORTABLE    CLINICAL HISTORY:  weakness;    COMPARISON:  02/14/2019.    FINDINGS:  The heart is normal in size.  Interval development of a patchy right upper lobe infiltrate.  Left lung is clear.  The aorta is atherosclerotic.  Surgical clips in the soft tissues of the base of the neck and in the upper abdomen.

## 2019-04-11 NOTE — ASSESSMENT & PLAN NOTE
April 11, 2019  --Patient completed SBRT left lung 50 Gy in 5 fractions 7/20/18  --Most recent PET CT performed on 3/2019 no evidence of recurrence  --s/p radiation treatment for brain metastasis noted on MRI brain 3/2019. Patient completed radiation 4/8/2019. Planned outpatient f/u with Rad/Onc 4/25/19 for 2 week post radiation f/u  --CT Chest/Abdomen/Pelvis today to r/o metastatic disease

## 2019-04-11 NOTE — SUBJECTIVE & OBJECTIVE
Interval History: Patient upset upon entry into room. Reports plans to leave hospital. After speaking with patient regarding his current clinical situation and desired workup. Patient agrees to workup  Oncology Treatment Plan:   [No treatment plan]    Medications:  Continuous Infusions:  Scheduled Meds:   sodium chloride 0.9%   Intravenous Once    amLODIPine  10 mg Oral Daily    budesonide  0.5 mg Nebulization Q12H    cloNIDine  0.1 mg Oral BID    dexamethasone  36 mg Oral Once    [START ON 4/12/2019] dexamethasone  40 mg Oral Daily    diltiaZEM  240 mg Oral Daily    doxazosin  4 mg Oral QHS    folic acid-vit B6-vit B12 2.5-25-2 mg  1 tablet Oral Daily    furosemide  20 mg Oral Daily    hydrALAZINE  100 mg Oral TID    ipratropium  0.5 mg Nebulization Q12H    levETIRAcetam  500 mg Oral BID    levothyroxine  137 mcg Oral Before breakfast    multivitamin  1 tablet Oral Daily    pantoprazole  40 mg Oral Daily    thiamine  100 mg Oral Daily     PRN Meds:acetaminophen, albuterol-ipratropium, ALPRAZolam, dextrose 50%, dextrose 50%, glucagon (human recombinant), glucose, glucose, influenza, ondansetron, pneumoc 13-kayla conj-dip cr(PF), promethazine (PHENERGAN) IVPB, sodium chloride 0.9%, sodium chloride 0.9%     Review of patient's allergies indicates:   Allergen Reactions    Levofloxacin Other (See Comments)     Stomach pains    Lisinopril Swelling    Valsartan Swelling        Past Medical History:   Diagnosis Date    Anxiety     Arthritis     Asthma     Atherosclerosis of native arteries of the extremities with intermittent claudication     Back pain     Benign hypertensive heart disease without heart failure     Cancer     Carotid artery occlusion     Chronic kidney disease     COPD (chronic obstructive pulmonary disease)     Coronary artery disease     Emphysema of lung     Encounter for blood transfusion     GERD (gastroesophageal reflux disease)     Heart failure     History of  aorto-femoral bypass 8/16/2013    Hyperlipidemia     Hypertension     Hypothyroidism     Iron deficiency anemia due to chronic blood loss 6/7/2018    PVD (peripheral vascular disease)     PVD (peripheral vascular disease) 8/16/2013    Renal artery stenosis 8/16/2013    Renovascular hypertension 8/16/2013    Rheumatoid arthritis     Shingles sept 2015    Stenosis of left subclavian artery 8/16/2013    Thyroid cancer     Thyroid disease     Thyroid cancer 3/1/2017    Tobacco abuse     Tobacco dependence     Trouble in sleeping      Past Surgical History:   Procedure Laterality Date    CARDIAC CATHETERIZATION      RENAL ARTERY BYPASS      2012    THYROIDECTOMY      VASCULAR SURGERY      Carotid cleaned out      Family History     Problem Relation (Age of Onset)    COPD Father    Diabetes Daughter, Daughter    Heart disease Mother    Heart failure Mother    Hyperlipidemia Mother, Father    Hypertension Mother        Tobacco Use    Smoking status: Current Every Day Smoker     Packs/day: 1.00     Years: 50.00     Pack years: 50.00     Types: Cigarettes    Smokeless tobacco: Never Used   Substance and Sexual Activity    Alcohol use: No    Drug use: No    Sexual activity: Not Currently     Partners: Female     Birth control/protection: None       Review of Systems   Constitutional: Positive for fatigue. Negative for activity change, appetite change, chills, diaphoresis, fever and unexpected weight change.   HENT: Negative for congestion, mouth sores, nosebleeds, sore throat, trouble swallowing and voice change.    Eyes: Negative for photophobia and visual disturbance.   Respiratory: Negative for cough, choking, chest tightness, shortness of breath and wheezing.    Cardiovascular: Positive for leg swelling. Negative for chest pain and palpitations.   Gastrointestinal: Negative for abdominal distention, abdominal pain, blood in stool, constipation, diarrhea, nausea and vomiting.   Genitourinary:  Negative for difficulty urinating, dysuria and hematuria.   Musculoskeletal: Negative for arthralgias, back pain and myalgias.   Skin: Negative for pallor, rash and wound.   Allergic/Immunologic: Positive for immunocompromised state.   Neurological: Negative for dizziness, syncope, weakness and headaches.   Hematological: Negative for adenopathy. Bruises/bleeds easily.   Psychiatric/Behavioral: The patient is nervous/anxious.      Objective:     Vital Signs (Most Recent):  Temp: 98.7 °F (37.1 °C) (04/11/19 1130)  Pulse: 65 (04/11/19 1130)  Resp: 16 (04/11/19 1130)  BP: (!) 141/67 (04/11/19 1130)  SpO2: 97 % (04/11/19 1130) Vital Signs (24h Range):  Temp:  [98.1 °F (36.7 °C)-98.7 °F (37.1 °C)] 98.7 °F (37.1 °C)  Pulse:  [63-85] 65  Resp:  [14-18] 16  SpO2:  [95 %-99 %] 97 %  BP: (129-170)/(67-85) 141/67     Weight: 55 kg (121 lb 4.1 oz)  Body mass index is 16.91 kg/m².  Body surface area is 1.66 meters squared.      Intake/Output Summary (Last 24 hours) at 4/11/2019 1353  Last data filed at 4/11/2019 1300  Gross per 24 hour   Intake 1854 ml   Output 1500 ml   Net 354 ml       Physical Exam   Constitutional: He is oriented to person, place, and time. He appears well-developed. He appears cachectic. He has a sickly appearance. He appears ill. He appears distressed.   HENT:   Head: Normocephalic.   Right Ear: Hearing normal. No drainage.   Left Ear: Hearing normal. No drainage.   Nose: Nose normal.   Mouth/Throat: Oropharynx is clear and moist.   Eyes: Conjunctivae, EOM and lids are normal. Right eye exhibits no discharge. Left eye exhibits no discharge. No scleral icterus.   Neck: Normal range of motion. No thyroid mass present.   Cardiovascular: Normal rate, regular rhythm and normal heart sounds.   No murmur heard.  Pulmonary/Chest: Effort normal. No respiratory distress. He has decreased breath sounds. He has no wheezes. He has no rhonchi. He has no rales.   Abdominal: Soft. Bowel sounds are normal. He exhibits no  distension. There is no tenderness.   Genitourinary:   Genitourinary Comments: deferred   Musculoskeletal: Normal range of motion. He exhibits edema and tenderness.   Bilateral ankle and foot edema   Lymphadenopathy:        Head (right side): No submandibular, no preauricular and no posterior auricular adenopathy present.        Head (left side): No submandibular, no preauricular and no posterior auricular adenopathy present.        Right cervical: No superficial cervical adenopathy present.       Left cervical: No superficial cervical adenopathy present.   Neurological: He is alert and oriented to person, place, and time.   Skin: Skin is warm, dry and intact.   Psychiatric: His speech is normal. Thought content normal. His mood appears anxious. His affect is angry. He is agitated.   Vitals reviewed.      Significant Labs:   CBC:   Recent Labs   Lab 04/10/19  1712 04/11/19  0517   WBC 4.05 3.92   HGB 9.7* 10.4*   HCT 28.4* 30.6*   PLT 22* 23*   , CMP:   Recent Labs   Lab 04/10/19  1712 04/11/19  0517   * 133*   K 3.6 4.0   CL 99 101   CO2 24 23   * 103   BUN 52* 49*   CREATININE 1.5* 1.4   CALCIUM 8.5* 8.7   PROT 5.1*  --    ALBUMIN 2.3*  --    BILITOT 0.3  --    ALKPHOS 66  --    AST 6*  --    ALT 23  --    ANIONGAP 9 9   EGFRNONAA 49* 53*    and All pertinent labs from the last 24 hours have been reviewed.    Diagnostic Results:  I have reviewed all pertinent imaging results/findings within the past 24 hours.

## 2019-04-11 NOTE — ASSESSMENT & PLAN NOTE
Platelet count on arrival 22.  No gross active bleeding noted on exam.  Monitor CBC.  Steroids as recommended by heme/oncology   Further evaluation/diagnostics/interventions/consults pending course

## 2019-04-11 NOTE — ASSESSMENT & PLAN NOTE
Previously TSH has been elevated recheck TSH, continue current Synthroid and adjust as needed pending results.  Further evaluation/diagnostics/interventions/consults pending course \

## 2019-04-11 NOTE — H&P
[facility] Hospital Medicine  History & Physical    Patient Name: Wallace Vigil  MRN: [id number]  Admission Date: 4/10/2019  Attending Physician: Lalito Aviles MD  Primary Care Provider: Mike Recinos MD         Patient information was obtained from patient, past medical records and ER records.     Subjective:     Principal Problem:Thrombocytopenia    Chief Complaint:   Chief Complaint   Patient presents with    Abnormal Lab     States he was sent from dr. Christianson for low platelets         HPI:  Wallace Vigil is a 64 y.o. male patient with a PMHx of lung CA with mets to the brain, COPD, CKD, CAD, heart failure, PVD, RA who presents to the Emergency Department for abnormal labs. Pt was referred to the ED from OP clinic for low platelets.  No modifying factors.  Associated symptoms include fatigue, bruising and lower extremity/edema from steroid use.  Prior treatment include steroid use.  No further complaints or concerns.  Patient was evaluated in the emergency room platelet count of 22.  ER Discussed case with Dr. Mcleod (Hem/onc) who recommended dexamethasone 4mg PO qd for 4 days and admitting pt for observation overnight.   hospital Medicine consulted.  Patient placed in observation.             Past Medical History:   Diagnosis Date    Anxiety     Arthritis     Asthma     Atherosclerosis of native arteries of the extremities with intermittent claudication     Back pain     Benign hypertensive heart disease without heart failure     Cancer     Carotid artery occlusion     Chronic kidney disease     COPD (chronic obstructive pulmonary disease)     Coronary artery disease     Emphysema of lung     Encounter for blood transfusion     GERD (gastroesophageal reflux disease)     Heart failure     History of aorto-femoral bypass 8/16/2013    Hyperlipidemia     Hypertension     Hypothyroidism     Iron deficiency anemia due to chronic blood loss 6/7/2018    PVD (peripheral vascular  disease)     PVD (peripheral vascular disease) 8/16/2013    Renal artery stenosis 8/16/2013    Renovascular hypertension 8/16/2013    Rheumatoid arthritis     Shingles sept 2015    Stenosis of left subclavian artery 8/16/2013    Thyroid cancer     Thyroid disease     Thyroid cancer 3/1/2017    Tobacco abuse     Tobacco dependence     Trouble in sleeping        Past Surgical History:   Procedure Laterality Date    CARDIAC CATHETERIZATION      RENAL ARTERY BYPASS      2012    THYROIDECTOMY      VASCULAR SURGERY      Carotid cleaned out        Review of patient's allergies indicates:   Allergen Reactions    Levofloxacin Other (See Comments)     Stomach pains    Lisinopril Swelling    Valsartan Swelling       No current facility-administered medications on file prior to encounter.      Current Outpatient Medications on File Prior to Encounter   Medication Sig    albuterol (VENTOLIN HFA) 90 mcg/actuation inhaler INHALE 2 PUFFS BY MOUTH EVERY 4 HOURS IF NEEDED FOR WHEEZING    ALPRAZolam (XANAX) 0.5 MG tablet TAKE 1 TABLET EVERY NIGHT AS NEEDED    amLODIPine (NORVASC) 10 MG tablet TAKE 1 TABLET EVERY DAY    aspirin 81 mg Tab Take 1 tablet by mouth Daily. 1 Tablet Oral Every day    cloNIDine (CATAPRES) 0.1 MG tablet TAKE 1 TABLET TWICE DAILY    cyanocobalamin, vitamin B-12, 1,000 mcg/mL Drop Place 1,000 mcg under the tongue once daily.    diltiaZEM (CARDIZEM CD) 240 MG 24 hr capsule TAKE 1 CAPSULE EVERY DAY    doxazosin (CARDURA) 4 MG tablet TAKE 1 TABLET EVERY EVENING    epinephrine (EPIPEN) 0.3 mg/0.3 mL (1:1,000) AtIn 1 Pen Injector Intramuscular once    fluticasone (FLONASE) 50 mcg/actuation nasal spray 2 sprays by Each Nare route once daily.    furosemide (LASIX) 20 MG tablet Take 1 tablet (20 mg total) by mouth once daily.    hydrALAZINE (APRESOLINE) 100 MG tablet TAKE 1 TABLET THREE TIMES DAILY    hydrocodone-acetaminophen 10-325mg (NORCO)  mg Tab Take 1 tablet by mouth every 6  (six) hours as needed.     lactulose (CHRONULAC) 10 gram/15 mL solution TAKE 30 MLS (2 TABLESPOONSFUL) EVERY 12 HOURS AS NEEDED TO ACHIEVE A GOOD BOWEL MOVEMENT    levETIRAcetam (KEPPRA) 500 MG Tab Take 1 tablet (500 mg total) by mouth 2 (two) times daily.    levothyroxine (SYNTHROID) 137 MCG Tab tablet Take 1 tablet (137 mcg total) by mouth before breakfast.    methylPREDNISolone (MEDROL DOSEPACK) 4 mg tablet Take as directed on the package.    predniSONE (DELTASONE) 5 MG tablet TAKE 1 TABLET EVERY DAY    ranitidine (ZANTAC) 75 MG tablet Take 75 mg by mouth 2 (two) times daily.     Family History     Problem Relation (Age of Onset)    COPD Father    Diabetes Daughter, Daughter    Heart disease Mother    Heart failure Mother    Hyperlipidemia Mother, Father    Hypertension Mother        Tobacco Use    Smoking status: Current Every Day Smoker     Packs/day: 1.00     Years: 50.00     Pack years: 50.00     Types: Cigarettes    Smokeless tobacco: Never Used   Substance and Sexual Activity    Alcohol use: No    Drug use: No    Sexual activity: Not Currently     Partners: Female     Birth control/protection: None     Review of Systems   Constitutional: Positive for fatigue. Negative for chills, diaphoresis and fever.   HENT: Negative for congestion, sore throat and voice change.    Eyes: Negative for photophobia and visual disturbance.   Respiratory: Negative for cough, shortness of breath, wheezing and stridor.    Cardiovascular: Positive for leg swelling. Negative for chest pain.   Gastrointestinal: Negative for abdominal distention, abdominal pain, constipation, diarrhea, nausea and vomiting.   Endocrine: Negative for polydipsia, polyphagia and polyuria.   Genitourinary: Negative for difficulty urinating, dysuria, flank pain, testicular pain and urgency.   Musculoskeletal: Negative for back pain, joint swelling, neck pain and neck stiffness.   Skin: Negative for color change and rash.   Allergic/Immunologic:  Negative for immunocompromised state.   Neurological: Negative for dizziness, syncope, weakness, numbness and headaches.   Hematological: Bruises/bleeds easily.   Psychiatric/Behavioral: Negative for agitation, behavioral problems and confusion.     Objective:     Vital Signs (Most Recent):  Temp: 98.5 °F (36.9 °C) (04/11/19 0402)  Pulse: 71 (04/11/19 0500)  Resp: 18 (04/11/19 0402)  BP: (!) 170/85 (04/11/19 0402)  SpO2: 95 % (04/11/19 0402) Vital Signs (24h Range):  Temp:  [98.1 °F (36.7 °C)-98.5 °F (36.9 °C)] 98.5 °F (36.9 °C)  Pulse:  [63-82] 71  Resp:  [16-18] 18  SpO2:  [95 %-99 %] 95 %  BP: (129-170)/(70-85) 170/85     Weight: 55 kg (121 lb 4.1 oz)  Body mass index is 16.91 kg/m².    Physical Exam   Constitutional: He is oriented to person, place, and time. He appears well-developed. No distress.   Thin chronically ill-appearing male appears older than stated age.   HENT:   Head: Normocephalic and atraumatic.   Nose: Nose normal.   Eyes: Pupils are equal, round, and reactive to light. Conjunctivae and EOM are normal. No scleral icterus.   Neck: Normal range of motion. Neck supple. No tracheal deviation present.   Cardiovascular: Normal rate, regular rhythm, normal heart sounds and intact distal pulses.   No murmur heard.  Pulmonary/Chest: Effort normal and breath sounds normal. No stridor. No respiratory distress. He has no wheezes. He has no rales.   Abdominal: Soft. Bowel sounds are normal. He exhibits no distension. There is no tenderness. There is no guarding.   Thin   Genitourinary:   Genitourinary Comments: Check urine   Musculoskeletal: Normal range of motion. He exhibits no edema or deformity.   Generalized weakness   Neurological: He is alert and oriented to person, place, and time. No cranial nerve deficit.   Skin: Skin is warm and dry. Capillary refill takes 2 to 3 seconds. No rash noted. He is not diaphoretic.   Generalized bruising to extremities noted.   Psychiatric: He has a normal mood and  affect. His behavior is normal. Judgment and thought content normal.   Nursing note and vitals reviewed.        CRANIAL NERVES     CN III, IV, VI   Pupils are equal, round, and reactive to light.  Extraocular motions are normal.        Significant Labs: All pertinent labs within the past 24 hours have been reviewed.  Results for orders placed or performed during the hospital encounter of 04/10/19   CBC auto differential   Result Value Ref Range    WBC 4.05 3.90 - 12.70 K/uL    RBC 3.17 (L) 4.60 - 6.20 M/uL    Hemoglobin 9.7 (L) 14.0 - 18.0 g/dL    Hematocrit 28.4 (L) 40.0 - 54.0 %    MCV 90 82 - 98 fL    MCH 30.6 27.0 - 31.0 pg    MCHC 34.2 32.0 - 36.0 g/dL    RDW 14.9 (H) 11.5 - 14.5 %    Platelets 22 (LL) 150 - 350 K/uL    MPV SEE COMMENT 9.2 - 12.9 fL    Gran # (ANC) 3.6 1.8 - 7.7 K/uL    Lymph # 0.3 (L) 1.0 - 4.8 K/uL    Mono # 0.2 (L) 0.3 - 1.0 K/uL    Eos # 0.0 0.0 - 0.5 K/uL    Baso # 0.00 0.00 - 0.20 K/uL    Gran% 90.6 (H) 38.0 - 73.0 %    Lymph% 6.4 (L) 18.0 - 48.0 %    Mono% 3.7 (L) 4.0 - 15.0 %    Eosinophil% 0.0 0.0 - 8.0 %    Basophil% 0.0 0.0 - 1.9 %    Platelet Estimate Decreased (A)     Differential Method Automated    Comprehensive metabolic panel   Result Value Ref Range    Sodium 132 (L) 136 - 145 mmol/L    Potassium 3.6 3.5 - 5.1 mmol/L    Chloride 99 95 - 110 mmol/L    CO2 24 23 - 29 mmol/L    Glucose 136 (H) 70 - 110 mg/dL    BUN, Bld 52 (H) 8 - 23 mg/dL    Creatinine 1.5 (H) 0.5 - 1.4 mg/dL    Calcium 8.5 (L) 8.7 - 10.5 mg/dL    Total Protein 5.1 (L) 6.0 - 8.4 g/dL    Albumin 2.3 (L) 3.5 - 5.2 g/dL    Total Bilirubin 0.3 0.1 - 1.0 mg/dL    Alkaline Phosphatase 66 55 - 135 U/L    AST 6 (L) 10 - 40 U/L    ALT 23 10 - 44 U/L    Anion Gap 9 8 - 16 mmol/L    eGFR if African American 56 (A) >60 mL/min/1.73 m^2    eGFR if non African American 49 (A) >60 mL/min/1.73 m^2   Urinalysis, Reflex to Urine Culture Urine, Clean Catch   Result Value Ref Range    Specimen UA Urine, Clean Catch     Color, UA  Yellow Yellow, Straw, Nora    Appearance, UA Clear Clear    pH, UA 6.0 5.0 - 8.0    Specific Gravity, UA 1.015 1.005 - 1.030    Protein, UA Negative Negative    Glucose, UA Negative Negative    Ketones, UA Negative Negative    Bilirubin (UA) Negative Negative    Occult Blood UA Negative Negative    Nitrite, UA Negative Negative    Urobilinogen, UA Negative <2.0 EU/dL    Leukocytes, UA Negative Negative   Brain natriuretic peptide   Result Value Ref Range     (H) 0 - 99 pg/mL   CK   Result Value Ref Range    CPK 17 (L) 20 - 200 U/L   Troponin I   Result Value Ref Range    Troponin I 0.197 (H) 0.000 - 0.026 ng/mL   Magnesium   Result Value Ref Range    Magnesium 1.8 1.6 - 2.6 mg/dL   Phosphorus   Result Value Ref Range    Phosphorus 3.7 2.7 - 4.5 mg/dL       Significant Imaging: I have reviewed all pertinent imaging results/findings within the past 24 hours.   Imaging Results          X-Ray Chest AP Portable (Final result)  Result time 04/10/19 18:00:49    Final result by Deven Rodriguez MD (04/10/19 18:00:49)                 Impression:      Right upper lobe pneumonia.      Electronically signed by: Deven Rodriguez MD  Date:    04/10/2019  Time:    18:00             Narrative:    EXAMINATION:  XR CHEST AP PORTABLE    CLINICAL HISTORY:  weakness;    COMPARISON:  02/14/2019.    FINDINGS:  The heart is normal in size.  Interval development of a patchy right upper lobe infiltrate.  Left lung is clear.  The aorta is atherosclerotic.  Surgical clips in the soft tissues of the base of the neck and in the upper abdomen.                                  Assessment/Plan:     * Thrombocytopenia  Platelet count on arrival 22.  No gross active bleeding noted on exam.  Monitor CBC.  Steroids as recommended by heme/oncology   Further evaluation/diagnostics/interventions/consults pending course         Hypothyroidism  Previously TSH has been elevated recheck TSH, continue current Synthroid and adjust as needed pending  results.  Further evaluation/diagnostics/interventions/consults pending course \        Primary cancer of left lower lobe of lung  Hematology/oncology consulted by emergency room.  Consider re-consultation/outpatient follow-up pending course.  Follow-up outpatient with Rheumatology versus consulting Rheumatology pending course as well.      Hypertension  Continue home meds, monitor trends.  Further evaluation/diagnostics/interventions/consults pending course         Coronary artery disease  No coronary/cardiac complaint   Continue patient's home meds.  Further evaluation/diagnostics/interventions/consults pending course         COPD (chronic obstructive pulmonary disease)  Stable continue anticholinergic neb/ics  P.r.n. beta nebs      VTE Risk Mitigation (From admission, onward)        Ordered     IP VTE HIGH RISK PATIENT  Once      04/10/19 2059     Place sequential compression device  Until discontinued      04/10/19 2059     Place FCO hose  Until discontinued      04/10/19 2059     Reason for No Pharmacological VTE Prophylaxis  Once      04/10/19 2059     Place sequential compression device  Until discontinued      04/10/19 2055          **Portions of this note has been dictated using speech recognition software, M*Modal Fluency Direct; although, time has been taken to proof read and revise it may still contain misspellings, grammatical and or other errors.**             Mac Akhtar NP  Department of Hospital Medicine   [facility]

## 2019-04-11 NOTE — ASSESSMENT & PLAN NOTE
Hematology/oncology consulted by emergency room.  Consider re-consultation/outpatient follow-up pending course.  Follow-up outpatient with Rheumatology versus consulting Rheumatology pending course as well.

## 2019-04-11 NOTE — PROGRESS NOTES
[facility] Hospital Medicine  Progress Note    Patient Name: Wallace Vigil  MRN: [id number]  Patient Class: OP- Observation   Admission Date: 4/10/2019  Length of Stay: 0 days  Attending Physician: Gopal Ghosh MD  Primary Care Provider: Mike Recinos MD        Subjective:     Principal Problem:Thrombocytopenia    HPI:   Wallace Vigil is a 64 y.o. male patient with a PMHx of lung CA with mets to the brain, COPD, CKD, CAD, heart failure, PVD, RA who presents to the Emergency Department for abnormal labs. Pt was referred to the ED from OP clinic for low platelets.  No modifying factors.  Associated symptoms include fatigue, bruising and lower extremity/edema from steroid use.  Prior treatment include steroid use.  No further complaints or concerns.  Patient was evaluated in the emergency room platelet count of 22.  ER Discussed case with Dr. Mcleod (Hem/onc) who recommended dexamethasone 4mg PO qd for 4 days and admitting pt for observation overnight.   hospital Medicine consulted.  Patient placed in observation.             Hospital Course:  No notes on file    Interval History: Patient kept on OBS for thrombocytopenia under the care of Hospital Medicine. Platelets were 95 1 month ago and have dropped to 22 as of admit. Heme/onc was consulted and they have ordered CT of chest, abd, and pelvis today looking for metastasis. They are planning on dexamethazone 40mg daily X 4 days and are planning on a bone marrow biopsy during stay looking for cause of sudden drop. Patient is high risk for bleeding and will need emergent care if platelets drop further. Currently he denies bleeding, SOB, and Chest pain. Patient has hx of rheumatoid arthritis - low platelets might be autoimmune.    Review of Systems   Constitutional: Positive for fatigue. Negative for chills, diaphoresis and fever.   HENT: Negative for congestion, sore throat and voice change.    Eyes: Negative for photophobia and visual disturbance.    Respiratory: Negative for cough, shortness of breath, wheezing and stridor.    Cardiovascular: Positive for leg swelling. Negative for chest pain.   Gastrointestinal: Negative for abdominal distention, abdominal pain, constipation, diarrhea, nausea and vomiting.   Endocrine: Negative for polydipsia, polyphagia and polyuria.   Genitourinary: Negative for difficulty urinating, dysuria, flank pain, testicular pain and urgency.   Musculoskeletal: Negative for back pain, joint swelling, neck pain and neck stiffness.   Skin: Negative for color change and rash.   Allergic/Immunologic: Negative for immunocompromised state.   Neurological: Negative for dizziness, syncope, weakness, numbness and headaches.   Hematological: Bruises/bleeds easily.   Psychiatric/Behavioral: Negative for agitation, behavioral problems and confusion.     Objective:     Vital Signs (Most Recent):  Temp: 98.7 °F (37.1 °C) (04/11/19 1130)  Pulse: 65 (04/11/19 1130)  Resp: 16 (04/11/19 1130)  BP: (!) 141/67 (04/11/19 1130)  SpO2: 97 % (04/11/19 1130) Vital Signs (24h Range):  Temp:  [98.1 °F (36.7 °C)-98.7 °F (37.1 °C)] 98.7 °F (37.1 °C)  Pulse:  [63-85] 65  Resp:  [14-18] 16  SpO2:  [95 %-99 %] 97 %  BP: (129-170)/(67-85) 141/67     Weight: 55 kg (121 lb 4.1 oz)  Body mass index is 16.91 kg/m².    Intake/Output Summary (Last 24 hours) at 4/11/2019 1134  Last data filed at 4/11/2019 1000  Gross per 24 hour   Intake 1074 ml   Output 1200 ml   Net -126 ml      Physical Exam   Constitutional: He is oriented to person, place, and time. He appears well-developed. No distress.   Thin chronically ill-appearing male appears older than stated age.   HENT:   Head: Normocephalic and atraumatic.   Nose: Nose normal.   Eyes: Pupils are equal, round, and reactive to light. Conjunctivae and EOM are normal. No scleral icterus.   Neck: Normal range of motion. Neck supple. No tracheal deviation present.   Cardiovascular: Normal rate, regular rhythm, normal heart  sounds and intact distal pulses.   No murmur heard.  Pulmonary/Chest: Effort normal and breath sounds normal. No stridor. No respiratory distress. He has no wheezes. He has no rales.   Abdominal: Soft. Bowel sounds are normal. He exhibits no distension. There is no tenderness. There is no guarding.   Thin   Genitourinary:   Genitourinary Comments: Check urine   Musculoskeletal: Normal range of motion. He exhibits no edema or deformity.   Generalized weakness   Neurological: He is alert and oriented to person, place, and time. No cranial nerve deficit.   Skin: Skin is warm and dry. Capillary refill takes 2 to 3 seconds. No rash noted. He is not diaphoretic.   Generalized bruising to extremities noted.   Psychiatric: He has a normal mood and affect. His behavior is normal. Judgment and thought content normal.   Nursing note and vitals reviewed.      Significant Labs:   Recent Lab Results       04/11/19  0517   04/10/19  1819   04/10/19  1712        Albumin     2.3     Alkaline Phosphatase     66     ALT     23     Anion Gap 9   9     Appearance, UA   Clear       aPTT 35.1  Comment:  aPTT therapeutic range = 39-69 seconds         AST     6     Baso # 0.00   0.00     Basophil% 0.0   0.0     Bilirubin (UA)   Negative       Total Bilirubin     0.3  Comment:  For infants and newborns, interpretation of results should be based  on gestational age, weight and in agreement with clinical  observations.  Premature Infant recommended reference ranges:  Up to 24 hours.............<8.0 mg/dL  Up to 48 hours............<12.0 mg/dL  3-5 days..................<15.0 mg/dL  6-29 days.................<15.0 mg/dL       BNP     165  Comment:  Values of less than 100 pg/ml are consistent with non-CHF populations.     BUN, Bld 49   52     Calcium 8.7   8.5     Chloride 101   99     CO2 23   24     Color, UA   Yellow       CPK     17     Creatinine 1.4   1.5     Differential Method Automated   Automated     eGFR if  >60   56      eGFR if non  53  Comment:  Calculation used to obtain the estimated glomerular filtration  rate (eGFR) is the CKD-EPI equation.      49  Comment:  Calculation used to obtain the estimated glomerular filtration  rate (eGFR) is the CKD-EPI equation.        Eos # 0.0   0.0     Eosinophil% 0.0   0.0     Glucose 103   136     Glucose, UA   Negative       Gran # (ANC) 3.5   3.6     Gran% 88.8   90.6     Hematocrit 30.6   28.4     Hemoglobin 10.4   9.7     Coumadin Monitoring INR 0.9  Comment:  Coumadin Therapy:  2.0 - 3.0 for INR for all indicators except mechanical heart valves  and antiphospholipid syndromes which should use 2.5 - 3.5.           Ketones, UA   Negative       Leukocytes, UA   Negative       Lymph # 0.2   0.3     Lymph% 4.8   6.4     Magnesium     1.8     MCH 30.4   30.6     MCHC 34.0   34.2     MCV 90   90     Mono # 0.3   0.2     Mono% 6.4   3.7     MPV SEE COMMENT  Comment:  Result not available.   SEE COMMENT  Comment:  Result not available.     Nitrite, UA   Negative       Occult Blood UA   Negative       pH, UA   6.0       Phosphorus     3.7     Platelet Estimate Decreased   Decreased     Platelets 23  Comment:  PLATELETS  critical result(s) called and verbal readback obtained   from AMY OROZCO RN, 04/11/2019 07:12     22  Comment:  Plt critical result(s) called and verbal readback obtained from   Komal Brito RN, 04/10/2019 17:47       Potassium 4.0   3.6     Total Protein     5.1     Protein, UA   Negative  Comment:  Recommend a 24 hour urine protein or a urine   protein/creatinine ratio if globulin induced proteinuria is  clinically suspected.         Protime 10.3         RBC 3.42   3.17     RDW 14.8   14.9     Sodium 133   132     Specific [place], UA   1.015       Specimen UA   Urine, Clean Catch       Troponin I     0.197  Comment:  The reference interval for Troponin I represents the 99th percentile   cutoff   for our facility and is consistent with 3rd generation assay    performance.       TSH 2.568         Urobilinogen, UA   Negative       WBC 3.92   4.05         All pertinent labs within the past 24 hours have been reviewed.    Significant Imaging: I have reviewed all pertinent imaging results/findings within the past 24 hours.    Assessment/Plan:      * Thrombocytopenia  Platelet count on arrival 22.  No gross active bleeding noted on exam.  Monitor CBC.  Steroids as recommended by --Heme/onc following  --CT scan abd, pelvis, & chest pending  --monitor daily cbc  --monitor for bleeding        Secondary adenocarcinoma of brain  --Heme/onc following  --supportive care      Hypothyroidism  --continue levothyroxine   --TSH stable        Primary cancer of left lower lobe of lung  Hematology/oncology consulted by emergency room.  Consider re-consultation/outpatient follow-up pending course.  Follow-up outpatient with Rheumatology versus consulting Rheumatology pending course as well.      Cachexia  --boost plus TID  --registered dietitian consulted      Hypertension  --continue amlodipine, diltiazem, furosemide, clonidine, and hydralazine  --cardiac diet        Coronary artery disease  --pt not on statin  --cardiac diet  --hold daily ASA 2/2 platelet count        COPD (chronic obstructive pulmonary disease)  --scheduled budesonide, ipratropium via nebulizer  --PRN duonebs        VTE Risk Mitigation (From admission, onward)        Ordered     IP VTE HIGH RISK PATIENT  Once      04/10/19 2059     Place sequential compression device  Until discontinued      04/10/19 2059     Place FCO hose  Until discontinued      04/10/19 2059     Reason for No Pharmacological VTE Prophylaxis  Once      04/10/19 2059     Place sequential compression device  Until discontinued      04/10/19 2055              JANIA Devine  Department of Hospital Medicine   [facility]

## 2019-04-11 NOTE — CONSULTS
[facility]  Hematology/Oncology  Consult Note    Patient Name: Wallace Vigil  MRN: [id number]  Admission Date: 4/10/2019  Hospital Length of Stay: 0 days  Code Status: Full Code   Attending Provider: Gopal Ghosh MD  Consulting Provider: Karina Cai NP  Primary Care Physician: Mike Recinos MD  Principal Problem:Thrombocytopenia    Consults  Subjective:     HPI:  1. 64 y.o. male patient with a PMHx of Thyroid cancer (tx thyroidectomy 2017), presumed lung cancer (previously treated with SBRT left lung 50 Gy in 5 fractions--followed in Heme-Onc clinic by Dr. Ramires) with recent diagnosis of brain metastasis (completed radiation treatment 4/8/19 and taking Dexamethasone 4 mg PO TID at home), COPD, CKD, CAD, heart failure, PVD, RA who presented to the Emergency Department as recommended by Dr. Christianson following outpatient office visit for abnormal labs.  Associated symptoms include fatigue, bruising and lower extremity/edema    Most recent PET 3/2019 did not show any clear evidence of systemic recurrence of malignancy.     ED workup revealed Hg 9.7, platelet count 22. Patient's baseline platelet count near 100 range    Heme-Onc consulted for further evaluation and management of thrombocytopenia               Interval History: Patient upset upon entry into room. Reports plans to leave hospital. After speaking with patient regarding his current clinical situation and desired workup. Patient agrees to workup  Oncology Treatment Plan:   [No treatment plan]    Medications:  Continuous Infusions:  Scheduled Meds:   sodium chloride 0.9%   Intravenous Once    amLODIPine  10 mg Oral Daily    budesonide  0.5 mg Nebulization Q12H    cloNIDine  0.1 mg Oral BID    dexamethasone  36 mg Oral Once    [START ON 4/12/2019] dexamethasone  40 mg Oral Daily    diltiaZEM  240 mg Oral Daily    doxazosin  4 mg Oral QHS    folic acid-vit B6-vit B12 2.5-25-2 mg  1 tablet Oral Daily    furosemide  20 mg Oral Daily     hydrALAZINE  100 mg Oral TID    ipratropium  0.5 mg Nebulization Q12H    levETIRAcetam  500 mg Oral BID    levothyroxine  137 mcg Oral Before breakfast    multivitamin  1 tablet Oral Daily    pantoprazole  40 mg Oral Daily    thiamine  100 mg Oral Daily     PRN Meds:acetaminophen, albuterol-ipratropium, ALPRAZolam, dextrose 50%, dextrose 50%, glucagon (human recombinant), glucose, glucose, influenza, ondansetron, pneumoc 13-kayla conj-dip cr(PF), promethazine (PHENERGAN) IVPB, sodium chloride 0.9%, sodium chloride 0.9%     Review of patient's allergies indicates:   Allergen Reactions    Levofloxacin Other (See Comments)     Stomach pains    Lisinopril Swelling    Valsartan Swelling        Past Medical History:   Diagnosis Date    Anxiety     Arthritis     Asthma     Atherosclerosis of native arteries of the extremities with intermittent claudication     Back pain     Benign hypertensive heart disease without heart failure     Cancer     Carotid artery occlusion     Chronic kidney disease     COPD (chronic obstructive pulmonary disease)     Coronary artery disease     Emphysema of lung     Encounter for blood transfusion     GERD (gastroesophageal reflux disease)     Heart failure     History of aorto-femoral bypass 8/16/2013    Hyperlipidemia     Hypertension     Hypothyroidism     Iron deficiency anemia due to chronic blood loss 6/7/2018    PVD (peripheral vascular disease)     PVD (peripheral vascular disease) 8/16/2013    Renal artery stenosis 8/16/2013    Renovascular hypertension 8/16/2013    Rheumatoid arthritis     Shingles sept 2015    Stenosis of left subclavian artery 8/16/2013    Thyroid cancer     Thyroid disease     Thyroid cancer 3/1/2017    Tobacco abuse     Tobacco dependence     Trouble in sleeping      Past Surgical History:   Procedure Laterality Date    CARDIAC CATHETERIZATION      RENAL ARTERY BYPASS      2012    THYROIDECTOMY      VASCULAR SURGERY       Carotid cleaned out      Family History     Problem Relation (Age of Onset)    COPD Father    Diabetes Daughter, Daughter    Heart disease Mother    Heart failure Mother    Hyperlipidemia Mother, Father    Hypertension Mother        Tobacco Use    Smoking status: Current Every Day Smoker     Packs/day: 1.00     Years: 50.00     Pack years: 50.00     Types: Cigarettes    Smokeless tobacco: Never Used   Substance and Sexual Activity    Alcohol use: No    Drug use: No    Sexual activity: Not Currently     Partners: Female     Birth control/protection: None       Review of Systems   Constitutional: Positive for fatigue. Negative for activity change, appetite change, chills, diaphoresis, fever and unexpected weight change.   HENT: Negative for congestion, mouth sores, nosebleeds, sore throat, trouble swallowing and voice change.    Eyes: Negative for photophobia and visual disturbance.   Respiratory: Negative for cough, choking, chest tightness, shortness of breath and wheezing.    Cardiovascular: Positive for leg swelling. Negative for chest pain and palpitations.   Gastrointestinal: Negative for abdominal distention, abdominal pain, blood in stool, constipation, diarrhea, nausea and vomiting.   Genitourinary: Negative for difficulty urinating, dysuria and hematuria.   Musculoskeletal: Negative for arthralgias, back pain and myalgias.   Skin: Negative for pallor, rash and wound.   Allergic/Immunologic: Positive for immunocompromised state.   Neurological: Negative for dizziness, syncope, weakness and headaches.   Hematological: Negative for adenopathy. Bruises/bleeds easily.   Psychiatric/Behavioral: The patient is nervous/anxious.      Objective:     Vital Signs (Most Recent):  Temp: 98.7 °F (37.1 °C) (04/11/19 1130)  Pulse: 65 (04/11/19 1130)  Resp: 16 (04/11/19 1130)  BP: (!) 141/67 (04/11/19 1130)  SpO2: 97 % (04/11/19 1130) Vital Signs (24h Range):  Temp:  [98.1 °F (36.7 °C)-98.7 °F (37.1 °C)] 98.7 °F  (37.1 °C)  Pulse:  [63-85] 65  Resp:  [14-18] 16  SpO2:  [95 %-99 %] 97 %  BP: (129-170)/(67-85) 141/67     Weight: 55 kg (121 lb 4.1 oz)  Body mass index is 16.91 kg/m².  Body surface area is 1.66 meters squared.      Intake/Output Summary (Last 24 hours) at 4/11/2019 1353  Last data filed at 4/11/2019 1300  Gross per 24 hour   Intake 1854 ml   Output 1500 ml   Net 354 ml       Physical Exam   Constitutional: He is oriented to person, place, and time. He appears well-developed. He appears cachectic. He has a sickly appearance. He appears ill. He appears distressed.   HENT:   Head: Normocephalic.   Right Ear: Hearing normal. No drainage.   Left Ear: Hearing normal. No drainage.   Nose: Nose normal.   Mouth/Throat: Oropharynx is clear and moist.   Eyes: Conjunctivae, EOM and lids are normal. Right eye exhibits no discharge. Left eye exhibits no discharge. No scleral icterus.   Neck: Normal range of motion. No thyroid mass present.   Cardiovascular: Normal rate, regular rhythm and normal heart sounds.   No murmur heard.  Pulmonary/Chest: Effort normal. No respiratory distress. He has decreased breath sounds. He has no wheezes. He has no rhonchi. He has no rales.   Abdominal: Soft. Bowel sounds are normal. He exhibits no distension. There is no tenderness.   Genitourinary:   Genitourinary Comments: deferred   Musculoskeletal: Normal range of motion. He exhibits edema and tenderness.   Bilateral ankle and foot edema   Lymphadenopathy:        Head (right side): No submandibular, no preauricular and no posterior auricular adenopathy present.        Head (left side): No submandibular, no preauricular and no posterior auricular adenopathy present.        Right cervical: No superficial cervical adenopathy present.       Left cervical: No superficial cervical adenopathy present.   Neurological: He is alert and oriented to person, place, and time.   Skin: Skin is warm, dry and intact.   Psychiatric: His speech is normal.  Thought content normal. His mood appears anxious. His affect is angry. He is agitated.   Vitals reviewed.      Significant Labs:   CBC:   Recent Labs   Lab 04/10/19  1712 04/11/19  0517   WBC 4.05 3.92   HGB 9.7* 10.4*   HCT 28.4* 30.6*   PLT 22* 23*   , CMP:   Recent Labs   Lab 04/10/19  1712 04/11/19  0517   * 133*   K 3.6 4.0   CL 99 101   CO2 24 23   * 103   BUN 52* 49*   CREATININE 1.5* 1.4   CALCIUM 8.5* 8.7   PROT 5.1*  --    ALBUMIN 2.3*  --    BILITOT 0.3  --    ALKPHOS 66  --    AST 6*  --    ALT 23  --    ANIONGAP 9 9   EGFRNONAA 49* 53*    and All pertinent labs from the last 24 hours have been reviewed.    Diagnostic Results:  I have reviewed all pertinent imaging results/findings within the past 24 hours.    Assessment/Plan:     * Thrombocytopenia  April 11, 2019  --differential dx include ITP vs bone marrow malignancy. Pneumonia likely a contributing factor but not to this degree  --Platelet count 23. No urgent need for platelet transfusion. Transfuse if platelet <10 or S&S bleeding  --Check LDH, Haptoglobin  --Dexamethasone 40 mg PO daily x 4 days. Monitor Cbc daily for response  --CT chest/abdomen/pelvis w/o contrast. Patient refusing CT with contrast.   --Spoke with Radiology regarding BMBx tomorrow. Unable to give time but supposed to call back. Would like patient to be NPO after midnight  --Follow up BMBx  --Daily CBC  --Continue supportive care    Secondary adenocarcinoma of brain  Patient is s/p radiation for brain mets.    Primary cancer of left lower lobe of lung  April 11, 2019  --Patient completed SBRT left lung 50 Gy in 5 fractions 7/20/18  --Most recent PET CT performed on 3/2019 no evidence of recurrence  --s/p radiation treatment for brain metastasis noted on MRI brain 3/2019. Patient completed radiation 4/8/2019. Planned outpatient f/u with Rad/Onc 4/25/19 for 2 week post radiation f/u  --CT Chest/Abdomen/Pelvis today to r/o metastatic disease          Thank you for your  consult. I will follow-up with patient. Please contact us if you have any additional questions.    Karina Cai NP  Hematology/Oncology  [facility]

## 2019-04-11 NOTE — NURSING
Patient refuses to take medications provided by hospital. Patient has his own home meds at bedside and refuses to give them to staff for safe keeping or verification and refuses to send them home. Patient states he will take his own home meds just like he takes them at home. I brought his AM meds to him and he  Stated he already took his own. I verified each individual  ordered AM meds prescribed here with the patient and he stated he took the same ones excluding the protonix and aspirin. He also states he does not take keppra. MD informed.

## 2019-04-12 NOTE — HOSPITAL COURSE
Patient kept on OBS for thrombocytopenia under the care of Hospital Medicine. Platelets were 95 1 month ago and have dropped to 22 as of admit. Heme/onc was consulted and they have ordered CT of chest, abd, and pelvis today looking for metastasis. They are planning on dexamethazone 40mg daily X 4 days and are planning on a bone marrow biopsy during stay looking for cause of sudden drop. Patient is high risk for bleeding and will need emergent care if platelets drop further. Currently he denies bleeding, SOB, and Chest pain. Patient has hx of rheumatoid arthritis - low platelets might be autoimmune.  As of 04/12: Patient to have bone marrow biopsy today. Platelets 24. Patient is agitated today but has also been NPO since midnight and is a smoker, who has not had a cigarette, and has mets to brain. Heme/onc following - CT chest, abd, pelvis showed new masses. Heme/onc to f/u OP for tx. Platelets remain low - will recheck CBC in AM.    As of 4/13 patient is s/p bone marrow biopsy on 4/12 with results pending.  Platelets remain low at 22, currently receiving po Dexamethasone per Hemoc recs.  He c/o worsening edema and pain to left foot and left calf.  + ecchymosis and petechiae noted with tenderness to touch.  Xray of left foot yesterday negative for acute findings.  US pending to r/o acute process.  Will need to consider CT versus MRI pending US results given concern for bleeding.  Sinus Tachycardia noted and could be r/t Dexamethasone versus not taking home Diltiazem given patient will not allow nursing staff to administer medications, versus pain.  TSH normal.  May need to r/o PE depending on US results.  Long discussion held with patient today regarding the need for increased cooperation with treatment plan, and to allow proper medication administration via nursing staff.  As of now, patient agrees.  CXR on admit showed RUL PNA, however CT showed 2.9 x 1.8 cm mass in the anterior right upper lobe and no PNA.  Will  "transition to inpatient for continued care.     As of yesterday afternoon LLE US negative for DVT.  Given persistent tachycardia with SaO2 of 84% on RA noted, STAT CTA chest was ordered to r/o PE, however, patient refused test.  At this time patient was AAOx3 and answering questions appropriately.  Code status d/w patient in detail with primary nurse Oni and patient's wife and brother present.  Per patient's wishes he is a DNR stating "just let me go peacefully".  Patient also stated "I'm tired of all these tests and don't want to do them".  After much encouragement, patient ultimately agreed to a VQ scan, but shortly thereafter he had an acute worsening of respiratory status with desaturation in SaO2 to the 70 range on RA.  Patient was subsequently placed on 100% NRB with improvement in SaO2 to 95%.  Given acute worsening, Dr. Funk was called to BS, and confirmed DNR status with patient's wife.  All questions answered in detail and family agreed to proceed with comfort measures.  Dr. Falcon updated and agreed with DNR status given metastatic cancer with poor prognosis.  Norco ordered for comfort and Xanax prn agitation.  At 2147 night physician Dr. Rodriguez was called to patient's bedside after being notified by primary nurse that patient was asystolic with no spontaneous respirations noted.  Per documentation from Dr. Rodriguez, patient was seen and examined.  Heart and lung sounds are absent.  No spontaneous cardiac or respiratory activity.  Patient does not respond to verbal or painful stimuli.  Corneal, pupillary, and gag reflexes are absent.  Pupils are fixed and dilated.  Rhythm strip showed asystole.  Family was present in room.  Time of death is 21:47 on April 13, 2019.                "

## 2019-04-12 NOTE — PROGRESS NOTES
[facility] -   Hematology/Oncology  Progress Note    Patient Name: Wallace Vigil  Admission Date: 4/10/2019  Hospital Length of Stay: 0 days  Code Status: Full Code     Subjective:     HPI:  1. 64 y.o. male patient with a PMHx of Thyroid cancer (tx thyroidectomy 2017), presumed lung cancer (previously treated with SBRT left lung 50 Gy in 5 fractions--followed in Heme-Onc clinic by Dr. Ramires) with recent diagnosis of brain metastasis (completed radiation treatment 4/8/19 and taking Dexamethasone 4 mg PO TID at home), COPD, CKD, CAD, heart failure, PVD, RA who presented to the Emergency Department as recommended by Dr. Christianson following outpatient office visit for abnormal labs.  Associated symptoms include fatigue, bruising and lower extremity/edema    Most recent PET 3/2019 did not show any clear evidence of systemic recurrence of malignancy.     ED workup revealed Hg 9.7, platelet count 22. Patient's baseline platelet count near 100 range    Heme-Onc consulted for further evaluation and management of thrombocytopenia               Interval History: No acute events overnight. Remains thrombocytopenic with little improvement. Scheduled to have BMBx today.  CT Chest/Abdomen/Pelvis reveal new mass not present on previous CT chest.    Oncology Treatment Plan:   [No treatment plan]    Medications:  Continuous Infusions:  Scheduled Meds:   amLODIPine  10 mg Oral Daily    budesonide  0.5 mg Nebulization Q12H    cloNIDine  0.1 mg Oral BID    dexamethasone  40 mg Oral Daily    diltiaZEM  240 mg Oral Daily    doxazosin  4 mg Oral QHS    folic acid-vit B6-vit B12 2.5-25-2 mg  1 tablet Oral Daily    furosemide  20 mg Oral Daily    hydrALAZINE  100 mg Oral TID    ipratropium  0.5 mg Nebulization Q12H    levETIRAcetam  500 mg Oral BID    levothyroxine  137 mcg Oral Before breakfast    multivitamin  1 tablet Oral Daily    pantoprazole  40 mg Oral Daily    thiamine  100 mg Oral Daily     PRN  Meds:acetaminophen, albuterol-ipratropium, ALPRAZolam, bisacodyl, dextrose 50%, dextrose 50%, glucagon (human recombinant), glucose, glucose, hydrALAZINE, influenza, ondansetron, pneumoc 13-kayla conj-dip cr(PF), promethazine (PHENERGAN) IVPB, sodium chloride 0.9%, sodium chloride 0.9%     Review of patient's allergies indicates:   Allergen Reactions    Levofloxacin Other (See Comments)     Stomach pains    Lisinopril Swelling    Valsartan Swelling        Past Medical History:   Diagnosis Date    Anxiety     Arthritis     Asthma     Atherosclerosis of native arteries of the extremities with intermittent claudication     Back pain     Benign hypertensive heart disease without heart failure     Cancer     Carotid artery occlusion     Chronic kidney disease     COPD (chronic obstructive pulmonary disease)     Coronary artery disease     Emphysema of lung     Encounter for blood transfusion     GERD (gastroesophageal reflux disease)     Heart failure     History of aorto-femoral bypass 8/16/2013    Hyperlipidemia     Hypertension     Hypothyroidism     Iron deficiency anemia due to chronic blood loss 6/7/2018    PVD (peripheral vascular disease)     PVD (peripheral vascular disease) 8/16/2013    Renal artery stenosis 8/16/2013    Renovascular hypertension 8/16/2013    Rheumatoid arthritis     Shingles sept 2015    Stenosis of left subclavian artery 8/16/2013    Thyroid cancer     Thyroid disease     Thyroid cancer 3/1/2017    Tobacco abuse     Tobacco dependence     Trouble in sleeping      Past Surgical History:   Procedure Laterality Date    CARDIAC CATHETERIZATION      RENAL ARTERY BYPASS      2012    THYROIDECTOMY      VASCULAR SURGERY      Carotid cleaned out      Family History     Problem Relation (Age of Onset)    COPD Father    Diabetes Daughter, Daughter    Heart disease Mother    Heart failure Mother    Hyperlipidemia Mother, Father    Hypertension Mother        Tobacco  Use    Smoking status: Current Every Day Smoker     Packs/day: 1.00     Years: 50.00     Pack years: 50.00     Types: Cigarettes    Smokeless tobacco: Never Used   Substance and Sexual Activity    Alcohol use: No    Drug use: No    Sexual activity: Not Currently     Partners: Female     Birth control/protection: None       Review of Systems   Constitutional: Positive for fatigue. Negative for activity change, appetite change, chills, diaphoresis, fever and unexpected weight change.   HENT: Negative for congestion, mouth sores, nosebleeds, sore throat, trouble swallowing and voice change.    Eyes: Negative for photophobia and visual disturbance.   Respiratory: Negative for cough, choking, chest tightness, shortness of breath and wheezing.    Cardiovascular: Positive for leg swelling. Negative for chest pain and palpitations.   Gastrointestinal: Negative for abdominal distention, abdominal pain, blood in stool, constipation, diarrhea, nausea and vomiting.   Genitourinary: Negative for difficulty urinating, dysuria and hematuria.   Musculoskeletal: Negative for arthralgias, back pain and myalgias.   Skin: Negative for pallor, rash and wound.   Allergic/Immunologic: Positive for immunocompromised state.   Neurological: Negative for dizziness, syncope, weakness and headaches.   Hematological: Negative for adenopathy. Bruises/bleeds easily.   Psychiatric/Behavioral: The patient is nervous/anxious.      Objective:     Vital Signs (Most Recent):  Temp: 98.4 °F (36.9 °C) (04/12/19 1146)  Pulse: 72 (04/12/19 1146)  Resp: 18 (04/12/19 1146)  BP: (!) 159/75 (04/12/19 1146)  SpO2: 95 % (04/12/19 1146) Vital Signs (24h Range):  Temp:  [98.1 °F (36.7 °C)-98.9 °F (37.2 °C)] 98.4 °F (36.9 °C)  Pulse:  [71-87] 72  Resp:  [16-18] 18  SpO2:  [94 %-98 %] 95 %  BP: (146-189)/(70-89) 159/75     Weight: 54.5 kg (120 lb 2.4 oz)  Body mass index is 16.76 kg/m².  Body surface area is 1.65 meters squared.      Intake/Output Summary (Last  24 hours) at 4/12/2019 1305  Last data filed at 4/12/2019 1024  Gross per 24 hour   Intake 958 ml   Output 800 ml   Net 158 ml       Physical Exam   Constitutional: He is oriented to person, place, and time. He appears well-developed. He appears cachectic. He has a sickly appearance. He appears ill. He appears distressed.   HENT:   Head: Normocephalic.   Right Ear: Hearing normal. No drainage.   Left Ear: Hearing normal. No drainage.   Nose: Nose normal.   Mouth/Throat: Oropharynx is clear and moist.   Eyes: Conjunctivae, EOM and lids are normal. Right eye exhibits no discharge. Left eye exhibits no discharge. No scleral icterus.   Neck: Normal range of motion. No thyroid mass present.   Cardiovascular: Normal rate, regular rhythm and normal heart sounds.   No murmur heard.  Pulmonary/Chest: Effort normal. No respiratory distress. He has decreased breath sounds. He has no wheezes. He has no rhonchi. He has no rales.   Abdominal: Soft. Bowel sounds are normal. He exhibits no distension. There is no tenderness.   Genitourinary:   Genitourinary Comments: deferred   Musculoskeletal: Normal range of motion. He exhibits edema and tenderness.   Bilateral ankle and foot edema   Lymphadenopathy:        Head (right side): No submandibular, no preauricular and no posterior auricular adenopathy present.        Head (left side): No submandibular, no preauricular and no posterior auricular adenopathy present.        Right cervical: No superficial cervical adenopathy present.       Left cervical: No superficial cervical adenopathy present.   Neurological: He is alert and oriented to person, place, and time.   Skin: Skin is warm, dry and intact.   Psychiatric: His speech is normal and behavior is normal. Thought content normal. His mood appears anxious. His affect is not angry. He is not agitated.   Vitals reviewed.      Significant Labs:   CBC:   Recent Labs   Lab 04/10/19  1712 04/11/19  0517 04/12/19  0436   WBC 4.05 3.92 4.24    HGB 9.7* 10.4* 9.7*   HCT 28.4* 30.6* 28.8*   PLT 22* 23* 24*   , CMP:   Recent Labs   Lab 04/10/19  1712 04/11/19  0517 04/12/19  0435   * 133* 134*   K 3.6 4.0 3.9   CL 99 101 101   CO2 24 23 24   * 103 154*   BUN 52* 49* 52*   CREATININE 1.5* 1.4 1.4   CALCIUM 8.5* 8.7 8.8   PROT 5.1*  --   --    ALBUMIN 2.3*  --   --    BILITOT 0.3  --   --    ALKPHOS 66  --   --    AST 6*  --   --    ALT 23  --   --    ANIONGAP 9 9 9   EGFRNONAA 49* 53* 53*    and All pertinent labs from the last 24 hours have been reviewed.    Diagnostic Results:  I have reviewed all pertinent imaging results/findings within the past 24 hours.    Assessment/Plan:     * Thrombocytopenia  April 12, 2019  --differential dx include ITP vs bone marrow malignancy. Pneumonia likely a contributing factor but not to this degree  --Platelet count 24. No urgent need for platelet transfusion. Transfuse if platelet <10 or S&S bleeding  -- LDH, Haptoglobin normal  --Dexamethasone 40 mg PO daily x 4 days. Monitor Cbc daily for response  --CT chest/abdomen/pelvis w/o contrast revealed new masses. Will have further evaluation and management of this outpatient  --BMBx today. Follow results  --Daily CBC  --Continue supportive care    Secondary adenocarcinoma of brain  Patient is s/p radiation for brain mets.    Primary cancer of left lower lobe of lung  April 12, 2019  --Patient completed SBRT left lung 50 Gy in 5 fractions 7/20/18  --Most recent PET CT performed on 3/2019 no evidence of recurrence  --s/p radiation treatment for brain metastasis noted on MRI brain 3/2019. Patient completed radiation 4/8/2019. Planned outpatient f/u with Rad/Onc 4/25/19 for 2 week post radiation f/u  --CT Chest/Abdomen/Pelvis did reveal new masses not noted on previous CT. Will have outpatient follow up with Dr. Ramires for further evaluation and management   --BMBx today for further evaluation of thrombocytopenia.           Thank you for your consult. I will  follow-up with patient. Please contact us if you have any additional questions.     Karina Cai NP  Hematology/Oncology  [facility]

## 2019-04-12 NOTE — ASSESSMENT & PLAN NOTE
--Heme/onc following  --supplemental oxygen to maintain sats >92%  --budesonide, duonebs, and ipratropium via nebulizer

## 2019-04-12 NOTE — SEDATION DOCUMENTATION
Pt lying on ct table left side down with bilateral arms across chest. Pt vss, cardiac monitoring in place. Pt verbalized understanding and all questions answered

## 2019-04-12 NOTE — ASSESSMENT & PLAN NOTE
--continue amlodipine, diltiazem, furosemide, clonidine, and hydralazine  --cardiac diet  --patient refusing medications at present

## 2019-04-12 NOTE — INTERVAL H&P NOTE
The patient has been examined and the H&P has been reviewed:    I concur with the findings and no changes have occurred since H&P was written.        Active Hospital Problems    Diagnosis  POA    *Thrombocytopenia [D69.6]  Yes    Severe malnutrition [E43]  Unknown    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]  Yes    Hypothyroidism [E03.9]  Yes     Thyroid cancer 3/1/2017      Primary cancer of left lower lobe of lung [C34.32]  Yes    Cachexia [R64]  Yes    Hypertension [I10]  Yes    Coronary artery disease [I25.10]  Yes    COPD (chronic obstructive pulmonary disease) [J44.9]  Yes      Resolved Hospital Problems   No resolved problems to display.

## 2019-04-12 NOTE — ASSESSMENT & PLAN NOTE
Platelet count on arrival 22.  No gross active bleeding noted on exam.  Monitor CBC.  Steroids as recommended by --Heme/onc following  --will take PO Dexamethasone 40mg daily X 4 days  --CT scan abd, pelvis, & chest show new masses - will f/u OP with Heme/onc  --had bone marrow bx today.  --monitor daily cbc  --monitor for bleeding

## 2019-04-12 NOTE — SEDATION DOCUMENTATION
Procedure complete. Pt tolerated well, vss. Manual pressure held and bandaid placed to healthy site.

## 2019-04-12 NOTE — SUBJECTIVE & OBJECTIVE
Interval History: Patient to have bone marrow biopsy today. Platelets 24 today. Patient is agitated today but has also been NPO since midnight. Heme/onc following - CT chest, abd, pelvis showed new masses. Heme/onc to f/u OP for tx. Platelets remain low - will recheck CBC in AM.    Review of Systems   Constitutional: Positive for fatigue. Negative for chills, diaphoresis and fever.   HENT: Negative for congestion, sore throat and voice change.    Eyes: Negative for photophobia and visual disturbance.   Respiratory: Negative for cough, shortness of breath, wheezing and stridor.    Cardiovascular: Positive for leg swelling. Negative for chest pain.   Gastrointestinal: Negative for abdominal distention, abdominal pain, constipation, diarrhea, nausea and vomiting.   Endocrine: Negative for polydipsia, polyphagia and polyuria.   Genitourinary: Negative for difficulty urinating, dysuria, flank pain, testicular pain and urgency.   Musculoskeletal: Negative for back pain, joint swelling, neck pain and neck stiffness.   Skin: Negative for color change and rash.   Allergic/Immunologic: Negative for immunocompromised state.   Neurological: Negative for dizziness, syncope, weakness, numbness and headaches.   Hematological: Bruises/bleeds easily.   Psychiatric/Behavioral: Positive for agitation. Negative for behavioral problems and confusion. The patient is nervous/anxious.      Objective:     Vital Signs (Most Recent):  Temp: 98.4 °F (36.9 °C) (04/12/19 1146)  Pulse: 72 (04/12/19 1146)  Resp: 18 (04/12/19 1146)  BP: (!) 159/75 (04/12/19 1146)  SpO2: 95 % (04/12/19 1146) Vital Signs (24h Range):  Temp:  [98.1 °F (36.7 °C)-98.9 °F (37.2 °C)] 98.4 °F (36.9 °C)  Pulse:  [71-87] 72  Resp:  [16-18] 18  SpO2:  [94 %-98 %] 95 %  BP: (146-189)/(70-89) 159/75     Weight: 54.5 kg (120 lb 2.4 oz)  Body mass index is 16.76 kg/m².    Intake/Output Summary (Last 24 hours) at 4/12/2019 1203  Last data filed at 4/12/2019 1024  Gross per 24 hour    Intake 1318 ml   Output 1100 ml   Net 218 ml      Physical Exam   Constitutional: He is oriented to person, place, and time. He appears well-developed. No distress.   Thin chronically ill-appearing male appears older than stated age.   HENT:   Head: Normocephalic and atraumatic.   Nose: Nose normal.   Eyes: Pupils are equal, round, and reactive to light. Conjunctivae and EOM are normal. No scleral icterus.   Neck: Normal range of motion. Neck supple. No tracheal deviation present.   Cardiovascular: Normal rate, regular rhythm, normal heart sounds and intact distal pulses.   No murmur heard.  Pulmonary/Chest: Effort normal and breath sounds normal. No stridor. No respiratory distress. He has no wheezes. He has no rales.   Abdominal: Soft. Bowel sounds are normal. He exhibits no distension. There is no tenderness. There is no guarding.   Thin   Genitourinary:   Genitourinary Comments: Check urine   Musculoskeletal: Normal range of motion. He exhibits no edema or deformity.   Generalized weakness   Neurological: He is alert and oriented to person, place, and time. No cranial nerve deficit.   Skin: Skin is warm and dry. Capillary refill takes 2 to 3 seconds. No rash noted. He is not diaphoretic.   Generalized bruising to extremities noted.   Psychiatric: He has a normal mood and affect. His behavior is normal. Judgment and thought content normal.   Nursing note and vitals reviewed.      Significant Labs:   Recent Lab Results       04/12/19  0436   04/12/19  0435   04/11/19  1358        Anion Gap   9       Baso # 0.00         Basophil% 0.0         BUN, Bld   52       Calcium   8.8       Chloride   101       CO2   24       Creatinine   1.4       Differential Method Automated         eGFR if    >60       eGFR if non    53  Comment:  Calculation used to obtain the estimated glomerular filtration  rate (eGFR) is the CKD-EPI equation.          Eos # 0.0         Eosinophil% 0.0         Glucose    154       Gran # (ANC) 3.8         Gran% 88.5         Haptoglobin     149     Hematocrit 28.8         Hemoglobin 9.7         LD     232  Comment:  Results are increased in hemolyzed samples.     Lymph # 0.3         Lymph% 6.1         MCH 30.3         MCHC 33.7         MCV 90         Mono # 0.2         Mono% 5.4         MPV SEE COMMENT  Comment:  Result not available.         Platelet Estimate Decreased         Platelets 24  Comment:  Results confirmed, test repeated  PLATELETS  critical result(s) called and verbal readback obtained   from CATY WEST RN, 04/12/2019 07:15           Potassium   3.9       RBC 3.20         RDW 14.7         Sodium   134       WBC 4.24             All pertinent labs within the past 24 hours have been reviewed.    Significant Imaging: I have reviewed all pertinent imaging results/findings within the past 24 hours.

## 2019-04-12 NOTE — PROGRESS NOTES
[facility] Hospital Medicine  Progress Note    Patient Name: Wallace Vigil  MRN: [id number]  Patient Class: OP- Observation   Admission Date: 4/10/2019  Length of Stay: 0 days  Attending Physician: Gopal Ghosh MD  Primary Care Provider: Mkie Recinos MD        Subjective:     Principal Problem:Thrombocytopenia    HPI:   Wallace Vigil is a 64 y.o. male patient with a PMHx of lung CA with mets to the brain, COPD, CKD, CAD, heart failure, PVD, RA who presents to the Emergency Department for abnormal labs. Pt was referred to the ED from OP clinic for low platelets.  No modifying factors.  Associated symptoms include fatigue, bruising and lower extremity/edema from steroid use.  Prior treatment include steroid use.  No further complaints or concerns.  Patient was evaluated in the emergency room platelet count of 22.  ER Discussed case with Dr. Mcleod (Hem/onc) who recommended dexamethasone 4mg PO qd for 4 days and admitting pt for observation overnight.   hospital Medicine consulted.  Patient placed in observation.             Hospital Course:  Patient kept on OBS for thrombocytopenia under the care of Hospital Medicine. Platelets were 95 1 month ago and have dropped to 22 as of admit. Heme/onc was consulted and they have ordered CT of chest, abd, and pelvis today looking for metastasis. They are planning on dexamethazone 40mg daily X 4 days and are planning on a bone marrow biopsy during stay looking for cause of sudden drop. Patient is high risk for bleeding and will need emergent care if platelets drop further. Currently he denies bleeding, SOB, and Chest pain. Patient has hx of rheumatoid arthritis - low platelets might be autoimmune.    Interval History: Patient to have bone marrow biopsy today. Platelets 24 today. Patient is agitated today but has also been NPO since midnight. Heme/onc following - CT chest, abd, pelvis showed new masses. Heme/onc to f/u OP for tx. Platelets remain low - will  recheck CBC in AM.    Review of Systems   Constitutional: Positive for fatigue. Negative for chills, diaphoresis and fever.   HENT: Negative for congestion, sore throat and voice change.    Eyes: Negative for photophobia and visual disturbance.   Respiratory: Negative for cough, shortness of breath, wheezing and stridor.    Cardiovascular: Positive for leg swelling. Negative for chest pain.   Gastrointestinal: Negative for abdominal distention, abdominal pain, constipation, diarrhea, nausea and vomiting.   Endocrine: Negative for polydipsia, polyphagia and polyuria.   Genitourinary: Negative for difficulty urinating, dysuria, flank pain, testicular pain and urgency.   Musculoskeletal: Negative for back pain, joint swelling, neck pain and neck stiffness.   Skin: Negative for color change and rash.   Allergic/Immunologic: Negative for immunocompromised state.   Neurological: Negative for dizziness, syncope, weakness, numbness and headaches.   Hematological: Bruises/bleeds easily.   Psychiatric/Behavioral: Positive for agitation. Negative for behavioral problems and confusion. The patient is nervous/anxious.      Objective:     Vital Signs (Most Recent):  Temp: 98.4 °F (36.9 °C) (04/12/19 1146)  Pulse: 72 (04/12/19 1146)  Resp: 18 (04/12/19 1146)  BP: (!) 159/75 (04/12/19 1146)  SpO2: 95 % (04/12/19 1146) Vital Signs (24h Range):  Temp:  [98.1 °F (36.7 °C)-98.9 °F (37.2 °C)] 98.4 °F (36.9 °C)  Pulse:  [71-87] 72  Resp:  [16-18] 18  SpO2:  [94 %-98 %] 95 %  BP: (146-189)/(70-89) 159/75     Weight: 54.5 kg (120 lb 2.4 oz)  Body mass index is 16.76 kg/m².    Intake/Output Summary (Last 24 hours) at 4/12/2019 1203  Last data filed at 4/12/2019 1024  Gross per 24 hour   Intake 1318 ml   Output 1100 ml   Net 218 ml      Physical Exam   Constitutional: He is oriented to person, place, and time. He appears well-developed. No distress.   Thin chronically ill-appearing male appears older than stated age.   HENT:   Head:  Normocephalic and atraumatic.   Nose: Nose normal.   Eyes: Pupils are equal, round, and reactive to light. Conjunctivae and EOM are normal. No scleral icterus.   Neck: Normal range of motion. Neck supple. No tracheal deviation present.   Cardiovascular: Normal rate, regular rhythm, normal heart sounds and intact distal pulses.   No murmur heard.  Pulmonary/Chest: Effort normal and breath sounds normal. No stridor. No respiratory distress. He has no wheezes. He has no rales.   Abdominal: Soft. Bowel sounds are normal. He exhibits no distension. There is no tenderness. There is no guarding.   Thin   Genitourinary:   Genitourinary Comments: Check urine   Musculoskeletal: Normal range of motion. He exhibits no edema or deformity.   Generalized weakness   Neurological: He is alert and oriented to person, place, and time. No cranial nerve deficit.   Skin: Skin is warm and dry. Capillary refill takes 2 to 3 seconds. No rash noted. He is not diaphoretic.   Generalized bruising to extremities noted.   Psychiatric: He has a normal mood and affect. His behavior is normal. Judgment and thought content normal.   Nursing note and vitals reviewed.      Significant Labs:   Recent Lab Results       04/12/19  0436   04/12/19  0435   04/11/19  1358        Anion Gap   9       Baso # 0.00         Basophil% 0.0         BUN, Bld   52       Calcium   8.8       Chloride   101       CO2   24       Creatinine   1.4       Differential Method Automated         eGFR if    >60       eGFR if non    53  Comment:  Calculation used to obtain the estimated glomerular filtration  rate (eGFR) is the CKD-EPI equation.          Eos # 0.0         Eosinophil% 0.0         Glucose   154       Gran # (ANC) 3.8         Gran% 88.5         Haptoglobin     149     Hematocrit 28.8         Hemoglobin 9.7         LD     232  Comment:  Results are increased in hemolyzed samples.     Lymph # 0.3         Lymph% 6.1         MCH 30.3          MCHC 33.7         MCV 90         Mono # 0.2         Mono% 5.4         MPV SEE COMMENT  Comment:  Result not available.         Platelet Estimate Decreased         Platelets 24  Comment:  Results confirmed, test repeated  PLATELETS  critical result(s) called and verbal readback obtained   from CATY WEST RN, 04/12/2019 07:15           Potassium   3.9       RBC 3.20         RDW 14.7         Sodium   134       WBC 4.24             All pertinent labs within the past 24 hours have been reviewed.    Significant Imaging: I have reviewed all pertinent imaging results/findings within the past 24 hours.    Assessment/Plan:      * Thrombocytopenia  Platelet count on arrival 22.  No gross active bleeding noted on exam.  Monitor CBC.  Steroids as recommended by --Heme/onc following  --will take PO Dexamethasone 40mg daily X 4 days  --CT scan abd, pelvis, & chest show new masses - will f/u OP with Heme/onc  --had bone marrow bx today.  --monitor daily cbc  --monitor for bleeding        Severe malnutrition  --Registered dietitian following  --Boost plus TID  --MVI, thiamine, folbic daily        Secondary adenocarcinoma of brain  --Heme/onc following  --supportive care      Hypothyroidism  --continue levothyroxine   --TSH stable        Primary cancer of left lower lobe of lung  --Heme/onc following  --supplemental oxygen to maintain sats >92%  --budesonide, duonebs, and ipratropium via nebulizer      Cachexia  --boost plus TID  --registered dietitian consulted  --BMI Grade: 16 - 16.9 protein-energy malnutrition grade II  --Pt with greater than 5% weight loss in past month  --folbic, mvi, thiamine daily      Hypertension  --continue amlodipine, diltiazem, furosemide, clonidine, and hydralazine  --cardiac diet  --patient refusing medications at present        Coronary artery disease  --pt not on statin  --cardiac diet  --hold daily ASA 2/2 platelet count        COPD (chronic obstructive pulmonary disease)  --scheduled  budesonide, ipratropium via nebulizer  --PRN duonebs        VTE Risk Mitigation (From admission, onward)        Ordered     IP VTE HIGH RISK PATIENT  Once      04/10/19 2059     Place sequential compression device  Until discontinued      04/10/19 2059     Place FCO hose  Until discontinued      04/10/19 2059     Reason for No Pharmacological VTE Prophylaxis  Once      04/10/19 2059     Place sequential compression device  Until discontinued      04/10/19 2055              LASHAWN Devine-JANA  Department of Hospital Medicine   [facility]

## 2019-04-12 NOTE — DISCHARGE INSTRUCTIONS
Bone Marrow Aspiration and Biopsy  Does this test have other names?  Bone marrow exam  What is this test?  This is a two-part test that looks at the blood cells in a sample of bone marrow, the spongy tissue within certain bones. This test may help your healthcare provider diagnose or monitor a blood disease or health condition affecting your marrow.  Your bone marrow has a liquid part and a solid part. Aspiration uses a needle to remove a sample of the liquid part of bone marrow. Biopsy uses a larger needle to remove a small amount of bone with its marrow.  Part of the job of bone marrow is to make blood cells. This test can find out how well your bone marrow is working. This test is also done to find some types of cancer.  Why do I need this test?  You might have this test if your healthcare provider wants to find out the health of your bone marrow or to check on how well your marrow is making blood cells.  You may have an aspiration to check for:  · The health of your bone marrow for a transplant  · Acute leukemia  · Multiple myeloma  In some cases, bone marrow aspiration is used to confirm chromosome disorders in newborns.  You may have an aspiration followed by a biopsy if you could have:  · Bacterial, fungal, or parasitic infection  · Unexplained anemia, leucopenia, or thrombocytopenia  · Metastatic cancer or many other diseases  What other tests might I have along with this test?  Your healthcare provider may also order these tests:  · Complete blood count, or CBC  · Reticulocyte count to find out your red blood cell survival rate  What do my test results mean?  Many things may affect your lab test results. These include the method each lab uses to do the test. Even if your test results are different from the normal value, you may not have a problem. To learn what the results mean for you, talk with your healthcare provider.  The lab will look at different aspects of your bone marrow to help find certain  diseases or conditions. These aspects include:  · Type and number of blood cells  · Any abnormalities in the size, shape, or look of cells  · Level of iron in the bone marrow  · Abnormal amount of young white blood cells, called blasts  · Any chromosomal abnormalities  Depending on what is seen, your results may mean you have an infection, a blood disease, leukemia, or cancer that has spread to the bone marrow from another site.  Your healthcare provider will take your results and combine this information with information from your physical exam, health history, and other types of tests to make a diagnosis.   If your results are negative, your provider may order other tests to diagnose your condition.   How is this test done?  These tests require a sample of bone marrow. A number of sites on your body can be used for marrow aspiration, but the hip bone is a common spot. You will likely lie on your side or stomach on an exam table. Your healthcare provider will numb the area of the test. You may feel a slight prick from the needle that the provider uses to give the numbing agent.  Does this test pose any risks?  It's not possible to numb the bone, so you may feel slight pain during the procedure. But you shouldn't feel any pain afterward. Risks from a bone marrow test are rare, but you could have bleeding or an infection.  What might affect my test results?  Other factors aren't likely to affect your results.  How do I get ready for this test?  Tell your healthcare provider if you take aspirin or have any allergies. Also tell your provider if you are pregnant, take any blood-thinner medicines, or have a history of bleeding problems.  Be sure your provider knows about all other medicines, herbs, vitamins, and supplements you are taking. This includes medicines that don't need a prescription and any illicit drugs you may use.     © 8910-2873 [employer] [facility]. [address] [place] [address], [place] [address]. All  rights reserved. This information is not intended as a substitute for professional medical care. Always follow your healthcare professional's instructions.

## 2019-04-12 NOTE — NURSING
Pt systolic . Notified Mac Akhtar NP. Pt continues to take home medication against medical advice. Pt took home BP medication. Will recheck BP.

## 2019-04-12 NOTE — OP NOTE
Pre Op Diagnosis: bicytopenia     Post Op Diagnosis: same     Procedure:  Bone marrow biopsy     Procedure performed by: Hi CHRISTIAN, Sandra ARMENDARIZ     Written Informed Consent Obtained: Yes     Specimen Removed:  yes     Estimated Blood Loss:  minimal     Findings: Local anesthesia and moderate sedation were used.     The patient tolerated the procedure well and there were no complications.      Sterile technique was performed in the posterior left iliac, lidocaine was used as a local anesthetic.  Multiple samples taken from the left iliac bone.  Pt tolerated the procedure well without immediate complications.  Please see radiologist report for details. F/u with PCP and/or ordering physician.

## 2019-04-12 NOTE — PLAN OF CARE
Problem: Adult Inpatient Plan of Care  Goal: Plan of Care Review  POC reviewed. Pt remains free from fall and injury. Comfort measures and safety measures addressed. Will continue to monitor. Telemetry monitoring.

## 2019-04-12 NOTE — PLAN OF CARE
CM met with patient and wife at bedside to assess discharge needs. Patient lives at home with wife and is dependent with needs due to advanced cancer status. Patient walks very little and reports frequent recent falls. Patient states he is aware of cancer status but immediately stated he does not want to discuss hospice adamantly refuses home health. Patient states he only wants to be discharged home with his family upon discharge. CM assessment and MOON completed, placed in chart, and copy given to patient. Updated patient white board with current d/c plan and CM contact information. Encouraged patient to contact CM if any questions or concerns arise.    DC Plan: Home with family.    Transportation home at d/c: spouse    Contact: Ying Vigil, spouse  Phone: [phone]    PCP:  Mike Recinos MD     Primary Payor:  [place]  Secondary Payor:  [employer] [facility]       04/11/19 1600   Discharge Assessment   Assessment Type Discharge Planning Assessment   Confirmed/corrected address and phone number on facesheet? Yes   Assessment information obtained from? Patient;Caregiver   Prior to hospitilization cognitive status: Alert/Oriented   Prior to hospitalization functional status: Partially Dependent   Current cognitive status: Alert/Oriented   Current Functional Status: Completely Dependent   Lives With spouse   Able to Return to Prior Arrangements yes   Is patient able to care for self after discharge? No   Who are your caregiver(s) and their phone number(s)?   (Ying Vigil, spouse,  [phone])   Patient's perception of discharge disposition home or selfcare   Readmission Within the Last 30 Days no previous admission in last 30 days   Patient currently being followed by outpatient case management? No   Patient currently receives any other outside agency services? No   Equipment Currently Used at Home rollator   Do you have any problems affording any of your prescribed medications? No   Is the patient taking medications  as prescribed? yes   Does the patient have transportation home? Yes   Transportation Anticipated family or friend will provide   Does the patient receive services at the Coumadin Clinic? No   Discharge Plan A Home with family   DME Needed Upon Discharge  none   Patient/Family in Agreement with Plan yes

## 2019-04-12 NOTE — ASSESSMENT & PLAN NOTE
April 12, 2019  --Patient completed SBRT left lung 50 Gy in 5 fractions 7/20/18  --Most recent PET CT performed on 3/2019 no evidence of recurrence  --s/p radiation treatment for brain metastasis noted on MRI brain 3/2019. Patient completed radiation 4/8/2019. Planned outpatient f/u with Rad/Onc 4/25/19 for 2 week post radiation f/u  --CT Chest/Abdomen/Pelvis did reveal new masses not noted on previous CT. Will have outpatient follow up with Dr. Ramires for further evaluation and management   --BMBx today for further evaluation of thrombocytopenia.

## 2019-04-12 NOTE — SUBJECTIVE & OBJECTIVE
Interval History: No acute events overnight. Remains thrombocytopenic with little improvement. Scheduled to have BMBx today.  CT Chest/Abdomen/Pelvis reveal new mass not present on previous CT chest.    Oncology Treatment Plan:   [No treatment plan]    Medications:  Continuous Infusions:  Scheduled Meds:   amLODIPine  10 mg Oral Daily    budesonide  0.5 mg Nebulization Q12H    cloNIDine  0.1 mg Oral BID    dexamethasone  40 mg Oral Daily    diltiaZEM  240 mg Oral Daily    doxazosin  4 mg Oral QHS    folic acid-vit B6-vit B12 2.5-25-2 mg  1 tablet Oral Daily    furosemide  20 mg Oral Daily    hydrALAZINE  100 mg Oral TID    ipratropium  0.5 mg Nebulization Q12H    levETIRAcetam  500 mg Oral BID    levothyroxine  137 mcg Oral Before breakfast    multivitamin  1 tablet Oral Daily    pantoprazole  40 mg Oral Daily    thiamine  100 mg Oral Daily     PRN Meds:acetaminophen, albuterol-ipratropium, ALPRAZolam, bisacodyl, dextrose 50%, dextrose 50%, glucagon (human recombinant), glucose, glucose, hydrALAZINE, influenza, ondansetron, pneumoc 13-kayla conj-dip cr(PF), promethazine (PHENERGAN) IVPB, sodium chloride 0.9%, sodium chloride 0.9%     Review of patient's allergies indicates:   Allergen Reactions    Levofloxacin Other (See Comments)     Stomach pains    Lisinopril Swelling    Valsartan Swelling        Past Medical History:   Diagnosis Date    Anxiety     Arthritis     Asthma     Atherosclerosis of native arteries of the extremities with intermittent claudication     Back pain     Benign hypertensive heart disease without heart failure     Cancer     Carotid artery occlusion     Chronic kidney disease     COPD (chronic obstructive pulmonary disease)     Coronary artery disease     Emphysema of lung     Encounter for blood transfusion     GERD (gastroesophageal reflux disease)     Heart failure     History of aorto-femoral bypass 8/16/2013    Hyperlipidemia     Hypertension      Hypothyroidism     Iron deficiency anemia due to chronic blood loss 6/7/2018    PVD (peripheral vascular disease)     PVD (peripheral vascular disease) 8/16/2013    Renal artery stenosis 8/16/2013    Renovascular hypertension 8/16/2013    Rheumatoid arthritis     Shingles sept 2015    Stenosis of left subclavian artery 8/16/2013    Thyroid cancer     Thyroid disease     Thyroid cancer 3/1/2017    Tobacco abuse     Tobacco dependence     Trouble in sleeping      Past Surgical History:   Procedure Laterality Date    CARDIAC CATHETERIZATION      RENAL ARTERY BYPASS      2012    THYROIDECTOMY      VASCULAR SURGERY      Carotid cleaned out      Family History     Problem Relation (Age of Onset)    COPD Father    Diabetes Daughter, Daughter    Heart disease Mother    Heart failure Mother    Hyperlipidemia Mother, Father    Hypertension Mother        Tobacco Use    Smoking status: Current Every Day Smoker     Packs/day: 1.00     Years: 50.00     Pack years: 50.00     Types: Cigarettes    Smokeless tobacco: Never Used   Substance and Sexual Activity    Alcohol use: No    Drug use: No    Sexual activity: Not Currently     Partners: Female     Birth control/protection: None       Review of Systems   Constitutional: Positive for fatigue. Negative for activity change, appetite change, chills, diaphoresis, fever and unexpected weight change.   HENT: Negative for congestion, mouth sores, nosebleeds, sore throat, trouble swallowing and voice change.    Eyes: Negative for photophobia and visual disturbance.   Respiratory: Negative for cough, choking, chest tightness, shortness of breath and wheezing.    Cardiovascular: Positive for leg swelling. Negative for chest pain and palpitations.   Gastrointestinal: Negative for abdominal distention, abdominal pain, blood in stool, constipation, diarrhea, nausea and vomiting.   Genitourinary: Negative for difficulty urinating, dysuria and hematuria.   Musculoskeletal:  Negative for arthralgias, back pain and myalgias.   Skin: Negative for pallor, rash and wound.   Allergic/Immunologic: Positive for immunocompromised state.   Neurological: Negative for dizziness, syncope, weakness and headaches.   Hematological: Negative for adenopathy. Bruises/bleeds easily.   Psychiatric/Behavioral: The patient is nervous/anxious.      Objective:     Vital Signs (Most Recent):  Temp: 98.4 °F (36.9 °C) (04/12/19 1146)  Pulse: 72 (04/12/19 1146)  Resp: 18 (04/12/19 1146)  BP: (!) 159/75 (04/12/19 1146)  SpO2: 95 % (04/12/19 1146) Vital Signs (24h Range):  Temp:  [98.1 °F (36.7 °C)-98.9 °F (37.2 °C)] 98.4 °F (36.9 °C)  Pulse:  [71-87] 72  Resp:  [16-18] 18  SpO2:  [94 %-98 %] 95 %  BP: (146-189)/(70-89) 159/75     Weight: 54.5 kg (120 lb 2.4 oz)  Body mass index is 16.76 kg/m².  Body surface area is 1.65 meters squared.      Intake/Output Summary (Last 24 hours) at 4/12/2019 1305  Last data filed at 4/12/2019 1024  Gross per 24 hour   Intake 958 ml   Output 800 ml   Net 158 ml       Physical Exam   Constitutional: He is oriented to person, place, and time. He appears well-developed. He appears cachectic. He has a sickly appearance. He appears ill. He appears distressed.   HENT:   Head: Normocephalic.   Right Ear: Hearing normal. No drainage.   Left Ear: Hearing normal. No drainage.   Nose: Nose normal.   Mouth/Throat: Oropharynx is clear and moist.   Eyes: Conjunctivae, EOM and lids are normal. Right eye exhibits no discharge. Left eye exhibits no discharge. No scleral icterus.   Neck: Normal range of motion. No thyroid mass present.   Cardiovascular: Normal rate, regular rhythm and normal heart sounds.   No murmur heard.  Pulmonary/Chest: Effort normal. No respiratory distress. He has decreased breath sounds. He has no wheezes. He has no rhonchi. He has no rales.   Abdominal: Soft. Bowel sounds are normal. He exhibits no distension. There is no tenderness.   Genitourinary:   Genitourinary Comments:  deferred   Musculoskeletal: Normal range of motion. He exhibits edema and tenderness.   Bilateral ankle and foot edema   Lymphadenopathy:        Head (right side): No submandibular, no preauricular and no posterior auricular adenopathy present.        Head (left side): No submandibular, no preauricular and no posterior auricular adenopathy present.        Right cervical: No superficial cervical adenopathy present.       Left cervical: No superficial cervical adenopathy present.   Neurological: He is alert and oriented to person, place, and time.   Skin: Skin is warm, dry and intact.   Psychiatric: His speech is normal and behavior is normal. Thought content normal. His mood appears anxious. His affect is not angry. He is not agitated.   Vitals reviewed.      Significant Labs:   CBC:   Recent Labs   Lab 04/10/19  1712 04/11/19  0517 04/12/19  0436   WBC 4.05 3.92 4.24   HGB 9.7* 10.4* 9.7*   HCT 28.4* 30.6* 28.8*   PLT 22* 23* 24*   , CMP:   Recent Labs   Lab 04/10/19  1712 04/11/19  0517 04/12/19  0435   * 133* 134*   K 3.6 4.0 3.9   CL 99 101 101   CO2 24 23 24   * 103 154*   BUN 52* 49* 52*   CREATININE 1.5* 1.4 1.4   CALCIUM 8.5* 8.7 8.8   PROT 5.1*  --   --    ALBUMIN 2.3*  --   --    BILITOT 0.3  --   --    ALKPHOS 66  --   --    AST 6*  --   --    ALT 23  --   --    ANIONGAP 9 9 9   EGFRNONAA 49* 53* 53*    and All pertinent labs from the last 24 hours have been reviewed.    Diagnostic Results:  I have reviewed all pertinent imaging results/findings within the past 24 hours.

## 2019-04-12 NOTE — ASSESSMENT & PLAN NOTE
--boost plus TID  --registered dietitian consulted  --BMI Grade: 16 - 16.9 protein-energy malnutrition grade II  --Pt with greater than 5% weight loss in past month  --folbic, mvi, thiamine daily

## 2019-04-12 NOTE — ASSESSMENT & PLAN NOTE
April 12, 2019  --differential dx include ITP vs bone marrow malignancy. Pneumonia likely a contributing factor but not to this degree  --Platelet count 24. No urgent need for platelet transfusion. Transfuse if platelet <10 or S&S bleeding  -- LDH, Haptoglobin normal  --Dexamethasone 40 mg PO daily x 4 days. Monitor Cbc daily for response  --CT chest/abdomen/pelvis w/o contrast revealed new masses. Will have further evaluation and management of this outpatient  --BMBx today. Follow results  --Daily CBC  --Continue supportive care

## 2019-04-13 PROBLEM — M79.89 FOOT SWELLING: Status: ACTIVE | Noted: 2019-01-01

## 2019-04-13 PROBLEM — Z91.148 NON COMPLIANCE W MEDICATION REGIMEN: Status: ACTIVE | Noted: 2019-01-01

## 2019-04-13 PROBLEM — R00.0 SINUS TACHYCARDIA: Status: ACTIVE | Noted: 2019-01-01

## 2019-04-13 NOTE — NURSING
Pt refused to take prn hydralazine to treat BP > 170 . Pt reports that he will  take his own medication. Attempted to educate pt on importance of compliance to treatment plan. Pt not willing to discuss this issue. Dr. Rodriguez notified. No new orders at this time.

## 2019-04-13 NOTE — PLAN OF CARE
Problem: Adult Inpatient Plan of Care  Goal: Patient-Specific Goal (Individualization)  Outcome: Ongoing (interventions implemented as appropriate)  Pt AAO x4.  VSS  Pt able to make needs known.  Pt remained afebrile throughout this shift.   Pt ambulates in room, gait steady   Pt remained free of falls this shift.   Pt denies pain this shift.  Plan of care reviewed. Patient verbalizes understanding.   Pt moving/turing independent. Frequent weight shifting encouraged.  Patient sinus constance on monitor.   Bed low, side rails up x 2, wheels locked, call light in reach.   Hourly rounding completed.   Will continue to monitor.

## 2019-04-13 NOTE — ASSESSMENT & PLAN NOTE
- Currently appears compensated  - Continue scheduled budesonide, ipratropium via nebulizer  - PRN duonebs  - Supplemental O2 PRN  - Monitor

## 2019-04-13 NOTE — PROGRESS NOTES
[facility] Hospital Medicine  Progress Note    Patient Name: Wallace Vigil  MRN: [id number]  Patient Class: IP- Inpatient   Admission Date: 4/10/2019  Length of Stay: 0 days  Attending Physician: Deven Espinal MD  Primary Care Provider: Mike Recinos MD        Subjective:     Principal Problem:Thrombocytopenia    HPI:   Wallace Vigil is a 64 y.o. male patient with a PMHx of lung CA with mets to the brain, COPD, CKD, CAD, heart failure, PVD, RA who presents to the Emergency Department for abnormal labs. Pt was referred to the ED from OP clinic for low platelets.  No modifying factors.  Associated symptoms include fatigue, bruising and lower extremity/edema from steroid use.  Prior treatment include steroid use.  No further complaints or concerns.  Patient was evaluated in the emergency room platelet count of 22.  ER Discussed case with Dr. Mcleod (Hem/onc) who recommended dexamethasone 4mg PO qd for 4 days and admitting pt for observation overnight.   hospital Medicine consulted.  Patient placed in observation.             Hospital Course:  Patient kept on OBS for thrombocytopenia under the care of Hospital Medicine. Platelets were 95 1 month ago and have dropped to 22 as of admit. Heme/onc was consulted and they have ordered CT of chest, abd, and pelvis today looking for metastasis. They are planning on dexamethazone 40mg daily X 4 days and are planning on a bone marrow biopsy during stay looking for cause of sudden drop. Patient is high risk for bleeding and will need emergent care if platelets drop further. Currently he denies bleeding, SOB, and Chest pain. Patient has hx of rheumatoid arthritis - low platelets might be autoimmune.  As of 04/12: Patient to have bone marrow biopsy today. Platelets 24. Patient is agitated today but has also been NPO since midnight and is a smoker, who has not had a cigarette, and has mets to brain. Heme/onc following - CT chest, abd, pelvis showed new masses.  Heme/onc to f/u OP for tx. Platelets remain low - will recheck CBC in AM.    As of 4/13 patient is s/p bone marrow biopsy on 4/12 with results pending.  Platelets remain low at 22, currently receiving po Dexamethasone per Hemoc recs.  He c/o worsening edema and pain to left foot and left calf.  + ecchymosis and petechiae noted with tenderness to touch.  Xray of left foot yesterday negative for acute findings.  US pending to r/o acute process.  Will need to consider CT versus MRI pending US results given concern for bleeding.  Sinus Tachycardia noted and could be r/t Dexamethasone versus not taking home Diltiazem given patient will not allow nursing staff to administer medications.  TSH normal.  May need to r/o PE depending on US results.  Long discussion held with patient today regarding the need for increased cooperation with treatment plan, and to allow proper medication administration via nursing staff.  As of now, patient agrees.  CXR on admit showed RUL PNA, however CT showed 2.9 x 1.8 cm mass in the anterior right upper lobe and no PNA.  Will transition to inpatient for continued care.     Interval History:  No acute events overnight.  Currently resting in bed with family at .  Patient is s/p bone marrow biopsy on 4/12 with results pending.  Platelets remain low at 22, currently receiving po Dexamethasone per Hemoc recs.  He c/o worsening edema and pain to left foot and left calf.  + ecchymosis and petechiae noted with tenderness to touch.  Xray of left foot yesterday negative for acute findings.  US pending to r/o acute process.  Will need to consider CT versus MRI pending US results given concern for bleeding.  Sinus Tachycardia noted and could be r/t Dexamethasone versus not taking home Diltiazem given patient will not allow nursing staff to administer medications.  TSH normal.  May need to r/o PE depending on US results.  Long discussion held with patient today regarding the need for increased  cooperation with treatment plan, and to allow proper medication administration via nursing staff.  As of now, patient agrees.  CXR on admit showed RUL PNA, however CT showed 2.9 x 1.8 cm mass in the anterior right upper lobe and no PNA.  Will transition to inpatient for continued care.     Review of Systems   Constitutional: Positive for activity change and fatigue. Negative for chills, diaphoresis and fever.   HENT: Negative for congestion, sore throat and voice change.    Eyes: Negative for photophobia and visual disturbance.   Respiratory: Negative for cough, shortness of breath, wheezing and stridor.    Cardiovascular: Positive for leg swelling. Negative for chest pain.   Gastrointestinal: Negative for abdominal distention, abdominal pain, constipation, diarrhea, nausea and vomiting.   Endocrine: Negative for polydipsia, polyphagia and polyuria.   Genitourinary: Negative for difficulty urinating, dysuria, flank pain, testicular pain and urgency.   Musculoskeletal: Negative for back pain, joint swelling, neck pain and neck stiffness.   Skin: Negative for color change and rash.   Allergic/Immunologic: Negative for immunocompromised state.   Neurological: Positive for weakness. Negative for dizziness, syncope, numbness and headaches.   Hematological: Bruises/bleeds easily.   Psychiatric/Behavioral: Positive for agitation. Negative for behavioral problems and confusion. The patient is nervous/anxious.      Objective:     Vital Signs (Most Recent):  Temp: 98.3 °F (36.8 °C) (04/13/19 1151)  Pulse: (!) 114 (04/13/19 1151)  Resp: 18 (04/13/19 1151)  BP: (!) 142/68 (04/13/19 1151)  SpO2: (!) 92 % (04/13/19 1151) Vital Signs (24h Range):  Temp:  [97.9 °F (36.6 °C)-99.6 °F (37.6 °C)] 98.3 °F (36.8 °C)  Pulse:  [] 114  Resp:  [14-20] 18  SpO2:  [91 %-98 %] 92 %  BP: (140-178)/(41-93) 142/68     Weight: 58.9 kg (129 lb 13.6 oz)  Body mass index is 18.11 kg/m².    Intake/Output Summary (Last 24 hours) at 4/13/2019  1414  Last data filed at 4/13/2019 1339  Gross per 24 hour   Intake --   Output 1510 ml   Net -1510 ml      Physical Exam   Constitutional: He is oriented to person, place, and time. He appears well-developed. No distress.   Thin chronically ill-appearing male appears older than stated age.   HENT:   Head: Normocephalic and atraumatic.   Nose: Nose normal.   Eyes: Pupils are equal, round, and reactive to light. Conjunctivae and EOM are normal. No scleral icterus.   Neck: Normal range of motion. Neck supple. No tracheal deviation present.   Cardiovascular: Regular rhythm, normal heart sounds and intact distal pulses. Tachycardia present.   No murmur heard.  Pulmonary/Chest: Effort normal and breath sounds normal. No stridor. No respiratory distress. He has no wheezes. He has no rales.   Comfortable on RA   Abdominal: Soft. Bowel sounds are normal. He exhibits no distension. There is no tenderness. There is no guarding.   Thin   Musculoskeletal: He exhibits edema. He exhibits no deformity.   Generalized weakness; 4+ pitting edema to LLE with tenderness to left foot upon palpation. Limited ROM of left foot/ankle noted due to pain.   Neurological: He is alert and oriented to person, place, and time. No cranial nerve deficit.   Skin: Skin is warm and dry. Capillary refill takes 2 to 3 seconds. No rash noted. He is not diaphoretic.   Generalized ecchymosis to all extremities noted. Band-Aid to back CDI, no active bleeding noted.   Psychiatric: His speech is normal. Thought content normal. His affect is angry. He is agitated. Cognition and memory are normal. He expresses impulsivity. He is inattentive.   Nursing note and vitals reviewed.      Significant Labs:   BMP:   Recent Labs   Lab 04/13/19 0422   GLU 90   *   K 3.9   CL 97   CO2 24   BUN 52*   CREATININE 1.4   CALCIUM 9.3     CBC:   Recent Labs   Lab 04/12/19  0436 04/13/19 0422   WBC 4.24 3.34*   HGB 9.7* 10.5*   HCT 28.8* 30.8*   PLT 24* 22*        Significant Imaging: I have reviewed all pertinent imaging results/findings within the past 24 hours.    Assessment/Plan:      * Thrombocytopenia  - Platelet count remains at 22  - No active bleeding noted on exam, but ecchymosis present  - Hemoc following  - Currently receiving po Dexamethasone per Hemoc recs  - s/p Bone marrow biopsy on 4/12 with results pending, will need to f/u  - Daily CBC  - Monitor and transfuse to keep platelets >10        Primary cancer of left lower lobe of lung  - Heme/onc following  - CT chest abdomen pelvis from April 12 showed new masses  - Per Hemoc, will get biopsy as outpatient for workup.  - Followed outpatient by Dr. Ramires      Secondary adenocarcinoma of brain  - s/p radiation  - Hemoc following      Foot swelling  - US of LLE pending to r/o acute process  - Per Hemoc may need to consider CT versus MRI pending US results given concern for bleeding  - Encouraged elevation  - Monitor      Sinus tachycardia  -  Sinus Tachycardia noted and could be r/t Dexamethasone versus not taking home Diltiazem given patient will not allow nursing staff to administer medications.    - TSH normal  - Continue home Diltiazem  - May need to r/o PE depending on LLE US results  - Cardiology consult pending clinical course  - Tele monitoring      COPD (chronic obstructive pulmonary disease)  - Currently appears compensated  - Continue scheduled budesonide, ipratropium via nebulizer  - PRN duonebs  - Supplemental O2 PRN  - Monitor      Coronary artery disease  - Currently asymptomatic   - pt not on statin  - Cardiac diet  - Hold daily ASA 2/2 platelet count  - tele monitoring        Hypertension  - BP under fair control  - Continue amlodipine, diltiazem, furosemide, clonidine, and hydralazine  - Cardiac diet  - Patient refusing medications at present  - Long discussion held with patient today regarding the need for increased cooperation with treatment plan, and to allow proper medication  administration via nursing staff.  As of now, patient agrees.   - Monitor and adjust meds as needed    Cachexia  - Boost plus TID  - Registered dietitian consulted  - BMI Grade: 16 - 16.9 protein-energy malnutrition grade II  - Pt with greater than 5% weight loss in past month  - folbic, mvi, thiamine daily  - Monitor intake and weight      Hypothyroidism  - TSH normal  - Continue Synthroid        Severe malnutrition  - Registered dietitian following  - Boost plus TID  - MVI, thiamine, folbic daily  - Monitor intake and weight        Non compliance w medication regimen  - Patient has been refusing medications  - Long discussion held with patient today regarding the need for increased cooperation with treatment plan, and to allow proper medication administration via nursing staff.  As of now, patient agrees.       VTE Risk Mitigation (From admission, onward)        Ordered     IP VTE HIGH RISK PATIENT  Once      04/10/19 2059     Place sequential compression device  Until discontinued      04/10/19 2059     Place FCO hose  Until discontinued      04/10/19 2059     Reason for No Pharmacological VTE Prophylaxis  Once      04/10/19 2059     Place sequential compression device  Until discontinued     No pharmacological AC given thrombocytopenia. 04/10/19 2055              Monika Mcclure, LAUREN, ACNP-BC  Department of Hospital Medicine   [facility]

## 2019-04-13 NOTE — ASSESSMENT & PLAN NOTE
- US of LLE pending to r/o acute process  - Per Hemoc may need to consider CT versus MRI pending US results given concern for bleeding  - Encouraged elevation  - Monitor

## 2019-04-13 NOTE — NURSING
Pt refused to take night meds. Pt reports that he will continue to take his own medication. Attempted to educate pt on importance of compliance to treatment plan. Pt not willing to discuss this issue. Dr. Rodriguez notified. No new orders at this time.

## 2019-04-13 NOTE — SIGNIFICANT EVENT
LLE US negative for DVT.  Given persistent tachycardia in setting of metastatic cancer, case d/w Dr. Espinal who agreed with CTA chest to r/o PE.  SaO2 documented as 84% per tech this afternoon.  D/W primary RN Oni who will confirm measurement.  If SaO2 is <92%, will place patient on supplemental O2.  Additional recommendations to follow CTA results.  Will also start on low dose Metoprolol in addition to home Diltiazem and monitor for effect.

## 2019-04-13 NOTE — ASSESSMENT & PLAN NOTE
- BP under fair control  - Continue amlodipine, diltiazem, furosemide, clonidine, and hydralazine  - Cardiac diet  - Patient refusing medications at present  - Long discussion held with patient today regarding the need for increased cooperation with treatment plan, and to allow proper medication administration via nursing staff.  As of now, patient agrees.   - Monitor and adjust meds as needed

## 2019-04-13 NOTE — SIGNIFICANT EVENT
"Patient refused CTA chest to r/o PE.  Detailed discussion held with patient and his wife Ying who is present at the bedside.  Patient is awake, alert, and oriented to person, place, and time, and answers all questions appropriately.  He was educated extensively on the need to r/o PE, but continues to refuse CTA stating "I only have 1 kidney and I won't do it".  He has agreed to a VQ scan, which has been ordered STAT.  Will await results of VQ scan, however AC is not an option at this point given thrombocytopenia.  Code status d/w patient in detail with primary nurse Oni and patient's wife and brother present.  Per patient's wishes he is a DNR stating "just let me go peacefully".  Will continue current POC and supplemental O2 for comfort.   "

## 2019-04-13 NOTE — ASSESSMENT & PLAN NOTE
- Patient has been refusing medications  - Long discussion held with patient today regarding the need for increased cooperation with treatment plan, and to allow proper medication administration via nursing staff.  As of now, patient agrees.

## 2019-04-13 NOTE — PROGRESS NOTES
[facility] -   Hematology/Oncology  Progress Note    Patient Name: Wallace Vigil  Admission Date: 4/10/2019  Hospital Length of Stay: 0 days  Code Status: Full Code     Subjective:     HPI:  1. 64 y.o. male patient with a PMHx of Thyroid cancer (tx thyroidectomy 2017), presumed lung cancer (previously treated with SBRT left lung 50 Gy in 5 fractions--followed in Heme-Onc clinic by Dr. Ramires) with recent diagnosis of brain metastasis (completed radiation treatment 4/8/19 and taking Dexamethasone 4 mg PO TID at home), COPD, CKD, CAD, heart failure, PVD, RA who presented to the Emergency Department as recommended by Dr. Christianson following outpatient office visit for abnormal labs.  Associated symptoms include fatigue, bruising and lower extremity/edema    Most recent PET 3/2019 did not show any clear evidence of systemic recurrence of malignancy.     ED workup revealed Hg 9.7, platelet count 22. Patient's baseline platelet count near 100 range    Heme-Onc consulted for further evaluation and management of thrombocytopenia               Interval History: developed swelling and ecchymosis of right ankle.    Oncology Treatment Plan:   [No treatment plan]    Medications:  Continuous Infusions:  Scheduled Meds:   amLODIPine  10 mg Oral Daily    budesonide  0.5 mg Nebulization Q12H    cloNIDine  0.1 mg Oral BID    dexamethasone  20 mg Oral Q12H    diltiaZEM  240 mg Oral Daily    doxazosin  4 mg Oral QHS    folic acid-vit B6-vit B12 2.5-25-2 mg  1 tablet Oral Daily    furosemide  20 mg Oral Daily    hydrALAZINE  100 mg Oral TID    ipratropium  0.5 mg Nebulization Q12H    levETIRAcetam  500 mg Oral BID    levothyroxine  137 mcg Oral Before breakfast    multivitamin  1 tablet Oral Daily    pantoprazole  40 mg Oral Daily    thiamine  100 mg Oral Daily     PRN Meds:acetaminophen, albuterol-ipratropium, ALPRAZolam, bisacodyl, dextrose 50%, dextrose 50%, glucagon (human recombinant), glucose, glucose,  hydrALAZINE, influenza, ondansetron, pneumoc 13-kayla conj-dip cr(PF), promethazine (PHENERGAN) IVPB, sodium chloride 0.9%, sodium chloride 0.9%     Review of Systems   Constitutional: Negative for appetite change and chills.   HENT: Negative.    Eyes: Negative.    Respiratory: Negative.    Cardiovascular: Negative.    Gastrointestinal: Negative.    Endocrine: Negative.    Genitourinary: Negative.    Musculoskeletal: Negative.    Skin: Negative.    Allergic/Immunologic: Negative.    Neurological: Negative.    Hematological: Negative.    Psychiatric/Behavioral: Negative.      Objective:     Vital Signs (Most Recent):  Temp: 98.4 °F (36.9 °C) (04/13/19 0758)  Pulse: (!) 112 (04/13/19 0758)  Resp: 16 (04/13/19 0758)  BP: (!) 140/74 (04/13/19 0758)  SpO2: (!) 94 % (04/13/19 0758) Vital Signs (24h Range):  Temp:  [97.9 °F (36.6 °C)-99.6 °F (37.6 °C)] 98.4 °F (36.9 °C)  Pulse:  [] 112  Resp:  [14-20] 16  SpO2:  [91 %-98 %] 94 %  BP: (140-178)/(41-93) 140/74     Weight: 58.9 kg (129 lb 13.6 oz)  Body mass index is 18.11 kg/m².  Body surface area is 1.72 meters squared.      Intake/Output Summary (Last 24 hours) at 4/13/2019 1100  Last data filed at 4/13/2019 0750  Gross per 24 hour   Intake --   Output 1250 ml   Net -1250 ml       Physical Exam   Constitutional: He is oriented to person, place, and time. He appears well-developed and well-nourished.   HENT:   Head: Normocephalic and atraumatic.   Eyes: Conjunctivae and EOM are normal.   Neck: Normal range of motion. Neck supple.   Cardiovascular: Normal rate and regular rhythm.   Pulmonary/Chest: Effort normal and breath sounds normal.   Abdominal: Soft. Bowel sounds are normal.   Musculoskeletal: Normal range of motion.        Right ankle: He exhibits no swelling and no ecchymosis.        Left ankle: He exhibits swelling and ecchymosis. Achilles tendon exhibits no pain.   Has ecchymosis and swelling of right ankle  Warm to touch   Neurological: He is alert and  oriented to person, place, and time.   Skin: Skin is warm and dry.   Psychiatric: He has a normal mood and affect. His behavior is normal. Judgment and thought content normal.   Nursing note and vitals reviewed.      Significant Labs:   All pertinent labs from the last 24 hours have been reviewed.    Diagnostic Results:  I have reviewed all pertinent imaging results/findings within the past 24 hours.    Assessment/Plan:     * Thrombocytopenia    --differential dx include ITP vs bone marrow malignancy. Pneumonia likely a contributing factor but not to this degree  Continue finish 4 day course of high dose decadron 40 mg daily.  Consider ivig on Monday if platelets do not improve.    Would get CT or MRI of foot, if has subcutaneous bleeding, would give platelet transfusion.        Foot swelling  Would get US of leg to rule out DVT.  If negative for DVT, would get CT or MRI foot to evaluate for bleeding.    Secondary adenocarcinoma of brain  Patient is s/p radiation for brain mets.    Primary cancer of left lower lobe of lung  CT chest abdomen pelvis from April 12 showed new right lung mass.  Will get biopsy as outpatient for workup.          Thank you for your consult. I will follow-up with patient. Please contact us if you have any additional questions.     Ronald Falcon MD  Hematology/Oncology  [facility]

## 2019-04-13 NOTE — SUBJECTIVE & OBJECTIVE
Interval History:  No acute events overnight.  Currently resting in bed with family at .  Patient is s/p bone marrow biopsy on 4/12 with results pending.  Platelets remain low at 22, currently receiving po Dexamethasone per Hemoc recs.  He c/o worsening edema and pain to left foot and left calf.  + ecchymosis and petechiae noted with tenderness to touch.  Xray of left foot yesterday negative for acute findings.  US pending to r/o acute process.  Will need to consider CT versus MRI pending US results given concern for bleeding.  Sinus Tachycardia noted and could be r/t Dexamethasone versus not taking home Diltiazem given patient will not allow nursing staff to administer medications.  TSH normal.  May need to r/o PE depending on US results.  Long discussion held with patient today regarding the need for increased cooperation with treatment plan, and to allow proper medication administration via nursing staff.  As of now, patient agrees.  CXR on admit showed RUL PNA, however CT showed 2.9 x 1.8 cm mass in the anterior right upper lobe and no PNA.  Will transition to inpatient for continued care.     Review of Systems   Constitutional: Positive for activity change and fatigue. Negative for chills, diaphoresis and fever.   HENT: Negative for congestion, sore throat and voice change.    Eyes: Negative for photophobia and visual disturbance.   Respiratory: Negative for cough, shortness of breath, wheezing and stridor.    Cardiovascular: Positive for leg swelling. Negative for chest pain.   Gastrointestinal: Negative for abdominal distention, abdominal pain, constipation, diarrhea, nausea and vomiting.   Endocrine: Negative for polydipsia, polyphagia and polyuria.   Genitourinary: Negative for difficulty urinating, dysuria, flank pain, testicular pain and urgency.   Musculoskeletal: Negative for back pain, joint swelling, neck pain and neck stiffness.   Skin: Negative for color change and rash.   Allergic/Immunologic:  Negative for immunocompromised state.   Neurological: Positive for weakness. Negative for dizziness, syncope, numbness and headaches.   Hematological: Bruises/bleeds easily.   Psychiatric/Behavioral: Positive for agitation. Negative for behavioral problems and confusion. The patient is nervous/anxious.      Objective:     Vital Signs (Most Recent):  Temp: 98.3 °F (36.8 °C) (04/13/19 1151)  Pulse: (!) 114 (04/13/19 1151)  Resp: 18 (04/13/19 1151)  BP: (!) 142/68 (04/13/19 1151)  SpO2: (!) 92 % (04/13/19 1151) Vital Signs (24h Range):  Temp:  [97.9 °F (36.6 °C)-99.6 °F (37.6 °C)] 98.3 °F (36.8 °C)  Pulse:  [] 114  Resp:  [14-20] 18  SpO2:  [91 %-98 %] 92 %  BP: (140-178)/(41-93) 142/68     Weight: 58.9 kg (129 lb 13.6 oz)  Body mass index is 18.11 kg/m².    Intake/Output Summary (Last 24 hours) at 4/13/2019 1414  Last data filed at 4/13/2019 1339  Gross per 24 hour   Intake --   Output 1510 ml   Net -1510 ml      Physical Exam   Constitutional: He is oriented to person, place, and time. He appears well-developed. No distress.   Thin chronically ill-appearing male appears older than stated age.   HENT:   Head: Normocephalic and atraumatic.   Nose: Nose normal.   Eyes: Pupils are equal, round, and reactive to light. Conjunctivae and EOM are normal. No scleral icterus.   Neck: Normal range of motion. Neck supple. No tracheal deviation present.   Cardiovascular: Regular rhythm, normal heart sounds and intact distal pulses. Tachycardia present.   No murmur heard.  Pulmonary/Chest: Effort normal and breath sounds normal. No stridor. No respiratory distress. He has no wheezes. He has no rales.   Comfortable on RA   Abdominal: Soft. Bowel sounds are normal. He exhibits no distension. There is no tenderness. There is no guarding.   Thin   Musculoskeletal: He exhibits edema. He exhibits no deformity.   Generalized weakness; 4+ pitting edema to LLE with tenderness to left foot upon palpation. Limited ROM of left foot/ankle  noted due to pain.   Neurological: He is alert and oriented to person, place, and time. No cranial nerve deficit.   Skin: Skin is warm and dry. Capillary refill takes 2 to 3 seconds. No rash noted. He is not diaphoretic.   Generalized ecchymosis to all extremities noted. Band-Aid to back CDI, no active bleeding noted.   Psychiatric: His speech is normal. Thought content normal. His affect is angry. He is agitated. Cognition and memory are normal. He expresses impulsivity. He is inattentive.   Nursing note and vitals reviewed.      Significant Labs:   BMP:   Recent Labs   Lab 04/13/19 0422   GLU 90   *   K 3.9   CL 97   CO2 24   BUN 52*   CREATININE 1.4   CALCIUM 9.3     CBC:   Recent Labs   Lab 04/12/19  0436 04/13/19 0422   WBC 4.24 3.34*   HGB 9.7* 10.5*   HCT 28.8* 30.8*   PLT 24* 22*       Significant Imaging: I have reviewed all pertinent imaging results/findings within the past 24 hours.

## 2019-04-13 NOTE — ASSESSMENT & PLAN NOTE
- Platelet count remains at 22  - No active bleeding noted on exam, but ecchymosis present  - Hemoc following  - Currently receiving po Dexamethasone per Hemoc recs  - s/p Bone marrow biopsy on 4/12 with results pending, will need to f/u  - Daily CBC  - Monitor and transfuse to keep platelets >10

## 2019-04-13 NOTE — ASSESSMENT & PLAN NOTE
- Boost plus TID  - Registered dietitian consulted  - BMI Grade: 16 - 16.9 protein-energy malnutrition grade II  - Pt with greater than 5% weight loss in past month  - folbic, mvi, thiamine daily  - Monitor intake and weight

## 2019-04-13 NOTE — ASSESSMENT & PLAN NOTE
- Currently asymptomatic   - pt not on statin  - Cardiac diet  - Hold daily ASA 2/2 platelet count  - tele monitoring

## 2019-04-13 NOTE — ASSESSMENT & PLAN NOTE
CT chest abdomen pelvis from April 12 showed new right lung mass.  Will get biopsy as outpatient for workup.

## 2019-04-13 NOTE — NURSING
Notified Della Simon NP that pt was complaining of pain to left foot and ankle. Pt reports pain 10/10. Foot appears edematous. Pt guarding foot and refuses to let nurse touch it. No new orders at this time.

## 2019-04-13 NOTE — ASSESSMENT & PLAN NOTE
Would get US of leg to rule out DVT.  If negative for DVT, would get CT or MRI foot to evaluate for bleeding.

## 2019-04-13 NOTE — ASSESSMENT & PLAN NOTE
- Heme/onc following  - CT chest abdomen pelvis from April 12 showed new masses  - Per Hemoc, will get biopsy as outpatient for workup.  - Followed outpatient by Dr. Ramires

## 2019-04-13 NOTE — SIGNIFICANT EVENT
Patient with acute worsening of respiratory status, uncomfortable on 3.5 L NC and restless.  Placed on 100% NRB for comfort with improvement in SaO2 to 95%.  Dr. Funk at , confirmed DNR status with patient's wife.  Will proceed with comfort measures and hold off on VQ scan at this time given acute worsening of status, family is aware.  Dr. Falcon updated and agrees with DNR status given metastatic cancer.  Norco ordered for comfort and Xanax prn agitation. Will sign off to night shift to monitor closely overnight.

## 2019-04-13 NOTE — ASSESSMENT & PLAN NOTE
- Registered dietitian following  - Boost plus TID  - MVI, thiamine, folbic daily  - Monitor intake and weight

## 2019-04-13 NOTE — ASSESSMENT & PLAN NOTE
--differential dx include ITP vs bone marrow malignancy. Pneumonia likely a contributing factor but not to this degree  Continue finish 4 day course of high dose decadron 40 mg daily.  Consider ivig on Monday if platelets do not improve.    Would get CT or MRI of foot, if has subcutaneous bleeding, would give platelet transfusion.

## 2019-04-13 NOTE — SUBJECTIVE & OBJECTIVE
Interval History: developed swelling and ecchymosis of right ankle.    Oncology Treatment Plan:   [No treatment plan]    Medications:  Continuous Infusions:  Scheduled Meds:   amLODIPine  10 mg Oral Daily    budesonide  0.5 mg Nebulization Q12H    cloNIDine  0.1 mg Oral BID    dexamethasone  20 mg Oral Q12H    diltiaZEM  240 mg Oral Daily    doxazosin  4 mg Oral QHS    folic acid-vit B6-vit B12 2.5-25-2 mg  1 tablet Oral Daily    furosemide  20 mg Oral Daily    hydrALAZINE  100 mg Oral TID    ipratropium  0.5 mg Nebulization Q12H    levETIRAcetam  500 mg Oral BID    levothyroxine  137 mcg Oral Before breakfast    multivitamin  1 tablet Oral Daily    pantoprazole  40 mg Oral Daily    thiamine  100 mg Oral Daily     PRN Meds:acetaminophen, albuterol-ipratropium, ALPRAZolam, bisacodyl, dextrose 50%, dextrose 50%, glucagon (human recombinant), glucose, glucose, hydrALAZINE, influenza, ondansetron, pneumoc 13-kayla conj-dip cr(PF), promethazine (PHENERGAN) IVPB, sodium chloride 0.9%, sodium chloride 0.9%     Review of Systems   Constitutional: Negative for appetite change and chills.   HENT: Negative.    Eyes: Negative.    Respiratory: Negative.    Cardiovascular: Negative.    Gastrointestinal: Negative.    Endocrine: Negative.    Genitourinary: Negative.    Musculoskeletal: Negative.    Skin: Negative.    Allergic/Immunologic: Negative.    Neurological: Negative.    Hematological: Negative.    Psychiatric/Behavioral: Negative.      Objective:     Vital Signs (Most Recent):  Temp: 98.4 °F (36.9 °C) (04/13/19 0758)  Pulse: (!) 112 (04/13/19 0758)  Resp: 16 (04/13/19 0758)  BP: (!) 140/74 (04/13/19 0758)  SpO2: (!) 94 % (04/13/19 0758) Vital Signs (24h Range):  Temp:  [97.9 °F (36.6 °C)-99.6 °F (37.6 °C)] 98.4 °F (36.9 °C)  Pulse:  [] 112  Resp:  [14-20] 16  SpO2:  [91 %-98 %] 94 %  BP: (140-178)/(41-93) 140/74     Weight: 58.9 kg (129 lb 13.6 oz)  Body mass index is 18.11 kg/m².  Body surface area is  1.72 meters squared.      Intake/Output Summary (Last 24 hours) at 4/13/2019 1100  Last data filed at 4/13/2019 0750  Gross per 24 hour   Intake --   Output 1250 ml   Net -1250 ml       Physical Exam   Constitutional: He is oriented to person, place, and time. He appears well-developed and well-nourished.   HENT:   Head: Normocephalic and atraumatic.   Eyes: Conjunctivae and EOM are normal.   Neck: Normal range of motion. Neck supple.   Cardiovascular: Normal rate and regular rhythm.   Pulmonary/Chest: Effort normal and breath sounds normal.   Abdominal: Soft. Bowel sounds are normal.   Musculoskeletal: Normal range of motion.        Right ankle: He exhibits no swelling and no ecchymosis.        Left ankle: He exhibits swelling and ecchymosis. Achilles tendon exhibits no pain.   Has ecchymosis and swelling of right ankle  Warm to touch   Neurological: He is alert and oriented to person, place, and time.   Skin: Skin is warm and dry.   Psychiatric: He has a normal mood and affect. His behavior is normal. Judgment and thought content normal.   Nursing note and vitals reviewed.      Significant Labs:   All pertinent labs from the last 24 hours have been reviewed.    Diagnostic Results:  I have reviewed all pertinent imaging results/findings within the past 24 hours.

## 2019-04-13 NOTE — ASSESSMENT & PLAN NOTE
-  Sinus Tachycardia noted and could be r/t Dexamethasone versus not taking home Diltiazem given patient will not allow nursing staff to administer medications.    - TSH normal  - Continue home Diltiazem  - May need to r/o PE depending on LLE US results  - Cardiology consult pending clinical course  - Tele monitoring

## 2019-04-14 NOTE — NURSING
Notified pt's wife, Ying Vigil, of [facility]. Mrs. Vigil agreed to pt being honored through program. House supervisor notified.

## 2019-04-14 NOTE — NURSING
Notified Dr. Rodriguez that pt was asystole on the monitor and was no longer breathing. Physician will come to bedside.

## 2019-04-14 NOTE — NURSING
Pt transported off unit via gurney by Sonal with [facility]. Family present.

## 2019-04-14 NOTE — PROGRESS NOTES
Expiration Note  I was paged by nurse to bedside.  Patient was seen and examined.  Heart and lung sounds are absent. No spontaneous cardiac or respiratory activity. Patient does not respond to verbal or painful stimuli. Corneal, pupillary, and gag reflexes are absent. Pupils are fixed and dilated.  Rhythm strip showed asystole.   Family was present in room.  Time of death is 21:47 on April 13, 2019.

## 2019-04-14 NOTE — DISCHARGE SUMMARY
[facility] Hospital Medicine  Discharge Summary      Patient Name: Wallace Vigil  MRN: [id number]  Admission Date: 4/10/2019  Hospital Length of Stay: 1 days  Discharge Date and Time: 4/14/2019     Attending Physician: No att. providers found   Discharging Provider: Monika Mcclure NP  Primary Care Provider: Mike Recinos MD      HPI:    Wallace Vigil is a 64 y.o. male patient with a PMHx of lung CA with mets to the brain, COPD, CKD, CAD, heart failure, PVD, RA who presents to the Emergency Department for abnormal labs. Pt was referred to the ED from OP clinic for low platelets.  No modifying factors.  Associated symptoms include fatigue, bruising and lower extremity/edema from steroid use.  Prior treatment include steroid use.  No further complaints or concerns.  Patient was evaluated in the emergency room platelet count of 22.  ER Discussed case with Dr. Mcleod (Hem/onc) who recommended dexamethasone 4mg PO qd for 4 days and admitting pt for observation overnight.   hospital Medicine consulted.  Patient placed in observation.             * No surgery found *      Hospital Course:   Patient kept on OBS for thrombocytopenia under the care of Hospital Medicine. Platelets were 95 1 month ago and have dropped to 22 as of admit. Heme/onc was consulted and they have ordered CT of chest, abd, and pelvis today looking for metastasis. They are planning on dexamethazone 40mg daily X 4 days and are planning on a bone marrow biopsy during stay looking for cause of sudden drop. Patient is high risk for bleeding and will need emergent care if platelets drop further. Currently he denies bleeding, SOB, and Chest pain. Patient has hx of rheumatoid arthritis - low platelets might be autoimmune.  As of 04/12: Patient to have bone marrow biopsy today. Platelets 24. Patient is agitated today but has also been NPO since midnight and is a smoker, who has not had a cigarette, and has mets to brain. Heme/onc  "following - CT chest, abd, pelvis showed new masses. Heme/onc to f/u OP for tx. Platelets remain low - will recheck CBC in AM.    As of 4/13 patient is s/p bone marrow biopsy on 4/12 with results pending.  Platelets remain low at 22, currently receiving po Dexamethasone per Hemoc recs.  He c/o worsening edema and pain to left foot and left calf.  + ecchymosis and petechiae noted with tenderness to touch.  Xray of left foot yesterday negative for acute findings.  US pending to r/o acute process.  Will need to consider CT versus MRI pending US results given concern for bleeding.  Sinus Tachycardia noted and could be r/t Dexamethasone versus not taking home Diltiazem given patient will not allow nursing staff to administer medications, versus pain.  TSH normal.  May need to r/o PE depending on US results.  Long discussion held with patient today regarding the need for increased cooperation with treatment plan, and to allow proper medication administration via nursing staff.  As of now, patient agrees.  CXR on admit showed RUL PNA, however CT showed 2.9 x 1.8 cm mass in the anterior right upper lobe and no PNA.  Will transition to inpatient for continued care.     As of yesterday afternoon LLE US negative for DVT.  Given persistent tachycardia with SaO2 of 84% on RA noted, STAT CTA chest was ordered to r/o PE, however, patient refused test.  At this time patient was AAOx3 and answering questions appropriately.  Code status d/w patient in detail with primary nurse Oni and patient's wife and brother present.  Per patient's wishes he is a DNR stating "just let me go peacefully".  Patient also stated "I'm tired of all these tests and don't want to do them".  After much encouragement, patient ultimately agreed to a VQ scan, but shortly thereafter he had an acute worsening of respiratory status with desaturation in SaO2 to the 70 range on RA.  Patient was subsequently placed on 100% NRB with improvement in SaO2 to 95%.  " Given acute worsening, Dr. Funk was called to BS, and confirmed DNR status with patient's wife.   All questions answered in detail and family agreed to proceed with comfort measures.  Dr. Falcon updated and agreed with DNR status given metastatic cancer with poor prognosis.  Norco ordered for comfort and Xanax prn agitation.  At 2147 night physician Dr. Rodriguez was called to patient's bedside after being notified by primary nurse that patient was asystolic with no spontaneous respirations noted.  Per documentation from Dr. Rodriguez, patient was seen and examined.  Heart and lung sounds are absent.  No spontaneous cardiac or respiratory activity.  Patient does not respond to verbal or painful stimuli.  Corneal, pupillary, and gag reflexes are absent.  Pupils are fixed and dilated.  Rhythm strip showed asystole.  Family was present in room.  Time of death is 21:47 on April 13, 2019.                   Consults: Hematology  Consults (From admission, onward)        Status Ordering Provider     Inpatient consult to Registered Dietitian/Nutritionist  Once     Provider:  (Not yet assigned)    Completed IVANNA CRUZ     Inpatient consult to Registered Dietitian/Nutritionist  Once     Provider:  (Not yet assigned)    Completed JOANNE GOMES     IP consult to case management  Once     Provider:  (Not yet assigned)    Completed IVANNA CURZ            Final Active Diagnoses:    Diagnosis Date Noted POA    PRINCIPAL PROBLEM:  Thrombocytopenia [D69.6] 02/04/2019 Yes    Primary cancer of left lower lobe of lung [C34.32] 06/25/2018 Yes    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] 03/12/2019 Yes    Foot swelling [M79.89] 04/13/2019 Unknown    Sinus tachycardia [R00.0] 04/13/2019 Unknown    COPD (chronic obstructive pulmonary disease) [J44.9] 06/10/2013 Yes    Coronary artery disease [I25.10]  Yes    Hypertension [I10] 11/04/2014 Yes    Cachexia [R64] 05/31/2018 Yes    Hypothyroidism [E03.9]  Yes    Severe  malnutrition [E43] 2019 Unknown    Non compliance w medication regimen [Z91.14] 2019 Not Applicable      Problems Resolved During this Admission:       Discharged Condition:     Disposition:     Follow Up: None;     Patient Instructions:   No discharge procedures on file.    Significant Diagnostic Studies: Labs:   BMP:   Recent Labs   Lab 19   GLU 90   *   K 3.9   CL 97   CO2 24   BUN 52*   CREATININE 1.4   CALCIUM 9.3   , CMP   Recent Labs   Lab 19   *   K 3.9   CL 97   CO2 24   GLU 90   BUN 52*   CREATININE 1.4   CALCIUM 9.3   ANIONGAP 11   ESTGFRAFRICA >60   EGFRNONAA 53*    and CBC   Recent Labs   Lab 19   WBC 3.34*   HGB 10.5*   HCT 30.8*   PLT 22*       Pending Diagnostic Studies:     Procedure Component Value Units Date/Time    Iron Stain, Bone Marrow [[id number]] Collected:  19    Order Status:  Sent Lab Status:  In process Updated:  19 0748    Specimen:  Bone Marrow     Tissue Specimen to Pathology, Bone Marrow Aspiration/Biopsy Procedure [[id number]] Collected:  19    Order Status:  Sent Lab Status:  No result     Specimen:  Bone Marrow Aspirate, Left Iliac Crest     Tissue Specimen to Pathology, Bone Marrow Aspiration/Biopsy Procedure [[id number]] Collected:  19    Order Status:  Sent Lab Status:  No result     Specimen:  Bone Marrow Aspirate, Left Iliac Crest     Tissue Specimen to Pathology, Bone Marrow Aspiration/Biopsy Procedure [[id number]] Collected:  19    Order Status:  Sent Lab Status:  No result     Specimen:  Bone Marrow Aspirate, Left Iliac Crest          Medications:  None (patient  at medical facility)    Indwelling Lines/Drains at time of discharge:   Lines/Drains/Airways          None          Time spent on the discharge of patient: 45 minutes  Patient was seen and examined on the date of discharge and determined to be suitable for discharge.          Monika Mcclure, DNP, ACNP-BC  Department of Hospital Medicine  [facility]

## 2019-04-15 ENCOUNTER — TELEPHONE (OUTPATIENT)
Dept: FAMILY MEDICINE | Facility: CLINIC | Age: 64
End: 2019-04-15

## 2019-04-15 ENCOUNTER — TELEPHONE (OUTPATIENT)
Dept: RADIATION ONCOLOGY | Facility: CLINIC | Age: 64
End: 2019-04-15

## 2019-04-15 LAB
BODY SITE - BONE MARROW: NORMAL
BONE MARROW IRON STAIN COMMENT: NORMAL
CLINICAL DIAGNOSIS - BONE MARROW: NORMAL
FLOW CYTOMETRY ANTIBODIES ANALYZED - BONE MARROW: NORMAL
FLOW CYTOMETRY COMMENT - BONE MARROW: NORMAL
FLOW CYTOMETRY INTERPRETATION - BONE MARROW: NORMAL

## 2019-04-15 PROCEDURE — 88342 IMHCHEM/IMCYTCHM 1ST ANTB: CPT | Performed by: PATHOLOGY

## 2019-04-15 PROCEDURE — 88305 TISSUE EXAM BY PATHOLOGIST: CPT | Performed by: PATHOLOGY

## 2019-04-15 NOTE — TELEPHONE ENCOUNTER
Spoke with patient's caregiver/son Vick and instructed him to contact the  home regarding the death certificate. Vick verbalized understanding.

## 2019-04-15 NOTE — TELEPHONE ENCOUNTER
----- Message from Annika Castelan sent at 4/15/2019  8:52 AM CDT -----  Contact: Vick/son  Please call pt son @ [phone] regarding a death certificate, pt  the Friday, son need to know how do he request the certificate.

## 2019-04-18 NOTE — PHYSICIAN QUERY
"PT Name: Wallace Vigil  MR #: [id number]    Physician Query Form - Hematology Clarification     CDS/: Lacey Bingham               Contact information:  This form is a permanent document in the medical record.     Query Date: April 18, 2019    By submitting this query, we are merely seeking further clarification of documentation. Please utilize your independent clinical judgment when addressing the question(s) below.    The Medical record contains the following:     Indicators   Supporting Clinical Findings   Location in Medical Record   X "Thrombocytopenia" documented  Thrombocytopenia Hospital medicine 4/13   X Platelets PLT  22 Labs 4/10, 4/13    Acute bleeding, Petechiae, Bruising      Patient on anticoagulant medication      Transfusion(s)     X Treatments:  - Platelet count remains at 22  - No active bleeding noted on exam, but ecchymosis present  - Hemoc following  - Currently receiving po Dexamethasone per Hemoc recs  - s/p Bone marrow biopsy on 4/12 with results pending, will need to f/u  - Daily CBC  - Monitor and transfuse to keep platelets >10   [facility] 4/13    Other:          Provider, please specify diagnosis or diagnoses associated with above clinical findings.    [   ] Idiopathic Thrombocytopenic Purpura (ITP)   [   ] Primary thrombocytopenia   [   ] Secondary thrombocytopenia related to (please specify):______________________   [ x  ] Unspecified thrombocytopenia   [   ] Drug induced thrombocytopenia (please specify):____________________________   [   ] Other hematological diagnosis (please specify):_____________________________   [  ] Clinically Undetermined         Please document in your progress notes daily for the duration of treatment, until resolved, and include in your discharge summary.                                                                                                                                                                                                 "

## 2019-04-18 NOTE — PHYSICIAN QUERY
PT Name: Wallace Vigil  MR #: [id number]    Physician Query Form - Respiratory Condition Clarification      CDS/: Lacey Bingham RN        Contact information: [email or web address]    This form is a permanent document in the medical record.    Query Date: April 18, 2019    By submitting this query, we are merely seeking further clarification of documentation. Please utilize your independent clinical judgment when addressing the question(s) below.    The Medical record contains the following   Indicators   Supporting Clinical Findings Location in Medical Record      SOB, BUI, Wheezing, Productive Cough, Use of Accessory Muscles, etc.     X   Acute/Chronic Illness Wallace Vigil is a 64 y.o. male patient with a PMHx of lung CA with mets to the brain, COPD, CKD, CAD, heart failure, PVD, RA who presents to the Emergency Department for abnormal labs     H & P 4/10   X   Radiology Findings Right upper lobe pneumonia CXR 4/10   X   Respiratory Distress or Failure Patient with acute worsening of respiratory status     Hospital medicine 4/13      Hypoxia or Hypercapnia     X   RR         ABGs         O2 sat RR 20 O2 sat 89% Nursing flowsheet 4/13 1908      BiPAP/Intubation     X   Supplemental O2 uncomfortable on 3.5 L NC and restless Hospital medicine 4/13      Home O2, Oxygen Dependence     X   Treatment Placed on 100% NRB   or comfort with improvement in SaO2 to 95%.   Hospital medicine 4/13   X   Other .  Patient was seen and examined.  Heart and lung sounds are absent. No spontaneous cardiac or respiratory activity     Hospital medicine 4/13     Respiratory failure can be acute, chronic or both. It is generally further specified as hypoxic, hypercapnic or both. Lastly, it is important to identify an etiology, if known or suspected.   References::  https://www.acphospitalist.org/archives/2013/10/coding.htm http://[email or web address].com/acute-respiratory-failure-know     The clinical guidelines noted below are only  system guidelines, and do not replace the providers clinical judgment.    Provider, please specify diagnosis or diagnoses associated with above clinical findings.   [  x ] Acute Respiratory Failure with Hypoxia - ABG pO2 < 60 mmHg or O2 sat of 88% on RA and respiratory symptoms documented   [   ] Other Acute Respiratory Failure     [   ] Acute Respiratory Insufficiency - Generally describes less severe respiratory symptoms and measurements (pO2, SpO2, pH, and pCO2) NOT meeting criteria for respiratory failure     [   ] Acute Respiratory Distress - Generally describes less severe respiratory symptoms (tachypnea, in respiratory distress, increased work of breathing, unable to speak in complete sentences, labored breathing, use of accessory muscles, RR> 24, cyanosis, dyspnea, wheezing, stridor, lethargy) without sufficient measurements (pO2, SpO2, pH, and pCO2) to meet criteria for respiratory failure    [   ] Hypoxia Only   [   ] Other Respiratory Diagnosis (please specify): _________________________________   [   ]  Clinically Undetermined       Please document in your progress notes daily for the duration of treatment until resolved and include in your discharge summary.

## 2019-04-18 NOTE — PHYSICIAN QUERY
PT Name: Wallace Vigil  MR #: [id number]     PHYSICIAN QUERY -  ELECTROLYTE CLARIFICATION      CDS/: Lacey Bingham RN             Contact information:[email or web address]  This form is a permanent document in the medical record.     Query Date: April 18, 2019    By submitting this query, we are merely seeking further clarification of documentation to reflect the severity of illness of your patient. Please utilize your independent clinical judgment when addressing the question(s) below.    The Medical record reflects the following:     Indicators   Supporting Clinical Findings Location in Medical Record   X Lab Value(s) Sodium, 132, 133, 134 Labs 4/10, 4/11, 4/12   X Treatment                                 Medication NaCl 250cc/hr x 1 MAR 4/11    Other       Provider, please specify the diagnosis or diagnoses that correspond(s) to the above indicators. Brody all that apply:    [   ] Hyponatremia   ] Other electrolyte disturbance (please specify): _______   [ x  ]  Clinically Undetermined       Please document in your progress notes daily for the duration of treatment until resolved, and include in your discharge summary.

## 2019-04-22 LAB
CHROM BANDING METHOD: NORMAL
CHROMOSOME ANALYSIS BM ADDITIONAL INFORMATION: NORMAL
CHROMOSOME ANALYSIS BM RELEASED BY: NORMAL
CHROMOSOME ANALYSIS BM RESULT SUMMARY: NORMAL
CLINICAL CYTOGENETICIST REVIEW: NORMAL
KARYOTYP MAR: NORMAL
REASON FOR REFERRAL (NARRATIVE): NORMAL
REF LAB TEST METHOD: NORMAL
SPECIMEN SOURCE: NORMAL
SPECIMEN: NORMAL

## 2019-04-22 NOTE — PHYSICIAN QUERY
PT Name: Wallace Vigil  MR #: [id number]     Physician Query Form - Documentation Clarification      CDS/: Lacey Bingham RN          Contact information:[email or web address]    This form is a permanent document in the medical record.     Query Date: April 22, 2019    By submitting this query, we are merely seeking further clarification of documentation. Please utilize your independent clinical judgment when addressing the question(s) below.    The Medical record reflects the following:    Supporting Clinical Findings Location in Medical Record   Wallace Vigil is a 64 y.o. male patient with a PMHx of lung CA with mets to the brain, COPD, CKD, CAD, heart failure, PVD, RA who presents to the Emergency Department for abnormal labs     Primary cancer of left lower lobe of lung  - Heme/onc following  - CT chest abdomen pelvis from April 12 showed new masses  - Per Hemoc, will get biopsy as outpatient for workup.  - Followed outpatient by Dr. Ramires    Secondary adenocarcinoma of brain  - s/p radiation  - Hemoc following      64 y.o. male patient with a PMHx of Thyroid cancer (tx thyroidectomy 2017), presumed lung cancer (previously treated with SBRT left lung 50 Gy in 5 fractions--followed in Heme-Onc clinic by Dr. Ramires) with recent diagnosis of brain metastasis (completed radiation treatment 4/8/19 and taking Dexamethasone 4 mg PO TID at home   H & P                                        Heme/onc 4/13                                                                                Doctor, Please specify diagnosis or diagnoses associated with above clinical findings.    Please check ALL that apply    Provider Use Only        [x  ]  Lung cancer is a current diagnosis    [  ]  Lung cancer is a past medical history only and not a current diagnosis    [  ]  Brain metastasis is a current diagnosis    [  ]  Brain metastasis is a past medical history only and not a current diagnosis                                                                                                                  [  ] Clinically Undetermined

## 2019-05-21 ENCOUNTER — APPOINTMENT (OUTPATIENT)
Dept: CT IMAGING | Age: 64
DRG: 812 | End: 2019-05-21
Payer: COMMERCIAL

## 2019-05-21 ENCOUNTER — APPOINTMENT (OUTPATIENT)
Dept: GENERAL RADIOLOGY | Age: 64
DRG: 812 | End: 2019-05-21
Payer: COMMERCIAL

## 2019-05-21 ENCOUNTER — HOSPITAL ENCOUNTER (INPATIENT)
Age: 64
LOS: 3 days | Discharge: HOME | DRG: 812 | End: 2019-05-24
Attending: EMERGENCY MEDICINE | Admitting: FAMILY MEDICINE
Payer: COMMERCIAL

## 2019-05-21 DIAGNOSIS — T40.601A OPIATE OVERDOSE, ACCIDENTAL OR UNINTENTIONAL, INITIAL ENCOUNTER (HCC): Primary | ICD-10-CM

## 2019-05-21 PROBLEM — T40.2X1A OPIOID OVERDOSE (HCC): Status: ACTIVE | Noted: 2019-05-21

## 2019-05-21 LAB
ANION GAP SERPL CALCULATED.3IONS-SCNC: 18 MMOL/L (ref 3–16)
BASE EXCESS ARTERIAL: -5.3 MMOL/L (ref -3–3)
BASE EXCESS VENOUS: -3 (ref -3–3)
BASE EXCESS VENOUS: -5 (ref -3–3)
BASOPHILS ABSOLUTE: 0 K/UL (ref 0–0.2)
BASOPHILS RELATIVE PERCENT: 0.1 %
BUN BLDV-MCNC: 16 MG/DL (ref 7–20)
CALCIUM SERPL-MCNC: 9.4 MG/DL (ref 8.3–10.6)
CARBOXYHEMOGLOBIN ARTERIAL: 2.5 % (ref 0–1.5)
CHLORIDE BLD-SCNC: 98 MMOL/L (ref 99–110)
CO2: 23 MMOL/L (ref 21–32)
CREAT SERPL-MCNC: 2.6 MG/DL (ref 0.8–1.3)
EOSINOPHILS ABSOLUTE: 0 K/UL (ref 0–0.6)
EOSINOPHILS RELATIVE PERCENT: 0 %
GFR AFRICAN AMERICAN: 30
GFR NON-AFRICAN AMERICAN: 25
GLUCOSE BLD-MCNC: 74 MG/DL (ref 70–99)
HCO3 ARTERIAL: 21 MMOL/L (ref 21–29)
HCO3 VENOUS: 23.3 MMOL/L (ref 23–29)
HCO3 VENOUS: 25.8 MMOL/L (ref 23–29)
HCT VFR BLD CALC: 44 % (ref 40.5–52.5)
HEMOGLOBIN, ART, EXTENDED: 14.2 G/DL
HEMOGLOBIN: 14.4 G/DL (ref 13.5–17.5)
LACTATE: 3.81 MMOL/L (ref 0.4–2)
LYMPHOCYTES ABSOLUTE: 1 K/UL (ref 1–5.1)
LYMPHOCYTES RELATIVE PERCENT: 9.7 %
MCH RBC QN AUTO: 29.7 PG (ref 26–34)
MCHC RBC AUTO-ENTMCNC: 32.8 G/DL (ref 31–36)
MCV RBC AUTO: 90.4 FL (ref 80–100)
METHEMOGLOBIN ARTERIAL: 0.7 % (ref 0–1.4)
MONOCYTES ABSOLUTE: 0.9 K/UL (ref 0–1.3)
MONOCYTES RELATIVE PERCENT: 9.1 %
NEUTROPHILS ABSOLUTE: 8.1 K/UL (ref 1.7–7.7)
NEUTROPHILS RELATIVE PERCENT: 81.1 %
O2 SAT, ARTERIAL: 94 % (ref 93–100)
O2 SAT, VEN: 22 %
O2 SAT, VEN: 34 %
PCO2 ARTERIAL: 48.1 MMHG (ref 35–45)
PCO2, VEN: 61.1 MM HG (ref 40–50)
PCO2, VEN: 69.3 MM HG (ref 40–50)
PDW BLD-RTO: 14.6 % (ref 12.4–15.4)
PERFORMED ON: ABNORMAL
PERFORMED ON: ABNORMAL
PH ARTERIAL: 7.27 (ref 7.35–7.45)
PH VENOUS: 7.18 (ref 7.35–7.45)
PH VENOUS: 7.19 (ref 7.35–7.45)
PLATELET # BLD: 310 K/UL (ref 135–450)
PMV BLD AUTO: 9 FL (ref 5–10.5)
PO2 ARTERIAL: 74.4 MMHG (ref 75–108)
PO2, VEN: 20 MM HG
PO2, VEN: 26 MM HG
POC SAMPLE TYPE: ABNORMAL
POC SAMPLE TYPE: ABNORMAL
POTASSIUM SERPL-SCNC: 5.8 MMOL/L (ref 3.5–5.1)
POTASSIUM SERPL-SCNC: 7.2 MMOL/L (ref 3.5–5.1)
RBC # BLD: 4.87 M/UL (ref 4.2–5.9)
SODIUM BLD-SCNC: 139 MMOL/L (ref 136–145)
TCO2 ARTERIAL: 23 MMOL/L
TCO2 CALC VENOUS: 25 MMOL/L
TCO2 CALC VENOUS: 28 MMOL/L
WBC # BLD: 10 K/UL (ref 4–11)

## 2019-05-21 PROCEDURE — 6360000002 HC RX W HCPCS

## 2019-05-21 PROCEDURE — 94664 DEMO&/EVAL PT USE INHALER: CPT

## 2019-05-21 PROCEDURE — 94640 AIRWAY INHALATION TREATMENT: CPT

## 2019-05-21 PROCEDURE — 82803 BLOOD GASES ANY COMBINATION: CPT

## 2019-05-21 PROCEDURE — 6360000002 HC RX W HCPCS: Performed by: STUDENT IN AN ORGANIZED HEALTH CARE EDUCATION/TRAINING PROGRAM

## 2019-05-21 PROCEDURE — 96376 TX/PRO/DX INJ SAME DRUG ADON: CPT

## 2019-05-21 PROCEDURE — 70450 CT HEAD/BRAIN W/O DYE: CPT

## 2019-05-21 PROCEDURE — 84132 ASSAY OF SERUM POTASSIUM: CPT

## 2019-05-21 PROCEDURE — 83605 ASSAY OF LACTIC ACID: CPT

## 2019-05-21 PROCEDURE — 94770 HC ETCO2 MONITOR DAILY: CPT

## 2019-05-21 PROCEDURE — 2700000000 HC OXYGEN THERAPY PER DAY

## 2019-05-21 PROCEDURE — 99291 CRITICAL CARE FIRST HOUR: CPT

## 2019-05-21 PROCEDURE — 94761 N-INVAS EAR/PLS OXIMETRY MLT: CPT

## 2019-05-21 PROCEDURE — 93005 ELECTROCARDIOGRAM TRACING: CPT | Performed by: STUDENT IN AN ORGANIZED HEALTH CARE EDUCATION/TRAINING PROGRAM

## 2019-05-21 PROCEDURE — 2580000003 HC RX 258: Performed by: STUDENT IN AN ORGANIZED HEALTH CARE EDUCATION/TRAINING PROGRAM

## 2019-05-21 PROCEDURE — 71046 X-RAY EXAM CHEST 2 VIEWS: CPT

## 2019-05-21 PROCEDURE — 96374 THER/PROPH/DIAG INJ IV PUSH: CPT

## 2019-05-21 PROCEDURE — 36600 WITHDRAWAL OF ARTERIAL BLOOD: CPT

## 2019-05-21 PROCEDURE — 80048 BASIC METABOLIC PNL TOTAL CA: CPT

## 2019-05-21 PROCEDURE — 80307 DRUG TEST PRSMV CHEM ANLYZR: CPT

## 2019-05-21 PROCEDURE — 2000000000 HC ICU R&B

## 2019-05-21 PROCEDURE — 85025 COMPLETE CBC W/AUTO DIFF WBC: CPT

## 2019-05-21 PROCEDURE — 6370000000 HC RX 637 (ALT 250 FOR IP): Performed by: EMERGENCY MEDICINE

## 2019-05-21 RX ORDER — NALOXONE HYDROCHLORIDE 1 MG/ML
2 INJECTION INTRAMUSCULAR; INTRAVENOUS; SUBCUTANEOUS PRN
Status: DISCONTINUED | OUTPATIENT
Start: 2019-05-21 | End: 2019-05-24

## 2019-05-21 RX ORDER — ONDANSETRON 2 MG/ML
4 INJECTION INTRAMUSCULAR; INTRAVENOUS EVERY 6 HOURS PRN
Status: DISCONTINUED | OUTPATIENT
Start: 2019-05-21 | End: 2019-05-24 | Stop reason: HOSPADM

## 2019-05-21 RX ORDER — NALOXONE HYDROCHLORIDE 0.4 MG/ML
INJECTION, SOLUTION INTRAMUSCULAR; INTRAVENOUS; SUBCUTANEOUS
Status: COMPLETED
Start: 2019-05-21 | End: 2019-05-21

## 2019-05-21 RX ORDER — NALOXONE HYDROCHLORIDE 1 MG/ML
INJECTION INTRAMUSCULAR; INTRAVENOUS; SUBCUTANEOUS
Status: COMPLETED
Start: 2019-05-21 | End: 2019-05-21

## 2019-05-21 RX ORDER — IPRATROPIUM BROMIDE AND ALBUTEROL SULFATE 2.5; .5 MG/3ML; MG/3ML
1 SOLUTION RESPIRATORY (INHALATION) ONCE
Status: COMPLETED | OUTPATIENT
Start: 2019-05-21 | End: 2019-05-21

## 2019-05-21 RX ORDER — SODIUM CHLORIDE 0.9 % (FLUSH) 0.9 %
10 SYRINGE (ML) INJECTION EVERY 12 HOURS SCHEDULED
Status: DISCONTINUED | OUTPATIENT
Start: 2019-05-21 | End: 2019-05-24 | Stop reason: HOSPADM

## 2019-05-21 RX ORDER — SODIUM CHLORIDE 9 MG/ML
INJECTION, SOLUTION INTRAVENOUS
Status: DISPENSED
Start: 2019-05-21 | End: 2019-05-22

## 2019-05-21 RX ORDER — SODIUM CHLORIDE 0.9 % (FLUSH) 0.9 %
10 SYRINGE (ML) INJECTION PRN
Status: DISCONTINUED | OUTPATIENT
Start: 2019-05-21 | End: 2019-05-24 | Stop reason: HOSPADM

## 2019-05-21 RX ADMIN — IPRATROPIUM BROMIDE AND ALBUTEROL SULFATE 1 AMPULE: .5; 3 SOLUTION RESPIRATORY (INHALATION) at 19:48

## 2019-05-21 RX ADMIN — NALOXONE HYDROCHLORIDE 1 MG: 1 INJECTION PARENTERAL at 18:13

## 2019-05-21 RX ADMIN — AMPICILLIN SODIUM AND SULBACTAM SODIUM 1.5 G: 1; .5 INJECTION, POWDER, FOR SOLUTION INTRAMUSCULAR; INTRAVENOUS at 22:55

## 2019-05-21 RX ADMIN — NALOXONE HYDROCHLORIDE 0.4 MG/HR: 0.4 INJECTION, SOLUTION INTRAMUSCULAR; INTRAVENOUS; SUBCUTANEOUS at 19:25

## 2019-05-21 RX ADMIN — NALOXONE HYDROCHLORIDE 1 MG: 1 INJECTION PARENTERAL at 17:44

## 2019-05-21 RX ADMIN — NALOXONE HYDROCHLORIDE 1 MG: 1 INJECTION PARENTERAL at 18:35

## 2019-05-21 RX ADMIN — NALOXONE HYDROCHLORIDE 0.4 MG: 0.4 INJECTION, SOLUTION INTRAMUSCULAR; INTRAVENOUS; SUBCUTANEOUS at 17:38

## 2019-05-21 NOTE — ED NOTES
Patient continues to yell, fidget in the bed. Not following commands. Just keeps repeating, \"I'm cold, I'm cold\".      Mega Sanchez RN  05/21/19 5004

## 2019-05-21 NOTE — ED PROVIDER NOTES
ED Attending Attestation Note     Date of evaluation: 5/21/2019    This patient was seen by the resident. I have seen and examined the patient, agree with the workup, evaluation, management and diagnosis. The care plan has been discussed. I have reviewed the ECG and concur with the resident's interpretation. My assessment reveals patient with AMS, found down, responsive to Narcan. Requiring 4mg per hour and will need ICU admission for Narcan Infusion. Admitted to me that he used Fentanyl. Critical Care:  Due to the immediate potential for life-threatening deterioration due to opioid overdose requiring continuous narcan , I spent 35 minutes providing critical care. This time excludes time spent performing procedures but includes time spent on direct patient care, history retrieval, review of the chart, and discussions with patient, family, and consultant(s).        Emmanuel Patel MD  05/21/19 Marilee Victoria

## 2019-05-21 NOTE — ED NOTES
Patient arrived to the ED with suspected heroin overdose. Family reports patient recently started using heroin and has been \"hanging with the wrong people\". They report that he \"stumbled into his mom's house after being gone for a few days and laid down and went to sleep. \" During this time, they report that he was making grunting noises. Eventually they noticed \"white foam and liquid\" coming from his nose and mouth, so they called 911. EMS gave 4 mg narcan, which caused the patient to become agitated. On arrival, he was agitated, repeating over and over, \"I'm cold\". His pupils were pinpoint and he was not able to follow commands, so more narcan has been ordered and Dr. Mindy Hoang is at the bedside.      Rajan Galeas RN  05/21/19 9908

## 2019-05-21 NOTE — ED PROVIDER NOTES
[id number] [id number] RESIDENT NOTE       Date of evaluation: 5/21/2019    Chief Complaint     Drug Overdose and Altered Mental Status    History of Present Illness     Zulema Lundy is a 62 y.o. male with no known past medical history who presents after EMS was called for unresponsiveness. Patient recently started to use heroin per family report obtained by ED staff. He came to his mother's house this afternoon, stumbling and passed out on the couch for about two hours. After he was less responsive and poor to wake up, 911 was called and the patient was brought to the ED. He was given narcan, unknown route, 4 mg,  by EMS resulting in increased agitation and increased alertness. When the patient arrived to the ED, he was only able to state, \"I'm cold\" repeatedly and could not answer history or ROS. He was given narcan (see ED course below) and became more agitated. He endorsed using fentanyl recently. Review of Systems     Review of Systems   Unable to perform ROS: Mental status change       Past Medical, Surgical, Family, and Social History     He has a past medical history of Opiate abuse, continuous ([facility] [address] and Opiate overdose ([facility] [address] He has a past surgical history that includes Abdomen surgery. His family history is not on file. He reports that he has current or past drug history. Drug: Opiates . Medications     Previous Medications    No medications on file       Allergies     He has no allergies on file. Physical Exam     INITIAL VITALS: BP: (!) 137/93, Temp: 98.7 °F (37.1 °C), Pulse: 103, Resp: (!) 35, SpO2: (!) 73 %   Physical Exam   Constitutional: He appears well-developed and well-nourished. He appears distressed. HENT:   Head: Normocephalic and atraumatic. Eyes: Pupils are equal, round, and reactive to light.  Conjunctivae and EOM are normal.   Pinpoint pupils   Cardiovascular: Normal rate, regular rhythm, normal heart sounds and intact distal pulses. Exam reveals no gallop and no friction rub. No murmur heard. Pulmonary/Chest:   Thick, coarse breath sounds bilaterally. Patient apneic requiring non rebreather. No increased work of breathing. Abdominal: Soft. Bowel sounds are normal. He exhibits no distension. There is no tenderness. Musculoskeletal: He exhibits no edema. Neurological:   Patient alternates between lethargy, required narcan every 15-20 minutes. After administration became listless, verbally yelling. Patient no able to answer orientation questions. Skin: Skin is warm and dry. Diagnostic Results     EKG   Interpreted in conjunction with emergency department physician Blas Lemus MD  Rhythm: normal sinus   Rate: normal  Axis: normal, borderline left axis  Ectopy: none  Conduction: normal  ST Segments: no elevation, depression or flattening. T Waves: normal  Q Waves: none  Clinical Impression: normal sinus  Comparison:  none    RADIOLOGY:  XR CHEST STANDARD (2 VW)   Final Result      1. Mild prominence of the lung markings without localized consolidation or pleural effusion in this patient with low lung volumes. Correlate clinically.       CT Head WO Contrast    (Results Pending)       LABS:   Results for orders placed or performed during the hospital encounter of 05/21/19   CBC auto differential   Result Value Ref Range    WBC 10.0 4.0 - 11.0 K/uL    RBC 4.87 4.20 - 5.90 M/uL    Hemoglobin 14.4 13.5 - 17.5 g/dL    Hematocrit 44.0 40.5 - 52.5 %    MCV 90.4 80.0 - 100.0 fL    MCH 29.7 26.0 - 34.0 pg    MCHC 32.8 31.0 - 36.0 g/dL    RDW 14.6 12.4 - 15.4 %    Platelets 805 340 - 534 K/uL    MPV 9.0 5.0 - 10.5 fL    Neutrophils % 81.1 %    Lymphocytes % 9.7 %    Monocytes % 9.1 %    Eosinophils % 0.0 %    Basophils % 0.1 %    Neutrophils # 8.1 (H) 1.7 - 7.7 K/uL    Lymphocytes # 1.0 1.0 - 5.1 K/uL    Monocytes # 0.9 0.0 - 1.3 K/uL    Eosinophils # 0.0 0.0 - 0.6 K/uL    Basophils # 0.0 0.0 - 0.2 K/uL   Basic Course as of May 21 2059   Tue May 21, 2019   1751 Patient only able to repeat, \"I'm cold\" on presentation and unable to give history or review of systems. SpO2 73% with pinpoint pupils, decreased alertness after arrival. Narcan given. Placed on NRB. Improved alertness. [ML]      ED Course User Index  [ML] Sammie Fuentes MD         The patient was given the following medications:  Orders Placed This Encounter   Medications    naloxone (NARCAN) 2 MG/2ML injection     MIRA BUENROSTRO: cabinet override    naloxone (NARCAN) 0.4 MG/ML injection     MIRA BUENROSTRO: cabinet override    naloxone (NARCAN) injection 2 mg    naloxone (NARCAN) 2 mg in sodium chloride 0.9 % 500 mL infusion    ipratropium-albuterol (DUONEB) nebulizer solution 1 ampule       CONSULTS:  IP CONSULT TO HOSPITALIST  IP CONSULT TO CRITICAL CARE    MEDICAL DECISION MAKING / ASSESSMENT / Jessica Wale is a 62 y.o. male was recent reported heroin/fentanyl usage that presents with decreased mental status and apnea. He was found poorly responsive by his family after a two hour nap. He required approximately 8.4 mg of narcan total (from EMS and in the ED), averaging about 1 mg every 15-20 minutes for increased RR with shallow volume breaths, new oxygen requirement needing NRB. On physical exam the patient was agitated after narcan administration, and had thick and coarse bilateral breath sounds while intermittently coughing thickened sputum, raising suspicion for aspiration. His VBG showed pCO2 retention of ~60. XR showed mild prominence in lung markings and low lung volumes, but no consolidation. CT head was unable to be obtained as the patient was yelling and shaking his head when stable/alert enough for exam. He was started on a narcan drip. The patient will be admitted to the ICU for opioid overdose. An assessment with discussed with the patient's family, who voiced understanding and was agreeable to the plan of care.      This patient was also evaluated by the attending physician. All care plans were discussed and agreed upon. Clinical Impression     1. Opiate overdose, accidental or unintentional, initial encounter ([facility] [address]        Disposition     PATIENT REFERRED TO:  No follow-up provider specified.     DISCHARGE MEDICATIONS:  New Prescriptions    No medications on file       Marleni Easley MD  Resident  05/21/19 9096

## 2019-05-21 NOTE — ED NOTES
Patient will likely require narcan drip per Dr. Fawn Collado. Patient will be moved to trauma as he continues to require 1:1 care.      Shanice Plunkett RN  05/21/19 5594

## 2019-05-21 NOTE — H&P
file    ============================================================================    Current Vitals: /65   Pulse 96   Temp 98.7 °F (37.1 °C) (Axillary)   Resp 30   SpO2 (!) 88%     Intake/Output: No intake/output data recorded. No intake/output data recorded. Physical Examination:   Physical Exam   Constitutional: He appears distressed. HENT:   Head: Normocephalic and atraumatic. Eyes:   Pupils pinpoint BL   Neck: No JVD present. Cardiovascular: Normal rate, regular rhythm and intact distal pulses. Exam reveals no gallop and no friction rub. No murmur heard. Difficult to appreciate heart sounds, tachycardic   Pulmonary/Chest: Breath sounds normal. No stridor. No respiratory distress. He has no wheezes. He has no rales. Tachypneic   Abdominal: Soft. He exhibits no distension and no mass. There is tenderness (Diffuse). There is no rebound and no guarding. Musculoskeletal: He exhibits edema (Trace BL). Diffuse tenderness    Neurological:   Patient having difficulty with exam, having difficulty hearing, moaning in pain  Able to follow commands, strength equal BL    Skin: Skin is warm and dry. Capillary refill takes less than 2 seconds.                                                ============================================================================  Consults:   IP CONSULT TO HOSPITALIST  IP CONSULT TO CRITICAL CARE  ============================================================================  Laboratory Data:    CBC: No results found for: WBC, HGB, HCT, MCV, PLT    BMP: No results found for: CREATININE, BUN, NA, K, CL, CO2, PHOS, MG    LFT's: No results for input(s): AST, ALT, ALB, BILITOT, ALKPHOS in the last 72 hours. INR: No results for input(s): INR in the last 72 hours. Troponin: No results for input(s): TROPONINI in the last 72 hours. BNP:No results for input(s): BNP in the last 72 hours.     ABGs: No results for input(s): PHART, QZE2ABO, PO2ART in the last 72 hours.    [email or web address]    ============================================================================  Imaging, EKG and other studies:   XR CHEST STANDARD (2 VW)   Final Result      1. Mild prominence of the lung markings without localized consolidation or pleural effusion in this patient with low lung volumes. Correlate clinically. CT Head WO Contrast    (Results Pending)       ============================================================================  Assessment and plan:    Mariela Butt is a 62 y.o. male with no PMHx p/w AMS and being admitted for drug overdose. Altered Mental Status -- likely 2/2 drug overdose, endorsed heroin and fentanyl use   - Narcan gtt  - f/u CT Head   - f/u UDS   - f/u troponin   - f/u LFT's     Acute Hypoxic Respiratory Failure -- likely 2/2 drug overdose vs. Aspiration, initially SpO2: 73% on RA.  Saturating well on non-rebreather   - unasyn 1.5 g Q6H empirically for possible aspiration   - f/u ABG   - supplemental O2 -- wean as tolerated     Diffuse Pain -- likely 2/2 narcan administration, tender in abdomen   - f/u lipase     ============================================================================  Code: Full   FEN: NPO   PPX: Lovenox   DISPO: ICU     This patient has been staffed and discussed with Ирина Liu MD.   -----------------------------  Transcribed by Mindy Schafer, MS4   05/21/19

## 2019-05-21 NOTE — ED NOTES
Patient remains on non-rebreather, respiratory rate of 50/minute, O2 sat mid-high 90s. Agitated after narcan, still not following commands or answering questions appropriately.      Fabiana Hemphill RN  05/21/19 4804

## 2019-05-22 PROBLEM — M62.82 RHABDOMYOLYSIS: Status: ACTIVE | Noted: 2019-05-22

## 2019-05-22 PROBLEM — R77.8 ELEVATED TROPONIN: Status: ACTIVE | Noted: 2019-05-22

## 2019-05-22 PROBLEM — E86.0 DEHYDRATION: Status: ACTIVE | Noted: 2019-05-22

## 2019-05-22 PROBLEM — G92.9 TOXIC ENCEPHALOPATHY: Status: ACTIVE | Noted: 2019-05-22

## 2019-05-22 PROBLEM — N17.9 AKI (ACUTE KIDNEY INJURY) (HCC): Status: ACTIVE | Noted: 2019-05-22

## 2019-05-22 PROBLEM — J96.01 ACUTE RESPIRATORY FAILURE WITH HYPOXIA (HCC): Status: ACTIVE | Noted: 2019-05-22

## 2019-05-22 LAB
ALBUMIN SERPL-MCNC: 4.1 G/DL (ref 3.4–5)
ALBUMIN SERPL-MCNC: 4.1 G/DL (ref 3.4–5)
ALP BLD-CCNC: 72 U/L (ref 40–129)
ALT SERPL-CCNC: 23 U/L (ref 10–40)
AMPHETAMINE SCREEN, URINE: ABNORMAL
ANION GAP SERPL CALCULATED.3IONS-SCNC: 17 MMOL/L (ref 3–16)
AST SERPL-CCNC: 44 U/L (ref 15–37)
BACTERIA: ABNORMAL /HPF
BARBITURATE SCREEN URINE: ABNORMAL
BASOPHILS ABSOLUTE: 0.1 K/UL (ref 0–0.2)
BASOPHILS RELATIVE PERCENT: 0.6 %
BENZODIAZEPINE SCREEN, URINE: POSITIVE
BILIRUB SERPL-MCNC: 0.4 MG/DL (ref 0–1)
BILIRUBIN DIRECT: <0.2 MG/DL (ref 0–0.3)
BILIRUBIN URINE: NEGATIVE
BILIRUBIN, INDIRECT: ABNORMAL MG/DL (ref 0–1)
BLOOD, URINE: ABNORMAL
BUN BLDV-MCNC: 19 MG/DL (ref 7–20)
CALCIUM SERPL-MCNC: 8.6 MG/DL (ref 8.3–10.6)
CANNABINOID SCREEN URINE: ABNORMAL
CASTS UA: ABNORMAL /LPF
CHLORIDE BLD-SCNC: 102 MMOL/L (ref 99–110)
CLARITY: CLEAR
CO2: 22 MMOL/L (ref 21–32)
COCAINE METABOLITE SCREEN URINE: POSITIVE
COLOR: YELLOW
CREAT SERPL-MCNC: 1.7 MG/DL (ref 0.8–1.3)
EKG ATRIAL RATE: 97 BPM
EKG DIAGNOSIS: NORMAL
EKG P AXIS: 64 DEGREES
EKG P-R INTERVAL: 162 MS
EKG Q-T INTERVAL: 338 MS
EKG QRS DURATION: 96 MS
EKG QTC CALCULATION (BAZETT): 429 MS
EKG R AXIS: 7 DEGREES
EKG T AXIS: -1 DEGREES
EKG VENTRICULAR RATE: 97 BPM
EOSINOPHILS ABSOLUTE: 0 K/UL (ref 0–0.6)
EOSINOPHILS RELATIVE PERCENT: 0 %
GFR AFRICAN AMERICAN: 49
GFR NON-AFRICAN AMERICAN: 41
GLUCOSE BLD-MCNC: 84 MG/DL (ref 70–99)
GLUCOSE URINE: NEGATIVE MG/DL
HCT VFR BLD CALC: 40.6 % (ref 40.5–52.5)
HEMOGLOBIN: 13.2 G/DL (ref 13.5–17.5)
KETONES, URINE: 15 MG/DL
LACTIC ACID: 1.8 MMOL/L (ref 0.4–2)
LEUKOCYTE ESTERASE, URINE: NEGATIVE
LIPASE: 11 U/L (ref 13–60)
LYMPHOCYTES ABSOLUTE: 0.5 K/UL (ref 1–5.1)
LYMPHOCYTES RELATIVE PERCENT: 3.8 %
Lab: ABNORMAL
MCH RBC QN AUTO: 29 PG (ref 26–34)
MCHC RBC AUTO-ENTMCNC: 32.5 G/DL (ref 31–36)
MCV RBC AUTO: 89.1 FL (ref 80–100)
METHADONE SCREEN, URINE: ABNORMAL
MICROSCOPIC EXAMINATION: YES
MONOCYTES ABSOLUTE: 0.8 K/UL (ref 0–1.3)
MONOCYTES RELATIVE PERCENT: 6.4 %
NEUTROPHILS ABSOLUTE: 11 K/UL (ref 1.7–7.7)
NEUTROPHILS RELATIVE PERCENT: 89.2 %
NITRITE, URINE: NEGATIVE
OPIATE SCREEN URINE: ABNORMAL
OXYCODONE URINE: ABNORMAL
PDW BLD-RTO: 14.3 % (ref 12.4–15.4)
PH UA: 5.5 (ref 5–8)
PH UA: 6
PHENCYCLIDINE SCREEN URINE: ABNORMAL
PHOSPHORUS: 4.5 MG/DL (ref 2.5–4.9)
PLATELET # BLD: 241 K/UL (ref 135–450)
PMV BLD AUTO: 8.5 FL (ref 5–10.5)
POTASSIUM SERPL-SCNC: 4.3 MMOL/L (ref 3.5–5.1)
PROPOXYPHENE SCREEN: ABNORMAL
PROTEIN UA: 30 MG/DL
RBC # BLD: 4.55 M/UL (ref 4.2–5.9)
RBC UA: ABNORMAL /HPF (ref 0–2)
SODIUM BLD-SCNC: 141 MMOL/L (ref 136–145)
SPECIFIC GRAVITY UA: 1.02 (ref 1–1.03)
TOTAL CK: 1104 U/L (ref 39–308)
TOTAL PROTEIN: 7.2 G/DL (ref 6.4–8.2)
TROPONIN: 0.08 NG/ML
TROPONIN: 0.17 NG/ML
URINE REFLEX TO CULTURE: ABNORMAL
URINE TYPE: ABNORMAL
UROBILINOGEN, URINE: 0.2 E.U./DL
WBC # BLD: 12.4 K/UL (ref 4–11)
WBC UA: ABNORMAL /HPF (ref 0–5)

## 2019-05-22 PROCEDURE — 36415 COLL VENOUS BLD VENIPUNCTURE: CPT

## 2019-05-22 PROCEDURE — 84484 ASSAY OF TROPONIN QUANT: CPT

## 2019-05-22 PROCEDURE — 81001 URINALYSIS AUTO W/SCOPE: CPT

## 2019-05-22 PROCEDURE — 94770 HC ETCO2 MONITOR DAILY: CPT

## 2019-05-22 PROCEDURE — 80076 HEPATIC FUNCTION PANEL: CPT

## 2019-05-22 PROCEDURE — 6360000002 HC RX W HCPCS: Performed by: INTERNAL MEDICINE

## 2019-05-22 PROCEDURE — 84100 ASSAY OF PHOSPHORUS: CPT

## 2019-05-22 PROCEDURE — 82550 ASSAY OF CK (CPK): CPT

## 2019-05-22 PROCEDURE — 94761 N-INVAS EAR/PLS OXIMETRY MLT: CPT

## 2019-05-22 PROCEDURE — 85025 COMPLETE CBC W/AUTO DIFF WBC: CPT

## 2019-05-22 PROCEDURE — 6360000002 HC RX W HCPCS: Performed by: STUDENT IN AN ORGANIZED HEALTH CARE EDUCATION/TRAINING PROGRAM

## 2019-05-22 PROCEDURE — 80048 BASIC METABOLIC PNL TOTAL CA: CPT

## 2019-05-22 PROCEDURE — 83605 ASSAY OF LACTIC ACID: CPT

## 2019-05-22 PROCEDURE — 2580000003 HC RX 258: Performed by: STUDENT IN AN ORGANIZED HEALTH CARE EDUCATION/TRAINING PROGRAM

## 2019-05-22 PROCEDURE — 83690 ASSAY OF LIPASE: CPT

## 2019-05-22 PROCEDURE — 2000000000 HC ICU R&B

## 2019-05-22 PROCEDURE — 2700000000 HC OXYGEN THERAPY PER DAY

## 2019-05-22 PROCEDURE — 99223 1ST HOSP IP/OBS HIGH 75: CPT | Performed by: INTERNAL MEDICINE

## 2019-05-22 RX ORDER — HEPARIN SODIUM 5000 [USP'U]/ML
5000 INJECTION, SOLUTION INTRAVENOUS; SUBCUTANEOUS EVERY 8 HOURS SCHEDULED
Status: DISCONTINUED | OUTPATIENT
Start: 2019-05-22 | End: 2019-05-24 | Stop reason: HOSPADM

## 2019-05-22 RX ADMIN — Medication 10 ML: at 23:43

## 2019-05-22 RX ADMIN — NALOXONE HYDROCHLORIDE 0.4 MG/HR: 0.4 INJECTION, SOLUTION INTRAMUSCULAR; INTRAVENOUS; SUBCUTANEOUS at 02:13

## 2019-05-22 RX ADMIN — HEPARIN SODIUM 5000 UNITS: 5000 INJECTION INTRAVENOUS; SUBCUTANEOUS at 23:43

## 2019-05-22 RX ADMIN — HEPARIN SODIUM 5000 UNITS: 5000 INJECTION INTRAVENOUS; SUBCUTANEOUS at 17:08

## 2019-05-22 RX ADMIN — AMPICILLIN SODIUM AND SULBACTAM SODIUM 1.5 G: 1; .5 INJECTION, POWDER, FOR SOLUTION INTRAMUSCULAR; INTRAVENOUS at 05:27

## 2019-05-22 RX ADMIN — NALOXONE HYDROCHLORIDE 0.4 MG/HR: 0.4 INJECTION, SOLUTION INTRAMUSCULAR; INTRAVENOUS; SUBCUTANEOUS at 08:18

## 2019-05-22 ASSESSMENT — ENCOUNTER SYMPTOMS
ABDOMINAL PAIN: 0
SINUS PRESSURE: 0
NAUSEA: 0
SHORTNESS OF BREATH: 0
PHOTOPHOBIA: 0
SINUS PAIN: 0
VOMITING: 0
BACK PAIN: 0
COUGH: 0

## 2019-05-22 ASSESSMENT — PAIN SCALES - GENERAL
PAINLEVEL_OUTOF10: 0

## 2019-05-22 NOTE — PROGRESS NOTES
Internal Medicine Resident  Hospitalist Progress Note      PCP: No primary care provider on file. Date of Admission: 5/21/2019    Chief Complaint:   Chief Complaint   Patient presents with    Drug Overdose    Altered Mental Status         Subjective: Endorses SOB & cough, non-productive. No CP. Slight abdominal pain. No N/V. Some lightheadedness. Medications:  Reviewed  InfusionMedications    naloxone (NARCAN) infusion 0.4 mg/hr (05/22/19 0818)    sodium chloride       Scheduled Medications    sodium chloride flush  10 mL Intravenous 2 times per day    ampicillin-sulbactam  1.5 g Intravenous Q6H     PRN Meds: naloxone, sodium chloride flush, magnesium hydroxide, ondansetron      Intake/Output Summary (Last 24 hours) at 5/22/2019 0919  Last data filed at 5/22/2019 0600  Gross per 24 hour   Intake 1066 ml   Output 1000 ml   Net 66 ml       Physical Exam Performed:  /69   Pulse 89   Temp 98.8 °F (37.1 °C) (Oral)   Resp 30   Ht 6' 0.05\" (1.83 m)   Wt 229 lb 11.5 oz (104.2 kg)   SpO2 92%   BMI 31.11 kg/m²   Physical Exam   Constitutional: He appears well-developed and well-nourished. He appears lethargic. He is cooperative. He has a sickly appearance. No distress. Nasal cannula in place. HENT:   Head: Normocephalic and atraumatic. Mouth/Throat: Oropharynx is clear and moist.   Eyes: Pupils are equal, round, and reactive to light. Conjunctivae and EOM are normal.   Neck: Normal range of motion. Neck supple. No tracheal deviation present. Cardiovascular: Normal rate, regular rhythm and normal heart sounds. Exam reveals no gallop and no friction rub. No murmur heard. Pulmonary/Chest: Effort normal. Bradypnea (breathing 8-12 shallow breaths per minute) noted. He has decreased breath sounds (poor respiratory effort). He has no wheezes. He has rales. Abdominal: Soft. Bowel sounds are normal. He exhibits no distension. There is no tenderness. Musculoskeletal: Normal range of motion. He exhibits no edema or deformity. Neurological: He has normal strength. He appears lethargic. He is disoriented (Oriented to self. Year with 2 attempts. ). No sensory deficit. Grossly non-focal on limited exam, moving all extremities. Skin: Skin is warm and dry. He is not diaphoretic. Psychiatric: He is not actively hallucinating. He exhibits abnormal recent memory. Nursing note and vitals reviewed. Labs:  Reviewed  Recent Labs     05/21/19 1943 05/22/19  0207   WBC 10.0 12.4*   HGB 14.4 13.2*   HCT 44.0 40.6    241     Recent Labs     05/21/19 1943 05/21/19 2025 05/22/19  0207     --  141   K 7.2* 5.8* 4.3   CL 98*  --  102   CO2 23  --  22   BUN 16  --  19   CREATININE 2.6*  --  1.7*   CALCIUM 9.4  --  8.6   PHOS  --   --  4.5     Recent Labs     05/22/19  0207   AST 44*   ALT 23   BILIDIR <0.2   BILITOT 0.4   ALKPHOS 72     No results for input(s): INR in the last 72 hours. Recent Labs     05/22/19 0207   CKTOTAL 1,104*   TROPONINI 0.17*       Urinalysis:   Lab Results   Component Value Date    NITRU Negative 05/22/2019    WBCUA 0-2 05/22/2019    BACTERIA 3+ 05/22/2019    RBCUA 0-2 05/22/2019    BLOODU MODERATE 05/22/2019    SPECGRAV 1.025 05/22/2019    GLUCOSEU Negative 05/22/2019       Radiology:  Reviewed  CT Head WO Contrast   Final Result      1. Limited exam secondary to patient motion artifact. 2.  No definite findings for acute intracranial abnormality. XR CHEST STANDARD (2 VW)   Final Result      1. Mild prominence of the lung markings without localized consolidation or pleural effusion in this patient with low lung volumes. Correlate clinically. Cardiac imaging:    Stress ECG  6/8/18: negative for ischemia    TTE  6/8/18: mild LVH. EF 60-65%. Wall motion normal. No RWMA. trivial agitated saline contrast shows Right-to-left shunt (intrapulmonary or intracardiac).      Stress Echo 6/8/18: Positive stress-induced ischemia due to LVEF, probable have abnormal stress test 6/8/18 but cardiology felt MSK. Plan: trend troponin    7 Rhabdomyolysis    Assessment: Traumatic (ie muscle compression) 2/2 #2 &3.    Plan: IVF. Monitor compartments and pain. Chronic Problem List  #Polysubstance Abuse  #IVDU    # Compensated Cirrhosis: (follows [facility] hepat[facility] [address] [facility]; 2/2 chronic hepatitis C genotype 1A, compensated, nodular liver w/ small varices on EGD, s/p sofosbuvir/rivavirin/peg-interferon for 12 weeks in Aug 2014, s/p 24 wks Harvoni 12/2016 w/ SVR 24 in 09/2017, on nadolol for primary ppx, immune to hep A & B, RUQ U/S 6/2017 w/o liver lesion)    #DM2 w/ polyneuropaty: last A1c 6.6 8/1/18. DVT Prophylaxis:   Diet: Diet NPO Effective Now  Code Status: Full Code  PT/OT Eval Status: hold  Dispo - ICU, transfer to floor when appropriate.     Will discuss with Reeda Craver, MD Claris Sever, MD

## 2019-05-22 NOTE — ED NOTES
Family brought back to bedside and updated on treatment and care plan. Patient remains agitated, will not appopriately answer questions or follow commands. He is on a NRB at 97%, tele in place, continuing to monitor at bedside.      Estella Breen RN  05/21/19 2000

## 2019-05-22 NOTE — H&P
History and Physical  PGY-1    Hospital Day: 1   Code:No Order  Admit Date: 2019  5:21 PM            PCP: No primary care provider on file. Chief Complaint: Altered Mental Status     History of present illness:   Huma Cole is a 62 y.o. male with no PMHx p/w AMS and being admitted for drug overdose. Per ED patient recently started using heroin and and endorsed using fentanyl. Was stumbling around mother's house before falling asleep on the couch for 2 hours. He was unresponsive when the family tried to arouse him prompting them to contact EMS. He was given Narcan during transport which resulted in agitation and increased alertness. Patient unable to provide history to us as he is altered and complains of trouble hearing. He complains of diffuse pain all over but unable to provide anything further. ED Course: Upon arrival to the ED he was afebrile, BP: 137/93, P: 103, RR: 35, SpO2: 73% on RA increasing to 100 on non-rebreather mask. Labs were remarkable for lactate 3.81 and VB.19/61.1/20/23. 3. Patient was acidotic and retaining CO2. CXR with prominent lung markings. Patient was started on Narcan gtt and is being admitted to the ICU for drug overdose. Unable to perform ROS.   ============================================================================  Past medical history:  Past Medical History:   Diagnosis Date    Opiate [facility] continuous ([facility]     Opiate overdose ([facility]        Past Surgical History:   Procedure Laterality Date    ABDOMEN SURGERY       Patient unable to cooperate with HPI. Social History: N/a  Alcohol:  N/a  Illicit drugs: N/a  Tobacco:   N/a    Family History:  No family history on file.  ============================================================================  HomeMedications:  No current facility-administered medications on file prior to encounter. No current outpatient medications on file prior to encounter.        Allergies: Allergies not on file    ============================================================================    Current Vitals: /65   Pulse 96   Temp 98.7 °F (37.1 °C) (Axillary)   Resp (!) 38   SpO2 95%     Intake/Output: No intake/output data recorded. No intake/output data recorded. Physical Examination:   Physical Exam   Constitutional: He appears well-developed. He appears distressed. HENT:   Head: Normocephalic and atraumatic. Eyes:   Pupils pinpoint BL   Neck: No JVD present. Cardiovascular: Normal rate, regular rhythm and intact distal pulses. Exam reveals no gallop and no friction rub. No murmur heard. Difficult to appreciate heart sounds due to transmitted sounds, tachycardic   Pulmonary/Chest: Breath sounds normal. No stridor. No respiratory distress. He has no wheezes. He has no rales. Tachypneic   Abdominal: Soft. He exhibits no distension and no mass. There is tenderness (Diffuse). There is no rebound and no guarding. Musculoskeletal: He exhibits edema (Trace BL). Diffuse tenderness    Neurological:   Alert. Oriented to person only. Patient having difficulty with exam, having difficulty hearing, moaning in pain  Able to follow commands, strength equal BL. Skin: Skin is warm and dry.  Capillary refill takes less than 2 seconds.                                                ============================================================================  Consults:   IP CONSULT TO HOSPITALIST  IP CONSULT TO CRITICAL CARE  ============================================================================  Laboratory Data:    CBC:   Lab Results   Component Value Date    WBC 10.0 05/21/2019    HGB 14.4 05/21/2019    HCT 44.0 05/21/2019    MCV 90.4 05/21/2019     05/21/2019       BMP:   Lab Results   Component Value Date    CREATININE 2.6 (H) 05/21/2019    BUN 16 05/21/2019     05/21/2019    K 7.2 ([place]) 05/21/2019    CL 98 (L) 05/21/2019    CO2 23 05/21/2019       LFT's: No results for input(s): AST, ALT, ALB, BILITOT, ALKPHOS in the last 72 hours. INR: No results for input(s): INR in the last 72 hours. Troponin: No results for input(s): TROPONINI in the last 72 hours. BNP:No results for input(s): BNP in the last 72 hours. ABGs:   Recent Labs     05/21/19 2009   PHART 7.270*   PDG2UWS 48.1*   PO2ART 74.4*       [email or web address]    ============================================================================  Imaging, EKG and other studies:   XR CHEST STANDARD (2 VW)   Final Result      1. Mild prominence of the lung markings without localized consolidation or pleural effusion in this patient with low lung volumes. Correlate clinically. CT Head WO Contrast    (Results Pending)       ============================================================================  Assessment and plan:    Tiffany Rodriguez is a 62 y.o. male with no PMHx p/w AMS and being admitted for drug overdose. Acute Encephalopathy -- likely 2/2 drug overdose, endorsed heroin and fentanyl use, Alert and oriented to person only  - Improved with narcan in ED, continue Narcan gtt  - f/u CT Head   - f/u UDS   - Troponin pending    Acute Hypoxic Respiratory Failure -- likely 2/2 drug overdose vs. Aspiration, initially SpO2: 73% on RA.  Saturating well on non-rebreather   - unasyn 1.5 g Q6H empirically for possible aspiration PNA  - f/u ABG   - supplemental O2 -- wean as tolerated     Diffuse Pain -- likely 2/2 narcan administration, tender in abdomen   - f/u lipase, LFT    ============================================================================  Code: Full   FEN: NPO   PPX: Holding pending CT head  DISPO: ICU     This patient has been staffed and discussed with Lawrence Foote MD.   -----------------------------  Transcribed by Cindy Robins, MS4  Naz Lopez MD  Reached via [address] [place] [address]   05/21/19

## 2019-05-22 NOTE — CONSULTS
ICU HISTORY AND PHYSICAL  PGY- [address] Day: 1  ICU Day:                                                          Code:Full Code  Admit Date: 5/21/2019  PCP: No primary care provider on file. CC: Altered Mental Status, Found Down    HISTORYOF PRESENT ILLNESS:     Sapphire Sexton is a 62 y.o. male with no PMHx p/w AMS and being admitted for drug overdose.      Per ED patient recently started using heroin and and endorsed using fentanyl. Was stumbling around mother's house before falling asleep on the couch for 2 hours. He was unresponsive when the family tried to arouse him prompting them to contact EMS. He was given Narcan during transport which resulted in agitation and increased alertness. Patient unable to provide history to us as he is altered and complains of trouble hearing. He complains of diffuse pain all over but unable to provide anything further. PAST HISTORY:     Past Medical History:   Diagnosis Date    [facility]piate abuse[facility] continuous ([facility]     Opiate overdose ([facility]        Past Surgical History:   Procedure Laterality Date    ABDOMEN SURGERY         Social History:   The patient lives at home    Alcohol:  Denies  Illicit drugs: Uses cocaine, Fentanyl  Tobacco:  Denies    Family History:  No family history on file. MEDICATIONS:     No current facility-administered medications on file prior to encounter. No current outpatient medications on file prior to encounter. Scheduled Meds:   sodium chloride flush  10 mL Intravenous 2 times per day    ampicillin-sulbactam  1.5 g Intravenous Q6H      Continuous Infusions:   naloxone (NARCAN) infusion 0.4 mg/hr (05/22/19 0213)    sodium chloride       PRN Meds:naloxone, sodium chloride flush, magnesium hydroxide, ondansetron    Allergies: Allergies not on file    REVIEW OF SYSTEMS:       History obtained from the patient    Review of Systems   Constitutional: Negative for chills, fatigue and fever.    HENT: Negative for congestion, sinus pressure and sinus pain. Eyes: Negative for photophobia and visual disturbance. Respiratory: Negative for cough and shortness of breath. Cardiovascular: Negative for chest pain and palpitations. Gastrointestinal: Negative for abdominal pain, nausea and vomiting. Endocrine: Negative for polyphagia and polyuria. Genitourinary: Negative for dysuria and urgency. Musculoskeletal: Negative for arthralgias and back pain. Neurological: Negative for dizziness, weakness and light-headedness. Psychiatric/Behavioral: Negative for confusion. The patient is not nervous/anxious. PHYSICAL EXAM:       Vitals: BP (!) 115/54   Pulse 95   Temp 100.8 °F (38.2 °C) (Oral)   Resp (!) 32   Ht 6' 0.05\" (1.83 m)   Wt 229 lb 11.5 oz (104.2 kg)   SpO2 94%   BMI 31.11 kg/m²     I/O:      Intake/Output Summary (Last 24 hours) at 5/22/2019 0649  Last data filed at 5/22/2019 0600  Gross per 24 hour   Intake 1066 ml   Output 1000 ml   Net 66 ml     I/O this shift:  In: 1066 [I.V.:108; Other:958]  Out: 1000 [Urine:1000]  No intake/output data recorded. PhysicalExamination:     Physical Exam   Constitutional: He appears well-developed and well-nourished. No distress. HENT:   Head: Normocephalic and atraumatic. Mouth/Throat: Oropharynx is clear and moist.   Eyes: Pupils are equal, round, and reactive to light. Conjunctivae and EOM are normal.   Neck: Normal range of motion. Neck supple. No tracheal deviation present. Cardiovascular: Normal rate, regular rhythm and normal heart sounds. Exam reveals no gallop and no friction rub. No murmur heard. Pulmonary/Chest: Effort normal. He has no wheezes. He has no rales. Rhonchi present   Abdominal: Soft. Bowel sounds are normal. He exhibits no distension. There is no tenderness. Musculoskeletal: Normal range of motion. He exhibits no edema or deformity. Neurological: He is alert. He has normal strength.  No cranial nerve deficit CT head negative for acute pathology  - UDS positive for benzos and cocaine    Elevated Troponin  - Likely 2/2 demand  - Trend    Diffuse Pain -- likely 2/2 narcan administration, tender in abdomen   - LFT, lipase wnl    BRITTANI on possible CKD  - Cr improving  - mIVF    Code Status:Full Code  FEN: NPO, will perform bedside swallow  DISPO: ICU, possible transfer to floor    This patient has been staffed and discussed with Sondra Sandhu MD.   -----------------------------  Barbara Okeefe, PGY-1  Reached via [employer]  6:49 AM  Patient examined, findings as discussed with Dr. Larry Ward. Agree with assessment and plan. Hypoxemia may be related to pulmonary edema secondary to cocaine and narcotic ingestion, rather than pneumonia. Note significant improvement in past few hours. Weaning off of oxygen. Follow-up chest x-ray for this.

## 2019-05-23 ENCOUNTER — APPOINTMENT (OUTPATIENT)
Dept: GENERAL RADIOLOGY | Age: 64
DRG: 812 | End: 2019-05-23
Payer: COMMERCIAL

## 2019-05-23 LAB
ALBUMIN SERPL-MCNC: 3.5 G/DL (ref 3.4–5)
ANION GAP SERPL CALCULATED.3IONS-SCNC: 13 MMOL/L (ref 3–16)
BASOPHILS ABSOLUTE: 0 K/UL (ref 0–0.2)
BASOPHILS RELATIVE PERCENT: 0.4 %
BUN BLDV-MCNC: 15 MG/DL (ref 7–20)
CALCIUM SERPL-MCNC: 9 MG/DL (ref 8.3–10.6)
CHLORIDE BLD-SCNC: 101 MMOL/L (ref 99–110)
CO2: 24 MMOL/L (ref 21–32)
CREAT SERPL-MCNC: 0.8 MG/DL (ref 0.8–1.3)
EOSINOPHILS ABSOLUTE: 0.1 K/UL (ref 0–0.6)
EOSINOPHILS RELATIVE PERCENT: 1.1 %
GFR AFRICAN AMERICAN: >60
GFR NON-AFRICAN AMERICAN: >60
GLUCOSE BLD-MCNC: 119 MG/DL (ref 70–99)
HCT VFR BLD CALC: 36.1 % (ref 40.5–52.5)
HEMOGLOBIN: 12.1 G/DL (ref 13.5–17.5)
LYMPHOCYTES ABSOLUTE: 1.6 K/UL (ref 1–5.1)
LYMPHOCYTES RELATIVE PERCENT: 16.1 %
MCH RBC QN AUTO: 29.6 PG (ref 26–34)
MCHC RBC AUTO-ENTMCNC: 33.6 G/DL (ref 31–36)
MCV RBC AUTO: 87.9 FL (ref 80–100)
MONOCYTES ABSOLUTE: 0.8 K/UL (ref 0–1.3)
MONOCYTES RELATIVE PERCENT: 7.7 %
NEUTROPHILS ABSOLUTE: 7.5 K/UL (ref 1.7–7.7)
NEUTROPHILS RELATIVE PERCENT: 74.7 %
PDW BLD-RTO: 13.9 % (ref 12.4–15.4)
PHOSPHORUS: 1.2 MG/DL (ref 2.5–4.9)
PLATELET # BLD: 222 K/UL (ref 135–450)
PMV BLD AUTO: 8.7 FL (ref 5–10.5)
POTASSIUM SERPL-SCNC: 3.7 MMOL/L (ref 3.5–5.1)
RBC # BLD: 4.1 M/UL (ref 4.2–5.9)
SODIUM BLD-SCNC: 138 MMOL/L (ref 136–145)
WBC # BLD: 10.1 K/UL (ref 4–11)

## 2019-05-23 PROCEDURE — 6360000002 HC RX W HCPCS: Performed by: INTERNAL MEDICINE

## 2019-05-23 PROCEDURE — 6370000000 HC RX 637 (ALT 250 FOR IP): Performed by: STUDENT IN AN ORGANIZED HEALTH CARE EDUCATION/TRAINING PROGRAM

## 2019-05-23 PROCEDURE — 36415 COLL VENOUS BLD VENIPUNCTURE: CPT

## 2019-05-23 PROCEDURE — 2580000003 HC RX 258: Performed by: STUDENT IN AN ORGANIZED HEALTH CARE EDUCATION/TRAINING PROGRAM

## 2019-05-23 PROCEDURE — 71045 X-RAY EXAM CHEST 1 VIEW: CPT

## 2019-05-23 PROCEDURE — 80069 RENAL FUNCTION PANEL: CPT

## 2019-05-23 PROCEDURE — 99233 SBSQ HOSP IP/OBS HIGH 50: CPT | Performed by: INTERNAL MEDICINE

## 2019-05-23 PROCEDURE — 1200000000 HC SEMI PRIVATE

## 2019-05-23 PROCEDURE — 85025 COMPLETE CBC W/AUTO DIFF WBC: CPT

## 2019-05-23 RX ORDER — AMOXICILLIN AND CLAVULANATE POTASSIUM 875; 125 MG/1; MG/1
1 TABLET, FILM COATED ORAL 2 TIMES DAILY
Status: DISCONTINUED | OUTPATIENT
Start: 2019-05-23 | End: 2019-05-23

## 2019-05-23 RX ADMIN — HEPARIN SODIUM 5000 UNITS: 5000 INJECTION INTRAVENOUS; SUBCUTANEOUS at 16:47

## 2019-05-23 RX ADMIN — HEPARIN SODIUM 5000 UNITS: 5000 INJECTION INTRAVENOUS; SUBCUTANEOUS at 23:35

## 2019-05-23 RX ADMIN — HEPARIN SODIUM 5000 UNITS: 5000 INJECTION INTRAVENOUS; SUBCUTANEOUS at 06:55

## 2019-05-23 RX ADMIN — Medication 10 ML: at 09:00

## 2019-05-23 RX ADMIN — Medication 10 ML: at 23:36

## 2019-05-23 RX ADMIN — AMOXICILLIN AND CLAVULANATE POTASSIUM 1 TABLET: 875; 125 TABLET, FILM COATED ORAL at 16:47

## 2019-05-23 ASSESSMENT — PAIN SCALES - GENERAL
PAINLEVEL_OUTOF10: 0
PAINLEVEL_OUTOF10: 0

## 2019-05-23 NOTE — PROGRESS NOTES
insight  Capillary Refill: Brisk,< 3 seconds   Peripheral Pulses: +2 palpable, equal bilaterally       Labs:   Recent Labs     05/21/19 1943 05/22/19 0207 05/23/19  0444   WBC 10.0 12.4* 10.1   HGB 14.4 13.2* 12.1*   HCT 44.0 40.6 36.1*    241 222     Recent Labs     05/21/19 1943 05/21/19 2025 05/22/19 0207 05/23/19 0444     --  141 138   K 7.2* 5.8* 4.3 3.7   CL 98*  --  102 101   CO2 23  --  22 24   BUN 16  --  19 15   CREATININE 2.6*  --  1.7* 0.8   CALCIUM 9.4  --  8.6 9.0   PHOS  --   --  4.5 1.2*     Recent Labs     05/22/19 0207   AST 44*   ALT 23   BILIDIR <0.2   BILITOT 0.4   ALKPHOS 72     No results for input(s): INR in the last 72 hours. Recent Labs     05/22/19 0207 05/22/19  0859   CKTOTAL 1,104*  --    TROPONINI 0.17* 0.08*       Urinalysis:      Lab Results   Component Value Date    NITRU Negative 05/22/2019    WBCUA 0-2 05/22/2019    BACTERIA 3+ 05/22/2019    RBCUA 0-2 05/22/2019    BLOODU MODERATE 05/22/2019    SPECGRAV 1.025 05/22/2019    GLUCOSEU Negative 05/22/2019       Radiology:  XR CHEST 1 VW   Final Result   1. Decrease in the appearance of pulmonary vascular congestion      CT Head WO Contrast   Final Result      1. Limited exam secondary to patient motion artifact. 2.  No definite findings for acute intracranial abnormality. XR CHEST STANDARD (2 VW)   Final Result      1. Mild prominence of the lung markings without localized consolidation or pleural effusion in this patient with low lung volumes. Correlate clinically. Assessment/Plan:      Active Hospital Problems    Diagnosis Date Noted    Toxic encephalopathy [G92] 05/22/2019    Dehydration [E86.0] 05/22/2019    BRITTANI (acute kidney injury) ([facility] [N17.9] 05/22/2019    Elevated troponin [R74.8] 05/22/2019    Rhabdomyolysis [M62.82] 05/22/2019    Acute respiratory failure with hypoxia ([facility] [J96.01] 05/22/2019    Opioid overdose ([facility] Frances Chavis 05/21/2019     Kirill Han is a 62 y.o. male, who was admitted with Acute Hypoxic Respiratory Failure 2/2 Opioid Overdose     Acute Hypoxic and Hypercarbic Respiratory Failure -- likely 2/2 opioid overdose vs. Aspiration PNA initially SpO2: 73% on RA, now 90% O2Hgb on RA. Chest xray today shows significant decrease in diffuse alveolar opacities. Findings are consistent with drug induced pulmonary edema.  - Augmentin 875mg bid can be discontinued  - supplemental O2 -- weaning off      Acute Toxic Encephalopathy - resolved  - Endorsed Cocaine and Fentanyl Use  - Improved with narcan in ED, continue Narcan gtt  - CT head negative for acute pathology  - UDS positive for benzos and cocaine     Elevated Troponin  - Likely 2/2 demand  - Downtrending    Compensated Cirrhosis s/p Hepatitis C  - Monitor     Diffuse Pain -- likely 2/2 narcan administration, tender in abdomen   - LFT, lipase wnl     BRITTANI - resolved  - Cr improving  - mIVF    DVT Prophylaxis: sqh  Diet: DIET GENERAL;  Code Status: Full Code     Dispo - Transfer to Floor    I will discuss the patient with the senior resident and MD Wil Singletary MD.   Internal Medicine Resident PGY-1  Reached via [facility]  Patient examined, findings as discussed with Dr Evelia Draper. Agree with assessment and plan as edited above. Stable for transfer to medical floor.

## 2019-05-23 NOTE — PROGRESS NOTES
Internal Medicine Resident  Hospitalist Progress Note      PCP: No primary care provider on file. Date of Admission: 5/21/2019    Chief Complaint:   Chief Complaint   Patient presents with    Drug Overdose    Altered Mental Status         Subjective: Endorses SOB & cough, non-productive. No CP. Slight abdominal pain. No N/V. Some lightheadedness. Medications:  Reviewed  InfusionMedications    naloxone (NARCAN) infusion       Scheduled Medications    heparin (porcine)  5,000 Units Subcutaneous 3 times per day    sodium chloride flush  10 mL Intravenous 2 times per day     PRN Meds: naloxone, sodium chloride flush, magnesium hydroxide, ondansetron      Intake/Output Summary (Last 24 hours) at 5/23/2019 0809  Last data filed at 5/23/2019 0600  Gross per 24 hour   Intake 1370 ml   Output 1575 ml   Net -205 ml       Physical Exam Performed:  BP 95/68   Pulse 80   Temp 98 °F (36.7 °C) (Oral)   Resp 23   Ht 6' 0.05\" (1.83 m)   Wt 229 lb 11.5 oz (104.2 kg)   SpO2 96%   BMI 31.11 kg/m²   Physical Exam   Constitutional: He appears well-developed and well-nourished. He is cooperative. No distress. Nasal cannula in place. Sitting up eating breakfast   HENT:   Head: Normocephalic and atraumatic. Mouth/Throat: Oropharynx is clear and moist.   Eyes: Conjunctivae are normal.   Neck: No tracheal deviation present. Cardiovascular: Normal rate, regular rhythm and normal heart sounds. Exam reveals no gallop and no friction rub. No murmur heard. Pulmonary/Chest: Effort normal. No respiratory distress. He has no decreased breath sounds. He has no wheezes. Abdominal: Soft. He exhibits no distension. There is no tenderness. Musculoskeletal: He exhibits no edema or deformity. Neurological: He is alert. He has normal strength. Grossly non-focal on limited exam, moving all extremities. Skin: Skin is warm and dry. He is not diaphoretic. Psychiatric: He is not actively hallucinating.  He expresses no homicidal and no suicidal ideation. He expresses no suicidal plans and no homicidal plans. Nursing note and vitals reviewed. Labs:  Reviewed  Recent Labs     05/21/19 1943 05/22/19 0207 05/23/19 0444   WBC 10.0 12.4* 10.1   HGB 14.4 13.2* 12.1*   HCT 44.0 40.6 36.1*    241 222     Recent Labs     05/21/19 1943 05/21/19 2025 05/22/19 0207 05/23/19  0444     --  141 138   K 7.2* 5.8* 4.3 3.7   CL 98*  --  102 101   CO2 23  --  22 24   BUN 16  --  19 15   CREATININE 2.6*  --  1.7* 0.8   CALCIUM 9.4  --  8.6 9.0   PHOS  --   --  4.5 1.2*     Recent Labs     05/22/19 0207   AST 44*   ALT 23   BILIDIR <0.2   BILITOT 0.4   ALKPHOS 72     No results for input(s): INR in the last 72 hours. Recent Labs     05/22/19 0207 05/22/19  0859   CKTOTAL 1,104*  --    TROPONINI 0.17* 0.08*       Urinalysis:   Lab Results   Component Value Date    NITRU Negative 05/22/2019    WBCUA 0-2 05/22/2019    BACTERIA 3+ 05/22/2019    RBCUA 0-2 05/22/2019    BLOODU MODERATE 05/22/2019    SPECGRAV 1.025 05/22/2019    GLUCOSEU Negative 05/22/2019       Radiology:  Reviewed  CT Head WO Contrast   Final Result      1. Limited exam secondary to patient motion artifact. 2.  No definite findings for acute intracranial abnormality. XR CHEST STANDARD (2 VW)   Final Result      1. Mild prominence of the lung markings without localized consolidation or pleural effusion in this patient with low lung volumes. Correlate clinically. Cardiac imaging:    Stress ECG  6/8/18: negative for ischemia    TTE  6/8/18: mild LVH. EF 60-65%. Wall motion normal. No RWMA. trivial agitated saline contrast shows Right-to-left shunt (intrapulmonary or intracardiac). Stress Echo 6/8/18: Positive stress-induced ischemia due to LVEF, probable inferoseptal wall motion abnormality. Mild reduction in global LVEF in recovery phase.           Assessment & Plan  62 y.o. male PMH HTN, former smoker, polysubstance abuse, PTSD/depression/anxiety (follows w/ [facility]), IVDU, HTN, HLD, h/o PE, Cirrhosis (2/2 HCV1A, s/p Harvoni 2016, small gastric varices on nadolol for ppx), DM2 w/ polyneuropathy, hypogonadism     Patient Active Hospital Problem List:  1 Acute respiratory failure with hypoxia    Assessment: 2/2 #2. Resolved. Plan:     2 Opioid overdose    Assessment: Improved. off narcan drip. Plan: encourage abstinence    3 Toxic encephalopathy    Assessment: 2/2 #2. Resolved. Plan: as per #2.    4 Dehydration    Assessment: 2/2 #3. Resolved. Plan: stop IVF when tolerating po.    5 BRITTANI     Assessment: 2/2 #7 &4. Resolved, at baseline SCr. Plan: stop IVF when tolerating po.    6 Elevated troponin     Assessment: trended flat. Likely demand ischemia in setting of dehydration, brittani, hypoxia, and rhabdomyolysis. 7 Rhabdomyolysis    Assessment: Traumatic (ie muscle compression) 2/2 #2 &3. Improved    Plan: stop IVF when tolerating po. Chronic Problem List  #Polysubstance Abuse  #IVDU    # Compensated Cirrhosis: (follows [facility] hepatology [facility] 2/2 chronic hepatitis C genotype 1A, compensated, nodular liver w/ small varices on EGD, s/p sofosbuvir/rivavirin/peg-interferon for 12 weeks in Aug 2014, s/p 24 wks Harvoni 12/2016 w/ SVR 24 in 09/2017, on nadolol for primary ppx, immune to hep A & B, RUQ U/S 6/2017 w/o liver lesion)    #DM2 w/ polyneuropaty: last A1c 6.6 8/1/18.        DVT Prophylaxis:   Diet: DIET GENERAL;  Code Status: Full Code  PT/OT Eval Status: hold  Dispo - Likely home today    Will discuss with MD Ekta Montemayor MD

## 2019-05-23 NOTE — PROGRESS NOTES
ICU to FloorTransfer Note    Subjective  63 yo male with no PMHx who p/w opioid overdose, found down, overdosed on Fentanyl and Cocaine. Encephalopathic 2/2 drug overdose. Was treated with Narcan gtt. Currently on supplemental oxygen, also treating for Aspiration and on Augmentin 875 mg PO BID. Vitals:    05/23/19 1000   BP: 104/66   Pulse: 76   Resp: 28   Temp:    SpO2: (!) 89%     Scheduled Meds:   amoxicillin-clavulanate  1 tablet Oral BID    heparin (porcine)  5,000 Units Subcutaneous 3 times per day    sodium chloride flush  10 mL Intravenous 2 times per day     Continuous Infusions:   naloxone (NARCAN) infusion       PRN Meds:naloxone, sodium chloride flush, magnesium hydroxide, ondansetron    General appearance: No apparent distress, appears stated age and cooperative. HEENT: Pupils equal, round, and reactive to light. Conjunctivae/corneas clear. Nasal Cannula in place  Neck: Supple, with full range of motion. No jugular venous distention. Trachea midline. Respiratory:  Normal respiratory effort. Clear to auscultation, bilaterally without Rales/Wheezes/Rhonchi. Cardiovascular: Regular rate and rhythm with normal S1/S2 without murmurs, rubs or gallops. Abdomen: Soft, non-tender, non-distended with normal bowel sounds. Musculoskeletal: No clubbing, cyanosis or edema bilaterally. Full range of motion without deformity. Skin: Skin color, texture, turgor normal.  No rashes or lesions. Neurologic:  Neurovascularly intact without any focal sensory/motor deficits.  Cranial nerves: II-XII intact, grossly non-focal.  Psychiatric: Alert and oriented, thought content appropriate, normal insight  Capillary Refill: Brisk,< 3 seconds   Peripheral Pulses: +2 palpable, equal bilaterally         LABS:    CBC:   Recent Labs     05/21/19  1943 05/22/19  0207 05/23/19  0444   WBC 10.0 12.4* 10.1   HGB 14.4 13.2* 12.1*   HCT 44.0 40.6 36.1*    241 222   MCV 90.4 89.1 87.9     Renal:    Recent Labs 05/21/19  1943 05/21/19 2025 05/22/19 0207 05/23/19  0444     --  141 138   K 7.2* 5.8* 4.3 3.7   CL 98*  --  102 101   CO2 23  --  22 24   BUN 16  --  19 15   CREATININE 2.6*  --  1.7* 0.8   GLUCOSE 74  --  84 119*   CALCIUM 9.4  --  8.6 9.0   PHOS  --   --  4.5 1.2*   ANIONGAP 18*  --  17* 13     Hepatic:   Recent Labs     05/22/19 0207 05/23/19  0444   AST 44*  --    ALT 23  --    BILITOT 0.4  --    BILIDIR <0.2  --    PROT 7.2  --    LABALBU 4.1  4.1 3.5   ALKPHOS 72  --      Troponin:   Recent Labs     05/22/19 0207 05/22/19  0859   TROPONINI 0.17* 0.08*     BNP: No results for input(s): BNP in the last 72 hours. Lipids: No results for input(s): CHOL, HDL in the last 72 hours. Invalid input(s): LDLCALCU, TRIGLYCERIDE  ABGs:    Recent Labs     05/21/19 2009   PHART 7.270*   [id number] 48.1*   PO2ART 74.4*   [id number] 21   BEART -5.3*   [id number] 94   PSV8EBK 23       INR: No results for input(s): INR in the last 72 hours. Lactate:   Recent Labs     05/21/19  1746   LACTATE 3.81*       The objective and subjective findings as well as the ICU course of treatment have been reviewed with the ICU team. The treatment plan has been reviewed with the ICU team. The patient is being transferred to [place] [id number] in stable condition.      Jonatan Peraza MD  Pager: [phone]

## 2019-05-23 NOTE — PLAN OF CARE
Problem: Falls - Risk of:  Goal: Will remain free from falls  Description  Will remain free from falls  Outcome: Ongoing  Note:   Fall risk protocol in place: Bed alarm on, fall risk bracelet applied, yellow indicator in hallway on, non-skid footwear in use. Patient/family educated on fall risk protocol, instructed to call for assistance when needed. Problem: Skin Integrity:  Goal: Absence of new skin breakdown  Description  Absence of new skin breakdown  Outcome: Ongoing  Note:   Patient turns self adequately in bed, Sacral Heart Mepilex in place to protect, skin intact underneath.

## 2019-05-24 VITALS
BODY MASS INDEX: 31.11 KG/M2 | WEIGHT: 229.72 LBS | RESPIRATION RATE: 16 BRPM | OXYGEN SATURATION: 92 % | TEMPERATURE: 98.1 F | DIASTOLIC BLOOD PRESSURE: 76 MMHG | HEIGHT: 72 IN | SYSTOLIC BLOOD PRESSURE: 143 MMHG | HEART RATE: 69 BPM

## 2019-05-24 LAB
ALBUMIN SERPL-MCNC: 3.9 G/DL (ref 3.4–5)
ANION GAP SERPL CALCULATED.3IONS-SCNC: 12 MMOL/L (ref 3–16)
BASOPHILS ABSOLUTE: 0 K/UL (ref 0–0.2)
BASOPHILS RELATIVE PERCENT: 0.4 %
BUN BLDV-MCNC: 13 MG/DL (ref 7–20)
CALCIUM SERPL-MCNC: 9.8 MG/DL (ref 8.3–10.6)
CHLORIDE BLD-SCNC: 105 MMOL/L (ref 99–110)
CO2: 24 MMOL/L (ref 21–32)
CREAT SERPL-MCNC: 0.7 MG/DL (ref 0.8–1.3)
EOSINOPHILS ABSOLUTE: 0.1 K/UL (ref 0–0.6)
EOSINOPHILS RELATIVE PERCENT: 2.3 %
GFR AFRICAN AMERICAN: >60
GFR NON-AFRICAN AMERICAN: >60
GLUCOSE BLD-MCNC: 104 MG/DL (ref 70–99)
HCT VFR BLD CALC: 39.6 % (ref 40.5–52.5)
HEMOGLOBIN: 13.3 G/DL (ref 13.5–17.5)
LYMPHOCYTES ABSOLUTE: 1 K/UL (ref 1–5.1)
LYMPHOCYTES RELATIVE PERCENT: 23.2 %
MAGNESIUM: 2 MG/DL (ref 1.8–2.4)
MCH RBC QN AUTO: 29.5 PG (ref 26–34)
MCHC RBC AUTO-ENTMCNC: 33.6 G/DL (ref 31–36)
MCV RBC AUTO: 88 FL (ref 80–100)
MONOCYTES ABSOLUTE: 0.5 K/UL (ref 0–1.3)
MONOCYTES RELATIVE PERCENT: 11.4 %
NEUTROPHILS ABSOLUTE: 2.8 K/UL (ref 1.7–7.7)
NEUTROPHILS RELATIVE PERCENT: 62.7 %
PDW BLD-RTO: 14 % (ref 12.4–15.4)
PHOSPHORUS: 1.4 MG/DL (ref 2.5–4.9)
PLATELET # BLD: 250 K/UL (ref 135–450)
PMV BLD AUTO: 8.4 FL (ref 5–10.5)
POTASSIUM SERPL-SCNC: 4 MMOL/L (ref 3.5–5.1)
RBC # BLD: 4.5 M/UL (ref 4.2–5.9)
SODIUM BLD-SCNC: 141 MMOL/L (ref 136–145)
WBC # BLD: 4.4 K/UL (ref 4–11)

## 2019-05-24 PROCEDURE — 83735 ASSAY OF MAGNESIUM: CPT

## 2019-05-24 PROCEDURE — 85025 COMPLETE CBC W/AUTO DIFF WBC: CPT

## 2019-05-24 PROCEDURE — 2500000003 HC RX 250 WO HCPCS: Performed by: STUDENT IN AN ORGANIZED HEALTH CARE EDUCATION/TRAINING PROGRAM

## 2019-05-24 PROCEDURE — 6360000002 HC RX W HCPCS: Performed by: STUDENT IN AN ORGANIZED HEALTH CARE EDUCATION/TRAINING PROGRAM

## 2019-05-24 PROCEDURE — 80069 RENAL FUNCTION PANEL: CPT

## 2019-05-24 PROCEDURE — 2580000003 HC RX 258: Performed by: STUDENT IN AN ORGANIZED HEALTH CARE EDUCATION/TRAINING PROGRAM

## 2019-05-24 PROCEDURE — 36415 COLL VENOUS BLD VENIPUNCTURE: CPT

## 2019-05-24 RX ORDER — GREEN TEA/HOODIA GORDONII 315-12.5MG
1 CAPSULE ORAL 2 TIMES DAILY
Qty: 60 TABLET | Refills: 0 | Status: SHIPPED | OUTPATIENT
Start: 2019-05-24 | End: 2019-06-03

## 2019-05-24 RX ORDER — AMOXICILLIN AND CLAVULANATE POTASSIUM 875; 125 MG/1; MG/1
1 TABLET, FILM COATED ORAL 2 TIMES DAILY
Qty: 14 TABLET | Refills: 0 | Status: SHIPPED | OUTPATIENT
Start: 2019-05-24 | End: 2019-05-31

## 2019-05-24 RX ADMIN — Medication 10 ML: at 08:44

## 2019-05-24 RX ADMIN — HEPARIN SODIUM 5000 UNITS: 5000 INJECTION INTRAVENOUS; SUBCUTANEOUS at 15:12

## 2019-05-24 RX ADMIN — HEPARIN SODIUM 5000 UNITS: 5000 INJECTION INTRAVENOUS; SUBCUTANEOUS at 08:42

## 2019-05-24 RX ADMIN — SODIUM PHOSPHATE, MONOBASIC, MONOHYDRATE 30 MMOL: 276; 142 INJECTION, SOLUTION INTRAVENOUS at 11:55

## 2019-05-24 ASSESSMENT — PAIN DESCRIPTION - LOCATION: LOCATION: SHOULDER

## 2019-05-24 ASSESSMENT — PAIN SCALES - GENERAL
PAINLEVEL_OUTOF10: 6
PAINLEVEL_OUTOF10: 0

## 2019-05-24 ASSESSMENT — PAIN DESCRIPTION - ONSET: ONSET: GRADUAL

## 2019-05-24 ASSESSMENT — PAIN DESCRIPTION - DESCRIPTORS: DESCRIPTORS: ACHING

## 2019-05-24 ASSESSMENT — PAIN DESCRIPTION - PROGRESSION
CLINICAL_PROGRESSION: GRADUALLY WORSENING

## 2019-05-24 ASSESSMENT — PAIN - FUNCTIONAL ASSESSMENT: PAIN_FUNCTIONAL_ASSESSMENT: ACTIVITIES ARE NOT PREVENTED

## 2019-05-24 ASSESSMENT — PAIN DESCRIPTION - PAIN TYPE: TYPE: ACUTE PAIN

## 2019-05-24 ASSESSMENT — PAIN DESCRIPTION - FREQUENCY: FREQUENCY: INTERMITTENT

## 2019-05-24 ASSESSMENT — PAIN DESCRIPTION - ORIENTATION: ORIENTATION: RIGHT

## 2019-05-24 NOTE — PROGRESS NOTES
Discharge note: Patient has been seen by doctor. Discharge order obtained, and discharge instructions reviewed. Patient educated, using the teach back method, about follow up instructions and discharge instructions. A completed copy of the AVS instructions given to patient and all questions answered. IV catheter removed without complaints, catheter intact, site WNL. Discharged to [facility] via wheel chair per transportation.     Electronically signed by Terrence Whiting RN on 5/24/2019 at 5:03 PM

## 2019-05-24 NOTE — DISCHARGE INSTR - COC
NKDEHT:126177204}    Nursing Mobility/ADLs:  Walking   {CHP DME FREZ:065159135}  Transfer  {CHP DME XGI}  Bathing  {CHP DME BWQE:349779290}  Dressing  {CHP DME PWIH:318474012}  Toileting  {CHP DME BDOY:448187979}  Feeding  {CHP DME XQWR:928719369}  Med Admin  {CHP DME LUXM:150222864}  Med Delivery   {[facility] MED Delivery:988886193}    Wound Care Documentation and Therapy:        Elimination:  Continence:   · Bowel: {YES / HF:76364}  · Bladder: {YES / UO:89991}  Urinary Catheter: {Urinary Catheter:152391514}   Colostomy/Ileostomy/Ileal Conduit: {YES / JA:12038}       Date of Last BM: ***    Intake/Output Summary (Last 24 hours) at 2019 1059  Last data filed at 2019 0557  Gross per 24 hour   Intake 300 ml   Output 1525 ml   Net -1225 ml     I/O last 3 completed shifts:   In: 550 [P.O.:550]  Out: 1525 [Urine:1525]    Safety Concerns:     508 [employer] Safety Concerns:629215516}    Impairments/Disabilities:      508 [employer] Impairments/Disabilities:545097339}    Nutrition Therapy:  Current Nutrition Therapy:   508 [employer] Diet List:716331996}    Routes of Feeding: {[facility] DME Other Feedings:736833239}  Liquids: {Slp liquid thickness:34705}  Daily Fluid Restriction: {CHP DME Yes amt example:411163208}  Last Modified Barium Swallow with Video (Video Swallowing Test): {Done Not Done INSM:177816410}    Treatments at the Time of Hospital Discharge:   Respiratory Treatments: ***  Oxygen Therapy:  {Therapy; copd oxygen:42002}  Ventilator:    { CC Vent MHHE:077454663}    Rehab Therapies: {THERAPEUTIC INTERVENTION:9743781033}  Weight Bearing Status/Restrictions: [address] [employer] Weight Bearin}  Other Medical Equipment (for information only, NOT a DME order):  {EQUIPMENT:955245985}  Other Treatments: ***    Patient's personal belongings (please select all that are sent with patient):  {[facility] DME Belongings:062625025}    RN SIGNATURE:  {Esignature:712827596}    CASE MANAGEMENT/SOCIAL WORK SECTION    Inpatient Status Date: 5/21/19    Readmission Risk Assessment Score:  Readmission Risk              Risk of Unplanned Readmission:        10           Discharging to Facility/ Agency   · Name: [facility]  · Address: [address] [place]  · Phone: [phone]          / signature: Electronically signed by ANUM Hinojosa, RADHAW on 5/24/19 at 11:08 AM    PHYSICIAN SECTION    Prognosis: {Prognosis:7981196430}    Condition at Discharge: [facility] Patient Condition:993417954}    Rehab Potential (if transferring to Rehab): {Prognosis:7528699014}    Recommended Labs or Other Treatments After Discharge: ***    Physician Certification: I certify the above information and transfer of Tiffany Rodriguez  is necessary for the continuing treatment of the diagnosis listed and that he requires {Admit to Appropriate Level of Care:62697} for {GREATER/LESS:265903339} 30 days.      Update Admission H&P: {CHP DME Changes in PTBDV:202422108}    PHYSICIAN SIGNATURE:  {Esignature:880429054}

## 2019-05-24 NOTE — PROGRESS NOTES
Internal Medicine Resident  Hospitalist Progress Note      PCP: No primary care provider on file. Date of Admission: 5/21/2019    Chief Complaint:   Chief Complaint   Patient presents with    Drug Overdose    Altered Mental Status         Subjective:  Continued SOB & Cough which is now productive of dark phlegm. No CP. Slight abdominal pain on his sides. No N/V. Some LH/dizziness early this morning after BM this morning. Medications:  Reviewed  InfusionMedications     Scheduled Medications    heparin (porcine)  5,000 Units Subcutaneous 3 times per day    sodium chloride flush  10 mL Intravenous 2 times per day     PRN Meds: sodium chloride flush, magnesium hydroxide, ondansetron      Intake/Output Summary (Last 24 hours) at 5/24/2019 0833  Last data filed at 5/24/2019 0557  Gross per 24 hour   Intake 550 ml   Output 1525 ml   Net -975 ml       Physical Exam Performed:  /85   Pulse 70   Temp 98.2 °F (36.8 °C) (Oral)   Resp 22   Ht 6' 0.05\" (1.83 m)   Wt 229 lb 11.5 oz (104.2 kg)   SpO2 (!) 89%   BMI 31.11 kg/m²   Physical Exam   Constitutional: He is oriented to person, place, and time. He appears well-developed and well-nourished. He is cooperative. No distress. Sitting up in bed   HENT:   Head: Normocephalic and atraumatic. Mouth/Throat: Oropharynx is clear and moist.   Eyes: Conjunctivae are normal.   Neck: No tracheal deviation present. Cardiovascular: Normal rate, regular rhythm and normal heart sounds. Exam reveals no gallop and no friction rub. No murmur heard. Pulmonary/Chest: Effort normal. No respiratory distress. He has no decreased breath sounds. He has no wheezes. He has rhonchi (transmitted upper airway). Abdominal: Soft. He exhibits no distension. There is no tenderness. Musculoskeletal: He exhibits no edema or deformity. Neurological: He is alert and oriented to person, place, and time. He has normal strength.    Grossly non-focal on limited exam, moving all intracardiac). Stress Echo 6/8/18: Positive stress-induced ischemia due to LVEF, probable inferoseptal wall motion abnormality. Mild reduction in global LVEF in recovery phase. Assessment & Plan  62 y.o. male PMH HTN, former smoker, polysubstance abuse, PTSD/depression/anxiety (follows w/ GCB), IVDU, HTN, HLD, h/o PE, Cirrhosis (2/2 HCV1A, s/p Harvoni 2016, small gastric varices on nadolol for ppx), DM2 w/ polyneuropathy, hypogonadism     Patient Active Hospital Problem List:  1 Acute respiratory failure with hypoxia    Assessment:  Remains off supplemental oxygen, with some episodes of hypoxia overnight. Likely due to #9. Plan: continue supplemental oxygen as necessary to maintain saturations 92%. Ambulate patient. 2 Opioid overdose    Assessment: Improved. off narcan drip. Plan: encourage abstinence    3 Toxic encephalopathy    Assessment: 2/2 #2. Improved. Plan: as per #2.    4 Dehydration    Assessment: 2/2 encephalopathy and opioid overdose. Resolved. 5 BRITTANI     Assessment: 2/2 #rhabdomyolysis, dehydration. Resolved, at baseline SCr.    6 Elevated troponin     Assessment: trended flat. Likely demand ischemia in setting of dehydration, brittani, hypoxia, and rhabdomyolysis. 7 Rhabdomyolysis    Assessment: Traumatic (ie muscle compression) 2/2 #2 &3. Improved    Plan: stop IVF when tolerating po.    8 hypophosphatemia    Replete    9 Aspiration pneumonia     Assessment: improving with continued SOB, cough, now expectorating dark mucous. Plan: cont augmentin with probiotic on discharge to complete course.     DVT Prophylaxis:   Diet: DIET GENERAL;  Code Status: Full Code  PT/OT Eval Status: hold  Dispo - Likely to [facility] via family transport,  today    Will discuss with MD Madison Chacon MD

## 2019-05-24 NOTE — CARE COORDINATION
Patient to discharge to the [facility] AT THE [facility] via family transporting. Location:  [address] [place] [address]  Phone: [phone]    SW spoke with intake at the [facility] and they are ready for him today whenever family brings him. Patient to enter and dial  to be let in.    SW has informed patient's brother who is working on having a family member drive patient to center.     Patient's sister to transport at 4:30pm.    Electronically signed by ANUM Lozano, RADHAW on 5/24/2019 at 11:06 AM
None    Home Health/Skilled Nursing   Home care at home? No       Pharmacy: [facility] Medications:   No  Does patient want to participate in local refill/meds to beds program?:  Yes    Discharge Plan    home with family         Role of Case Management discussed with patient/family/designee.      Estuardo Hodgson RN, BSN  [facility] [employer]  Case Management Department  Ph:  [phone]

## 2019-06-21 PROBLEM — R77.8 ELEVATED TROPONIN: Status: RESOLVED | Noted: 2019-05-22 | Resolved: 2019-06-21

## 2019-06-21 PROBLEM — E86.0 DEHYDRATION: Status: RESOLVED | Noted: 2019-05-22 | Resolved: 2019-06-21

## 2022-05-25 NOTE — DISCHARGE SUMMARY
Hospital Medicine Discharge Summary    Patient ID: Tiffany Rodriguez      Patient's PCP: No primary care provider on file. Admit Date: 5/21/2019     Discharge Date: 5/24/2019     Disposition:  [facility] via family transport    Admitting Physician: Basilia Lowe MD     Discharge Physician: Basilia Lowe MD     Admission Diagnoses:  Present on Admission:   [facility] overdose [facility]   Toxic encephalopathy   Dehydration   BRITTANI (acute kidney injury) ([facility]   Elevated troponin   Rhabdomyolysis   Acute respiratory failure with hypoxia [facility])       Discharge Diagnoses: Active Hospital Problems    Diagnosis Date Noted    Toxic encephalopathy [G92] 05/22/2019    Dehydration [E86.0] 05/22/2019    BRITTANI (acute kidney injury) ([address] [N17.9] 05/22/2019    Elevated troponin [R74.8] 05/22/2019    Rhabdomyolysis [M62.82] 05/22/2019    Acute respiratory failure with hypoxia ([facility] [J96.01] 05/22/2019    Opioid overdose ([facility] Aydee Up 05/21/2019       The patient was seen and examined on day of discharge and this discharge summary is in conjunction with any daily progress note from day of discharge. Hospital Course:   62 y.o. 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 Presented to [place] [address] ED via EMS for AMS, after being found stumbling around his mother's house. Per report, he recently relapsed using heroin and fentanyl. Admitted to ICU requiring narcan drip. Did not require intubation. Fluid resuscitated with improvement of BRITTANI, dehydration, and rhabdomyolysis. Continue Augmentin with probiotic to complete course for aspiration pneumonia. Plan discharge with family transport to [facility] for inpatient therapy. Stop drug use.       Physical Exam Performed:     /76   Pulse 78   Temp 98.3 °F (36.8 °C) (Oral)   Resp 16   Ht 6' 0.05\" Last a1c 6 1  Stable, advised patient to continue with metformin 500 mg BID  Patient did lose 2 pounds since last visit  Recommend working on diet and exercise  If no improvement by next visit consider starting patient on dpp4 inhibitor or sglt2 inhibitor for further glucose control    Follow up in 6 months      Lab Results   Component Value Date    HGBA1C 6 1 01/19/2022 (1.83 m)   Wt 229 lb 11.5 oz (104.2 kg)   SpO2 93%   BMI 31.11 kg/m²       Physical Exam   Constitutional: He is oriented to person, place, and time. He appears well-developed and well-nourished. He is cooperative. No distress. Sitting up in bed   HENT:   Head: Normocephalic and atraumatic. Mouth/Throat: Oropharynx is clear and moist.   Eyes: Conjunctivae are normal.   Neck: No tracheal deviation present. Cardiovascular: Normal rate, regular rhythm and normal heart sounds. Exam reveals no gallop and no friction rub. No murmur heard. Pulmonary/Chest: Effort normal. No respiratory distress. He has no decreased breath sounds. He has no wheezes. He has rhonchi (transmitted upper airway). Abdominal: Soft. He exhibits no distension. There is no tenderness. Musculoskeletal: He exhibits no edema or deformity. Neurological: He is alert and oriented to person, place, and time. He has normal strength. Grossly non-focal on limited exam, moving all extremities. Skin: Skin is warm and dry. Capillary refill takes less than 2 seconds. He is not diaphoretic. Psychiatric: He has a normal mood and affect. His behavior is normal. He is not actively hallucinating. He expresses no homicidal and no suicidal ideation. He expresses no suicidal plans and no homicidal plans. Nursing note and vitals reviewed. Significant Diagnostic Studies    Labs: For convenience and continuity at follow-up the following most recent labs are provided:    CBC:    Lab Results   Component Value Date    WBC 4.4 05/24/2019    HGB 13.3 05/24/2019    HCT 39.6 05/24/2019     05/24/2019       Renal:    Lab Results   Component Value Date     05/24/2019    K 4.0 05/24/2019     05/24/2019    CO2 24 05/24/2019    BUN 13 05/24/2019    CREATININE 0.7 05/24/2019    CALCIUM 9.8 05/24/2019    PHOS 1.4 05/24/2019       Radiology:   XR CHEST 1 VW   Final Result   1.  Decrease in the appearance of pulmonary vascular congestion CT Head WO Contrast   Final Result      1. Limited exam secondary to patient motion artifact. 2.  No definite findings for acute intracranial abnormality. XR CHEST STANDARD (2 VW)   Final Result      1. Mild prominence of the lung markings without localized consolidation or pleural effusion in this patient with low lung volumes. Correlate clinically. Consults:     IP CONSULT TO HOSPITALIST  IP CONSULT TO CRITICAL CARE  IP CONSULT TO CRITICAL CARE  IP CONSULT TO CASE MANAGEMENT        Condition at Discharge: Stable    Discharge Instructions/Follow-up:    See dc instructions on chart      Activity: activity as tolerated    Diet: DIET GENERAL;       Discharge Medications:        Medication List      START taking these medications    amoxicillin-clavulanate 875-125 MG per tablet  Commonly known as:  AUGMENTIN  Take 1 tablet by mouth 2 times daily for 7 days     PROBIOTIC ACIDOPHILUS Tabs  Take 1 tablet by mouth 2 times daily for 10 days           Where to Get Your Medications      These medications were sent to [place], [facility] [address] [facility] [address] Marleni Ulises [phone] - F [phone]  [address] [facility] [address]    Phone:  [phone]   · amoxicillin-clavulanate 875-125 MG per tablet  · PROBIOTIC ACIDOPHILUS Tabs           Time Spent on discharge 40 minutes in the examination, evaluation, counseling and review of medications and discharge plan with the patient and/or caregiver. The patient Giovanny Suggs was advised to consult their PCP/ or call 911 to proceed to nearest ED in the event if symptoms are not getting better and are getting worse . The note was completed using EMR. Every effort was made to ensure accuracy; however, inadvertent computerized transcription errors may be present.     Electronically Signed:  Edinson Davis MD   5/24/2019

## 2022-11-22 NOTE — PHYSICIAN QUERY
PT Name: Wallace Vigil  MR #: [id number]    Physician Query Form - Hematology Clarification      CDS/: Lacey Bingham RN       Contact information: [email or web address]    This form is a permanent document in the medical record.      Query Date: April 18, 2019    By submitting this query, we are merely seeking further clarification of documentation. Please utilize your independent clinical judgment when addressing the question(s) below.    The Medical record contains the following:   Indicators    Supporting Clinical Findings Location in Medical Record   X Anemia, Thrombocytopenia, Neutropenia, Pancytopenia documented Thrombocytopenia    CBC data shows Pancytopenia [facility] 4/13    Bone marrow biopsy report 4/12   X H & H H/H  9.7, 28.4  H/H 10.5, 30.8 Labs 4/10  Labs 4/13   X WBC WBC 4.05, 3.34 Labs 4/10, 4/13    Neutrophils     X Granulocytes Gran#(ANC) 3.6  Gran#(ANC) 3.1 Labs 4/10  Labs 4/13   X Platelets PLT  22 Labs 4/10, 4/13    Transfusion(s)     X Treatments: - Platelet count remains at 22  - No active bleeding noted on exam, but ecchymosis present  - Hemoc following  - Currently receiving po Dexamethasone per Hemoc recs  - s/p Bone marrow biopsy on 4/12 with results pending, will need to f/u  - Daily CBC  - Monitor and transfuse to keep platelets >10   [facility] 4/13   X Other: 64 y.o. male patient with a PMHx of Thyroid cancer (tx thyroidectomy 2017), presumed lung cancer (previously treated with SBRT left lung 50 Gy in 5 fractions--followed in Heme-Onc clinic by Dr. Ramires) with recent diagnosis of brain metastasis (completed radiation treatment 4/8/19 and taking Dexamethasone 4 mg PO TID at home), COPD, CKD, CAD, heart failure, PVD, RA who presented to the Emergency Department as recommended by Dr. Christianson following outpatient office visit for abnormal labs     H & P     Provider, please specify diagnosis or diagnoses associated with above clinical findings.    [   ]  Pancytopenia due to chemotherapy   [   ] Pancytopenia due to myelodysplastic syndrome   [  x ] Pancytopenia   [   ] Other Hematological Diagnosis (please specify)   [  ] Clinically Undetermined         Please document in your progress notes daily for the duration of treatment, until resolved, and include in your discharge summary.                                                                                                       Attending Aurao: pt lymphopenic w/ reactive lymphocytes, likely due to covid+. Ct and TVUS w/o acute findings. Discussed w/ pt. reports pain improved and is comfortable going home. return precautions provided and discussed. ready for DC.

## 2025-01-23 NOTE — TELEPHONE ENCOUNTER
Pt was last seen on 7/13/17 pt last labs were on 7/15/17 pt requesting refill on Ventolin HIFA AER    impaired balance/pain/decreased strength

## 2025-06-26 NOTE — TELEPHONE ENCOUNTER
----- Message from Yareli Sidhu sent at 5/22/2017  4:08 PM CDT -----  Contact: Ying/wife  Ying returned call; patient's phone not working. Please call her at [phone].    Thanks,  Yareli   Statement Selected